# Patient Record
Sex: MALE | Employment: UNEMPLOYED | URBAN - METROPOLITAN AREA
[De-identification: names, ages, dates, MRNs, and addresses within clinical notes are randomized per-mention and may not be internally consistent; named-entity substitution may affect disease eponyms.]

---

## 2017-04-29 ENCOUNTER — HOSPITAL ENCOUNTER (EMERGENCY)
Age: 62
Discharge: LEFT BEFORE DISCHARGE | End: 2017-04-29
Attending: EMERGENCY MEDICINE
Payer: MEDICAID

## 2017-04-29 ENCOUNTER — APPOINTMENT (OUTPATIENT)
Dept: GENERAL RADIOLOGY | Age: 62
End: 2017-04-29
Attending: PHYSICIAN ASSISTANT
Payer: MEDICAID

## 2017-04-29 VITALS
SYSTOLIC BLOOD PRESSURE: 137 MMHG | TEMPERATURE: 98.3 F | WEIGHT: 150 LBS | OXYGEN SATURATION: 98 % | HEART RATE: 91 BPM | RESPIRATION RATE: 18 BRPM | BODY MASS INDEX: 20.32 KG/M2 | DIASTOLIC BLOOD PRESSURE: 81 MMHG | HEIGHT: 72 IN

## 2017-04-29 DIAGNOSIS — S82.891S: ICD-10-CM

## 2017-04-29 DIAGNOSIS — Z53.29 LEFT AGAINST MEDICAL ADVICE: Primary | ICD-10-CM

## 2017-04-29 DIAGNOSIS — E87.1 HYPONATREMIA: ICD-10-CM

## 2017-04-29 DIAGNOSIS — R07.89 MUSCULOSKELETAL CHEST PAIN: ICD-10-CM

## 2017-04-29 LAB
ALBUMIN SERPL BCP-MCNC: 3.7 G/DL (ref 3.4–5)
ALBUMIN/GLOB SERPL: 0.9 {RATIO} (ref 0.8–1.7)
ALP SERPL-CCNC: 102 U/L (ref 45–117)
ALT SERPL-CCNC: 34 U/L (ref 16–61)
ANION GAP BLD CALC-SCNC: 13 MMOL/L (ref 3–18)
ANION GAP BLD CALC-SCNC: 9 MMOL/L (ref 3–18)
AST SERPL W P-5'-P-CCNC: 39 U/L (ref 15–37)
BASOPHILS # BLD AUTO: 0 K/UL (ref 0–0.06)
BASOPHILS # BLD: 1 % (ref 0–2)
BILIRUB DIRECT SERPL-MCNC: 0.1 MG/DL (ref 0–0.2)
BILIRUB SERPL-MCNC: 0.4 MG/DL (ref 0.2–1)
BUN SERPL-MCNC: 5 MG/DL (ref 7–18)
BUN SERPL-MCNC: 5 MG/DL (ref 7–18)
BUN/CREAT SERPL: 7 (ref 12–20)
BUN/CREAT SERPL: 7 (ref 12–20)
CALCIUM SERPL-MCNC: 8.6 MG/DL (ref 8.5–10.1)
CALCIUM SERPL-MCNC: 8.8 MG/DL (ref 8.5–10.1)
CHLORIDE SERPL-SCNC: 90 MMOL/L (ref 100–108)
CHLORIDE SERPL-SCNC: 91 MMOL/L (ref 100–108)
CK MB CFR SERPL CALC: 1.7 % (ref 0–4)
CK MB CFR SERPL CALC: 1.7 % (ref 0–4)
CK MB SERPL-MCNC: 2.4 NG/ML (ref 5–25)
CK MB SERPL-MCNC: 3 NG/ML (ref 5–25)
CK SERPL-CCNC: 142 U/L (ref 39–308)
CK SERPL-CCNC: 172 U/L (ref 39–308)
CO2 SERPL-SCNC: 20 MMOL/L (ref 21–32)
CO2 SERPL-SCNC: 24 MMOL/L (ref 21–32)
CREAT SERPL-MCNC: 0.67 MG/DL (ref 0.6–1.3)
CREAT SERPL-MCNC: 0.71 MG/DL (ref 0.6–1.3)
DIFFERENTIAL METHOD BLD: ABNORMAL
EOSINOPHIL # BLD: 0 K/UL (ref 0–0.4)
EOSINOPHIL NFR BLD: 1 % (ref 0–5)
ERYTHROCYTE [DISTWIDTH] IN BLOOD BY AUTOMATED COUNT: 12.5 % (ref 11.6–14.5)
GLOBULIN SER CALC-MCNC: 4.2 G/DL (ref 2–4)
GLUCOSE SERPL-MCNC: 134 MG/DL (ref 74–99)
GLUCOSE SERPL-MCNC: 91 MG/DL (ref 74–99)
HCT VFR BLD AUTO: 39.2 % (ref 36–48)
HGB BLD-MCNC: 14.4 G/DL (ref 13–16)
LYMPHOCYTES # BLD AUTO: 44 % (ref 21–52)
LYMPHOCYTES # BLD: 1.6 K/UL (ref 0.9–3.6)
MAGNESIUM SERPL-MCNC: 1.7 MG/DL (ref 1.6–2.6)
MCH RBC QN AUTO: 33.6 PG (ref 24–34)
MCHC RBC AUTO-ENTMCNC: 36.7 G/DL (ref 31–37)
MCV RBC AUTO: 91.4 FL (ref 74–97)
MONOCYTES # BLD: 0.4 K/UL (ref 0.05–1.2)
MONOCYTES NFR BLD AUTO: 11 % (ref 3–10)
NEUTS SEG # BLD: 1.6 K/UL (ref 1.8–8)
NEUTS SEG NFR BLD AUTO: 43 % (ref 40–73)
PLATELET # BLD AUTO: 221 K/UL (ref 135–420)
PMV BLD AUTO: 7.6 FL (ref 9.2–11.8)
POTASSIUM SERPL-SCNC: 3.7 MMOL/L (ref 3.5–5.5)
POTASSIUM SERPL-SCNC: 3.9 MMOL/L (ref 3.5–5.5)
PROT SERPL-MCNC: 7.9 G/DL (ref 6.4–8.2)
RBC # BLD AUTO: 4.29 M/UL (ref 4.7–5.5)
SODIUM SERPL-SCNC: 123 MMOL/L (ref 136–145)
SODIUM SERPL-SCNC: 124 MMOL/L (ref 136–145)
TROPONIN I SERPL-MCNC: <0.02 NG/ML (ref 0–0.04)
TROPONIN I SERPL-MCNC: <0.02 NG/ML (ref 0–0.04)
WBC # BLD AUTO: 3.7 K/UL (ref 4.6–13.2)

## 2017-04-29 PROCEDURE — 99285 EMERGENCY DEPT VISIT HI MDM: CPT

## 2017-04-29 PROCEDURE — 82550 ASSAY OF CK (CPK): CPT | Performed by: PHYSICIAN ASSISTANT

## 2017-04-29 PROCEDURE — 74011250636 HC RX REV CODE- 250/636: Performed by: PHYSICIAN ASSISTANT

## 2017-04-29 PROCEDURE — 83735 ASSAY OF MAGNESIUM: CPT | Performed by: PHYSICIAN ASSISTANT

## 2017-04-29 PROCEDURE — 80048 BASIC METABOLIC PNL TOTAL CA: CPT | Performed by: PHYSICIAN ASSISTANT

## 2017-04-29 PROCEDURE — 73564 X-RAY EXAM KNEE 4 OR MORE: CPT

## 2017-04-29 PROCEDURE — 74011250637 HC RX REV CODE- 250/637: Performed by: PHYSICIAN ASSISTANT

## 2017-04-29 PROCEDURE — 85025 COMPLETE CBC W/AUTO DIFF WBC: CPT | Performed by: PHYSICIAN ASSISTANT

## 2017-04-29 PROCEDURE — 80076 HEPATIC FUNCTION PANEL: CPT | Performed by: PHYSICIAN ASSISTANT

## 2017-04-29 PROCEDURE — 96372 THER/PROPH/DIAG INJ SC/IM: CPT

## 2017-04-29 PROCEDURE — 71010 XR CHEST PORT: CPT

## 2017-04-29 PROCEDURE — 96360 HYDRATION IV INFUSION INIT: CPT

## 2017-04-29 PROCEDURE — 93005 ELECTROCARDIOGRAM TRACING: CPT

## 2017-04-29 RX ORDER — LISINOPRIL 20 MG/1
20 TABLET ORAL
Status: COMPLETED | OUTPATIENT
Start: 2017-04-29 | End: 2017-04-29

## 2017-04-29 RX ORDER — KETOROLAC TROMETHAMINE 30 MG/ML
60 INJECTION, SOLUTION INTRAMUSCULAR; INTRAVENOUS
Status: COMPLETED | OUTPATIENT
Start: 2017-04-29 | End: 2017-04-29

## 2017-04-29 RX ORDER — LORAZEPAM 1 MG/1
1 TABLET ORAL
Status: COMPLETED | OUTPATIENT
Start: 2017-04-29 | End: 2017-04-29

## 2017-04-29 RX ORDER — CLONIDINE HYDROCHLORIDE 0.1 MG/1
0.1 TABLET ORAL
Status: COMPLETED | OUTPATIENT
Start: 2017-04-29 | End: 2017-04-29

## 2017-04-29 RX ORDER — LISINOPRIL 20 MG/1
20 TABLET ORAL DAILY
Qty: 30 TAB | Refills: 0 | Status: SHIPPED | OUTPATIENT
Start: 2017-04-29 | End: 2018-06-22

## 2017-04-29 RX ADMIN — SODIUM CHLORIDE 1000 ML: 900 INJECTION, SOLUTION INTRAVENOUS at 13:23

## 2017-04-29 RX ADMIN — LISINOPRIL 20 MG: 20 TABLET ORAL at 14:45

## 2017-04-29 RX ADMIN — KETOROLAC TROMETHAMINE 60 MG: 30 INJECTION, SOLUTION INTRAMUSCULAR at 11:48

## 2017-04-29 RX ADMIN — CLONIDINE HYDROCHLORIDE 0.1 MG: 0.1 TABLET ORAL at 14:45

## 2017-04-29 RX ADMIN — LORAZEPAM 1 MG: 1 TABLET ORAL at 16:33
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
## 2017-04-29 NOTE — DISCHARGE INSTRUCTIONS
Musculoskeletal Chest Pain: Care Instructions  Your Care Instructions  Chest pain is not always a sign that something is wrong with your heart or that you have another serious problem. The doctor thinks your chest pain is caused by strained muscles or ligaments, inflamed chest cartilage, or another problem in your chest, rather than by your heart. You may need more tests to find the cause of your chest pain. Follow-up care is a key part of your treatment and safety. Be sure to make and go to all appointments, and call your doctor if you are having problems. Its also a good idea to know your test results and keep a list of the medicines you take. How can you care for yourself at home? · Take pain medicines exactly as directed. ¨ If the doctor gave you a prescription medicine for pain, take it as prescribed. ¨ If you are not taking a prescription pain medicine, ask your doctor if you can take an over-the-counter medicine. · Rest and protect the sore area. · Stop, change, or take a break from any activity that may be causing your pain or soreness. · Put ice or a cold pack on the sore area for 10 to 20 minutes at a time. Try to do this every 1 to 2 hours for the next 3 days (when you are awake) or until the swelling goes down. Put a thin cloth between the ice and your skin. · After 2 or 3 days, apply a heating pad set on low or a warm cloth to the area that hurts. Some doctors suggest that you go back and forth between hot and cold. · Do not wrap or tape your ribs for support. This may cause you to take smaller breaths, which could increase your risk of lung problems. · Mentholated creams such as Bengay or Icy Hot may soothe sore muscles. Follow the instructions on the package. · Follow your doctor's instructions for exercising. · Gentle stretching and massage may help you get better faster. Stretch slowly to the point just before pain begins, and hold the stretch for at least 15 to 30 seconds.  Do this 3 or 4 times a day. Stretch just after you have applied heat. · As your pain gets better, slowly return to your normal activities. Any increased pain may be a sign that you need to rest a while longer. When should you call for help? Call 911 anytime you think you may need emergency care. For example, call if:  · You have chest pain or pressure. This may occur with:  ¨ Sweating. ¨ Shortness of breath. ¨ Nausea or vomiting. ¨ Pain that spreads from the chest to the neck, jaw, or one or both shoulders or arms. ¨ Dizziness or lightheadedness. ¨ A fast or uneven pulse. After calling 911, chew 1 adult-strength aspirin. Wait for an ambulance. Do not try to drive yourself. · You have sudden chest pain and shortness of breath, or you cough up blood. Call your doctor now or seek immediate medical care if:  · You have any trouble breathing. · Your chest pain gets worse. · Your chest pain occurs consistently with exercise and is relieved by rest.  Watch closely for changes in your health, and be sure to contact your doctor if:  · Your chest pain does not get better after 1 week. Where can you learn more? Go to http://olivia-gary.info/. Enter V293 in the search box to learn more about \"Musculoskeletal Chest Pain: Care Instructions. \"  Current as of: May 27, 2016  Content Version: 11.2  © 3314-9840 [employer]. Care instructions adapted under license by [employer] (which disclaims liability or warranty for this information). If you have questions about a medical condition or this instruction, always ask your healthcare professional. [place] [address] any warranty or liability for your use of this information. Hyponatremia: Care Instructions  Your Care Instructions  Hyponatremia (say \"wd-[id number]-[id number]-beverly-uh\") means that you don't have enough sodium in your blood. It can cause nausea, vomiting, and headaches. Or you may not feel hungry.  In serious cases, it can cause seizures, a coma, or even death. Hyponatremia is not a disease. It is a problem caused by something else, such as medicines or exercising for a long time in hot weather. You can get hyponatremia if you lose a lot of fluids and then you drink a lot of water or other liquids that don't have much sodium. You can also get it if you have kidney, liver, heart, or other health problems. Treatment is focused on getting your sodium levels back to normal.  Follow-up care is a key part of your treatment and safety. Be sure to make and go to all appointments, and call your doctor if you are having problems. It's also a good idea to know your test results and keep a list of the medicines you take. How can you care for yourself at home? · If your doctor recommends it, drink fluids that have sodium. Sports drinks are a good choice. Or you can eat salty foods. · If your doctor recommends it, limit the amount of water you drink. And limit fluids that are mostly water. These include tea, coffee, and juice. · Take your medicines exactly as prescribed. Call your doctor if you have any problems with your medicine. · Get your sodium levels tested when your doctor tells you to. When should you call for help? Call 911 anytime you think you may need emergency care. For example, call if:  · You have a seizure. · You passed out (lost consciousness). Call your doctor now or seek immediate medical care if:  · You are confused or it is hard to focus. · You have little or no appetite. · You feel sick to your stomach or you vomit. · You have a headache. · You have mood changes. · You feel more tired than usual.  Watch closely for changes in your health, and be sure to contact your doctor if:  · You do not get better as expected. Where can you learn more? Go to http://olivia-gary.info/. Enter W293 in the search box to learn more about \"Hyponatremia: Care Instructions. \"  Current as of: October 14, 2016  Content Version: 11.2  © 5885-3222 [employer]. Care instructions adapted under license by [employer] (which disclaims liability or warranty for this information). If you have questions about a medical condition or this instruction, always ask your healthcare professional. [address] any warranty or liability for your use of this information.

## 2017-04-29 NOTE — ED NOTES
Patient leaving against medical advice at this time. I have tried talking to patient but patient states Tamea Apley been here all day I need to go home\". Patient verbalizes understanding that his sodium is low and we are trying to increase his levels prior to leaving. Patient states \"I will eat a lot of sodium at home\". Patient asking for referral to a clinic so he can get pain medication. I have reviewed discharge instructions with the patient. The patient verbalized understanding. VSS.  Patient armband removed and shredded

## 2017-04-29 NOTE — ED NOTES
Purposeful rounding completed:    Side rails up x 1:  YES  Bed in low position and wheels locked: YES  Call bell within reach: YES  Comfort addressed: YES    Toileting needs addressed: YES  Plan of care reviewed/updated with patient and or family members: YES  IV site assessed: YES  Pain assessed and addressed: YES, 6

## 2017-04-29 NOTE — ED NOTES
I have reviewed discharge instructions with the patient. The patient verbalized understanding. Patient armband removed and shredded. VSS.  Patient verbalized understanding of how to properly take prescribed medication

## 2017-04-29 NOTE — ED TRIAGE NOTES
Pt c/o right knee pain for one month and wanting \"some pain medication\". Reports he was prescribed pain medication one month ago but only got half the pills because it was too expensive. Wants pain medicine from us and to f/u with \"a private doctor\". Education given on pain medication and er visits.

## 2017-04-29 NOTE — ED NOTES
Purposeful rounding completed:    Side rails up x 1:  YES  Bed in low position and wheels locked: YES  Call bell within reach: YES  Comfort addressed: YES    Toileting needs addressed: YES  Plan of care reviewed/updated with patient and or family members: YES  IV site assessed: YES  Pain assessed and addressed: YES, 5    Patient pacing in room saying hes tired of sitting and wants to leave.  Patient medicated with ativan at this time

## 2017-04-29 NOTE — ED NOTES
Patient provided with crackers and ginger ale at this time per verbal okay of provider Mary Dickerson.

## 2017-04-30 LAB
ATRIAL RATE: 88 BPM
CALCULATED P AXIS, ECG09: 76 DEGREES
CALCULATED R AXIS, ECG10: 71 DEGREES
CALCULATED T AXIS, ECG11: 75 DEGREES
DIAGNOSIS, 93000: NORMAL
P-R INTERVAL, ECG05: 262 MS
Q-T INTERVAL, ECG07: 370 MS
QRS DURATION, ECG06: 84 MS
QTC CALCULATION (BEZET), ECG08: 447 MS
VENTRICULAR RATE, ECG03: 88 BPM

## 2018-06-22 ENCOUNTER — APPOINTMENT (OUTPATIENT)
Dept: GENERAL RADIOLOGY | Age: 63
End: 2018-06-22
Attending: EMERGENCY MEDICINE
Payer: SELF-PAY

## 2018-06-22 ENCOUNTER — HOSPITAL ENCOUNTER (EMERGENCY)
Age: 63
Discharge: HOME | End: 2018-06-22
Attending: EMERGENCY MEDICINE
Payer: SELF-PAY

## 2018-06-22 VITALS
DIASTOLIC BLOOD PRESSURE: 89 MMHG | HEART RATE: 77 BPM | OXYGEN SATURATION: 100 % | TEMPERATURE: 97.6 F | SYSTOLIC BLOOD PRESSURE: 142 MMHG | RESPIRATION RATE: 19 BRPM

## 2018-06-22 DIAGNOSIS — C34.91 NON-SMALL CELL CANCER OF RIGHT LUNG (HCC): ICD-10-CM

## 2018-06-22 DIAGNOSIS — R07.9 ACUTE CHEST PAIN: Primary | ICD-10-CM

## 2018-06-22 LAB
ANION GAP SERPL CALC-SCNC: 8 MMOL/L (ref 3–18)
APPEARANCE UR: CLEAR
BASOPHILS # BLD: 0.1 K/UL (ref 0–0.06)
BASOPHILS NFR BLD: 2 % (ref 0–2)
BILIRUB UR QL: NEGATIVE
BUN SERPL-MCNC: 4 MG/DL (ref 7–18)
BUN/CREAT SERPL: 5 (ref 12–20)
CALCIUM SERPL-MCNC: 8.9 MG/DL (ref 8.5–10.1)
CHLORIDE SERPL-SCNC: 99 MMOL/L (ref 100–108)
CO2 SERPL-SCNC: 25 MMOL/L (ref 21–32)
COLOR UR: YELLOW
CREAT SERPL-MCNC: 0.73 MG/DL (ref 0.6–1.3)
DIFFERENTIAL METHOD BLD: ABNORMAL
EOSINOPHIL # BLD: 0 K/UL (ref 0–0.4)
EOSINOPHIL NFR BLD: 1 % (ref 0–5)
ERYTHROCYTE [DISTWIDTH] IN BLOOD BY AUTOMATED COUNT: 12.8 % (ref 11.6–14.5)
GLUCOSE SERPL-MCNC: 86 MG/DL (ref 74–99)
GLUCOSE UR STRIP.AUTO-MCNC: NEGATIVE MG/DL
HCT VFR BLD AUTO: 41.8 % (ref 36–48)
HGB BLD-MCNC: 15.2 G/DL (ref 13–16)
HGB UR QL STRIP: NEGATIVE
KETONES UR QL STRIP.AUTO: NEGATIVE MG/DL
LEUKOCYTE ESTERASE UR QL STRIP.AUTO: NEGATIVE
LYMPHOCYTES # BLD: 1.8 K/UL (ref 0.9–3.6)
LYMPHOCYTES NFR BLD: 55 % (ref 21–52)
MCH RBC QN AUTO: 34.3 PG (ref 24–34)
MCHC RBC AUTO-ENTMCNC: 36.4 G/DL (ref 31–37)
MCV RBC AUTO: 94.4 FL (ref 74–97)
MONOCYTES # BLD: 0.5 K/UL (ref 0.05–1.2)
MONOCYTES NFR BLD: 14 % (ref 3–10)
NEUTS SEG # BLD: 0.9 K/UL (ref 1.8–8)
NEUTS SEG NFR BLD: 28 % (ref 40–73)
NITRITE UR QL STRIP.AUTO: NEGATIVE
PH UR STRIP: 6 [PH] (ref 5–8)
PLATELET # BLD AUTO: 143 K/UL (ref 135–420)
PMV BLD AUTO: 8.1 FL (ref 9.2–11.8)
POTASSIUM SERPL-SCNC: 4 MMOL/L (ref 3.5–5.5)
PROT UR STRIP-MCNC: NEGATIVE MG/DL
RBC # BLD AUTO: 4.43 M/UL (ref 4.7–5.5)
SODIUM SERPL-SCNC: 132 MMOL/L (ref 136–145)
SP GR UR REFRACTOMETRY: 1.01 (ref 1–1.03)
TROPONIN I SERPL-MCNC: <0.02 NG/ML (ref 0–0.04)
UROBILINOGEN UR QL STRIP.AUTO: 0.2 EU/DL (ref 0.2–1)
WBC # BLD AUTO: 3.3 K/UL (ref 4.6–13.2)

## 2018-06-22 PROCEDURE — 71045 X-RAY EXAM CHEST 1 VIEW: CPT

## 2018-06-22 PROCEDURE — 93005 ELECTROCARDIOGRAM TRACING: CPT

## 2018-06-22 PROCEDURE — 99285 EMERGENCY DEPT VISIT HI MDM: CPT

## 2018-06-22 PROCEDURE — 85025 COMPLETE CBC W/AUTO DIFF WBC: CPT | Performed by: EMERGENCY MEDICINE

## 2018-06-22 PROCEDURE — 80048 BASIC METABOLIC PNL TOTAL CA: CPT | Performed by: EMERGENCY MEDICINE

## 2018-06-22 PROCEDURE — 74011250637 HC RX REV CODE- 250/637: Performed by: EMERGENCY MEDICINE

## 2018-06-22 PROCEDURE — 81003 URINALYSIS AUTO W/O SCOPE: CPT | Performed by: EMERGENCY MEDICINE

## 2018-06-22 PROCEDURE — 84484 ASSAY OF TROPONIN QUANT: CPT | Performed by: EMERGENCY MEDICINE

## 2018-06-22 RX ORDER — GUAIFENESIN 100 MG/5ML
324 LIQUID (ML) ORAL
Status: COMPLETED | OUTPATIENT
Start: 2018-06-22 | End: 2018-06-22

## 2018-06-22 RX ORDER — LISINOPRIL 20 MG/1
20 TABLET ORAL
Status: COMPLETED | OUTPATIENT
Start: 2018-06-22 | End: 2018-06-22

## 2018-06-22 RX ORDER — LISINOPRIL 20 MG/1
20 TABLET ORAL DAILY
Qty: 30 TAB | Refills: 0 | Status: SHIPPED | OUTPATIENT
Start: 2018-06-22 | End: 2019-01-01

## 2018-06-22 RX ORDER — NITROGLYCERIN 0.4 MG/1
0.4 TABLET SUBLINGUAL
Status: DISCONTINUED | OUTPATIENT
Start: 2018-06-22 | End: 2018-06-22 | Stop reason: HOSPADM

## 2018-06-22 RX ADMIN — NITROGLYCERIN 0.4 MG: 0.4 TABLET SUBLINGUAL at 19:10

## 2018-06-22 RX ADMIN — LISINOPRIL 20 MG: 20 TABLET ORAL at 19:10

## 2018-06-22 RX ADMIN — ASPIRIN 81 MG 324 MG: 81 TABLET ORAL at 19:10

## 2018-06-22 NOTE — ED PROVIDER NOTES
EMERGENCY DEPARTMENT HISTORY AND PHYSICAL EXAM    6:25 PM      Date: 6/22/2018  Patient Name: Timoteo Gagnon    History of Presenting Illness     Chief Complaint   Patient presents with    Chest Pain         History Provided By: Patient    Chief Complaint: cp  Duration:  4 hours  Timing:  Constant  Location: right sided  Quality: nonradiating, atraumatic   Severity: Moderate  Modifying Factors: has not had BP and seizure medication for a while   Associated Symptoms: denies any other associated signs or symptoms      Additional History (Context): Timoteo Gagnon, a 59 y.o. Male, current daily smoker, alcohol drinker, with h/o depression, seizures and HTN, presents to the ED c/o constant, moderate severity, right sided cp since 4 hours ago that worsens with palpation. Pt admits to walking at the time of onset of his cp but denies sob. He has a cough for a while but no cold sx or abd pain. Pt is supposed to take HTN medication but has not been able to get his medication due to problems with coverage for the past month. He has applied for Medicaid but won't have coverage until 7/1/2018. Pt does not have a PCP. No other complaints/concerns are reported at this time. PCP: None    Current Facility-Administered Medications   Medication Dose Route Frequency Provider Last Rate Last Dose    nitroglycerin (NITROSTAT) tablet 0.4 mg  0.4 mg SubLINGual Q5MIN PRN Luda Gauthier MD   0.4 mg at 06/22/18 1910     Current Outpatient Prescriptions   Medication Sig Dispense Refill    lisinopril (PRINIVIL, ZESTRIL) 20 mg tablet Take 1 Tab by mouth daily. 30 Tab 0    aspirin (ASPIRIN) 325 mg tablet Take 325 mg by mouth daily.  divalproex DR (DEPAKOTE) 250 mg tablet Take  by mouth three (3) times daily. Past History     Past Medical History:  Past Medical History:   Diagnosis Date    Depression     Hypertension     Seizure ([facility] [address]     Seizures ([facility] [address]        Past Surgical History:  History reviewed.  No pertinent surgical history. Family History:  History reviewed. No pertinent family history. Social History:  Social History   Substance Use Topics    Smoking status: Current Every Day Smoker    Smokeless tobacco: Never Used    Alcohol use Yes       Allergies:  No Known Allergies      Review of Systems       Review of Systems   Constitutional: Negative for chills and fever. HENT: Negative for ear pain and sore throat. Eyes: Negative for pain and visual disturbance. Respiratory: Positive for cough. Negative for shortness of breath. Cardiovascular: Positive for chest pain (right sided). Negative for palpitations. Gastrointestinal: Negative for abdominal pain, diarrhea, nausea and vomiting. Genitourinary: Negative for flank pain. Musculoskeletal: Negative for back pain and neck pain. Neurological: Negative for syncope and headaches. Psychiatric/Behavioral: Negative for agitation. The patient is not nervous/anxious. All other systems reviewed and are negative. Physical Exam     Visit Vitals    BP (!) 169/93    Pulse 89    Temp 97.6 °F (36.4 °C)    Resp 14    SpO2 100%       Physical Exam    Physical exam:  General:  Well-developed, well-nourished, thin male nontoxic nondiaphoretic. Head:  Normocephalic atraumatic. Eyes:  Pupils midrange extraocular movements intact. No pallor or conjunctival injection. Nose:  No rhinorrhea, inspection grossly normal.    Ears:  Grossly normal to inspection, no discharge. Mouth:  Mucous membranes moist, no appreciable intraoral lesion. Neck/Back:  Trachea midline, no asymmetry. Chest:  Grossly normal inspection, symmetric chest rise. Pulmonary:  Clear to auscultation bilaterally no wheezes rhonchi or rales. Cardiovascular:  S1-S2 no murmurs rubs or gallops. Abdomen: Soft, nontender, nondistended no guarding rebound or peritoneal signs.     Extremities:  Grossly normal to inspection, peripheral pulses intact  no distal edema no pain on palpation no asymmetry  Neurologic:  Alert and oriented no appreciable focal neurologic deficit     Skin:  Warm and dry   Psychiatric:  Grossly normal mood and affect   Nursing note reviewed, vital signs reviewed. Diagnostic Study Results     Labs -  Recent Results (from the past 12 hour(s))   EKG, 12 LEAD, INITIAL    Collection Time: 06/22/18  6:22 PM   Result Value Ref Range    Ventricular Rate 88 BPM    Atrial Rate 88 BPM    P-R Interval 248 ms    QRS Duration 94 ms    Q-T Interval 372 ms    QTC Calculation (Bezet) 450 ms    Calculated P Axis 83 degrees    Calculated R Axis 71 degrees    Calculated T Axis 78 degrees    Diagnosis       Sinus rhythm with 1st degree AV block  Possible Left atrial enlargement  Borderline ECG  When compared with ECG of 29-APR-2017 10:57,  No significant change was found     URINALYSIS W/ RFLX MICROSCOPIC    Collection Time: 06/22/18  6:29 PM   Result Value Ref Range    Color YELLOW      Appearance CLEAR      Specific gravity 1.007 1.005 - 1.030      pH (UA) 6.0 5.0 - 8.0      Protein NEGATIVE  NEG mg/dL    Glucose NEGATIVE  NEG mg/dL    Ketone NEGATIVE  NEG mg/dL    Bilirubin NEGATIVE  NEG      Blood NEGATIVE  NEG      Urobilinogen 0.2 0.2 - 1.0 EU/dL    Nitrites NEGATIVE  NEG      Leukocyte Esterase NEGATIVE  NEG     CBC WITH AUTOMATED DIFF    Collection Time: 06/22/18  6:34 PM   Result Value Ref Range    WBC 3.3 (L) 4.6 - 13.2 K/uL    RBC 4.43 (L) 4.70 - 5.50 M/uL    HGB 15.2 13.0 - 16.0 g/dL    HCT 41.8 36.0 - 48.0 %    MCV 94.4 74.0 - 97.0 FL    MCH 34.3 (H) 24.0 - 34.0 PG    MCHC 36.4 31.0 - 37.0 g/dL    RDW 12.8 11.6 - 14.5 %    PLATELET 881 020 - 722 K/uL    MPV 8.1 (L) 9.2 - 11.8 FL    NEUTROPHILS 28 (L) 40 - 73 %    LYMPHOCYTES 55 (H) 21 - 52 %    MONOCYTES 14 (H) 3 - 10 %    EOSINOPHILS 1 0 - 5 %    BASOPHILS 2 0 - 2 %    ABS. NEUTROPHILS 0.9 (L) 1.8 - 8.0 K/UL    ABS. LYMPHOCYTES 1.8 0.9 - 3.6 K/UL    ABS. MONOCYTES 0.5 0.05 - 1.2 K/UL    ABS. EOSINOPHILS 0.0 0.0 - 0.4 K/UL    ABS. BASOPHILS 0.1 (H) 0.0 - 0.06 K/UL    DF AUTOMATED     METABOLIC PANEL, BASIC    Collection Time: 06/22/18  6:34 PM   Result Value Ref Range    Sodium 132 (L) 136 - 145 mmol/L    Potassium 4.0 3.5 - 5.5 mmol/L    Chloride 99 (L) 100 - 108 mmol/L    CO2 25 21 - 32 mmol/L    Anion gap 8 3.0 - 18 mmol/L    Glucose 86 74 - 99 mg/dL    BUN 4 (L) 7.0 - 18 MG/DL    Creatinine 0.73 0.6 - 1.3 MG/DL    BUN/Creatinine ratio 5 (L) 12 - 20      GFR est AA >60 >60 ml/min/1.73m2    GFR est non-AA >60 >60 ml/min/1.73m2    Calcium 8.9 8.5 - 10.1 MG/DL   TROPONIN I    Collection Time: 06/22/18  6:34 PM   Result Value Ref Range    Troponin-I, Qt. <0.02 0.0 - 0.045 NG/ML       Radiologic Studies -   XR CHEST PORT    (Results Pending)     XR CHEST PORT  --------------------------------------------  Impression by Dr. Nash Kent at 7:24 PM   No pneumothorax, hazy opasicity in right upper lobe corresponding to former nodule. Medical Decision Making   I am the first provider for this patient. I reviewed the vital signs, available nursing notes, past medical history, past surgical history, family history and social history. Vital Signs-Reviewed the patient's vital signs. Pulse Oximetry Analysis -  98% on room air (Interpretation) Non-hypoxic     Cardiac Monitor:  Rate: 95  Rhythm:  sinus rhythm with first degree AV block     EKG: Interpreted by the EP. Time Interpreted: 18:22    Rate: 88 bpm   Rhythm: sinus rhythm with first degree AV block     Records Reviewed: Nursing Notes and Old Medical Records (Time of Review: 6:25 PM)    ED Course: Progress Notes, Reevaluation, and Consults:      ED course:  Patient presents with chest pain, atypical description, recent onset of more than 4 hours, we'll not need more than 1 troponin, he does have a markedly elevated high blood pressure not compliant with medications or past 1 month due to financial difficulty.   We'll give aspirin and nitroglycerin glycerin dose of lisinopril here which is his normal medication and check labs    7:26 PM Chart review shows pt  was seen by pulmonology for lung cancer on January of 2018. Labs unremarkable troponin negative    Reevaluated blood pressure is now 140/82 his symptoms have resolved, he was offered inpatient admission for further evaluation of his chest pain and management as high blood pressure, he declined. He is clinically sober, and no indication of force him against his will to be admitted. On review his EMR he does have a history of right-sided lung cancer, status post what sounds like radiation therapy, he has multiple no shows listed, he was informed as need to follow-up with primary care and oncology. He is aware of the dangers of failing to follow up for evaluation of cancer. He was given a cardiologist to follow up with as an outpatient, prescription for lisinopril and strict return precautions    Patient's history, physical exam and laboratory evaluations were reviewed. Had discussion with the patient about the possible etiologies of chest pain. Heart score: 2    Patient was felt to be low risk for major adverse cardiac event, was  informed about the risks and benefits and alternatives of inpatient versus outpatient workup. At this time patient is stable for outpatient management including follow-up with primary care physician/cardiology for reevaluation within 72 hours. Patient is aware that no evaluation in the emergency department can ensure 0% risk, patient will return to the emergency department with any concerns. Portions of this chart were created with Dragon medical speech to text program.   Unrecognized errors may be present. Diagnosis     Clinical Impression:   1. Acute chest pain    2.  Non-small cell cancer of right lung [facility])        Disposition: Discharged    Follow-up Information     Follow up With Details Comments Contact Info    Nikolay Noel MD Schedule an appointment as soon as possible for a visit in 3 days  [address] [address]  [phone]      [facility] EMERGENCY DEPT  As needed, If symptoms worsen [address]  [phone]           Patient's Medications   Start Taking    No medications on file   Continue Taking    ASPIRIN (ASPIRIN) 325 MG TABLET    Take 325 mg by mouth daily. DIVALPROEX DR (DEPAKOTE) 250 MG TABLET    Take  by mouth three (3) times daily. These Medications have changed    Modified Medication Previous Medication    LISINOPRIL (PRINIVIL, ZESTRIL) 20 MG TABLET lisinopril (PRINIVIL, ZESTRIL) 20 mg tablet       Take 1 Tab by mouth daily. Take 1 Tab by mouth daily. Stop Taking    LISINOPRIL (PRINIVIL, ZESTRIL) 20 MG TABLET    Take  by mouth daily. _______________________________    Attestations:  Scribe Attestation     Doris Roach acting as a scribe for and in the presence of Rukhsana Arias MD      June 22, 2018 at 6:25 PM       Provider Attestation:      I personally performed the services described in the documentation, reviewed the documentation, as recorded by the scribe in my presence, and it accurately and completely records my words and actions.  June 22, 2018 at 6:25 PM - Rukhsana Arias MD  _______________________________

## 2018-06-23 LAB
ATRIAL RATE: 88 BPM
CALCULATED P AXIS, ECG09: 83 DEGREES
CALCULATED R AXIS, ECG10: 71 DEGREES
CALCULATED T AXIS, ECG11: 78 DEGREES
DIAGNOSIS, 93000: NORMAL
P-R INTERVAL, ECG05: 248 MS
Q-T INTERVAL, ECG07: 372 MS
QRS DURATION, ECG06: 94 MS
QTC CALCULATION (BEZET), ECG08: 450 MS
VENTRICULAR RATE, ECG03: 88 BPM

## 2018-06-23 NOTE — DISCHARGE INSTRUCTIONS
Chest Pain: Care Instructions  Your Care Instructions    There are many things that can cause chest pain. Some are not serious and will get better on their own in a few days. But some kinds of chest pain need more testing and treatment. Your doctor may have recommended a follow-up visit in the next 8 to 12 hours. If you are not getting better, you may need more tests or treatment. Even though your doctor has released you, you still need to watch for any problems. The doctor carefully checked you, but sometimes problems can develop later. If you have new symptoms or if your symptoms do not get better, get medical care right away. If you have worse or different chest pain or pressure that lasts more than 5 minutes or you passed out (lost consciousness), call 911 or seek other emergency help right away. A medical visit is only one step in your treatment. Even if you feel better, you still need to do what your doctor recommends, such as going to all suggested follow-up appointments and taking medicines exactly as directed. This will help you recover and help prevent future problems. How can you care for yourself at home? · Rest until you feel better. · Take your medicine exactly as prescribed. Call your doctor if you think you are having a problem with your medicine. · Do not drive after taking a prescription pain medicine. When should you call for help? Call 911 if:  ? · You passed out (lost consciousness). ? · You have severe difficulty breathing. ? · You have symptoms of a heart attack. These may include:  ¨ Chest pain or pressure, or a strange feeling in your chest.  ¨ Sweating. ¨ Shortness of breath. ¨ Nausea or vomiting. ¨ Pain, pressure, or a strange feeling in your back, neck, jaw, or upper belly or in one or both shoulders or arms. ¨ Lightheadedness or sudden weakness. ¨ A fast or irregular heartbeat.   After you call 911, the  may tell you to chew 1 adult-strength or 2 to 4 low-dose aspirin. Wait for an ambulance. Do not try to drive yourself. ?Call your doctor today if:  ? · You have any trouble breathing. ? · Your chest pain gets worse. ? · You are dizzy or lightheaded, or you feel like you may faint. ? · You are not getting better as expected. ? · You are having new or different chest pain. Where can you learn more? Go to http://olivia-gary.info/. Enter A120 in the search box to learn more about \"Chest Pain: Care Instructions. \"  Current as of: March 20, 2017  Content Version: 11.4  © 4568-3910 [employer]. Care instructions adapted under license by [employer] (which disclaims liability or warranty for this information). If you have questions about a medical condition or this instruction, always ask your healthcare professional. [address] any warranty or liability for your use of this information. Lung Cancer: Care Instructions  Your Care Instructions    Lung cancer occurs when abnormal cells grow out of control in the lung. Lung cancer can start anywhere in the lungs and spread to other parts of the body. Treatment for lung cancer depends on what type of lung cancer you have and how advanced it is. Treatment may include surgery to remove the cancer. It could also include medicines (chemotherapy) or radiation to destroy cancer cells. Being treated for cancer can weaken your body, and you may feel very tired. Home treatment and certain medicines can help you feel better. Finding out that you have cancer is scary. You may feel many emotions and may need some help coping. Seek out family, friends, and counselors for support. You also can do things at home to make yourself feel better while you go through treatment. Call the [facility] (1-599.216.8869) or visit its website at [address]. org for more information. Follow-up care is a key part of your treatment and safety.  Be sure to make and go to all appointments, and call your doctor if you are having problems. It's also a good idea to know your test results and keep a list of the medicines you take. How can you care for yourself at home? · Take your medicines exactly as prescribed. Call your doctor if you think you are having a problem with your medicine. You will get more details on the specific medicines your doctor prescribes. · Follow your doctor's instructions to relieve pain. Use pain medicine when you first feel pain, before it becomes severe. Taking pain medicines regularly is often the best way to keep pain under control. · Eat healthy food. If you do not feel like eating, try to eat food that has protein and extra calories to keep up your strength and prevent weight loss. Drink liquid meal replacements for extra calories and protein. Try to eat your main meal early. Eating smaller portions more often may help as well. · Get some physical activity every day, but do not get too tired. Keep doing the hobbies you enjoy as your energy allows. · Do not smoke. Smoking can make your cancer symptoms worse. If you need help quitting, talk to your doctor about stop-smoking programs and medicines. These can increase your chances of quitting for good. · If you use oxygen, do not smoke, light a cigarette, or use a flame while your oxygen is on. Smoking while using oxygen can lead to fire and even explosion. · If you have nausea, try to eat several small meals a day. When you feel better, eat clear soups and mild foods until all symptoms are gone for 12 to 48 hours. Other good choices include dry toast, crackers, cooked cereal, and gelatin dessert, such as Jell-O.  · If you are vomiting or have diarrhea:  ¨ Drink plenty of fluids (enough so that your urine is light yellow or clear like water) to prevent dehydration. Choose water and other caffeine-free clear liquids.  If you have kidney, heart, or liver disease and have to limit fluids, talk with your doctor before you increase the amount of fluids you drink. ¨ When you are able to eat, try clear soups, mild foods, and liquids until all symptoms are gone for 12 to 48 hours. Other good choices include dry toast, crackers, cooked cereal, and gelatin dessert, such as Jell-O.  · Take steps to control your stress and workload. Learn relaxation techniques. ¨ Share your feelings. Stress and tension affect our emotions. By expressing your feelings to others, you may be able to understand and cope with them. ¨ Consider joining a support group. Talking about a problem with your spouse, a good friend, or other people with similar problems is a good way to reduce tension and stress. ¨ Express yourself with art. Try writing, crafts, dance, or art to relieve stress. Some dance, writing, or art groups may be available just for people who have cancer. ¨ Be kind to your body and mind. Getting enough sleep, eating a healthy diet, and taking time to do things you enjoy can contribute to an overall feeling of balance in your life and help reduce stress. ¨ Get help if you need it. Discuss your concerns with your doctor or counselor. · If you have not already done so, prepare a list of advance directives. Advance directives are instructions to your doctor and family members about what kind of care you want if you become unable to speak or express yourself. When should you call for help? Call 911 anytime you think you may need emergency care. For example, call if:  ? · You passed out (lost consciousness). ? · You have severe trouble breathing. ? · You cough up a lot of blood. ?Call your doctor now or seek immediate medical care if:  ? · You have a fever. ? · You are short of breath. ? · You have new or worse pain. ? · You have a new or worse cough. ? · You think you have an infection. ? Watch closely for changes in your health, and be sure to contact your doctor if:  ? · You do not get better as expected. Where can you learn more? Go to http://olivia-gary.info/. Enter Y405 in the search box to learn more about \"Lung Cancer: Care Instructions. \"  Current as of: May 12, 2017  Content Version: 11.4  © 6468-5043 [employer]. Care instructions adapted under license by [employer] (which disclaims liability or warranty for this information). If you have questions about a medical condition or this instruction, always ask your healthcare professional. [address] any warranty or liability for your use of this information.

## 2019-01-01 ENCOUNTER — APPOINTMENT (OUTPATIENT)
Dept: NON INVASIVE DIAGNOSTICS | Age: 64
DRG: 300 | End: 2019-01-01
Attending: PHYSICIAN ASSISTANT
Payer: MEDICARE

## 2019-01-01 ENCOUNTER — APPOINTMENT (OUTPATIENT)
Dept: GENERAL RADIOLOGY | Age: 64
DRG: 853 | End: 2019-01-01
Attending: NURSE PRACTITIONER
Payer: MEDICARE

## 2019-01-01 ENCOUNTER — APPOINTMENT (OUTPATIENT)
Dept: CT IMAGING | Age: 64
DRG: 300 | End: 2019-01-01
Attending: SURGERY
Payer: MEDICARE

## 2019-01-01 ENCOUNTER — APPOINTMENT (OUTPATIENT)
Dept: MRI IMAGING | Age: 64
DRG: 300 | End: 2019-01-01
Attending: HOSPITALIST
Payer: MEDICARE

## 2019-01-01 ENCOUNTER — APPOINTMENT (OUTPATIENT)
Dept: GENERAL RADIOLOGY | Age: 64
DRG: 853 | End: 2019-01-01
Attending: SURGERY
Payer: MEDICARE

## 2019-01-01 ENCOUNTER — ANESTHESIA EVENT (OUTPATIENT)
Dept: SURGERY | Age: 64
DRG: 853 | End: 2019-01-01
Payer: MEDICARE

## 2019-01-01 ENCOUNTER — APPOINTMENT (OUTPATIENT)
Dept: GENERAL RADIOLOGY | Age: 64
DRG: 300 | End: 2019-01-01
Attending: PHYSICIAN ASSISTANT
Payer: MEDICARE

## 2019-01-01 ENCOUNTER — APPOINTMENT (OUTPATIENT)
Dept: GENERAL RADIOLOGY | Age: 64
DRG: 853 | End: 2019-01-01
Attending: INTERNAL MEDICINE
Payer: MEDICARE

## 2019-01-01 ENCOUNTER — HOME HEALTH ADMISSION (OUTPATIENT)
Dept: HOME HEALTH SERVICES | Facility: HOME HEALTH | Age: 64
End: 2019-01-01
Payer: MEDICARE

## 2019-01-01 ENCOUNTER — HOSPICE ADMISSION (OUTPATIENT)
Dept: HOSPICE | Facility: HOSPICE | Age: 64
End: 2019-01-01

## 2019-01-01 ENCOUNTER — ANESTHESIA (OUTPATIENT)
Dept: SURGERY | Age: 64
DRG: 853 | End: 2019-01-01
Payer: MEDICARE

## 2019-01-01 ENCOUNTER — HOSPITAL ENCOUNTER (EMERGENCY)
Age: 64
Discharge: HOME | End: 2019-01-13
Attending: EMERGENCY MEDICINE
Payer: MEDICARE

## 2019-01-01 ENCOUNTER — HOSPITAL ENCOUNTER (OUTPATIENT)
Dept: LAB | Age: 64
Discharge: HOME | DRG: 853 | End: 2019-07-11
Payer: MEDICARE

## 2019-01-01 ENCOUNTER — HOSPITAL ENCOUNTER (INPATIENT)
Age: 64
LOS: 8 days | Discharge: HOME | DRG: 300 | End: 2019-07-07
Attending: EMERGENCY MEDICINE | Admitting: HOSPITALIST
Payer: MEDICARE

## 2019-01-01 ENCOUNTER — APPOINTMENT (OUTPATIENT)
Dept: NUCLEAR MEDICINE | Age: 64
DRG: 853 | End: 2019-01-01
Attending: SPECIALIST
Payer: MEDICARE

## 2019-01-01 ENCOUNTER — APPOINTMENT (OUTPATIENT)
Dept: NON INVASIVE DIAGNOSTICS | Age: 64
DRG: 300 | End: 2019-01-01
Attending: HOSPITALIST
Payer: MEDICARE

## 2019-01-01 ENCOUNTER — APPOINTMENT (OUTPATIENT)
Dept: NUCLEAR MEDICINE | Age: 64
DRG: 300 | End: 2019-01-01
Attending: PHYSICIAN ASSISTANT
Payer: MEDICARE

## 2019-01-01 ENCOUNTER — HOSPITAL ENCOUNTER (INPATIENT)
Dept: NUCLEAR MEDICINE | Age: 64
Discharge: HOME | DRG: 853 | End: 2019-07-12
Attending: SPECIALIST
Payer: MEDICARE

## 2019-01-01 ENCOUNTER — APPOINTMENT (OUTPATIENT)
Dept: ULTRASOUND IMAGING | Age: 64
DRG: 853 | End: 2019-01-01
Attending: INTERNAL MEDICINE
Payer: MEDICARE

## 2019-01-01 ENCOUNTER — APPOINTMENT (OUTPATIENT)
Dept: GENERAL RADIOLOGY | Age: 64
DRG: 853 | End: 2019-01-01
Attending: HOSPITALIST
Payer: MEDICARE

## 2019-01-01 ENCOUNTER — APPOINTMENT (OUTPATIENT)
Dept: VASCULAR SURGERY | Age: 64
DRG: 300 | End: 2019-01-01
Attending: EMERGENCY MEDICINE
Payer: MEDICARE

## 2019-01-01 ENCOUNTER — APPOINTMENT (OUTPATIENT)
Dept: GENERAL RADIOLOGY | Age: 64
DRG: 853 | End: 2019-01-01
Attending: PHYSICIAN ASSISTANT
Payer: MEDICARE

## 2019-01-01 ENCOUNTER — HOSPITAL ENCOUNTER (INPATIENT)
Age: 64
LOS: 16 days | DRG: 853 | End: 2019-07-26
Attending: EMERGENCY MEDICINE | Admitting: HOSPITALIST
Payer: MEDICARE

## 2019-01-01 ENCOUNTER — APPOINTMENT (OUTPATIENT)
Dept: CT IMAGING | Age: 64
DRG: 853 | End: 2019-01-01
Attending: SPECIALIST
Payer: MEDICARE

## 2019-01-01 ENCOUNTER — HOME CARE VISIT (OUTPATIENT)
Dept: HOME HEALTH SERVICES | Facility: HOME HEALTH | Age: 64
End: 2019-01-01
Payer: MEDICARE

## 2019-01-01 ENCOUNTER — APPOINTMENT (OUTPATIENT)
Dept: VASCULAR SURGERY | Age: 64
DRG: 853 | End: 2019-01-01
Attending: PHYSICIAN ASSISTANT
Payer: MEDICARE

## 2019-01-01 ENCOUNTER — HOME CARE VISIT (OUTPATIENT)
Dept: HOSPICE | Facility: HOSPICE | Age: 64
End: 2019-01-01

## 2019-01-01 ENCOUNTER — HOME CARE VISIT (OUTPATIENT)
Dept: SCHEDULING | Facility: HOME HEALTH | Age: 64
End: 2019-01-01
Payer: MEDICARE

## 2019-01-01 ENCOUNTER — APPOINTMENT (OUTPATIENT)
Dept: GENERAL RADIOLOGY | Age: 64
DRG: 853 | End: 2019-01-01
Attending: EMERGENCY MEDICINE
Payer: MEDICARE

## 2019-01-01 ENCOUNTER — HOME CARE VISIT (OUTPATIENT)
Dept: HOME HEALTH SERVICES | Facility: HOME HEALTH | Age: 64
End: 2019-01-01

## 2019-01-01 ENCOUNTER — APPOINTMENT (OUTPATIENT)
Dept: MRI IMAGING | Age: 64
DRG: 300 | End: 2019-01-01
Attending: PHYSICIAN ASSISTANT
Payer: MEDICARE

## 2019-01-01 VITALS
HEIGHT: 72 IN | SYSTOLIC BLOOD PRESSURE: 139 MMHG | HEART RATE: 107 BPM | WEIGHT: 141.09 LBS | TEMPERATURE: 102.2 F | OXYGEN SATURATION: 93 % | DIASTOLIC BLOOD PRESSURE: 84 MMHG | BODY MASS INDEX: 19.11 KG/M2 | RESPIRATION RATE: 22 BRPM

## 2019-01-01 VITALS
HEART RATE: 92 BPM | RESPIRATION RATE: 18 BRPM | TEMPERATURE: 98 F | OXYGEN SATURATION: 98 % | DIASTOLIC BLOOD PRESSURE: 76 MMHG | SYSTOLIC BLOOD PRESSURE: 118 MMHG

## 2019-01-01 VITALS
WEIGHT: 130 LBS | HEIGHT: 72 IN | BODY MASS INDEX: 17.61 KG/M2 | RESPIRATION RATE: 16 BRPM | TEMPERATURE: 98.2 F | HEART RATE: 58 BPM | DIASTOLIC BLOOD PRESSURE: 93 MMHG | SYSTOLIC BLOOD PRESSURE: 143 MMHG | OXYGEN SATURATION: 98 %

## 2019-01-01 VITALS
OXYGEN SATURATION: 100 % | HEART RATE: 87 BPM | TEMPERATURE: 98.6 F | RESPIRATION RATE: 18 BRPM | DIASTOLIC BLOOD PRESSURE: 75 MMHG | SYSTOLIC BLOOD PRESSURE: 120 MMHG

## 2019-01-01 DIAGNOSIS — I96 GANGRENE OF FOOT (HCC): Primary | ICD-10-CM

## 2019-01-01 DIAGNOSIS — I73.9 PAD (PERIPHERAL ARTERY DISEASE) (HCC): ICD-10-CM

## 2019-01-01 DIAGNOSIS — I96 GANGRENE OF FOOT (HCC): ICD-10-CM

## 2019-01-01 DIAGNOSIS — L03.116 CELLULITIS OF LEFT FOOT: ICD-10-CM

## 2019-01-01 DIAGNOSIS — E87.1 HYPONATREMIA: ICD-10-CM

## 2019-01-01 DIAGNOSIS — L03.116 CELLULITIS OF LEFT LOWER EXTREMITY: ICD-10-CM

## 2019-01-01 DIAGNOSIS — D72.829 LEUKOCYTOSIS, UNSPECIFIED TYPE: ICD-10-CM

## 2019-01-01 DIAGNOSIS — R79.89 ELEVATED LACTIC ACID LEVEL: ICD-10-CM

## 2019-01-01 DIAGNOSIS — D64.9 ANEMIA, UNSPECIFIED TYPE: ICD-10-CM

## 2019-01-01 DIAGNOSIS — R04.0 EPISTAXIS: Primary | ICD-10-CM

## 2019-01-01 DIAGNOSIS — I96 GANGRENE OF TOE OF LEFT FOOT (HCC): Primary | ICD-10-CM

## 2019-01-01 LAB
ABO + RH BLD: NORMAL
ABO + RH BLD: NORMAL
ALBUMIN SERPL ELPH-MCNC: 2.2 G/DL (ref 2.9–4.4)
ALBUMIN SERPL-MCNC: 1.1 G/DL (ref 3.4–5)
ALBUMIN SERPL-MCNC: 1.1 G/DL (ref 3.4–5)
ALBUMIN SERPL-MCNC: 1.2 G/DL (ref 3.4–5)
ALBUMIN SERPL-MCNC: 1.4 G/DL (ref 3.4–5)
ALBUMIN SERPL-MCNC: 1.5 G/DL (ref 3.4–5)
ALBUMIN SERPL-MCNC: 1.8 G/DL (ref 3.4–5)
ALBUMIN SERPL-MCNC: 1.9 G/DL (ref 3.4–5)
ALBUMIN SERPL-MCNC: 2 G/DL (ref 3.4–5)
ALBUMIN SERPL-MCNC: 2.6 G/DL (ref 3.4–5)
ALBUMIN SERPL-MCNC: 2.7 G/DL (ref 3.4–5)
ALBUMIN SERPL-MCNC: 2.8 G/DL (ref 3.4–5)
ALBUMIN SERPL-MCNC: 2.9 G/DL (ref 3.4–5)
ALBUMIN SERPL-MCNC: 3.2 G/DL (ref 3.4–5)
ALBUMIN/GLOB SERPL: 0.2 {RATIO} (ref 0.8–1.7)
ALBUMIN/GLOB SERPL: 0.3 {RATIO} (ref 0.8–1.7)
ALBUMIN/GLOB SERPL: 0.4 {RATIO} (ref 0.8–1.7)
ALBUMIN/GLOB SERPL: 0.4 {RATIO} (ref 0.8–1.7)
ALBUMIN/GLOB SERPL: 0.5 {RATIO} (ref 0.8–1.7)
ALBUMIN/GLOB SERPL: 0.6 {RATIO} (ref 0.8–1.7)
ALBUMIN/GLOB SERPL: 0.7 {RATIO} (ref 0.8–1.7)
ALBUMIN/GLOB SERPL: 0.8 {RATIO} (ref 0.7–1.7)
ALP SERPL-CCNC: 100 U/L (ref 45–117)
ALP SERPL-CCNC: 131 U/L (ref 45–117)
ALP SERPL-CCNC: 164 U/L (ref 45–117)
ALP SERPL-CCNC: 165 U/L (ref 45–117)
ALP SERPL-CCNC: 166 U/L (ref 45–117)
ALP SERPL-CCNC: 53 U/L (ref 45–117)
ALP SERPL-CCNC: 64 U/L (ref 45–117)
ALP SERPL-CCNC: 76 U/L (ref 45–117)
ALP SERPL-CCNC: 87 U/L (ref 45–117)
ALPHA1 GLOB SERPL ELPH-MCNC: 0.4 G/DL (ref 0–0.4)
ALPHA2 GLOB SERPL ELPH-MCNC: 0.8 G/DL (ref 0.4–1)
ALT SERPL-CCNC: 18 U/L (ref 16–61)
ALT SERPL-CCNC: 26 U/L (ref 16–61)
ALT SERPL-CCNC: 70 U/L (ref 16–61)
ALT SERPL-CCNC: 75 U/L (ref 16–61)
ALT SERPL-CCNC: 76 U/L (ref 16–61)
ALT SERPL-CCNC: 81 U/L (ref 16–61)
ALT SERPL-CCNC: 83 U/L (ref 16–61)
ALT SERPL-CCNC: 96 U/L (ref 16–61)
ALT SERPL-CCNC: 97 U/L (ref 16–61)
ANION GAP SERPL CALC-SCNC: 10 MMOL/L (ref 3–18)
ANION GAP SERPL CALC-SCNC: 11 MMOL/L (ref 3–18)
ANION GAP SERPL CALC-SCNC: 12 MMOL/L (ref 3–18)
ANION GAP SERPL CALC-SCNC: 13 MMOL/L (ref 3–18)
ANION GAP SERPL CALC-SCNC: 13 MMOL/L (ref 3–18)
ANION GAP SERPL CALC-SCNC: 14 MMOL/L (ref 3–18)
ANION GAP SERPL CALC-SCNC: 15 MMOL/L (ref 3–18)
ANION GAP SERPL CALC-SCNC: 16 MMOL/L (ref 3–18)
ANION GAP SERPL CALC-SCNC: 6 MMOL/L (ref 3–18)
ANION GAP SERPL CALC-SCNC: 6 MMOL/L (ref 3–18)
ANION GAP SERPL CALC-SCNC: 7 MMOL/L (ref 3–18)
ANION GAP SERPL CALC-SCNC: 7 MMOL/L (ref 3–18)
ANION GAP SERPL CALC-SCNC: 8 MMOL/L (ref 3–18)
ANION GAP SERPL CALC-SCNC: 9 MMOL/L (ref 3–18)
APPEARANCE UR: ABNORMAL
APPEARANCE UR: CLEAR
ARTERIAL PATENCY WRIST A: ABNORMAL
ARTERIAL PATENCY WRIST A: YES
AST SERPL-CCNC: 260 U/L (ref 10–38)
AST SERPL-CCNC: 284 U/L (ref 10–38)
AST SERPL-CCNC: 292 U/L (ref 10–38)
AST SERPL-CCNC: 332 U/L (ref 10–38)
AST SERPL-CCNC: 34 U/L (ref 15–37)
AST SERPL-CCNC: 398 U/L (ref 10–38)
AST SERPL-CCNC: 412 U/L (ref 10–38)
AST SERPL-CCNC: 439 U/L (ref 10–38)
AST SERPL-CCNC: 49 U/L (ref 15–37)
ATRIAL RATE: 102 BPM
ATRIAL RATE: 94 BPM
B-GLOBULIN SERPL ELPH-MCNC: 1 G/DL (ref 0.7–1.3)
BACTERIA SPEC CULT: ABNORMAL
BACTERIA SPEC CULT: NORMAL
BACTERIA SPEC CULT: NORMAL
BACTERIA URNS QL MICRO: NEGATIVE /HPF
BASE DEFICIT BLD-SCNC: 1 MMOL/L
BASE DEFICIT BLD-SCNC: 1 MMOL/L
BASE DEFICIT BLD-SCNC: 2 MMOL/L
BASE DEFICIT BLD-SCNC: 2 MMOL/L
BASE DEFICIT BLD-SCNC: 5 MMOL/L
BASE EXCESS BLD CALC-SCNC: 0 MMOL/L
BASE EXCESS BLD CALC-SCNC: 0 MMOL/L
BASE EXCESS BLD CALC-SCNC: 1 MMOL/L
BASOPHILS # BLD: 0 K/UL (ref 0–0.1)
BASOPHILS NFR BLD: 0 % (ref 0–2)
BASOPHILS NFR BLD: 0 % (ref 0–3)
BASOPHILS NFR BLD: 0 % (ref 0–3)
BDY SITE: ABNORMAL
BILIRUB SERPL-MCNC: 0.3 MG/DL (ref 0.2–1)
BILIRUB SERPL-MCNC: 0.4 MG/DL (ref 0.2–1)
BILIRUB SERPL-MCNC: 0.5 MG/DL (ref 0.2–1)
BILIRUB SERPL-MCNC: 0.7 MG/DL (ref 0.2–1)
BILIRUB UR QL: NEGATIVE
BILIRUB UR QL: NEGATIVE
BLD PROD TYP BPU: NORMAL
BLOOD GROUP ANTIBODIES SERPL: NORMAL
BLOOD GROUP ANTIBODIES SERPL: NORMAL
BODY TEMPERATURE: 101.9
BODY TEMPERATURE: 98.6
BODY TEMPERATURE: 99.8
BPU ID: NORMAL
BUN SERPL-MCNC: 12 MG/DL (ref 7–18)
BUN SERPL-MCNC: 16 MG/DL (ref 7–18)
BUN SERPL-MCNC: 18 MG/DL (ref 7–18)
BUN SERPL-MCNC: 27 MG/DL (ref 7–18)
BUN SERPL-MCNC: 29 MG/DL (ref 7–18)
BUN SERPL-MCNC: 3 MG/DL (ref 7–18)
BUN SERPL-MCNC: 32 MG/DL (ref 7–18)
BUN SERPL-MCNC: 37 MG/DL (ref 7–18)
BUN SERPL-MCNC: 4 MG/DL (ref 7–18)
BUN SERPL-MCNC: 40 MG/DL (ref 7–18)
BUN SERPL-MCNC: 43 MG/DL (ref 7–18)
BUN SERPL-MCNC: 45 MG/DL (ref 7–18)
BUN SERPL-MCNC: 46 MG/DL (ref 7–18)
BUN SERPL-MCNC: 5 MG/DL (ref 7–18)
BUN SERPL-MCNC: 5 MG/DL (ref 7–18)
BUN SERPL-MCNC: 52 MG/DL (ref 7–18)
BUN SERPL-MCNC: 6 MG/DL (ref 7–18)
BUN SERPL-MCNC: 65 MG/DL (ref 7–18)
BUN SERPL-MCNC: 7 MG/DL (ref 7–18)
BUN SERPL-MCNC: 7 MG/DL (ref 7–18)
BUN SERPL-MCNC: 75 MG/DL (ref 7–18)
BUN SERPL-MCNC: 76 MG/DL (ref 7–18)
BUN SERPL-MCNC: 9 MG/DL (ref 7–18)
BUN/CREAT SERPL: 10 (ref 12–20)
BUN/CREAT SERPL: 10 (ref 12–20)
BUN/CREAT SERPL: 11 (ref 12–20)
BUN/CREAT SERPL: 12 (ref 12–20)
BUN/CREAT SERPL: 4 (ref 12–20)
BUN/CREAT SERPL: 5 (ref 12–20)
BUN/CREAT SERPL: 6 (ref 12–20)
BUN/CREAT SERPL: 7 (ref 12–20)
BUN/CREAT SERPL: 8 (ref 12–20)
BUN/CREAT SERPL: 9 (ref 12–20)
CA-I SERPL-SCNC: 0.96 MMOL/L (ref 1.12–1.32)
CA-I SERPL-SCNC: 0.96 MMOL/L (ref 1.12–1.32)
CA-I SERPL-SCNC: 1.04 MMOL/L (ref 1.12–1.32)
CA-I SERPL-SCNC: 1.06 MMOL/L (ref 1.12–1.32)
CA-I SERPL-SCNC: 1.07 MMOL/L (ref 1.12–1.32)
CA-I SERPL-SCNC: 1.09 MMOL/L (ref 1.12–1.32)
CA-I SERPL-SCNC: 1.1 MMOL/L (ref 1.12–1.32)
CA-I SERPL-SCNC: 1.11 MMOL/L (ref 1.12–1.32)
CA-I SERPL-SCNC: 1.12 MMOL/L (ref 1.12–1.32)
CA-I SERPL-SCNC: 1.15 MMOL/L (ref 1.12–1.32)
CA-I SERPL-SCNC: 1.16 MMOL/L (ref 1.12–1.32)
CA-I SERPL-SCNC: 1.25 MMOL/L (ref 1.12–1.32)
CALCIUM SERPL-MCNC: 7 MG/DL (ref 8.5–10.1)
CALCIUM SERPL-MCNC: 7.1 MG/DL (ref 8.5–10.1)
CALCIUM SERPL-MCNC: 7.2 MG/DL (ref 8.5–10.1)
CALCIUM SERPL-MCNC: 7.3 MG/DL (ref 8.5–10.1)
CALCIUM SERPL-MCNC: 7.4 MG/DL (ref 8.5–10.1)
CALCIUM SERPL-MCNC: 7.5 MG/DL (ref 8.5–10.1)
CALCIUM SERPL-MCNC: 7.6 MG/DL (ref 8.5–10.1)
CALCIUM SERPL-MCNC: 7.7 MG/DL (ref 8.5–10.1)
CALCIUM SERPL-MCNC: 7.9 MG/DL (ref 8.5–10.1)
CALCIUM SERPL-MCNC: 7.9 MG/DL (ref 8.5–10.1)
CALCIUM SERPL-MCNC: 8 MG/DL (ref 8.5–10.1)
CALCIUM SERPL-MCNC: 8.1 MG/DL (ref 8.5–10.1)
CALCIUM SERPL-MCNC: 8.2 MG/DL (ref 8.5–10.1)
CALCIUM SERPL-MCNC: 8.2 MG/DL (ref 8.5–10.1)
CALCIUM SERPL-MCNC: 8.3 MG/DL (ref 8.5–10.1)
CALCIUM SERPL-MCNC: 8.4 MG/DL (ref 8.5–10.1)
CALCIUM SERPL-MCNC: 8.6 MG/DL (ref 8.5–10.1)
CALCIUM SERPL-MCNC: 8.7 MG/DL (ref 8.5–10.1)
CALCULATED P AXIS, ECG09: 46 DEGREES
CALCULATED P AXIS, ECG09: 65 DEGREES
CALCULATED R AXIS, ECG10: -16 DEGREES
CALCULATED R AXIS, ECG10: -41 DEGREES
CALCULATED T AXIS, ECG11: 46 DEGREES
CALCULATED T AXIS, ECG11: 73 DEGREES
CALLED TO:,BCALL1: NORMAL
CALLED TO:,BCALL1: NORMAL
CALLED TO:,BCALL2: NORMAL
CHLORIDE SERPL-SCNC: 100 MMOL/L (ref 100–108)
CHLORIDE SERPL-SCNC: 100 MMOL/L (ref 100–111)
CHLORIDE SERPL-SCNC: 102 MMOL/L (ref 100–111)
CHLORIDE SERPL-SCNC: 102 MMOL/L (ref 100–111)
CHLORIDE SERPL-SCNC: 103 MMOL/L (ref 100–111)
CHLORIDE SERPL-SCNC: 103 MMOL/L (ref 100–111)
CHLORIDE SERPL-SCNC: 87 MMOL/L (ref 100–108)
CHLORIDE SERPL-SCNC: 87 MMOL/L (ref 100–108)
CHLORIDE SERPL-SCNC: 90 MMOL/L (ref 100–108)
CHLORIDE SERPL-SCNC: 91 MMOL/L (ref 100–108)
CHLORIDE SERPL-SCNC: 92 MMOL/L (ref 100–108)
CHLORIDE SERPL-SCNC: 93 MMOL/L (ref 100–108)
CHLORIDE SERPL-SCNC: 94 MMOL/L (ref 100–108)
CHLORIDE SERPL-SCNC: 95 MMOL/L (ref 100–108)
CHLORIDE SERPL-SCNC: 96 MMOL/L (ref 100–108)
CHLORIDE SERPL-SCNC: 97 MMOL/L (ref 100–108)
CHLORIDE SERPL-SCNC: 98 MMOL/L (ref 100–108)
CHLORIDE SERPL-SCNC: 98 MMOL/L (ref 100–108)
CHLORIDE SERPL-SCNC: 99 MMOL/L (ref 100–111)
CK MB CFR SERPL CALC: 2.2 % (ref 0–4)
CK MB CFR SERPL CALC: 2.5 % (ref 0–4)
CK MB SERPL-MCNC: 129.7 NG/ML (ref 5–25)
CK MB SERPL-MCNC: 45 NG/ML (ref 5–25)
CK SERPL-CCNC: 1780 U/L (ref 39–308)
CK SERPL-CCNC: 5909 U/L (ref 39–308)
CO2 SERPL-SCNC: 13 MMOL/L (ref 21–32)
CO2 SERPL-SCNC: 14 MMOL/L (ref 21–32)
CO2 SERPL-SCNC: 14 MMOL/L (ref 21–32)
CO2 SERPL-SCNC: 15 MMOL/L (ref 21–32)
CO2 SERPL-SCNC: 16 MMOL/L (ref 21–32)
CO2 SERPL-SCNC: 18 MMOL/L (ref 21–32)
CO2 SERPL-SCNC: 20 MMOL/L (ref 21–32)
CO2 SERPL-SCNC: 20 MMOL/L (ref 21–32)
CO2 SERPL-SCNC: 21 MMOL/L (ref 21–32)
CO2 SERPL-SCNC: 21 MMOL/L (ref 21–32)
CO2 SERPL-SCNC: 22 MMOL/L (ref 21–32)
CO2 SERPL-SCNC: 23 MMOL/L (ref 21–32)
CO2 SERPL-SCNC: 24 MMOL/L (ref 21–32)
CO2 SERPL-SCNC: 25 MMOL/L (ref 21–32)
CO2 SERPL-SCNC: 26 MMOL/L (ref 21–32)
CO2 SERPL-SCNC: 27 MMOL/L (ref 21–32)
CO2 SERPL-SCNC: 27 MMOL/L (ref 21–32)
COLOR UR: ABNORMAL
COLOR UR: YELLOW
CORTIS AM PEAK SERPL-MCNC: 9.2 UG/DL (ref 4.3–22.45)
CREAT SERPL-MCNC: 0.59 MG/DL (ref 0.6–1.3)
CREAT SERPL-MCNC: 0.61 MG/DL (ref 0.6–1.3)
CREAT SERPL-MCNC: 0.62 MG/DL (ref 0.6–1.3)
CREAT SERPL-MCNC: 0.63 MG/DL (ref 0.6–1.3)
CREAT SERPL-MCNC: 0.64 MG/DL (ref 0.6–1.3)
CREAT SERPL-MCNC: 0.64 MG/DL (ref 0.6–1.3)
CREAT SERPL-MCNC: 0.65 MG/DL (ref 0.6–1.3)
CREAT SERPL-MCNC: 0.66 MG/DL (ref 0.6–1.3)
CREAT SERPL-MCNC: 0.67 MG/DL (ref 0.6–1.3)
CREAT SERPL-MCNC: 0.67 MG/DL (ref 0.6–1.3)
CREAT SERPL-MCNC: 0.68 MG/DL (ref 0.6–1.3)
CREAT SERPL-MCNC: 0.69 MG/DL (ref 0.6–1.3)
CREAT SERPL-MCNC: 0.71 MG/DL (ref 0.6–1.3)
CREAT SERPL-MCNC: 0.74 MG/DL (ref 0.6–1.3)
CREAT SERPL-MCNC: 1.41 MG/DL (ref 0.6–1.3)
CREAT SERPL-MCNC: 2.35 MG/DL (ref 0.6–1.3)
CREAT SERPL-MCNC: 2.99 MG/DL (ref 0.6–1.3)
CREAT SERPL-MCNC: 3.04 MG/DL (ref 0.6–1.3)
CREAT SERPL-MCNC: 3.26 MG/DL (ref 0.6–1.3)
CREAT SERPL-MCNC: 3.45 MG/DL (ref 0.6–1.3)
CREAT SERPL-MCNC: 3.85 MG/DL (ref 0.6–1.3)
CREAT SERPL-MCNC: 4.17 MG/DL (ref 0.6–1.3)
CREAT SERPL-MCNC: 4.46 MG/DL (ref 0.6–1.3)
CREAT SERPL-MCNC: 5.49 MG/DL (ref 0.6–1.3)
CREAT SERPL-MCNC: 5.77 MG/DL (ref 0.6–1.3)
CREAT SERPL-MCNC: 6.7 MG/DL (ref 0.6–1.3)
CREAT SERPL-MCNC: 7.01 MG/DL (ref 0.6–1.3)
CREAT SERPL-MCNC: 7.74 MG/DL (ref 0.6–1.3)
CREAT SERPL-MCNC: 8.68 MG/DL (ref 0.6–1.3)
CREAT SERPL-MCNC: 8.99 MG/DL (ref 0.6–1.3)
CREAT SERPL-MCNC: 9.26 MG/DL (ref 0.6–1.3)
CREAT SERPL-MCNC: 9.38 MG/DL (ref 0.6–1.3)
CREAT UR-MCNC: 64.5 MG/DL (ref 30–125)
CREAT UR-MCNC: 67.2 MG/DL (ref 30–125)
CROSSMATCH RESULT,%XM: NORMAL
DATE LAST DOSE: NORMAL
DIAGNOSIS, 93000: NORMAL
DIAGNOSIS, 93000: NORMAL
DIFFERENTIAL METHOD BLD: ABNORMAL
EOSINOPHIL # BLD: 0 K/UL (ref 0–0.4)
EOSINOPHIL # BLD: 0.1 K/UL (ref 0–0.4)
EOSINOPHIL # BLD: 0.1 K/UL (ref 0–0.4)
EOSINOPHIL NFR BLD: 0 % (ref 0–5)
EOSINOPHIL NFR BLD: 1 % (ref 0–5)
EOSINOPHIL NFR BLD: 1 % (ref 0–5)
EPITH CASTS URNS QL MICRO: NORMAL /LPF (ref 0–5)
ERYTHROCYTE [DISTWIDTH] IN BLOOD BY AUTOMATED COUNT: 13.3 % (ref 11.6–14.5)
ERYTHROCYTE [DISTWIDTH] IN BLOOD BY AUTOMATED COUNT: 13.6 % (ref 11.6–14.5)
ERYTHROCYTE [DISTWIDTH] IN BLOOD BY AUTOMATED COUNT: 15.1 % (ref 11.6–14.5)
ERYTHROCYTE [DISTWIDTH] IN BLOOD BY AUTOMATED COUNT: 15.1 % (ref 11.6–14.5)
ERYTHROCYTE [DISTWIDTH] IN BLOOD BY AUTOMATED COUNT: 15.2 % (ref 11.6–14.5)
ERYTHROCYTE [DISTWIDTH] IN BLOOD BY AUTOMATED COUNT: 15.5 % (ref 11.6–14.5)
ERYTHROCYTE [DISTWIDTH] IN BLOOD BY AUTOMATED COUNT: 15.8 % (ref 11.6–14.5)
ERYTHROCYTE [DISTWIDTH] IN BLOOD BY AUTOMATED COUNT: 16 % (ref 11.6–14.5)
ERYTHROCYTE [DISTWIDTH] IN BLOOD BY AUTOMATED COUNT: 16.1 % (ref 11.6–14.5)
ERYTHROCYTE [DISTWIDTH] IN BLOOD BY AUTOMATED COUNT: 16.3 % (ref 11.6–14.5)
ERYTHROCYTE [DISTWIDTH] IN BLOOD BY AUTOMATED COUNT: 16.4 % (ref 11.6–14.5)
ERYTHROCYTE [DISTWIDTH] IN BLOOD BY AUTOMATED COUNT: 16.5 % (ref 11.6–14.5)
ERYTHROCYTE [DISTWIDTH] IN BLOOD BY AUTOMATED COUNT: 16.6 % (ref 11.6–14.5)
ERYTHROCYTE [DISTWIDTH] IN BLOOD BY AUTOMATED COUNT: 16.7 % (ref 11.6–14.5)
ERYTHROCYTE [SEDIMENTATION RATE] IN BLOOD: 82 MM/HR (ref 0–20)
ERYTHROCYTE [SEDIMENTATION RATE] IN BLOOD: 83 MM/HR (ref 0–20)
GAMMA GLOB SERPL ELPH-MCNC: 0.8 G/DL (ref 0.4–1.8)
GAS FLOW.O2 O2 DELIVERY SYS: ABNORMAL L/MIN
GAS FLOW.O2 SETTING OXYMISER: 12 BPM
GAS FLOW.O2 SETTING OXYMISER: 14 BPM
GLOBULIN SER CALC-MCNC: 3.4 G/DL (ref 2–4)
GLOBULIN SER CALC-MCNC: 3.4 G/DL (ref 2–4)
GLOBULIN SER CALC-MCNC: 3.6 G/DL (ref 2–4)
GLOBULIN SER CALC-MCNC: 3.9 G/DL (ref 2–4)
GLOBULIN SER CALC-MCNC: 3.9 G/DL (ref 2–4)
GLOBULIN SER CALC-MCNC: 4.2 G/DL (ref 2–4)
GLOBULIN SER CALC-MCNC: 4.3 G/DL (ref 2–4)
GLOBULIN SER CALC-MCNC: 4.7 G/DL (ref 2–4)
GLOBULIN SER CALC-MCNC: 4.8 G/DL (ref 2–4)
GLOBULIN SER-MCNC: 3 G/DL (ref 2.2–3.9)
GLUCOSE BLD STRIP.AUTO-MCNC: 101 MG/DL (ref 70–110)
GLUCOSE BLD STRIP.AUTO-MCNC: 102 MG/DL (ref 70–110)
GLUCOSE BLD STRIP.AUTO-MCNC: 105 MG/DL (ref 70–110)
GLUCOSE BLD STRIP.AUTO-MCNC: 107 MG/DL (ref 70–110)
GLUCOSE BLD STRIP.AUTO-MCNC: 110 MG/DL (ref 70–110)
GLUCOSE BLD STRIP.AUTO-MCNC: 111 MG/DL (ref 70–110)
GLUCOSE BLD STRIP.AUTO-MCNC: 111 MG/DL (ref 70–110)
GLUCOSE BLD STRIP.AUTO-MCNC: 113 MG/DL (ref 70–110)
GLUCOSE BLD STRIP.AUTO-MCNC: 117 MG/DL (ref 70–110)
GLUCOSE BLD STRIP.AUTO-MCNC: 119 MG/DL (ref 70–110)
GLUCOSE BLD STRIP.AUTO-MCNC: 121 MG/DL (ref 70–110)
GLUCOSE BLD STRIP.AUTO-MCNC: 124 MG/DL (ref 70–110)
GLUCOSE BLD STRIP.AUTO-MCNC: 128 MG/DL (ref 70–110)
GLUCOSE BLD STRIP.AUTO-MCNC: 130 MG/DL (ref 70–110)
GLUCOSE BLD STRIP.AUTO-MCNC: 133 MG/DL (ref 70–110)
GLUCOSE BLD STRIP.AUTO-MCNC: 133 MG/DL (ref 70–110)
GLUCOSE BLD STRIP.AUTO-MCNC: 139 MG/DL (ref 70–110)
GLUCOSE BLD STRIP.AUTO-MCNC: 140 MG/DL (ref 70–110)
GLUCOSE BLD STRIP.AUTO-MCNC: 66 MG/DL (ref 70–110)
GLUCOSE BLD STRIP.AUTO-MCNC: 67 MG/DL (ref 70–110)
GLUCOSE BLD STRIP.AUTO-MCNC: 70 MG/DL (ref 70–110)
GLUCOSE BLD STRIP.AUTO-MCNC: 72 MG/DL (ref 70–110)
GLUCOSE BLD STRIP.AUTO-MCNC: 77 MG/DL (ref 70–110)
GLUCOSE BLD STRIP.AUTO-MCNC: 84 MG/DL (ref 70–110)
GLUCOSE BLD STRIP.AUTO-MCNC: 84 MG/DL (ref 70–110)
GLUCOSE BLD STRIP.AUTO-MCNC: 85 MG/DL (ref 70–110)
GLUCOSE BLD STRIP.AUTO-MCNC: 86 MG/DL (ref 70–110)
GLUCOSE BLD STRIP.AUTO-MCNC: 89 MG/DL (ref 70–110)
GLUCOSE BLD STRIP.AUTO-MCNC: 91 MG/DL (ref 70–110)
GLUCOSE BLD STRIP.AUTO-MCNC: 91 MG/DL (ref 70–110)
GLUCOSE BLD STRIP.AUTO-MCNC: 93 MG/DL (ref 70–110)
GLUCOSE BLD STRIP.AUTO-MCNC: 96 MG/DL (ref 70–110)
GLUCOSE BLD STRIP.AUTO-MCNC: 96 MG/DL (ref 70–110)
GLUCOSE BLD STRIP.AUTO-MCNC: 97 MG/DL (ref 70–110)
GLUCOSE BLD STRIP.AUTO-MCNC: 98 MG/DL (ref 70–110)
GLUCOSE BLD STRIP.AUTO-MCNC: 99 MG/DL (ref 70–110)
GLUCOSE SERPL-MCNC: 100 MG/DL (ref 74–99)
GLUCOSE SERPL-MCNC: 100 MG/DL (ref 74–99)
GLUCOSE SERPL-MCNC: 101 MG/DL (ref 74–99)
GLUCOSE SERPL-MCNC: 101 MG/DL (ref 74–99)
GLUCOSE SERPL-MCNC: 107 MG/DL (ref 74–99)
GLUCOSE SERPL-MCNC: 110 MG/DL (ref 74–99)
GLUCOSE SERPL-MCNC: 115 MG/DL (ref 74–99)
GLUCOSE SERPL-MCNC: 117 MG/DL (ref 74–99)
GLUCOSE SERPL-MCNC: 125 MG/DL (ref 74–99)
GLUCOSE SERPL-MCNC: 67 MG/DL (ref 74–99)
GLUCOSE SERPL-MCNC: 75 MG/DL (ref 74–99)
GLUCOSE SERPL-MCNC: 77 MG/DL (ref 74–99)
GLUCOSE SERPL-MCNC: 78 MG/DL (ref 74–99)
GLUCOSE SERPL-MCNC: 79 MG/DL (ref 74–99)
GLUCOSE SERPL-MCNC: 79 MG/DL (ref 74–99)
GLUCOSE SERPL-MCNC: 81 MG/DL (ref 74–99)
GLUCOSE SERPL-MCNC: 83 MG/DL (ref 74–99)
GLUCOSE SERPL-MCNC: 84 MG/DL (ref 74–99)
GLUCOSE SERPL-MCNC: 86 MG/DL (ref 74–99)
GLUCOSE SERPL-MCNC: 87 MG/DL (ref 74–99)
GLUCOSE SERPL-MCNC: 87 MG/DL (ref 74–99)
GLUCOSE SERPL-MCNC: 88 MG/DL (ref 74–99)
GLUCOSE SERPL-MCNC: 89 MG/DL (ref 74–99)
GLUCOSE SERPL-MCNC: 90 MG/DL (ref 74–99)
GLUCOSE SERPL-MCNC: 92 MG/DL (ref 74–99)
GLUCOSE SERPL-MCNC: 94 MG/DL (ref 74–99)
GLUCOSE SERPL-MCNC: 95 MG/DL (ref 74–99)
GLUCOSE SERPL-MCNC: 97 MG/DL (ref 74–99)
GLUCOSE SERPL-MCNC: 98 MG/DL (ref 74–99)
GLUCOSE SERPL-MCNC: 99 MG/DL (ref 74–99)
GLUCOSE UR STRIP.AUTO-MCNC: NEGATIVE MG/DL
GLUCOSE UR STRIP.AUTO-MCNC: NEGATIVE MG/DL
GRAM STN SPEC: ABNORMAL
HBV SURFACE AG SER QL: <0.1 INDEX
HBV SURFACE AG SER QL: NEGATIVE
HCO3 BLD-SCNC: 19.7 MMOL/L (ref 22–26)
HCO3 BLD-SCNC: 21.5 MMOL/L (ref 22–26)
HCO3 BLD-SCNC: 22.4 MMOL/L (ref 22–26)
HCO3 BLD-SCNC: 22.5 MMOL/L (ref 22–26)
HCO3 BLD-SCNC: 23.2 MMOL/L (ref 22–26)
HCO3 BLD-SCNC: 23.6 MMOL/L (ref 22–26)
HCO3 BLD-SCNC: 23.9 MMOL/L (ref 22–26)
HCO3 BLD-SCNC: 24.3 MMOL/L (ref 22–26)
HCT VFR BLD AUTO: 19.4 % (ref 36–48)
HCT VFR BLD AUTO: 19.4 % (ref 36–48)
HCT VFR BLD AUTO: 20.1 % (ref 36–48)
HCT VFR BLD AUTO: 20.5 % (ref 36–48)
HCT VFR BLD AUTO: 20.6 % (ref 36–48)
HCT VFR BLD AUTO: 21.3 % (ref 36–48)
HCT VFR BLD AUTO: 21.6 % (ref 36–48)
HCT VFR BLD AUTO: 21.6 % (ref 36–48)
HCT VFR BLD AUTO: 21.9 % (ref 36–48)
HCT VFR BLD AUTO: 22.3 % (ref 36–48)
HCT VFR BLD AUTO: 22.5 % (ref 36–48)
HCT VFR BLD AUTO: 22.8 % (ref 36–48)
HCT VFR BLD AUTO: 22.9 % (ref 36–48)
HCT VFR BLD AUTO: 23.1 % (ref 36–48)
HCT VFR BLD AUTO: 23.3 % (ref 36–48)
HCT VFR BLD AUTO: 23.9 % (ref 36–48)
HCT VFR BLD AUTO: 24.3 % (ref 36–48)
HCT VFR BLD AUTO: 24.5 % (ref 36–48)
HCT VFR BLD AUTO: 25.9 % (ref 36–48)
HCT VFR BLD AUTO: 28.1 % (ref 36–48)
HCV AB SER IA-ACNC: 0.12 INDEX
HCV AB SERPL QL IA: NEGATIVE
HCV COMMENT,HCGAC: NORMAL
HGB BLD-MCNC: 6.5 G/DL (ref 13–16)
HGB BLD-MCNC: 6.6 G/DL (ref 13–16)
HGB BLD-MCNC: 6.7 G/DL (ref 13–16)
HGB BLD-MCNC: 6.9 G/DL (ref 13–16)
HGB BLD-MCNC: 7 G/DL (ref 13–16)
HGB BLD-MCNC: 7 G/DL (ref 13–16)
HGB BLD-MCNC: 7.1 G/DL (ref 13–16)
HGB BLD-MCNC: 7.1 G/DL (ref 13–16)
HGB BLD-MCNC: 7.2 G/DL (ref 13–16)
HGB BLD-MCNC: 7.4 G/DL (ref 13–16)
HGB BLD-MCNC: 7.5 G/DL (ref 13–16)
HGB BLD-MCNC: 7.7 G/DL (ref 13–16)
HGB BLD-MCNC: 7.8 G/DL (ref 13–16)
HGB BLD-MCNC: 7.9 G/DL (ref 13–16)
HGB BLD-MCNC: 7.9 G/DL (ref 13–16)
HGB BLD-MCNC: 8.1 G/DL (ref 13–16)
HGB BLD-MCNC: 8.7 G/DL (ref 13–16)
HGB BLD-MCNC: 9.7 G/DL (ref 13–16)
HGB UR QL STRIP: NEGATIVE
HGB UR QL STRIP: NEGATIVE
HIV 1+2 AB+HIV1 P24 AG SERPL QL IA: NONREACTIVE
HIV1 P24 AG SERPL QL IA: NONREACTIVE
HIV1+2 AB SERPL QL IA: NONREACTIVE
HIV12 RESULT COMMENT, HHIVC: NORMAL
IGA SERPL-MCNC: 625 MG/DL (ref 61–437)
IGG SERPL-MCNC: 1150 MG/DL (ref 700–1600)
IGM SERPL-MCNC: 40 MG/DL (ref 20–172)
INR PPP: 1.1 (ref 0.8–1.2)
INR PPP: 1.1 (ref 0.8–1.2)
INR PPP: 1.2 (ref 0.8–1.2)
INSPIRATION.DURATION SETTING TIME VENT: 0.9 SEC
INTERPRETATION SERPL IEP-IMP: ABNORMAL
KAPPA LC FREE SER-MCNC: 44.4 MG/L (ref 3.3–19.4)
KAPPA LC FREE/LAMBDA FREE SER: 1.37 {RATIO} (ref 0.26–1.65)
KETONES UR QL STRIP.AUTO: NEGATIVE MG/DL
KETONES UR QL STRIP.AUTO: NEGATIVE MG/DL
LACTATE BLD-SCNC: 1.21 MMOL/L (ref 0.4–2)
LACTATE BLD-SCNC: 2.26 MMOL/L (ref 0.4–2)
LACTATE BLD-SCNC: 3.95 MMOL/L (ref 0.4–2)
LAMBDA LC FREE SERPL-MCNC: 32.5 MG/L (ref 5.7–26.3)
LEFT ARM BP: 172 MMHG
LEFT KIDNEY LENGTH: 11.98 CM
LEFT KIDNEY WIDTH: 5.64 CM
LEUKOCYTE ESTERASE UR QL STRIP.AUTO: NEGATIVE
LEUKOCYTE ESTERASE UR QL STRIP.AUTO: NEGATIVE
LYMPHOCYTES # BLD: 1 K/UL (ref 0.9–3.6)
LYMPHOCYTES # BLD: 1.2 K/UL (ref 0.9–3.6)
LYMPHOCYTES # BLD: 1.2 K/UL (ref 0.9–3.6)
LYMPHOCYTES # BLD: 1.3 K/UL (ref 0.8–3.5)
LYMPHOCYTES # BLD: 1.3 K/UL (ref 0.9–3.6)
LYMPHOCYTES # BLD: 1.3 K/UL (ref 0.9–3.6)
LYMPHOCYTES # BLD: 1.4 K/UL (ref 0.9–3.6)
LYMPHOCYTES # BLD: 1.5 K/UL (ref 0.8–3.5)
LYMPHOCYTES # BLD: 1.6 K/UL (ref 0.9–3.6)
LYMPHOCYTES # BLD: 1.7 K/UL (ref 0.9–3.6)
LYMPHOCYTES NFR BLD: 10 % (ref 20–51)
LYMPHOCYTES NFR BLD: 10 % (ref 21–52)
LYMPHOCYTES NFR BLD: 11 % (ref 21–52)
LYMPHOCYTES NFR BLD: 19 % (ref 21–52)
LYMPHOCYTES NFR BLD: 6 % (ref 21–52)
LYMPHOCYTES NFR BLD: 7 % (ref 20–51)
LYMPHOCYTES NFR BLD: 7 % (ref 21–52)
LYMPHOCYTES NFR BLD: 8 % (ref 21–52)
LYMPHOCYTES NFR BLD: 8 % (ref 21–52)
LYMPHOCYTES NFR BLD: 9 % (ref 21–52)
M PROTEIN SERPL ELPH-MCNC: ABNORMAL G/DL
MAGNESIUM SERPL-MCNC: 1.9 MG/DL (ref 1.6–2.6)
MAGNESIUM SERPL-MCNC: 2 MG/DL (ref 1.6–2.6)
MAGNESIUM SERPL-MCNC: 2.1 MG/DL (ref 1.6–2.6)
MAGNESIUM SERPL-MCNC: 2.2 MG/DL (ref 1.6–2.6)
MAGNESIUM SERPL-MCNC: 2.4 MG/DL (ref 1.6–2.6)
MCH RBC QN AUTO: 28.2 PG (ref 24–34)
MCH RBC QN AUTO: 28.3 PG (ref 24–34)
MCH RBC QN AUTO: 28.4 PG (ref 24–34)
MCH RBC QN AUTO: 28.6 PG (ref 24–34)
MCH RBC QN AUTO: 28.6 PG (ref 24–34)
MCH RBC QN AUTO: 28.7 PG (ref 24–34)
MCH RBC QN AUTO: 28.8 PG (ref 24–34)
MCH RBC QN AUTO: 28.9 PG (ref 24–34)
MCH RBC QN AUTO: 28.9 PG (ref 24–34)
MCH RBC QN AUTO: 29.1 PG (ref 24–34)
MCH RBC QN AUTO: 29.1 PG (ref 24–34)
MCH RBC QN AUTO: 29.2 PG (ref 24–34)
MCH RBC QN AUTO: 29.2 PG (ref 24–34)
MCH RBC QN AUTO: 29.4 PG (ref 24–34)
MCH RBC QN AUTO: 29.9 PG (ref 24–34)
MCH RBC QN AUTO: 30.1 PG (ref 24–34)
MCHC RBC AUTO-ENTMCNC: 32 G/DL (ref 31–37)
MCHC RBC AUTO-ENTMCNC: 32.2 G/DL (ref 31–37)
MCHC RBC AUTO-ENTMCNC: 32.2 G/DL (ref 31–37)
MCHC RBC AUTO-ENTMCNC: 32.3 G/DL (ref 31–37)
MCHC RBC AUTO-ENTMCNC: 32.3 G/DL (ref 31–37)
MCHC RBC AUTO-ENTMCNC: 32.4 G/DL (ref 31–37)
MCHC RBC AUTO-ENTMCNC: 32.5 G/DL (ref 31–37)
MCHC RBC AUTO-ENTMCNC: 33.3 G/DL (ref 31–37)
MCHC RBC AUTO-ENTMCNC: 33.3 G/DL (ref 31–37)
MCHC RBC AUTO-ENTMCNC: 33.5 G/DL (ref 31–37)
MCHC RBC AUTO-ENTMCNC: 33.6 G/DL (ref 31–37)
MCHC RBC AUTO-ENTMCNC: 34.1 G/DL (ref 31–37)
MCHC RBC AUTO-ENTMCNC: 34.2 G/DL (ref 31–37)
MCHC RBC AUTO-ENTMCNC: 34.2 G/DL (ref 31–37)
MCHC RBC AUTO-ENTMCNC: 34.3 G/DL (ref 31–37)
MCHC RBC AUTO-ENTMCNC: 34.5 G/DL (ref 31–37)
MCHC RBC AUTO-ENTMCNC: 34.5 G/DL (ref 31–37)
MCHC RBC AUTO-ENTMCNC: 34.7 G/DL (ref 31–37)
MCV RBC AUTO: 83.7 FL (ref 74–97)
MCV RBC AUTO: 84 FL (ref 74–97)
MCV RBC AUTO: 84.1 FL (ref 74–97)
MCV RBC AUTO: 84.3 FL (ref 74–97)
MCV RBC AUTO: 85.4 FL (ref 74–97)
MCV RBC AUTO: 85.5 FL (ref 74–97)
MCV RBC AUTO: 85.9 FL (ref 74–97)
MCV RBC AUTO: 86.6 FL (ref 74–97)
MCV RBC AUTO: 87.3 FL (ref 74–97)
MCV RBC AUTO: 87.4 FL (ref 74–97)
MCV RBC AUTO: 87.5 FL (ref 74–97)
MCV RBC AUTO: 87.8 FL (ref 74–97)
MCV RBC AUTO: 87.9 FL (ref 74–97)
MCV RBC AUTO: 88 FL (ref 74–97)
MCV RBC AUTO: 88.3 FL (ref 74–97)
MCV RBC AUTO: 88.4 FL (ref 74–97)
MCV RBC AUTO: 88.7 FL (ref 74–97)
MCV RBC AUTO: 89 FL (ref 74–97)
MONOCYTES # BLD: 0.4 K/UL (ref 0–1)
MONOCYTES # BLD: 0.7 K/UL (ref 0.05–1.2)
MONOCYTES # BLD: 1 K/UL (ref 0.05–1.2)
MONOCYTES # BLD: 1.1 K/UL (ref 0.05–1.2)
MONOCYTES # BLD: 1.2 K/UL (ref 0.05–1.2)
MONOCYTES # BLD: 1.3 K/UL (ref 0.05–1.2)
MONOCYTES # BLD: 1.4 K/UL (ref 0.05–1.2)
MONOCYTES # BLD: 1.5 K/UL (ref 0–1)
MONOCYTES NFR BLD: 11 % (ref 3–10)
MONOCYTES NFR BLD: 3 % (ref 2–9)
MONOCYTES NFR BLD: 6 % (ref 3–10)
MONOCYTES NFR BLD: 7 % (ref 2–9)
MONOCYTES NFR BLD: 7 % (ref 3–10)
MONOCYTES NFR BLD: 8 % (ref 3–10)
MONOCYTES NFR BLD: 9 % (ref 3–10)
NEUTS BAND NFR BLD MANUAL: 2 % (ref 0–5)
NEUTS BAND NFR BLD MANUAL: 2 % (ref 0–5)
NEUTS SEG # BLD: 11 K/UL (ref 1.8–8)
NEUTS SEG # BLD: 11.3 K/UL (ref 1.8–8)
NEUTS SEG # BLD: 12.7 K/UL (ref 1.8–8)
NEUTS SEG # BLD: 12.7 K/UL (ref 1.8–8)
NEUTS SEG # BLD: 14.2 K/UL (ref 1.8–8)
NEUTS SEG # BLD: 17.2 K/UL (ref 1.8–8)
NEUTS SEG # BLD: 17.4 K/UL (ref 1.8–8)
NEUTS SEG # BLD: 18.1 K/UL (ref 1.8–8)
NEUTS SEG # BLD: 5.1 K/UL (ref 1.8–8)
NEUTS SEG # BLD: 9.8 K/UL (ref 1.8–8)
NEUTS SEG NFR BLD: 71 % (ref 40–73)
NEUTS SEG NFR BLD: 81 % (ref 40–73)
NEUTS SEG NFR BLD: 82 % (ref 40–73)
NEUTS SEG NFR BLD: 82 % (ref 40–73)
NEUTS SEG NFR BLD: 83 % (ref 40–73)
NEUTS SEG NFR BLD: 84 % (ref 42–75)
NEUTS SEG NFR BLD: 85 % (ref 40–73)
NEUTS SEG NFR BLD: 85 % (ref 42–75)
NEUTS SEG NFR BLD: 87 % (ref 40–73)
NEUTS SEG NFR BLD: 87 % (ref 40–73)
NITRITE UR QL STRIP.AUTO: NEGATIVE
NITRITE UR QL STRIP.AUTO: NEGATIVE
O2/TOTAL GAS SETTING VFR VENT: 0.3 %
O2/TOTAL GAS SETTING VFR VENT: 30 %
O2/TOTAL GAS SETTING VFR VENT: 40 %
OSMOLALITY SERPL: 255 MOSM/KG H2O (ref 280–301)
OSMOLALITY SERPL: 264 MOSM/KG H2O (ref 280–301)
OSMOLALITY SERPL: 666 MOSM/KG H2O (ref 280–301)
OSMOLALITY UR: 259 MOSM/KG H2O (ref 50–1400)
OSMOLALITY UR: 263 MOSM/KG H2O (ref 50–1400)
P-R INTERVAL, ECG05: 166 MS
P-R INTERVAL, ECG05: 218 MS
PCO2 BLD: 30.7 MMHG (ref 35–45)
PCO2 BLD: 31.1 MMHG (ref 35–45)
PCO2 BLD: 32.7 MMHG (ref 35–45)
PCO2 BLD: 33.9 MMHG (ref 35–45)
PCO2 BLD: 34.3 MMHG (ref 35–45)
PCO2 BLD: 34.3 MMHG (ref 35–45)
PCO2 BLD: 34.6 MMHG (ref 35–45)
PCO2 BLD: 35.2 MMHG (ref 35–45)
PEEP RESPIRATORY: 5 CMH2O
PH BLD: 7.41 [PH] (ref 7.35–7.45)
PH BLD: 7.42 [PH] (ref 7.35–7.45)
PH BLD: 7.43 [PH] (ref 7.35–7.45)
PH BLD: 7.44 [PH] (ref 7.35–7.45)
PH BLD: 7.44 [PH] (ref 7.35–7.45)
PH BLD: 7.45 [PH] (ref 7.35–7.45)
PH BLD: 7.46 [PH] (ref 7.35–7.45)
PH BLD: 7.47 [PH] (ref 7.35–7.45)
PH UR STRIP: 5.5 [PH] (ref 5–8)
PH UR STRIP: 5.5 [PH] (ref 5–8)
PHOSPHATE SERPL-MCNC: 10.3 MG/DL (ref 2.5–4.9)
PHOSPHATE SERPL-MCNC: 10.6 MG/DL (ref 2.5–4.9)
PHOSPHATE SERPL-MCNC: 3 MG/DL (ref 2.5–4.9)
PHOSPHATE SERPL-MCNC: 3 MG/DL (ref 2.5–4.9)
PHOSPHATE SERPL-MCNC: 3.1 MG/DL (ref 2.5–4.9)
PHOSPHATE SERPL-MCNC: 3.3 MG/DL (ref 2.5–4.9)
PHOSPHATE SERPL-MCNC: 3.5 MG/DL (ref 2.5–4.9)
PHOSPHATE SERPL-MCNC: 3.6 MG/DL (ref 2.5–4.9)
PHOSPHATE SERPL-MCNC: 3.7 MG/DL (ref 2.5–4.9)
PHOSPHATE SERPL-MCNC: 4 MG/DL (ref 2.5–4.9)
PHOSPHATE SERPL-MCNC: 7.5 MG/DL (ref 2.5–4.9)
PHOSPHATE SERPL-MCNC: 8.5 MG/DL (ref 2.5–4.9)
PHOSPHATE SERPL-MCNC: 9.3 MG/DL (ref 2.5–4.9)
PHOSPHATE SERPL-MCNC: 9.8 MG/DL (ref 2.5–4.9)
PIP ISTAT,IPIP: 12
PIP ISTAT,IPIP: 12
PIP ISTAT,IPIP: 16
PIP ISTAT,IPIP: 19
PIP ISTAT,IPIP: 23
PIP ISTAT,IPIP: 26
PLATELET # BLD AUTO: 191 K/UL (ref 135–420)
PLATELET # BLD AUTO: 196 K/UL (ref 135–420)
PLATELET # BLD AUTO: 208 K/UL (ref 135–420)
PLATELET # BLD AUTO: 223 K/UL (ref 135–420)
PLATELET # BLD AUTO: 241 K/UL (ref 135–420)
PLATELET # BLD AUTO: 253 K/UL (ref 135–420)
PLATELET # BLD AUTO: 276 K/UL (ref 135–420)
PLATELET # BLD AUTO: 289 K/UL (ref 135–420)
PLATELET # BLD AUTO: 305 K/UL (ref 135–420)
PLATELET # BLD AUTO: 327 K/UL (ref 135–420)
PLATELET # BLD AUTO: 358 K/UL (ref 135–420)
PLATELET # BLD AUTO: 369 K/UL (ref 135–420)
PLATELET # BLD AUTO: 375 K/UL (ref 135–420)
PLATELET # BLD AUTO: 449 K/UL (ref 135–420)
PLATELET # BLD AUTO: 453 K/UL (ref 135–420)
PLATELET # BLD AUTO: 459 K/UL (ref 135–420)
PLATELET # BLD AUTO: 462 K/UL (ref 135–420)
PLATELET # BLD AUTO: 465 K/UL (ref 135–420)
PLATELET COMMENTS,PCOM: ABNORMAL
PLATELET COMMENTS,PCOM: ABNORMAL
PMV BLD AUTO: 7.3 FL (ref 9.2–11.8)
PMV BLD AUTO: 7.4 FL (ref 9.2–11.8)
PMV BLD AUTO: 7.5 FL (ref 9.2–11.8)
PMV BLD AUTO: 7.7 FL (ref 9.2–11.8)
PMV BLD AUTO: 7.8 FL (ref 9.2–11.8)
PMV BLD AUTO: 7.9 FL (ref 9.2–11.8)
PMV BLD AUTO: 8.3 FL (ref 9.2–11.8)
PMV BLD AUTO: 8.5 FL (ref 9.2–11.8)
PMV BLD AUTO: 8.5 FL (ref 9.2–11.8)
PMV BLD AUTO: 8.9 FL (ref 9.2–11.8)
PO2 BLD: 129 MMHG (ref 80–100)
PO2 BLD: 59 MMHG (ref 80–100)
PO2 BLD: 65 MMHG (ref 80–100)
PO2 BLD: 66 MMHG (ref 80–100)
PO2 BLD: 77 MMHG (ref 80–100)
PO2 BLD: 79 MMHG (ref 80–100)
PO2 BLD: 80 MMHG (ref 80–100)
PO2 BLD: 84 MMHG (ref 80–100)
POTASSIUM SERPL-SCNC: 3.3 MMOL/L (ref 3.5–5.5)
POTASSIUM SERPL-SCNC: 3.6 MMOL/L (ref 3.5–5.5)
POTASSIUM SERPL-SCNC: 3.8 MMOL/L (ref 3.5–5.5)
POTASSIUM SERPL-SCNC: 3.9 MMOL/L (ref 3.5–5.5)
POTASSIUM SERPL-SCNC: 4 MMOL/L (ref 3.5–5.5)
POTASSIUM SERPL-SCNC: 4.1 MMOL/L (ref 3.5–5.5)
POTASSIUM SERPL-SCNC: 4.2 MMOL/L (ref 3.5–5.5)
POTASSIUM SERPL-SCNC: 4.3 MMOL/L (ref 3.5–5.5)
POTASSIUM SERPL-SCNC: 4.4 MMOL/L (ref 3.5–5.5)
POTASSIUM SERPL-SCNC: 4.5 MMOL/L (ref 3.5–5.5)
POTASSIUM SERPL-SCNC: 4.6 MMOL/L (ref 3.5–5.5)
POTASSIUM SERPL-SCNC: 4.9 MMOL/L (ref 3.5–5.5)
POTASSIUM SERPL-SCNC: 5 MMOL/L (ref 3.5–5.5)
POTASSIUM SERPL-SCNC: 5.4 MMOL/L (ref 3.5–5.5)
POTASSIUM SERPL-SCNC: 5.4 MMOL/L (ref 3.5–5.5)
POTASSIUM SERPL-SCNC: 5.7 MMOL/L (ref 3.5–5.5)
PROT SERPL-MCNC: 4.8 G/DL (ref 6.4–8.2)
PROT SERPL-MCNC: 5.1 G/DL (ref 6.4–8.2)
PROT SERPL-MCNC: 5.2 G/DL (ref 6–8.5)
PROT SERPL-MCNC: 5.3 G/DL (ref 6.4–8.2)
PROT SERPL-MCNC: 5.3 G/DL (ref 6.4–8.2)
PROT SERPL-MCNC: 5.8 G/DL (ref 6.4–8.2)
PROT SERPL-MCNC: 7.4 G/DL (ref 6.4–8.2)
PROT SERPL-MCNC: 7.5 G/DL (ref 6.4–8.2)
PROT UR STRIP-MCNC: 30 MG/DL
PROT UR STRIP-MCNC: NEGATIVE MG/DL
PROTHROMBIN TIME: 13.9 SEC (ref 11.5–15.2)
PROTHROMBIN TIME: 14.2 SEC (ref 11.5–15.2)
PROTHROMBIN TIME: 14.8 SEC (ref 11.5–15.2)
PROTHROMBIN TIME: 14.9 SEC (ref 11.5–15.2)
PROTHROMBIN TIME: 15.1 SEC (ref 11.5–15.2)
PROTHROMBIN TIME: 15.1 SEC (ref 11.5–15.2)
Q-T INTERVAL, ECG07: 334 MS
Q-T INTERVAL, ECG07: 390 MS
QRS DURATION, ECG06: 66 MS
QRS DURATION, ECG06: 68 MS
QTC CALCULATION (BEZET), ECG08: 435 MS
QTC CALCULATION (BEZET), ECG08: 487 MS
RBC # BLD AUTO: 2.27 M/UL (ref 4.7–5.5)
RBC # BLD AUTO: 2.3 M/UL (ref 4.7–5.5)
RBC # BLD AUTO: 2.34 M/UL (ref 4.7–5.5)
RBC # BLD AUTO: 2.39 M/UL (ref 4.7–5.5)
RBC # BLD AUTO: 2.44 M/UL (ref 4.7–5.5)
RBC # BLD AUTO: 2.46 M/UL (ref 4.7–5.5)
RBC # BLD AUTO: 2.47 M/UL (ref 4.7–5.5)
RBC # BLD AUTO: 2.54 M/UL (ref 4.7–5.5)
RBC # BLD AUTO: 2.57 M/UL (ref 4.7–5.5)
RBC # BLD AUTO: 2.58 M/UL (ref 4.7–5.5)
RBC # BLD AUTO: 2.59 M/UL (ref 4.7–5.5)
RBC # BLD AUTO: 2.64 M/UL (ref 4.7–5.5)
RBC # BLD AUTO: 2.67 M/UL (ref 4.7–5.5)
RBC # BLD AUTO: 2.69 M/UL (ref 4.7–5.5)
RBC # BLD AUTO: 2.72 M/UL (ref 4.7–5.5)
RBC # BLD AUTO: 2.74 M/UL (ref 4.7–5.5)
RBC # BLD AUTO: 2.91 M/UL (ref 4.7–5.5)
RBC # BLD AUTO: 3.22 M/UL (ref 4.7–5.5)
RBC #/AREA URNS HPF: NORMAL /HPF (ref 0–5)
RBC MORPH BLD: ABNORMAL
REPORTED DOSE,DOSE: NORMAL UNITS
REPORTED DOSE/TIME,TMG: 100
REPORTED DOSE/TIME,TMG: 2000
REPORTED DOSE/TIME,TMG: 400
RIGHT ARM BP: 170 MMHG
SAO2 % BLD: 91 % (ref 92–97)
SAO2 % BLD: 93 % (ref 92–97)
SAO2 % BLD: 94 % (ref 92–97)
SAO2 % BLD: 95 % (ref 92–97)
SAO2 % BLD: 96 % (ref 92–97)
SAO2 % BLD: 96 % (ref 92–97)
SAO2 % BLD: 97 % (ref 92–97)
SAO2 % BLD: 99 % (ref 92–97)
SERVICE CMNT-IMP: ABNORMAL
SERVICE CMNT-IMP: NORMAL
SERVICE CMNT-IMP: NORMAL
SODIUM SERPL-SCNC: 121 MMOL/L (ref 136–145)
SODIUM SERPL-SCNC: 123 MMOL/L (ref 136–145)
SODIUM SERPL-SCNC: 124 MMOL/L (ref 136–145)
SODIUM SERPL-SCNC: 125 MMOL/L (ref 136–145)
SODIUM SERPL-SCNC: 126 MMOL/L (ref 136–145)
SODIUM SERPL-SCNC: 127 MMOL/L (ref 136–145)
SODIUM SERPL-SCNC: 128 MMOL/L (ref 136–145)
SODIUM SERPL-SCNC: 129 MMOL/L (ref 136–145)
SODIUM SERPL-SCNC: 130 MMOL/L (ref 136–145)
SODIUM SERPL-SCNC: 132 MMOL/L (ref 136–145)
SODIUM SERPL-SCNC: 134 MMOL/L (ref 136–145)
SODIUM SERPL-SCNC: 134 MMOL/L (ref 136–145)
SODIUM SERPL-SCNC: 135 MMOL/L (ref 136–145)
SODIUM SERPL-SCNC: 135 MMOL/L (ref 136–145)
SODIUM SERPL-SCNC: 136 MMOL/L (ref 136–145)
SODIUM SERPL-SCNC: 137 MMOL/L (ref 136–145)
SODIUM SERPL-SCNC: 137 MMOL/L (ref 136–145)
SODIUM SERPL-SCNC: 138 MMOL/L (ref 136–145)
SODIUM UR-SCNC: 34 MMOL/L (ref 20–110)
SODIUM UR-SCNC: 46 MMOL/L (ref 20–110)
SODIUM UR-SCNC: 47 MMOL/L (ref 20–110)
SP GR UR REFRACTOMETRY: 1.01 (ref 1–1.03)
SP GR UR REFRACTOMETRY: 1.01 (ref 1–1.03)
SPECIMEN EXP DATE BLD: NORMAL
SPECIMEN EXP DATE BLD: NORMAL
SPECIMEN TYPE: ABNORMAL
STATUS OF UNIT,%ST: NORMAL
STRESS BASELINE HR: 97 BPM
STRESS ESTIMATED WORKLOAD: 1 METS
STRESS EXERCISE DUR MIN: NORMAL
STRESS PEAK DIAS BP: 86 MMHG
STRESS PEAK SYS BP: 174 MMHG
STRESS PERCENT HR ACHIEVED: 74 %
STRESS POST PEAK HR: 116 BPM
STRESS RATE PRESSURE PRODUCT: NORMAL BPM*MMHG
STRESS ST DEPRESSION: 0 MM
STRESS ST ELEVATION: 0 MM
STRESS TARGET HR: 157 BPM
TOTAL RESP. RATE, ITRR: 14
TOTAL RESP. RATE, ITRR: 15
TOTAL RESP. RATE, ITRR: 16
TOTAL RESP. RATE, ITRR: 16
TOTAL RESP. RATE, ITRR: 18
TOTAL RESP. RATE, ITRR: 24
TOTAL RESP. RATE, ITRR: 27
TOTAL RESP. RATE, ITRR: 28
TROPONIN I SERPL-MCNC: 0.03 NG/ML (ref 0–0.04)
TSH SERPL DL<=0.05 MIU/L-ACNC: 0.82 UIU/ML (ref 0.36–3.74)
UNIT DIVISION, %UDIV: 0
UROBILINOGEN UR QL STRIP.AUTO: 0.2 EU/DL (ref 0.2–1)
UROBILINOGEN UR QL STRIP.AUTO: 1 EU/DL (ref 0.2–1)
VANCOMYCIN SERPL-MCNC: 16.9 UG/ML (ref 5–40)
VANCOMYCIN SERPL-MCNC: 18 UG/ML (ref 5–40)
VANCOMYCIN SERPL-MCNC: 22 UG/ML (ref 5–40)
VANCOMYCIN SERPL-MCNC: 23.9 UG/ML (ref 5–40)
VANCOMYCIN SERPL-MCNC: 24.7 UG/ML (ref 5–40)
VANCOMYCIN SERPL-MCNC: 26 UG/ML (ref 5–40)
VANCOMYCIN SERPL-MCNC: 26.2 UG/ML (ref 5–40)
VANCOMYCIN SERPL-MCNC: 26.7 UG/ML (ref 5–40)
VANCOMYCIN TROUGH SERPL-MCNC: 11 UG/ML (ref 10–20)
VANCOMYCIN TROUGH SERPL-MCNC: 15.8 UG/ML (ref 10–20)
VANCOMYCIN TROUGH SERPL-MCNC: 17.9 UG/ML (ref 10–20)
VANCOMYCIN TROUGH SERPL-MCNC: 18 UG/ML (ref 10–20)
VANCOMYCIN TROUGH SERPL-MCNC: 18.2 UG/ML (ref 10–20)
VANCOMYCIN TROUGH SERPL-MCNC: 23.8 UG/ML (ref 10–20)
VENTILATION MODE VENT: ABNORMAL
VENTRICULAR RATE, ECG03: 102 BPM
VENTRICULAR RATE, ECG03: 94 BPM
VOLUME CONTROL PLUS IVLCP: YES
VT SETTING VENT: 450 ML
WBC # BLD AUTO: 10.4 K/UL (ref 4.6–13.2)
WBC # BLD AUTO: 11.6 K/UL (ref 4.6–13.2)
WBC # BLD AUTO: 12.2 K/UL (ref 4.6–13.2)
WBC # BLD AUTO: 12.5 K/UL (ref 4.6–13.2)
WBC # BLD AUTO: 12.6 K/UL (ref 4.6–13.2)
WBC # BLD AUTO: 12.7 K/UL (ref 4.6–13.2)
WBC # BLD AUTO: 13.3 K/UL (ref 4.6–13.2)
WBC # BLD AUTO: 13.3 K/UL (ref 4.6–13.2)
WBC # BLD AUTO: 13.7 K/UL (ref 4.6–13.2)
WBC # BLD AUTO: 14.2 K/UL (ref 4.6–13.2)
WBC # BLD AUTO: 15.5 K/UL (ref 4.6–13.2)
WBC # BLD AUTO: 15.5 K/UL (ref 4.6–13.2)
WBC # BLD AUTO: 16.8 K/UL (ref 4.6–13.2)
WBC # BLD AUTO: 19.8 K/UL (ref 4.6–13.2)
WBC # BLD AUTO: 20 K/UL (ref 4.6–13.2)
WBC # BLD AUTO: 21.5 K/UL (ref 4.6–13.2)
WBC # BLD AUTO: 5.7 K/UL (ref 4.6–13.2)
WBC # BLD AUTO: 7.2 K/UL (ref 4.6–13.2)
WBC URNS QL MICRO: NORMAL /HPF (ref 0–4)

## 2019-01-01 PROCEDURE — 74011250636 HC RX REV CODE- 250/636: Performed by: INTERNAL MEDICINE

## 2019-01-01 PROCEDURE — 65610000006 HC RM INTENSIVE CARE

## 2019-01-01 PROCEDURE — 74011250636 HC RX REV CODE- 250/636: Performed by: HOSPITALIST

## 2019-01-01 PROCEDURE — 74011000258 HC RX REV CODE- 258: Performed by: HOSPITALIST

## 2019-01-01 PROCEDURE — 75710 ARTERY X-RAYS ARM/LEG: CPT

## 2019-01-01 PROCEDURE — 36600 WITHDRAWAL OF ARTERIAL BLOOD: CPT

## 2019-01-01 PROCEDURE — 85025 COMPLETE CBC W/AUTO DIFF WBC: CPT

## 2019-01-01 PROCEDURE — 82962 GLUCOSE BLOOD TEST: CPT

## 2019-01-01 PROCEDURE — 74011250636 HC RX REV CODE- 250/636: Performed by: SURGERY

## 2019-01-01 PROCEDURE — 84100 ASSAY OF PHOSPHORUS: CPT

## 2019-01-01 PROCEDURE — 77030002916 HC SUT ETHLN J&J -A: Performed by: SURGERY

## 2019-01-01 PROCEDURE — 77010033678 HC OXYGEN DAILY

## 2019-01-01 PROCEDURE — 74011000258 HC RX REV CODE- 258: Performed by: PHYSICIAN ASSISTANT

## 2019-01-01 PROCEDURE — 77030019938 HC TBNG IV PCA ICUM -A

## 2019-01-01 PROCEDURE — 74011250637 HC RX REV CODE- 250/637: Performed by: PHYSICIAN ASSISTANT

## 2019-01-01 PROCEDURE — G0299 HHS/HOSPICE OF RN EA 15 MIN: HCPCS

## 2019-01-01 PROCEDURE — 84132 ASSAY OF SERUM POTASSIUM: CPT

## 2019-01-01 PROCEDURE — 74011000250 HC RX REV CODE- 250: Performed by: NURSE PRACTITIONER

## 2019-01-01 PROCEDURE — 80202 ASSAY OF VANCOMYCIN: CPT

## 2019-01-01 PROCEDURE — 06H033Z INSERTION OF INFUSION DEVICE INTO INFERIOR VENA CAVA, PERCUTANEOUS APPROACH: ICD-10-PCS | Performed by: SURGERY

## 2019-01-01 PROCEDURE — 36430 TRANSFUSION BLD/BLD COMPNT: CPT

## 2019-01-01 PROCEDURE — 80053 COMPREHEN METABOLIC PANEL: CPT

## 2019-01-01 PROCEDURE — 74011250636 HC RX REV CODE- 250/636: Performed by: PHYSICIAN ASSISTANT

## 2019-01-01 PROCEDURE — 74011250637 HC RX REV CODE- 250/637: Performed by: INTERNAL MEDICINE

## 2019-01-01 PROCEDURE — 94640 AIRWAY INHALATION TREATMENT: CPT

## 2019-01-01 PROCEDURE — 74011636320 HC RX REV CODE- 636/320: Performed by: HOSPITALIST

## 2019-01-01 PROCEDURE — 87186 SC STD MICRODIL/AGAR DIL: CPT

## 2019-01-01 PROCEDURE — 74011000250 HC RX REV CODE- 250: Performed by: INTERNAL MEDICINE

## 2019-01-01 PROCEDURE — 3331090002 HH PPS REVENUE DEBIT

## 2019-01-01 PROCEDURE — 82803 BLOOD GASES ANY COMBINATION: CPT

## 2019-01-01 PROCEDURE — 85610 PROTHROMBIN TIME: CPT

## 2019-01-01 PROCEDURE — 65270000029 HC RM PRIVATE

## 2019-01-01 PROCEDURE — 84295 ASSAY OF SERUM SODIUM: CPT

## 2019-01-01 PROCEDURE — 88307 TISSUE EXAM BY PATHOLOGIST: CPT

## 2019-01-01 PROCEDURE — 96365 THER/PROPH/DIAG IV INF INIT: CPT

## 2019-01-01 PROCEDURE — 3331090001 HH PPS REVENUE CREDIT

## 2019-01-01 PROCEDURE — 97530 THERAPEUTIC ACTIVITIES: CPT

## 2019-01-01 PROCEDURE — 74011250636 HC RX REV CODE- 250/636: Performed by: ANESTHESIOLOGY

## 2019-01-01 PROCEDURE — 83735 ASSAY OF MAGNESIUM: CPT

## 2019-01-01 PROCEDURE — 71045 X-RAY EXAM CHEST 1 VIEW: CPT

## 2019-01-01 PROCEDURE — 36415 COLL VENOUS BLD VENIPUNCTURE: CPT

## 2019-01-01 PROCEDURE — 85027 COMPLETE CBC AUTOMATED: CPT

## 2019-01-01 PROCEDURE — P9016 RBC LEUKOCYTES REDUCED: HCPCS

## 2019-01-01 PROCEDURE — 77030018846 HC SOL IRR STRL H20 ICUM -A

## 2019-01-01 PROCEDURE — 74011250637 HC RX REV CODE- 250/637: Performed by: HOSPITALIST

## 2019-01-01 PROCEDURE — 99284 EMERGENCY DEPT VISIT MOD MDM: CPT

## 2019-01-01 PROCEDURE — 83930 ASSAY OF BLOOD OSMOLALITY: CPT

## 2019-01-01 PROCEDURE — 5A1D70Z PERFORMANCE OF URINARY FILTRATION, INTERMITTENT, LESS THAN 6 HOURS PER DAY: ICD-10-PCS | Performed by: INTERNAL MEDICINE

## 2019-01-01 PROCEDURE — 74011250636 HC RX REV CODE- 250/636: Performed by: NURSE PRACTITIONER

## 2019-01-01 PROCEDURE — 82330 ASSAY OF CALCIUM: CPT

## 2019-01-01 PROCEDURE — 74011250637 HC RX REV CODE- 250/637: Performed by: SURGERY

## 2019-01-01 PROCEDURE — 86900 BLOOD TYPING SEROLOGIC ABO: CPT

## 2019-01-01 PROCEDURE — 80069 RENAL FUNCTION PANEL: CPT

## 2019-01-01 PROCEDURE — 400013 HH SOC

## 2019-01-01 PROCEDURE — 93017 CV STRESS TEST TRACING ONLY: CPT

## 2019-01-01 PROCEDURE — 94762 N-INVAS EAR/PLS OXIMTRY CONT: CPT

## 2019-01-01 PROCEDURE — 31500 INSERT EMERGENCY AIRWAY: CPT

## 2019-01-01 PROCEDURE — 77030018723 HC ELCTRD BLD COVD -A: Performed by: SURGERY

## 2019-01-01 PROCEDURE — 80048 BASIC METABOLIC PNL TOTAL CA: CPT

## 2019-01-01 PROCEDURE — C1752 CATH,HEMODIALYSIS,SHORT-TERM: HCPCS | Performed by: SURGERY

## 2019-01-01 PROCEDURE — 93922 UPR/L XTREMITY ART 2 LEVELS: CPT

## 2019-01-01 PROCEDURE — 93005 ELECTROCARDIOGRAM TRACING: CPT

## 2019-01-01 PROCEDURE — 87070 CULTURE OTHR SPECIMN AEROBIC: CPT

## 2019-01-01 PROCEDURE — 74011250636 HC RX REV CODE- 250/636: Performed by: FAMILY MEDICINE

## 2019-01-01 PROCEDURE — 51798 US URINE CAPACITY MEASURE: CPT

## 2019-01-01 PROCEDURE — 77030037878 HC DRSG MEPILEX >48IN BORD MOLN -B

## 2019-01-01 PROCEDURE — 94003 VENT MGMT INPAT SUBQ DAY: CPT

## 2019-01-01 PROCEDURE — 74011250636 HC RX REV CODE- 250/636

## 2019-01-01 PROCEDURE — 0Y6D0Z3 DETACHMENT AT LEFT UPPER LEG, LOW, OPEN APPROACH: ICD-10-PCS | Performed by: SURGERY

## 2019-01-01 PROCEDURE — 74011000250 HC RX REV CODE- 250: Performed by: PHYSICIAN ASSISTANT

## 2019-01-01 PROCEDURE — 77030003028 HC SUT VCRL J&J -A: Performed by: SURGERY

## 2019-01-01 PROCEDURE — 3331090003 HH PPS REVENUE ADJ

## 2019-01-01 PROCEDURE — 84300 ASSAY OF URINE SODIUM: CPT

## 2019-01-01 PROCEDURE — 36592 COLLECT BLOOD FROM PICC: CPT

## 2019-01-01 PROCEDURE — 87340 HEPATITIS B SURFACE AG IA: CPT

## 2019-01-01 PROCEDURE — 74018 RADEX ABDOMEN 1 VIEW: CPT

## 2019-01-01 PROCEDURE — 73630 X-RAY EXAM OF FOOT: CPT

## 2019-01-01 PROCEDURE — 82570 ASSAY OF URINE CREATININE: CPT

## 2019-01-01 PROCEDURE — 83605 ASSAY OF LACTIC ACID: CPT

## 2019-01-01 PROCEDURE — C9113 INJ PANTOPRAZOLE SODIUM, VIA: HCPCS | Performed by: PHYSICIAN ASSISTANT

## 2019-01-01 PROCEDURE — 74011250636 HC RX REV CODE- 250/636: Performed by: EMERGENCY MEDICINE

## 2019-01-01 PROCEDURE — 74011000258 HC RX REV CODE- 258: Performed by: NURSE PRACTITIONER

## 2019-01-01 PROCEDURE — A9500 TC99M SESTAMIBI: HCPCS

## 2019-01-01 PROCEDURE — 77030008462 HC STPLR SKN PROX J&J -A: Performed by: SURGERY

## 2019-01-01 PROCEDURE — 82784 ASSAY IGA/IGD/IGG/IGM EACH: CPT

## 2019-01-01 PROCEDURE — 74011000258 HC RX REV CODE- 258: Performed by: INTERNAL MEDICINE

## 2019-01-01 PROCEDURE — 76210000017 HC OR PH I REC 1.5 TO 2 HR: Performed by: SURGERY

## 2019-01-01 PROCEDURE — 77030018719 HC DRSG PTCH ANTIMIC J&J -A: Performed by: SURGERY

## 2019-01-01 PROCEDURE — 77030037877 HC DRSG MEPILEX >48IN BORD MOLN -A

## 2019-01-01 PROCEDURE — 90935 HEMODIALYSIS ONE EVALUATION: CPT

## 2019-01-01 PROCEDURE — P9047 ALBUMIN (HUMAN), 25%, 50ML: HCPCS | Performed by: NURSE PRACTITIONER

## 2019-01-01 PROCEDURE — 87389 HIV-1 AG W/HIV-1&-2 AB AG IA: CPT

## 2019-01-01 PROCEDURE — 93306 TTE W/DOPPLER COMPLETE: CPT

## 2019-01-01 PROCEDURE — 76060000034 HC ANESTHESIA 1.5 TO 2 HR: Performed by: SURGERY

## 2019-01-01 PROCEDURE — 77030029184 HC NEB SOLO AERONEB AEPM -A

## 2019-01-01 PROCEDURE — 96375 TX/PRO/DX INJ NEW DRUG ADDON: CPT

## 2019-01-01 PROCEDURE — 77030002996 HC SUT SLK J&J -A: Performed by: SURGERY

## 2019-01-01 PROCEDURE — 99285 EMERGENCY DEPT VISIT HI MDM: CPT

## 2019-01-01 PROCEDURE — 96366 THER/PROPH/DIAG IV INF ADDON: CPT

## 2019-01-01 PROCEDURE — 85652 RBC SED RATE AUTOMATED: CPT

## 2019-01-01 PROCEDURE — 93975 VASCULAR STUDY: CPT

## 2019-01-01 PROCEDURE — 82533 TOTAL CORTISOL: CPT

## 2019-01-01 PROCEDURE — 74011000272 HC RX REV CODE- 272: Performed by: SURGERY

## 2019-01-01 PROCEDURE — 87077 CULTURE AEROBIC IDENTIFY: CPT

## 2019-01-01 PROCEDURE — 96374 THER/PROPH/DIAG INJ IV PUSH: CPT

## 2019-01-01 PROCEDURE — 77030027688 HC DRSG MEPILEX 16-48IN NO BORD MOLN -A

## 2019-01-01 PROCEDURE — 99283 EMERGENCY DEPT VISIT LOW MDM: CPT

## 2019-01-01 PROCEDURE — 87040 BLOOD CULTURE FOR BACTERIA: CPT

## 2019-01-01 PROCEDURE — 86803 HEPATITIS C AB TEST: CPT

## 2019-01-01 PROCEDURE — 97166 OT EVAL MOD COMPLEX 45 MIN: CPT

## 2019-01-01 PROCEDURE — 77030006835 HC BLD SAW SAG STRY -B: Performed by: SURGERY

## 2019-01-01 PROCEDURE — 87106 FUNGI IDENTIFICATION YEAST: CPT

## 2019-01-01 PROCEDURE — 71250 CT THORAX DX C-: CPT

## 2019-01-01 PROCEDURE — 83935 ASSAY OF URINE OSMOLALITY: CPT

## 2019-01-01 PROCEDURE — 86923 COMPATIBILITY TEST ELECTRIC: CPT

## 2019-01-01 PROCEDURE — 76775 US EXAM ABDO BACK WALL LIM: CPT

## 2019-01-01 PROCEDURE — 30233N1 TRANSFUSION OF NONAUTOLOGOUS RED BLOOD CELLS INTO PERIPHERAL VEIN, PERCUTANEOUS APPROACH: ICD-10-PCS | Performed by: HOSPITALIST

## 2019-01-01 PROCEDURE — 77030013079 HC BLNKT BAIR HGGR 3M -A: Performed by: ANESTHESIOLOGY

## 2019-01-01 PROCEDURE — 36591 DRAW BLOOD OFF VENOUS DEVICE: CPT

## 2019-01-01 PROCEDURE — 88311 DECALCIFY TISSUE: CPT

## 2019-01-01 PROCEDURE — C1892 INTRO/SHEATH,FIXED,PEEL-AWAY: HCPCS | Performed by: SURGERY

## 2019-01-01 PROCEDURE — 97162 PT EVAL MOD COMPLEX 30 MIN: CPT

## 2019-01-01 PROCEDURE — 77030012510 HC MSK AIRWY LMA TELE -B: Performed by: ANESTHESIOLOGY

## 2019-01-01 PROCEDURE — 77030021352 HC CBL LD SYS DISP COVD -B

## 2019-01-01 PROCEDURE — 83883 ASSAY NEPHELOMETRY NOT SPEC: CPT

## 2019-01-01 PROCEDURE — 76010000129 HC CV SURG 1.5 TO 2 HR: Performed by: SURGERY

## 2019-01-01 PROCEDURE — 74011250636 HC RX REV CODE- 250/636: Performed by: NURSE ANESTHETIST, CERTIFIED REGISTERED

## 2019-01-01 PROCEDURE — 74011000250 HC RX REV CODE- 250

## 2019-01-01 PROCEDURE — 81001 URINALYSIS AUTO W/SCOPE: CPT

## 2019-01-01 PROCEDURE — 77030011256 HC DRSG MEPILEX <16IN NO BORD MOLN -A

## 2019-01-01 PROCEDURE — 82550 ASSAY OF CK (CPK): CPT

## 2019-01-01 PROCEDURE — 0BH17EZ INSERTION OF ENDOTRACHEAL AIRWAY INTO TRACHEA, VIA NATURAL OR ARTIFICIAL OPENING: ICD-10-PCS | Performed by: INTERNAL MEDICINE

## 2019-01-01 PROCEDURE — 74011000258 HC RX REV CODE- 258: Performed by: EMERGENCY MEDICINE

## 2019-01-01 PROCEDURE — 5A1955Z RESPIRATORY VENTILATION, GREATER THAN 96 CONSECUTIVE HOURS: ICD-10-PCS | Performed by: INTERNAL MEDICINE

## 2019-01-01 PROCEDURE — 77030008463 HC STPLR SKN PROX J&J -B: Performed by: SURGERY

## 2019-01-01 PROCEDURE — 82553 CREATINE MB FRACTION: CPT

## 2019-01-01 PROCEDURE — 81003 URINALYSIS AUTO W/O SCOPE: CPT

## 2019-01-01 PROCEDURE — 96367 TX/PROPH/DG ADDL SEQ IV INF: CPT

## 2019-01-01 PROCEDURE — 65660000001 HC RM ICU INTERMED STEPDOWN

## 2019-01-01 PROCEDURE — 84443 ASSAY THYROID STIM HORMONE: CPT

## 2019-01-01 PROCEDURE — 94002 VENT MGMT INPAT INIT DAY: CPT

## 2019-01-01 PROCEDURE — 74011250637 HC RX REV CODE- 250/637: Performed by: EMERGENCY MEDICINE

## 2019-01-01 PROCEDURE — 75635 CT ANGIO ABDOMINAL ARTERIES: CPT

## 2019-01-01 PROCEDURE — 85018 HEMOGLOBIN: CPT

## 2019-01-01 RX ORDER — SODIUM CHLORIDE 0.9 % (FLUSH) 0.9 %
5-40 SYRINGE (ML) INJECTION AS NEEDED
Status: DISCONTINUED | OUTPATIENT
Start: 2019-01-01 | End: 2019-01-01

## 2019-01-01 RX ORDER — DIPHENHYDRAMINE HYDROCHLORIDE 50 MG/ML
12.5 INJECTION, SOLUTION INTRAMUSCULAR; INTRAVENOUS
Status: DISCONTINUED | OUTPATIENT
Start: 2019-01-01 | End: 2019-01-01 | Stop reason: HOSPADM

## 2019-01-01 RX ORDER — CHLORHEXIDINE GLUCONATE 1.2 MG/ML
10 RINSE ORAL EVERY 12 HOURS
Status: DISCONTINUED | OUTPATIENT
Start: 2019-01-01 | End: 2019-01-01

## 2019-01-01 RX ORDER — SODIUM CHLORIDE 9 MG/ML
250 INJECTION, SOLUTION INTRAVENOUS AS NEEDED
Status: DISCONTINUED | OUTPATIENT
Start: 2019-01-01 | End: 2019-01-01

## 2019-01-01 RX ORDER — EPINEPHRINE 1 MG/ML
INJECTION, SOLUTION, CONCENTRATE INTRAVENOUS
Status: DISCONTINUED
Start: 2019-01-01 | End: 2019-01-01

## 2019-01-01 RX ORDER — CARVEDILOL 6.25 MG/1
6.25 TABLET ORAL 2 TIMES DAILY WITH MEALS
Status: DISCONTINUED | OUTPATIENT
Start: 2019-01-01 | End: 2019-01-01

## 2019-01-01 RX ORDER — HEPARIN SODIUM 1000 [USP'U]/ML
INJECTION, SOLUTION INTRAVENOUS; SUBCUTANEOUS AS NEEDED
Status: DISCONTINUED | OUTPATIENT
Start: 2019-01-01 | End: 2019-01-01 | Stop reason: HOSPADM

## 2019-01-01 RX ORDER — FENTANYL CITRATE 50 UG/ML
25 INJECTION, SOLUTION INTRAMUSCULAR; INTRAVENOUS
Status: DISCONTINUED | OUTPATIENT
Start: 2019-01-01 | End: 2019-01-01 | Stop reason: HOSPADM

## 2019-01-01 RX ORDER — SODIUM CHLORIDE 9 MG/ML
50 INJECTION, SOLUTION INTRAVENOUS
Status: DISCONTINUED | OUTPATIENT
Start: 2019-01-01 | End: 2019-01-01

## 2019-01-01 RX ORDER — MORPHINE SULFATE 2 MG/ML
2 INJECTION, SOLUTION INTRAMUSCULAR; INTRAVENOUS
Status: COMPLETED | OUTPATIENT
Start: 2019-01-01 | End: 2019-01-01

## 2019-01-01 RX ORDER — CLONIDINE HYDROCHLORIDE 0.1 MG/1
0.1 TABLET ORAL 2 TIMES DAILY
Status: DISCONTINUED | OUTPATIENT
Start: 2019-01-01 | End: 2019-01-01 | Stop reason: HOSPADM

## 2019-01-01 RX ORDER — OXYCODONE AND ACETAMINOPHEN 5; 325 MG/1; MG/1
1-2 TABLET ORAL
Status: DISCONTINUED | OUTPATIENT
Start: 2019-01-01 | End: 2019-01-01

## 2019-01-01 RX ORDER — SODIUM CHLORIDE 0.9 % (FLUSH) 0.9 %
5-40 SYRINGE (ML) INJECTION AS NEEDED
Status: DISCONTINUED | OUTPATIENT
Start: 2019-01-01 | End: 2019-01-01 | Stop reason: HOSPADM

## 2019-01-01 RX ORDER — THERA TABS 400 MCG
1 TAB ORAL DAILY
Status: DISCONTINUED | OUTPATIENT
Start: 2019-01-01 | End: 2019-01-01

## 2019-01-01 RX ORDER — SODIUM CHLORIDE 0.9 % (FLUSH) 0.9 %
5-40 SYRINGE (ML) INJECTION EVERY 8 HOURS
Status: DISCONTINUED | OUTPATIENT
Start: 2019-01-01 | End: 2019-01-01

## 2019-01-01 RX ORDER — ATORVASTATIN CALCIUM 40 MG/1
40 TABLET, FILM COATED ORAL
Status: DISCONTINUED | OUTPATIENT
Start: 2019-01-01 | End: 2019-01-01 | Stop reason: HOSPADM

## 2019-01-01 RX ORDER — FENTANYL CITRATE-0.9 % NACL/PF 900MCG/30
PATIENT CONTROLLED ANALGESIA VIAL INJECTION
Status: DISCONTINUED | OUTPATIENT
Start: 2019-01-01 | End: 2019-01-01

## 2019-01-01 RX ORDER — FLUCONAZOLE 2 MG/ML
200 INJECTION, SOLUTION INTRAVENOUS EVERY 24 HOURS
Status: DISCONTINUED | OUTPATIENT
Start: 2019-01-01 | End: 2019-01-01

## 2019-01-01 RX ORDER — MORPHINE SULFATE 2 MG/ML
2 INJECTION, SOLUTION INTRAMUSCULAR; INTRAVENOUS
Status: DISCONTINUED | OUTPATIENT
Start: 2019-01-01 | End: 2019-01-01

## 2019-01-01 RX ORDER — ASPIRIN 600 MG/1
300 SUPPOSITORY RECTAL DAILY
Status: DISCONTINUED | OUTPATIENT
Start: 2019-01-01 | End: 2019-01-01

## 2019-01-01 RX ORDER — LIDOCAINE HYDROCHLORIDE 20 MG/ML
INJECTION, SOLUTION EPIDURAL; INFILTRATION; INTRACAUDAL; PERINEURAL AS NEEDED
Status: DISCONTINUED | OUTPATIENT
Start: 2019-01-01 | End: 2019-01-01 | Stop reason: HOSPADM

## 2019-01-01 RX ORDER — ALBUMIN HUMAN 250 G/1000ML
25 SOLUTION INTRAVENOUS ONCE
Status: COMPLETED | OUTPATIENT
Start: 2019-01-01 | End: 2019-01-01

## 2019-01-01 RX ORDER — LORAZEPAM 2 MG/ML
1 INJECTION INTRAMUSCULAR ONCE
Status: COMPLETED | OUTPATIENT
Start: 2019-01-01 | End: 2019-01-01

## 2019-01-01 RX ORDER — FENTANYL CITRATE 50 UG/ML
INJECTION, SOLUTION INTRAMUSCULAR; INTRAVENOUS
Status: DISCONTINUED
Start: 2019-01-01 | End: 2019-01-01

## 2019-01-01 RX ORDER — SODIUM CHLORIDE 9 MG/ML
INJECTION, SOLUTION INTRAVENOUS
Status: DISCONTINUED | OUTPATIENT
Start: 2019-01-01 | End: 2019-01-01 | Stop reason: HOSPADM

## 2019-01-01 RX ORDER — SODIUM CHLORIDE 0.9 % (FLUSH) 0.9 %
5-40 SYRINGE (ML) INJECTION EVERY 8 HOURS
Status: DISCONTINUED | OUTPATIENT
Start: 2019-01-01 | End: 2019-01-01 | Stop reason: HOSPADM

## 2019-01-01 RX ORDER — MORPHINE SULFATE 2 MG/ML
2-3 INJECTION, SOLUTION INTRAMUSCULAR; INTRAVENOUS
Status: DISCONTINUED | OUTPATIENT
Start: 2019-01-01 | End: 2019-01-01

## 2019-01-01 RX ORDER — OXYCODONE AND ACETAMINOPHEN 5; 325 MG/1; MG/1
1 TABLET ORAL
Status: COMPLETED | OUTPATIENT
Start: 2019-01-01 | End: 2019-01-01

## 2019-01-01 RX ORDER — NALOXONE HYDROCHLORIDE 0.4 MG/ML
0.1 INJECTION, SOLUTION INTRAMUSCULAR; INTRAVENOUS; SUBCUTANEOUS AS NEEDED
Status: DISCONTINUED | OUTPATIENT
Start: 2019-01-01 | End: 2019-01-01

## 2019-01-01 RX ORDER — FENTANYL CITRATE 50 UG/ML
25-50 INJECTION, SOLUTION INTRAMUSCULAR; INTRAVENOUS
Status: DISCONTINUED | OUTPATIENT
Start: 2019-01-01 | End: 2019-01-01 | Stop reason: HOSPADM

## 2019-01-01 RX ORDER — FUROSEMIDE 20 MG/1
20 TABLET ORAL
Qty: 12 TAB | Refills: 0 | Status: SHIPPED | OUTPATIENT
Start: 2019-01-01

## 2019-01-01 RX ORDER — ATORVASTATIN CALCIUM 40 MG/1
40 TABLET, FILM COATED ORAL
Qty: 30 TAB | Refills: 0 | Status: SHIPPED | OUTPATIENT
Start: 2019-01-01

## 2019-01-01 RX ORDER — PROPOFOL 10 MG/ML
INJECTION, EMULSION INTRAVENOUS
Status: DISCONTINUED
Start: 2019-01-01 | End: 2019-01-01

## 2019-01-01 RX ORDER — OXYCODONE AND ACETAMINOPHEN 5; 325 MG/1; MG/1
1-2 TABLET ORAL
Qty: 9 TAB | Refills: 0 | Status: SHIPPED | OUTPATIENT
Start: 2019-01-01 | End: 2019-01-01

## 2019-01-01 RX ORDER — POTASSIUM CHLORIDE 7.45 MG/ML
10 INJECTION INTRAVENOUS
Status: COMPLETED | OUTPATIENT
Start: 2019-01-01 | End: 2019-01-01

## 2019-01-01 RX ORDER — CIPROFLOXACIN 750 MG/1
750 TABLET, FILM COATED ORAL EVERY 12 HOURS
Qty: 18 TAB | Refills: 0 | Status: SHIPPED | OUTPATIENT
Start: 2019-01-01

## 2019-01-01 RX ORDER — QUETIAPINE FUMARATE 25 MG/1
50 TABLET, FILM COATED ORAL 2 TIMES DAILY
Status: DISCONTINUED | OUTPATIENT
Start: 2019-01-01 | End: 2019-01-01

## 2019-01-01 RX ORDER — FENTANYL CITRATE 50 UG/ML
12.5 INJECTION, SOLUTION INTRAMUSCULAR; INTRAVENOUS ONCE
Status: DISCONTINUED | OUTPATIENT
Start: 2019-01-01 | End: 2019-01-01

## 2019-01-01 RX ORDER — MIDAZOLAM HYDROCHLORIDE 1 MG/ML
1 INJECTION, SOLUTION INTRAMUSCULAR; INTRAVENOUS ONCE
Status: COMPLETED | OUTPATIENT
Start: 2019-01-01 | End: 2019-01-01

## 2019-01-01 RX ORDER — FENTANYL CITRATE 50 UG/ML
INJECTION, SOLUTION INTRAMUSCULAR; INTRAVENOUS AS NEEDED
Status: DISCONTINUED | OUTPATIENT
Start: 2019-01-01 | End: 2019-01-01 | Stop reason: HOSPADM

## 2019-01-01 RX ORDER — IPRATROPIUM BROMIDE AND ALBUTEROL SULFATE 2.5; .5 MG/3ML; MG/3ML
3 SOLUTION RESPIRATORY (INHALATION)
Status: DISCONTINUED | OUTPATIENT
Start: 2019-01-01 | End: 2019-01-01

## 2019-01-01 RX ORDER — MORPHINE SULFATE 2 MG/ML
10 INJECTION, SOLUTION INTRAMUSCULAR; INTRAVENOUS
Status: DISCONTINUED | OUTPATIENT
Start: 2019-01-01 | End: 2019-01-01 | Stop reason: HOSPADM

## 2019-01-01 RX ORDER — MORPHINE SULFATE 5 MG/ML
INJECTION, SOLUTION INTRAVENOUS CONTINUOUS
Status: DISCONTINUED | OUTPATIENT
Start: 2019-01-01 | End: 2019-01-01

## 2019-01-01 RX ORDER — AMOXICILLIN 500 MG/1
500 CAPSULE ORAL EVERY 8 HOURS
Qty: 27 CAP | Refills: 0 | Status: SHIPPED | OUTPATIENT
Start: 2019-01-01

## 2019-01-01 RX ORDER — NOREPINEPHRINE BIT/0.9 % NACL 8 MG/250ML
2-16 INFUSION BOTTLE (ML) INTRAVENOUS
Status: DISCONTINUED | OUTPATIENT
Start: 2019-01-01 | End: 2019-01-01

## 2019-01-01 RX ORDER — EPINEPHRINE 1 MG/ML
1 INJECTION, SOLUTION, CONCENTRATE INTRAVENOUS ONCE
Status: DISCONTINUED | OUTPATIENT
Start: 2019-01-01 | End: 2019-01-01

## 2019-01-01 RX ORDER — AMOXICILLIN 250 MG/1
500 CAPSULE ORAL EVERY 8 HOURS
Status: DISCONTINUED | OUTPATIENT
Start: 2019-01-01 | End: 2019-01-01

## 2019-01-01 RX ORDER — DEXTROSE MONOHYDRATE AND SODIUM CHLORIDE 5; .9 G/100ML; G/100ML
100 INJECTION, SOLUTION INTRAVENOUS CONTINUOUS
Status: DISCONTINUED | OUTPATIENT
Start: 2019-01-01 | End: 2019-01-01

## 2019-01-01 RX ORDER — MORPHINE SULFATE 4 MG/ML
4 INJECTION INTRAVENOUS
Status: COMPLETED | OUTPATIENT
Start: 2019-01-01 | End: 2019-01-01

## 2019-01-01 RX ORDER — CLOPIDOGREL BISULFATE 75 MG/1
75 TABLET ORAL DAILY
Status: DISCONTINUED | OUTPATIENT
Start: 2019-01-01 | End: 2019-01-01

## 2019-01-01 RX ORDER — VANCOMYCIN/0.9 % SOD CHLORIDE 1.5G/250ML
1500 PLASTIC BAG, INJECTION (ML) INTRAVENOUS ONCE
Status: COMPLETED | OUTPATIENT
Start: 2019-01-01 | End: 2019-01-01

## 2019-01-01 RX ORDER — QUETIAPINE FUMARATE 100 MG/1
200 TABLET, FILM COATED ORAL 2 TIMES DAILY
Status: DISCONTINUED | OUTPATIENT
Start: 2019-01-01 | End: 2019-01-01

## 2019-01-01 RX ORDER — OXYMETAZOLINE HCL 0.05 %
2 SPRAY, NON-AEROSOL (ML) NASAL 2 TIMES DAILY
Qty: 15 ML | Refills: 0 | Status: SHIPPED | OUTPATIENT
Start: 2019-01-01 | End: 2019-01-01

## 2019-01-01 RX ORDER — PROCHLORPERAZINE EDISYLATE 5 MG/ML
5 INJECTION INTRAMUSCULAR; INTRAVENOUS ONCE
Status: ACTIVE | OUTPATIENT
Start: 2019-01-01 | End: 2019-01-01

## 2019-01-01 RX ORDER — CIPROFLOXACIN 750 MG/1
750 TABLET, FILM COATED ORAL EVERY 12 HOURS
Qty: 20 TAB | Refills: 0 | Status: SHIPPED | OUTPATIENT
Start: 2019-01-01 | End: 2019-01-01

## 2019-01-01 RX ORDER — PROPOFOL 10 MG/ML
0-50 VIAL (ML) INTRAVENOUS
Status: DISCONTINUED | OUTPATIENT
Start: 2019-01-01 | End: 2019-01-01

## 2019-01-01 RX ORDER — SENNOSIDES 8.6 MG/1
1 TABLET ORAL 2 TIMES DAILY
Status: DISCONTINUED | OUTPATIENT
Start: 2019-01-01 | End: 2019-01-01

## 2019-01-01 RX ORDER — MORPHINE SULFATE 4 MG/ML
4 INJECTION INTRAVENOUS
Status: DISCONTINUED | OUTPATIENT
Start: 2019-01-01 | End: 2019-01-01

## 2019-01-01 RX ORDER — ASPIRIN 325 MG
325 TABLET ORAL DAILY
Status: DISCONTINUED | OUTPATIENT
Start: 2019-01-01 | End: 2019-01-01

## 2019-01-01 RX ORDER — HEPARIN SODIUM 5000 [USP'U]/ML
5000 INJECTION, SOLUTION INTRAVENOUS; SUBCUTANEOUS EVERY 8 HOURS
Status: DISCONTINUED | OUTPATIENT
Start: 2019-01-01 | End: 2019-01-01 | Stop reason: HOSPADM

## 2019-01-01 RX ORDER — AMLODIPINE BESYLATE 10 MG/1
10 TABLET ORAL DAILY
Status: DISCONTINUED | OUTPATIENT
Start: 2019-01-01 | End: 2019-01-01

## 2019-01-01 RX ORDER — MORPHINE SULFATE 5 MG/ML
0-10 INJECTION, SOLUTION INTRAVENOUS
Status: DISCONTINUED | OUTPATIENT
Start: 2019-01-01 | End: 2019-01-01 | Stop reason: HOSPADM

## 2019-01-01 RX ORDER — SODIUM CHLORIDE 0.9 % (FLUSH) 0.9 %
5-10 SYRINGE (ML) INJECTION AS NEEDED
Status: DISCONTINUED | OUTPATIENT
Start: 2019-01-01 | End: 2019-01-01

## 2019-01-01 RX ORDER — POTASSIUM CHLORIDE 750 MG/1
20 TABLET, EXTENDED RELEASE ORAL 3 TIMES DAILY
Status: DISPENSED | OUTPATIENT
Start: 2019-01-01 | End: 2019-01-01

## 2019-01-01 RX ORDER — SODIUM CHLORIDE 1 G/1
1 TABLET ORAL 3 TIMES DAILY
Status: DISCONTINUED | OUTPATIENT
Start: 2019-01-01 | End: 2019-01-01

## 2019-01-01 RX ORDER — FUROSEMIDE 20 MG/1
20 TABLET ORAL ONCE
Status: DISCONTINUED | OUTPATIENT
Start: 2019-01-01 | End: 2019-01-01

## 2019-01-01 RX ORDER — HYDROCODONE BITARTRATE AND ACETAMINOPHEN 5; 325 MG/1; MG/1
1 TABLET ORAL
Status: DISCONTINUED | OUTPATIENT
Start: 2019-01-01 | End: 2019-01-01

## 2019-01-01 RX ORDER — PROPOFOL 10 MG/ML
INJECTION, EMULSION INTRAVENOUS AS NEEDED
Status: DISCONTINUED | OUTPATIENT
Start: 2019-01-01 | End: 2019-01-01 | Stop reason: HOSPADM

## 2019-01-01 RX ORDER — SODIUM CHLORIDE 9 MG/ML
75 INJECTION, SOLUTION INTRAVENOUS CONTINUOUS
Status: DISCONTINUED | OUTPATIENT
Start: 2019-01-01 | End: 2019-01-01

## 2019-01-01 RX ORDER — HEPARIN SODIUM 5000 [USP'U]/ML
5000 INJECTION, SOLUTION INTRAVENOUS; SUBCUTANEOUS EVERY 8 HOURS
Status: DISCONTINUED | OUTPATIENT
Start: 2019-01-01 | End: 2019-01-01

## 2019-01-01 RX ORDER — CLOPIDOGREL BISULFATE 75 MG/1
75 TABLET ORAL DAILY
COMMUNITY

## 2019-01-01 RX ORDER — HEPARIN SODIUM 1000 [USP'U]/ML
1000 INJECTION, SOLUTION INTRAVENOUS; SUBCUTANEOUS
Status: DISCONTINUED | OUTPATIENT
Start: 2019-01-01 | End: 2019-01-01

## 2019-01-01 RX ORDER — VASOPRESSIN 20 U/ML
INJECTION PARENTERAL AS NEEDED
Status: DISCONTINUED | OUTPATIENT
Start: 2019-01-01 | End: 2019-01-01 | Stop reason: HOSPADM

## 2019-01-01 RX ORDER — OXYCODONE AND ACETAMINOPHEN 5; 325 MG/1; MG/1
1-2 TABLET ORAL
Status: DISCONTINUED | OUTPATIENT
Start: 2019-01-01 | End: 2019-01-01 | Stop reason: HOSPADM

## 2019-01-01 RX ORDER — FENTANYL CITRATE 50 UG/ML
25 INJECTION, SOLUTION INTRAMUSCULAR; INTRAVENOUS
Status: COMPLETED | OUTPATIENT
Start: 2019-01-01 | End: 2019-01-01

## 2019-01-01 RX ORDER — SODIUM CHLORIDE 9 MG/ML
25 INJECTION, SOLUTION INTRAVENOUS CONTINUOUS
Status: DISPENSED | OUTPATIENT
Start: 2019-01-01 | End: 2019-01-01

## 2019-01-01 RX ORDER — ONDANSETRON 2 MG/ML
4 INJECTION INTRAMUSCULAR; INTRAVENOUS AS NEEDED
Status: DISCONTINUED | OUTPATIENT
Start: 2019-01-01 | End: 2019-01-01

## 2019-01-01 RX ORDER — CARVEDILOL 3.12 MG/1
3.12 TABLET ORAL 2 TIMES DAILY WITH MEALS
Status: DISCONTINUED | OUTPATIENT
Start: 2019-01-01 | End: 2019-01-01 | Stop reason: HOSPADM

## 2019-01-01 RX ORDER — OXYMETAZOLINE HCL 0.05 %
2 SPRAY, NON-AEROSOL (ML) NASAL 2 TIMES DAILY
Status: DISCONTINUED | OUTPATIENT
Start: 2019-01-01 | End: 2019-01-01 | Stop reason: HOSPADM

## 2019-01-01 RX ORDER — ACETAMINOPHEN 650 MG/1
650 SUPPOSITORY RECTAL
Status: DISCONTINUED | OUTPATIENT
Start: 2019-01-01 | End: 2019-01-01 | Stop reason: HOSPADM

## 2019-01-01 RX ORDER — ACETAMINOPHEN 325 MG/1
650 TABLET ORAL
Status: DISCONTINUED | OUTPATIENT
Start: 2019-01-01 | End: 2019-01-01

## 2019-01-01 RX ORDER — SODIUM CHLORIDE, SODIUM LACTATE, POTASSIUM CHLORIDE, CALCIUM CHLORIDE 600; 310; 30; 20 MG/100ML; MG/100ML; MG/100ML; MG/100ML
75 INJECTION, SOLUTION INTRAVENOUS CONTINUOUS
Status: DISCONTINUED | OUTPATIENT
Start: 2019-01-01 | End: 2019-01-01

## 2019-01-01 RX ORDER — INSULIN LISPRO 100 [IU]/ML
INJECTION, SOLUTION INTRAVENOUS; SUBCUTANEOUS ONCE
Status: DISCONTINUED | OUTPATIENT
Start: 2019-01-01 | End: 2019-01-01 | Stop reason: HOSPADM

## 2019-01-01 RX ORDER — GUAIFENESIN 100 MG/5ML
81 LIQUID (ML) ORAL DAILY
Status: DISCONTINUED | OUTPATIENT
Start: 2019-01-01 | End: 2019-01-01 | Stop reason: HOSPADM

## 2019-01-01 RX ORDER — QUETIAPINE FUMARATE 25 MG/1
100 TABLET, FILM COATED ORAL 2 TIMES DAILY
Status: DISCONTINUED | OUTPATIENT
Start: 2019-01-01 | End: 2019-01-01

## 2019-01-01 RX ORDER — CARVEDILOL 3.12 MG/1
3.12 TABLET ORAL 2 TIMES DAILY WITH MEALS
Status: DISCONTINUED | OUTPATIENT
Start: 2019-01-01 | End: 2019-01-01

## 2019-01-01 RX ORDER — AMOXICILLIN 250 MG/1
500 CAPSULE ORAL EVERY 8 HOURS
Status: DISCONTINUED | OUTPATIENT
Start: 2019-01-01 | End: 2019-01-01 | Stop reason: HOSPADM

## 2019-01-01 RX ORDER — MORPHINE SULFATE 2 MG/ML
2 INJECTION, SOLUTION INTRAMUSCULAR; INTRAVENOUS
Status: DISCONTINUED | OUTPATIENT
Start: 2019-01-01 | End: 2019-01-01 | Stop reason: HOSPADM

## 2019-01-01 RX ORDER — ETOMIDATE 2 MG/ML
20 INJECTION INTRAVENOUS ONCE
Status: COMPLETED | OUTPATIENT
Start: 2019-01-01 | End: 2019-01-01

## 2019-01-01 RX ORDER — HALOPERIDOL 5 MG/ML
2 INJECTION INTRAMUSCULAR ONCE
Status: COMPLETED | OUTPATIENT
Start: 2019-01-01 | End: 2019-01-01

## 2019-01-01 RX ORDER — ONDANSETRON 2 MG/ML
4 INJECTION INTRAMUSCULAR; INTRAVENOUS
Status: COMPLETED | OUTPATIENT
Start: 2019-01-01 | End: 2019-01-01

## 2019-01-01 RX ORDER — AMOXICILLIN 500 MG/1
500 CAPSULE ORAL EVERY 8 HOURS
Qty: 30 CAP | Refills: 0 | Status: SHIPPED | OUTPATIENT
Start: 2019-01-01 | End: 2019-01-01

## 2019-01-01 RX ORDER — ONDANSETRON 2 MG/ML
4 INJECTION INTRAMUSCULAR; INTRAVENOUS
Status: DISCONTINUED | OUTPATIENT
Start: 2019-01-01 | End: 2019-01-01 | Stop reason: HOSPADM

## 2019-01-01 RX ORDER — CIPROFLOXACIN 500 MG/1
750 TABLET ORAL EVERY 12 HOURS
Status: DISCONTINUED | OUTPATIENT
Start: 2019-01-01 | End: 2019-01-01 | Stop reason: HOSPADM

## 2019-01-01 RX ORDER — ONDANSETRON 2 MG/ML
INJECTION INTRAMUSCULAR; INTRAVENOUS AS NEEDED
Status: DISCONTINUED | OUTPATIENT
Start: 2019-01-01 | End: 2019-01-01 | Stop reason: HOSPADM

## 2019-01-01 RX ORDER — FUROSEMIDE 20 MG/1
20 TABLET ORAL
Status: DISCONTINUED | OUTPATIENT
Start: 2019-01-01 | End: 2019-01-01 | Stop reason: HOSPADM

## 2019-01-01 RX ORDER — IBUPROFEN 200 MG
1 TABLET ORAL DAILY
Status: DISCONTINUED | OUTPATIENT
Start: 2019-01-01 | End: 2019-01-01 | Stop reason: HOSPADM

## 2019-01-01 RX ORDER — DEXTROSE MONOHYDRATE AND SODIUM CHLORIDE 5; .45 G/100ML; G/100ML
75 INJECTION, SOLUTION INTRAVENOUS CONTINUOUS
Status: DISCONTINUED | OUTPATIENT
Start: 2019-01-01 | End: 2019-01-01

## 2019-01-01 RX ORDER — LORAZEPAM 2 MG/ML
1 CONCENTRATE ORAL
Status: DISCONTINUED | OUTPATIENT
Start: 2019-01-01 | End: 2019-01-01 | Stop reason: HOSPADM

## 2019-01-01 RX ORDER — POTASSIUM CHLORIDE 7.45 MG/ML
10 INJECTION INTRAVENOUS ONCE
Status: COMPLETED | OUTPATIENT
Start: 2019-01-01 | End: 2019-01-01

## 2019-01-01 RX ORDER — CARVEDILOL 3.12 MG/1
3.12 TABLET ORAL 2 TIMES DAILY WITH MEALS
Qty: 60 TAB | Refills: 0 | Status: SHIPPED | OUTPATIENT
Start: 2019-01-01

## 2019-01-01 RX ORDER — ATORVASTATIN CALCIUM 40 MG/1
40 TABLET, FILM COATED ORAL
Status: DISCONTINUED | OUTPATIENT
Start: 2019-01-01 | End: 2019-01-01

## 2019-01-01 RX ORDER — MORPHINE SULFATE 4 MG/ML
6 INJECTION INTRAVENOUS
Status: DISCONTINUED | OUTPATIENT
Start: 2019-01-01 | End: 2019-01-01

## 2019-01-01 RX ADMIN — FENTANYL CITRATE 50 MCG: 50 INJECTION, SOLUTION INTRAMUSCULAR; INTRAVENOUS at 13:52

## 2019-01-01 RX ADMIN — CLONIDINE HYDROCHLORIDE 0.1 MG: 0.1 TABLET ORAL at 06:35

## 2019-01-01 RX ADMIN — CEFTRIAXONE 1 G: 1 INJECTION, POWDER, FOR SOLUTION INTRAMUSCULAR; INTRAVENOUS at 14:27

## 2019-01-01 RX ADMIN — Medication 10 ML: at 13:54

## 2019-01-01 RX ADMIN — HEPARIN SODIUM 5000 UNITS: 5000 INJECTION INTRAVENOUS; SUBCUTANEOUS at 06:06

## 2019-01-01 RX ADMIN — CLOPIDOGREL BISULFATE 75 MG: 75 TABLET ORAL at 08:28

## 2019-01-01 RX ADMIN — POTASSIUM CHLORIDE 20 MEQ: 10 TABLET, EXTENDED RELEASE ORAL at 22:29

## 2019-01-01 RX ADMIN — CHLORHEXIDINE GLUCONATE 10 ML: 1.2 RINSE ORAL at 08:31

## 2019-01-01 RX ADMIN — MORPHINE SULFATE: 10 INJECTION, SOLUTION INTRAMUSCULAR; INTRAVENOUS at 19:57

## 2019-01-01 RX ADMIN — IPRATROPIUM BROMIDE AND ALBUTEROL SULFATE 3 ML: .5; 3 SOLUTION RESPIRATORY (INHALATION) at 20:22

## 2019-01-01 RX ADMIN — OXYCODONE HYDROCHLORIDE AND ACETAMINOPHEN 1 TABLET: 5; 325 TABLET ORAL at 14:06

## 2019-01-01 RX ADMIN — CARVEDILOL 3.12 MG: 3.12 TABLET, FILM COATED ORAL at 18:26

## 2019-01-01 RX ADMIN — OXYCODONE HYDROCHLORIDE AND ACETAMINOPHEN 1 TABLET: 5; 325 TABLET ORAL at 22:45

## 2019-01-01 RX ADMIN — Medication 100 MCG/HR: at 05:00

## 2019-01-01 RX ADMIN — THERA TABS 1 TABLET: TAB at 08:56

## 2019-01-01 RX ADMIN — OXYCODONE HYDROCHLORIDE AND ACETAMINOPHEN 2 TABLET: 5; 325 TABLET ORAL at 01:09

## 2019-01-01 RX ADMIN — OXYCODONE HYDROCHLORIDE AND ACETAMINOPHEN 2 TABLET: 5; 325 TABLET ORAL at 21:59

## 2019-01-01 RX ADMIN — SODIUM BICARBONATE: 84 INJECTION, SOLUTION INTRAVENOUS at 23:14

## 2019-01-01 RX ADMIN — IPRATROPIUM BROMIDE AND ALBUTEROL SULFATE 3 ML: .5; 3 SOLUTION RESPIRATORY (INHALATION) at 02:40

## 2019-01-01 RX ADMIN — POTASSIUM CHLORIDE 20 MEQ: 10 TABLET, EXTENDED RELEASE ORAL at 08:44

## 2019-01-01 RX ADMIN — CLONIDINE HYDROCHLORIDE 0.1 MG: 0.1 TABLET ORAL at 08:24

## 2019-01-01 RX ADMIN — CHLORHEXIDINE GLUCONATE 10 ML: 1.2 RINSE ORAL at 08:41

## 2019-01-01 RX ADMIN — SODIUM CHLORIDE 75 ML/HR: 900 INJECTION, SOLUTION INTRAVENOUS at 19:28

## 2019-01-01 RX ADMIN — ONDANSETRON 4 MG: 2 INJECTION INTRAMUSCULAR; INTRAVENOUS at 11:13

## 2019-01-01 RX ADMIN — CLOPIDOGREL BISULFATE 75 MG: 75 TABLET ORAL at 08:02

## 2019-01-01 RX ADMIN — SODIUM CHLORIDE 0.4 MCG/KG/HR: 900 INJECTION, SOLUTION INTRAVENOUS at 13:02

## 2019-01-01 RX ADMIN — SODIUM CHLORIDE 0.4 MCG/KG/HR: 900 INJECTION, SOLUTION INTRAVENOUS at 11:43

## 2019-01-01 RX ADMIN — CARVEDILOL 6.25 MG: 6.25 TABLET, FILM COATED ORAL at 07:21

## 2019-01-01 RX ADMIN — OXYCODONE HYDROCHLORIDE AND ACETAMINOPHEN 2 TABLET: 5; 325 TABLET ORAL at 18:01

## 2019-01-01 RX ADMIN — ASPIRIN 325 MG ORAL TABLET 325 MG: 325 PILL ORAL at 13:09

## 2019-01-01 RX ADMIN — ASPIRIN 81 MG 81 MG: 81 TABLET ORAL at 08:44

## 2019-01-01 RX ADMIN — SODIUM BICARBONATE: 84 INJECTION, SOLUTION INTRAVENOUS at 20:52

## 2019-01-01 RX ADMIN — HEPARIN SODIUM 5000 UNITS: 5000 INJECTION INTRAVENOUS; SUBCUTANEOUS at 20:11

## 2019-01-01 RX ADMIN — PROPOFOL 50 MCG/KG/MIN: 10 INJECTION, EMULSION INTRAVENOUS at 03:30

## 2019-01-01 RX ADMIN — Medication 8.6 MG: at 17:39

## 2019-01-01 RX ADMIN — ATORVASTATIN CALCIUM 40 MG: 40 TABLET, FILM COATED ORAL at 22:44

## 2019-01-01 RX ADMIN — Medication 10 ML: at 06:04

## 2019-01-01 RX ADMIN — SODIUM CHLORIDE 1000 MG: 900 INJECTION, SOLUTION INTRAVENOUS at 04:05

## 2019-01-01 RX ADMIN — FENTANYL CITRATE 50 MCG: 50 INJECTION, SOLUTION INTRAMUSCULAR; INTRAVENOUS at 11:33

## 2019-01-01 RX ADMIN — SODIUM CHLORIDE 1000 MG: 900 INJECTION, SOLUTION INTRAVENOUS at 13:57

## 2019-01-01 RX ADMIN — HEPARIN SODIUM 5000 UNITS: 5000 INJECTION INTRAVENOUS; SUBCUTANEOUS at 21:46

## 2019-01-01 RX ADMIN — HEPARIN SODIUM 5000 UNITS: 5000 INJECTION INTRAVENOUS; SUBCUTANEOUS at 22:24

## 2019-01-01 RX ADMIN — THERA TABS 1 TABLET: TAB at 09:24

## 2019-01-01 RX ADMIN — LORAZEPAM 5 MG/HR: 2 INJECTION, SOLUTION INTRAMUSCULAR; INTRAVENOUS at 07:50

## 2019-01-01 RX ADMIN — Medication 8.6 MG: at 17:50

## 2019-01-01 RX ADMIN — THERA TABS 1 TABLET: TAB at 08:27

## 2019-01-01 RX ADMIN — PROPOFOL 45 MCG/KG/MIN: 10 INJECTION, EMULSION INTRAVENOUS at 14:27

## 2019-01-01 RX ADMIN — CEFEPIME HYDROCHLORIDE 0.5 G: 1 INJECTION, POWDER, FOR SOLUTION INTRAMUSCULAR; INTRAVENOUS at 21:30

## 2019-01-01 RX ADMIN — HYDROCODONE BITARTRATE AND ACETAMINOPHEN 1 TABLET: 5; 325 TABLET ORAL at 04:55

## 2019-01-01 RX ADMIN — PROPOFOL 40 MCG/KG/MIN: 10 INJECTION, EMULSION INTRAVENOUS at 03:56

## 2019-01-01 RX ADMIN — SODIUM CHLORIDE 75 ML/HR: 900 INJECTION, SOLUTION INTRAVENOUS at 10:03

## 2019-01-01 RX ADMIN — PROPOFOL 50 MCG/KG/MIN: 10 INJECTION, EMULSION INTRAVENOUS at 02:01

## 2019-01-01 RX ADMIN — AMOXICILLIN 500 MG: 250 CAPSULE ORAL at 11:35

## 2019-01-01 RX ADMIN — CHLORHEXIDINE GLUCONATE 10 ML: 1.2 RINSE ORAL at 09:22

## 2019-01-01 RX ADMIN — SODIUM CHLORIDE 1000 MG: 900 INJECTION, SOLUTION INTRAVENOUS at 03:24

## 2019-01-01 RX ADMIN — HEPARIN SODIUM 5000 UNITS: 5000 INJECTION INTRAVENOUS; SUBCUTANEOUS at 21:59

## 2019-01-01 RX ADMIN — CALCIUM GLUCONATE 4 G: 94 INJECTION, SOLUTION INTRAVENOUS at 20:25

## 2019-01-01 RX ADMIN — ATORVASTATIN CALCIUM 40 MG: 40 TABLET, FILM COATED ORAL at 21:16

## 2019-01-01 RX ADMIN — IPRATROPIUM BROMIDE AND ALBUTEROL SULFATE 3 ML: .5; 3 SOLUTION RESPIRATORY (INHALATION) at 13:47

## 2019-01-01 RX ADMIN — OXYCODONE HYDROCHLORIDE AND ACETAMINOPHEN 2 TABLET: 5; 325 TABLET ORAL at 16:45

## 2019-01-01 RX ADMIN — IPRATROPIUM BROMIDE AND ALBUTEROL SULFATE 3 ML: .5; 3 SOLUTION RESPIRATORY (INHALATION) at 08:23

## 2019-01-01 RX ADMIN — OXYCODONE HYDROCHLORIDE AND ACETAMINOPHEN 2 TABLET: 5; 325 TABLET ORAL at 00:27

## 2019-01-01 RX ADMIN — DEXTROSE MONOHYDRATE AND SODIUM CHLORIDE 75 ML/HR: 5; .9 INJECTION, SOLUTION INTRAVENOUS at 17:14

## 2019-01-01 RX ADMIN — PROPOFOL 50 MCG/KG/MIN: 10 INJECTION, EMULSION INTRAVENOUS at 12:04

## 2019-01-01 RX ADMIN — POTASSIUM CHLORIDE 20 MEQ: 10 TABLET, EXTENDED RELEASE ORAL at 17:02

## 2019-01-01 RX ADMIN — HEPARIN SODIUM 5000 UNITS: 5000 INJECTION INTRAVENOUS; SUBCUTANEOUS at 13:09

## 2019-01-01 RX ADMIN — CEFTRIAXONE 1 G: 1 INJECTION, POWDER, FOR SOLUTION INTRAMUSCULAR; INTRAVENOUS at 14:29

## 2019-01-01 RX ADMIN — PANTOPRAZOLE SODIUM 40 MG: 40 INJECTION, POWDER, FOR SOLUTION INTRAVENOUS at 08:41

## 2019-01-01 RX ADMIN — HYDROCODONE BITARTRATE AND ACETAMINOPHEN 1 TABLET: 5; 325 TABLET ORAL at 23:09

## 2019-01-01 RX ADMIN — THERA TABS 1 TABLET: TAB at 12:13

## 2019-01-01 RX ADMIN — IPRATROPIUM BROMIDE AND ALBUTEROL SULFATE 3 ML: .5; 3 SOLUTION RESPIRATORY (INHALATION) at 15:06

## 2019-01-01 RX ADMIN — HEPARIN SODIUM 5000 UNITS: 5000 INJECTION INTRAVENOUS; SUBCUTANEOUS at 21:16

## 2019-01-01 RX ADMIN — ACETAMINOPHEN 650 MG: 325 TABLET, FILM COATED ORAL at 09:55

## 2019-01-01 RX ADMIN — CEFEPIME HYDROCHLORIDE 1 G: 1 INJECTION, POWDER, FOR SOLUTION INTRAMUSCULAR; INTRAVENOUS at 21:30

## 2019-01-01 RX ADMIN — SODIUM CHLORIDE 0.5 MCG/KG/HR: 900 INJECTION, SOLUTION INTRAVENOUS at 07:55

## 2019-01-01 RX ADMIN — ASPIRIN 325 MG ORAL TABLET 325 MG: 325 PILL ORAL at 08:56

## 2019-01-01 RX ADMIN — IPRATROPIUM BROMIDE AND ALBUTEROL SULFATE 3 ML: .5; 3 SOLUTION RESPIRATORY (INHALATION) at 08:13

## 2019-01-01 RX ADMIN — SODIUM CHLORIDE 1000 MG: 900 INJECTION, SOLUTION INTRAVENOUS at 12:01

## 2019-01-01 RX ADMIN — HEPARIN SODIUM 5000 UNITS: 5000 INJECTION INTRAVENOUS; SUBCUTANEOUS at 21:27

## 2019-01-01 RX ADMIN — SODIUM CHLORIDE 1000 MG: 900 INJECTION, SOLUTION INTRAVENOUS at 17:00

## 2019-01-01 RX ADMIN — MORPHINE SULFATE 4 MG: 4 INJECTION INTRAVENOUS at 14:16

## 2019-01-01 RX ADMIN — CLOPIDOGREL BISULFATE 75 MG: 75 TABLET ORAL at 08:27

## 2019-01-01 RX ADMIN — IPRATROPIUM BROMIDE AND ALBUTEROL SULFATE 3 ML: .5; 3 SOLUTION RESPIRATORY (INHALATION) at 01:50

## 2019-01-01 RX ADMIN — CLOPIDOGREL BISULFATE 75 MG: 75 TABLET ORAL at 08:42

## 2019-01-01 RX ADMIN — HEPARIN SODIUM 5000 UNITS: 5000 INJECTION INTRAVENOUS; SUBCUTANEOUS at 04:17

## 2019-01-01 RX ADMIN — HEPARIN SODIUM 5000 UNITS: 5000 INJECTION INTRAVENOUS; SUBCUTANEOUS at 06:08

## 2019-01-01 RX ADMIN — FENTANYL CITRATE 25 MCG: 50 INJECTION, SOLUTION INTRAMUSCULAR; INTRAVENOUS at 14:20

## 2019-01-01 RX ADMIN — CEFEPIME HYDROCHLORIDE 1 G: 1 INJECTION, POWDER, FOR SOLUTION INTRAMUSCULAR; INTRAVENOUS at 01:38

## 2019-01-01 RX ADMIN — SODIUM CHLORIDE 1000 MG: 900 INJECTION, SOLUTION INTRAVENOUS at 20:11

## 2019-01-01 RX ADMIN — MORPHINE SULFATE 2 MG: 2 INJECTION, SOLUTION INTRAMUSCULAR; INTRAVENOUS at 11:25

## 2019-01-01 RX ADMIN — IPRATROPIUM BROMIDE AND ALBUTEROL SULFATE 3 ML: .5; 3 SOLUTION RESPIRATORY (INHALATION) at 13:32

## 2019-01-01 RX ADMIN — ASPIRIN 81 MG 81 MG: 81 TABLET ORAL at 09:11

## 2019-01-01 RX ADMIN — PROPOFOL 50 MCG/KG/MIN: 10 INJECTION, EMULSION INTRAVENOUS at 21:33

## 2019-01-01 RX ADMIN — LORAZEPAM 5 MG/HR: 2 INJECTION, SOLUTION INTRAMUSCULAR; INTRAVENOUS at 02:42

## 2019-01-01 RX ADMIN — CLOPIDOGREL BISULFATE 75 MG: 75 TABLET ORAL at 09:22

## 2019-01-01 RX ADMIN — Medication 8.6 MG: at 08:42

## 2019-01-01 RX ADMIN — PROPOFOL 50 MCG/KG/MIN: 10 INJECTION, EMULSION INTRAVENOUS at 21:30

## 2019-01-01 RX ADMIN — LORAZEPAM 2 MG/HR: 2 INJECTION, SOLUTION INTRAMUSCULAR; INTRAVENOUS at 03:50

## 2019-01-01 RX ADMIN — HEPARIN SODIUM 5000 UNITS: 5000 INJECTION INTRAVENOUS; SUBCUTANEOUS at 05:57

## 2019-01-01 RX ADMIN — Medication 1000 MG: at 22:29

## 2019-01-01 RX ADMIN — MORPHINE SULFATE 4 MG: 4 INJECTION INTRAVENOUS at 22:53

## 2019-01-01 RX ADMIN — HEPARIN SODIUM 5000 UNITS: 5000 INJECTION INTRAVENOUS; SUBCUTANEOUS at 13:52

## 2019-01-01 RX ADMIN — OXYCODONE HYDROCHLORIDE AND ACETAMINOPHEN 2 TABLET: 5; 325 TABLET ORAL at 02:53

## 2019-01-01 RX ADMIN — QUETIAPINE FUMARATE 50 MG: 25 TABLET ORAL at 12:28

## 2019-01-01 RX ADMIN — MORPHINE SULFATE 2 MG: 2 INJECTION, SOLUTION INTRAMUSCULAR; INTRAVENOUS at 20:46

## 2019-01-01 RX ADMIN — AMOXICILLIN 500 MG: 250 CAPSULE ORAL at 13:55

## 2019-01-01 RX ADMIN — SODIUM CHLORIDE 1000 MG: 900 INJECTION, SOLUTION INTRAVENOUS at 04:00

## 2019-01-01 RX ADMIN — PANTOPRAZOLE SODIUM 40 MG: 40 INJECTION, POWDER, FOR SOLUTION INTRAVENOUS at 08:26

## 2019-01-01 RX ADMIN — Medication 10 ML: at 22:24

## 2019-01-01 RX ADMIN — THERA TABS 1 TABLET: TAB at 08:42

## 2019-01-01 RX ADMIN — MORPHINE SULFATE 4 MG: 4 INJECTION INTRAVENOUS at 14:47

## 2019-01-01 RX ADMIN — Medication 10 ML: at 21:29

## 2019-01-01 RX ADMIN — LORAZEPAM 5 MG/HR: 2 INJECTION, SOLUTION INTRAMUSCULAR; INTRAVENOUS at 19:40

## 2019-01-01 RX ADMIN — PROPOFOL 50 MCG/KG/MIN: 10 INJECTION, EMULSION INTRAVENOUS at 04:30

## 2019-01-01 RX ADMIN — CARVEDILOL 6.25 MG: 6.25 TABLET, FILM COATED ORAL at 08:56

## 2019-01-01 RX ADMIN — Medication 10 ML: at 21:48

## 2019-01-01 RX ADMIN — CEFEPIME HYDROCHLORIDE 0.5 G: 1 INJECTION, POWDER, FOR SOLUTION INTRAMUSCULAR; INTRAVENOUS at 21:23

## 2019-01-01 RX ADMIN — IPRATROPIUM BROMIDE AND ALBUTEROL SULFATE 3 ML: .5; 3 SOLUTION RESPIRATORY (INHALATION) at 02:03

## 2019-01-01 RX ADMIN — Medication 10 ML: at 14:11

## 2019-01-01 RX ADMIN — ACETAMINOPHEN 650 MG: 325 TABLET, FILM COATED ORAL at 16:15

## 2019-01-01 RX ADMIN — OXYCODONE HYDROCHLORIDE AND ACETAMINOPHEN 2 TABLET: 5; 325 TABLET ORAL at 09:03

## 2019-01-01 RX ADMIN — ASPIRIN 81 MG 81 MG: 81 TABLET ORAL at 08:24

## 2019-01-01 RX ADMIN — MORPHINE SULFATE 4 MG: 4 INJECTION INTRAVENOUS at 09:55

## 2019-01-01 RX ADMIN — QUETIAPINE FUMARATE 50 MG: 25 TABLET ORAL at 20:44

## 2019-01-01 RX ADMIN — HEPARIN SODIUM 5000 UNITS: 5000 INJECTION INTRAVENOUS; SUBCUTANEOUS at 11:24

## 2019-01-01 RX ADMIN — OXYCODONE HYDROCHLORIDE AND ACETAMINOPHEN 2 TABLET: 5; 325 TABLET ORAL at 22:16

## 2019-01-01 RX ADMIN — FENTANYL CITRATE 25 MCG: 50 INJECTION, SOLUTION INTRAMUSCULAR; INTRAVENOUS at 14:33

## 2019-01-01 RX ADMIN — ASPIRIN 325 MG ORAL TABLET 325 MG: 325 PILL ORAL at 09:22

## 2019-01-01 RX ADMIN — POTASSIUM CHLORIDE 10 MEQ: 7.46 INJECTION, SOLUTION INTRAVENOUS at 03:42

## 2019-01-01 RX ADMIN — PROPOFOL 10 MCG/KG/MIN: 10 INJECTION, EMULSION INTRAVENOUS at 04:00

## 2019-01-01 RX ADMIN — OXYCODONE HYDROCHLORIDE AND ACETAMINOPHEN 2 TABLET: 5; 325 TABLET ORAL at 18:26

## 2019-01-01 RX ADMIN — HEPARIN SODIUM 5000 UNITS: 5000 INJECTION INTRAVENOUS; SUBCUTANEOUS at 21:52

## 2019-01-01 RX ADMIN — IOPAMIDOL 148 ML: 755 INJECTION, SOLUTION INTRAVENOUS at 15:56

## 2019-01-01 RX ADMIN — HEPARIN SODIUM 5000 UNITS: 5000 INJECTION INTRAVENOUS; SUBCUTANEOUS at 20:27

## 2019-01-01 RX ADMIN — MORPHINE SULFATE 4 MG: 4 INJECTION INTRAVENOUS at 02:25

## 2019-01-01 RX ADMIN — REGADENOSON 0.4 MG: 0.08 INJECTION, SOLUTION INTRAVENOUS at 09:00

## 2019-01-01 RX ADMIN — HEPARIN SODIUM 5000 UNITS: 5000 INJECTION INTRAVENOUS; SUBCUTANEOUS at 20:23

## 2019-01-01 RX ADMIN — OXYCODONE HYDROCHLORIDE AND ACETAMINOPHEN 2 TABLET: 5; 325 TABLET ORAL at 20:26

## 2019-01-01 RX ADMIN — SODIUM CHLORIDE 0.5 MCG/KG/HR: 900 INJECTION, SOLUTION INTRAVENOUS at 13:18

## 2019-01-01 RX ADMIN — POTASSIUM CHLORIDE 20 MEQ: 10 TABLET, EXTENDED RELEASE ORAL at 21:32

## 2019-01-01 RX ADMIN — SODIUM CHLORIDE 75 ML/HR: 900 INJECTION, SOLUTION INTRAVENOUS at 19:57

## 2019-01-01 RX ADMIN — CLONIDINE HYDROCHLORIDE 0.1 MG: 0.1 TABLET ORAL at 18:35

## 2019-01-01 RX ADMIN — OXYCODONE HYDROCHLORIDE AND ACETAMINOPHEN 2 TABLET: 5; 325 TABLET ORAL at 20:48

## 2019-01-01 RX ADMIN — LORAZEPAM 5 MG/HR: 2 INJECTION, SOLUTION INTRAMUSCULAR; INTRAVENOUS at 13:56

## 2019-01-01 RX ADMIN — CARVEDILOL 6.25 MG: 6.25 TABLET, FILM COATED ORAL at 09:52

## 2019-01-01 RX ADMIN — Medication 10 ML: at 05:03

## 2019-01-01 RX ADMIN — SODIUM CHLORIDE 75 ML/HR: 900 INJECTION, SOLUTION INTRAVENOUS at 10:49

## 2019-01-01 RX ADMIN — OXYCODONE HYDROCHLORIDE AND ACETAMINOPHEN 2 TABLET: 5; 325 TABLET ORAL at 21:57

## 2019-01-01 RX ADMIN — AMLODIPINE BESYLATE 10 MG: 10 TABLET ORAL at 11:33

## 2019-01-01 RX ADMIN — PANTOPRAZOLE SODIUM 40 MG: 40 INJECTION, POWDER, FOR SOLUTION INTRAVENOUS at 08:13

## 2019-01-01 RX ADMIN — HYDROCODONE BITARTRATE AND ACETAMINOPHEN 1 TABLET: 5; 325 TABLET ORAL at 21:16

## 2019-01-01 RX ADMIN — PANTOPRAZOLE SODIUM 40 MG: 40 INJECTION, POWDER, FOR SOLUTION INTRAVENOUS at 08:31

## 2019-01-01 RX ADMIN — DEXTROSE MONOHYDRATE 50 ML/HR: 10 INJECTION, SOLUTION INTRAVENOUS at 17:00

## 2019-01-01 RX ADMIN — Medication 8.6 MG: at 08:31

## 2019-01-01 RX ADMIN — MORPHINE SULFATE 2 MG: 2 INJECTION, SOLUTION INTRAMUSCULAR; INTRAVENOUS at 08:50

## 2019-01-01 RX ADMIN — CLONIDINE HYDROCHLORIDE 0.1 MG: 0.1 TABLET ORAL at 05:44

## 2019-01-01 RX ADMIN — CARVEDILOL 6.25 MG: 6.25 TABLET, FILM COATED ORAL at 18:08

## 2019-01-01 RX ADMIN — CEFTRIAXONE 1 G: 1 INJECTION, POWDER, FOR SOLUTION INTRAMUSCULAR; INTRAVENOUS at 15:30

## 2019-01-01 RX ADMIN — FLUCONAZOLE, SODIUM CHLORIDE 200 MG: 2 INJECTION INTRAVENOUS at 21:46

## 2019-01-01 RX ADMIN — Medication 10 ML: at 13:59

## 2019-01-01 RX ADMIN — MORPHINE SULFATE 4 MG/HR: 10 INJECTION, SOLUTION INTRAMUSCULAR; INTRAVENOUS at 17:33

## 2019-01-01 RX ADMIN — SODIUM CHLORIDE 1000 MG: 900 INJECTION, SOLUTION INTRAVENOUS at 10:33

## 2019-01-01 RX ADMIN — QUETIAPINE FUMARATE 50 MG: 25 TABLET ORAL at 21:37

## 2019-01-01 RX ADMIN — HEPARIN SODIUM 5000 UNITS: 5000 INJECTION INTRAVENOUS; SUBCUTANEOUS at 22:09

## 2019-01-01 RX ADMIN — CEFEPIME HYDROCHLORIDE 1 G: 1 INJECTION, POWDER, FOR SOLUTION INTRAMUSCULAR; INTRAVENOUS at 00:37

## 2019-01-01 RX ADMIN — OXYCODONE HYDROCHLORIDE AND ACETAMINOPHEN 2 TABLET: 5; 325 TABLET ORAL at 12:01

## 2019-01-01 RX ADMIN — IPRATROPIUM BROMIDE AND ALBUTEROL SULFATE 3 ML: .5; 3 SOLUTION RESPIRATORY (INHALATION) at 20:03

## 2019-01-01 RX ADMIN — CEFEPIME HYDROCHLORIDE 1 G: 1 INJECTION, POWDER, FOR SOLUTION INTRAMUSCULAR; INTRAVENOUS at 13:06

## 2019-01-01 RX ADMIN — CARVEDILOL 6.25 MG: 6.25 TABLET, FILM COATED ORAL at 08:12

## 2019-01-01 RX ADMIN — SODIUM CHLORIDE 750 MG: 900 INJECTION, SOLUTION INTRAVENOUS at 13:00

## 2019-01-01 RX ADMIN — HEPARIN SODIUM 5000 UNITS: 5000 INJECTION INTRAVENOUS; SUBCUTANEOUS at 11:33

## 2019-01-01 RX ADMIN — HEPARIN SODIUM 5000 UNITS: 5000 INJECTION INTRAVENOUS; SUBCUTANEOUS at 06:17

## 2019-01-01 RX ADMIN — OXYCODONE HYDROCHLORIDE AND ACETAMINOPHEN 2 TABLET: 5; 325 TABLET ORAL at 07:35

## 2019-01-01 RX ADMIN — PROPOFOL 35 MCG/KG/MIN: 10 INJECTION, EMULSION INTRAVENOUS at 15:14

## 2019-01-01 RX ADMIN — ACETAMINOPHEN 650 MG: 325 TABLET, FILM COATED ORAL at 00:28

## 2019-01-01 RX ADMIN — ATORVASTATIN CALCIUM 40 MG: 40 TABLET, FILM COATED ORAL at 22:16

## 2019-01-01 RX ADMIN — IPRATROPIUM BROMIDE AND ALBUTEROL SULFATE 3 ML: .5; 3 SOLUTION RESPIRATORY (INHALATION) at 01:04

## 2019-01-01 RX ADMIN — LORAZEPAM 5 MG/HR: 2 INJECTION, SOLUTION INTRAMUSCULAR; INTRAVENOUS at 20:28

## 2019-01-01 RX ADMIN — CLONIDINE HYDROCHLORIDE 0.1 MG: 0.1 TABLET ORAL at 17:47

## 2019-01-01 RX ADMIN — MORPHINE SULFATE 2 MG: 2 INJECTION, SOLUTION INTRAMUSCULAR; INTRAVENOUS at 04:32

## 2019-01-01 RX ADMIN — OXYCODONE HYDROCHLORIDE AND ACETAMINOPHEN 2 TABLET: 5; 325 TABLET ORAL at 04:40

## 2019-01-01 RX ADMIN — SODIUM CHLORIDE 0.5 MCG/KG/HR: 900 INJECTION, SOLUTION INTRAVENOUS at 22:02

## 2019-01-01 RX ADMIN — HALOPERIDOL LACTATE 1 MG: 5 INJECTION INTRAMUSCULAR at 10:32

## 2019-01-01 RX ADMIN — MORPHINE SULFATE 3 MG: 2 INJECTION, SOLUTION INTRAMUSCULAR; INTRAVENOUS at 02:39

## 2019-01-01 RX ADMIN — SODIUM CHLORIDE 75 ML/HR: 900 INJECTION, SOLUTION INTRAVENOUS at 01:04

## 2019-01-01 RX ADMIN — Medication 10 ML: at 22:11

## 2019-01-01 RX ADMIN — MORPHINE SULFATE 2 MG: 2 INJECTION, SOLUTION INTRAMUSCULAR; INTRAVENOUS at 04:49

## 2019-01-01 RX ADMIN — IPRATROPIUM BROMIDE AND ALBUTEROL SULFATE 3 ML: .5; 3 SOLUTION RESPIRATORY (INHALATION) at 07:36

## 2019-01-01 RX ADMIN — CLONIDINE HYDROCHLORIDE 0.1 MG: 0.1 TABLET ORAL at 18:03

## 2019-01-01 RX ADMIN — LORAZEPAM 1 MG: 2 INJECTION, SOLUTION INTRAMUSCULAR; INTRAVENOUS at 16:31

## 2019-01-01 RX ADMIN — Medication 10 ML: at 14:31

## 2019-01-01 RX ADMIN — Medication 10 ML: at 14:18

## 2019-01-01 RX ADMIN — VANCOMYCIN HYDROCHLORIDE 1500 MG: 10 INJECTION, POWDER, LYOPHILIZED, FOR SOLUTION INTRAVENOUS at 15:52

## 2019-01-01 RX ADMIN — SODIUM CHLORIDE 1000 MG: 900 INJECTION, SOLUTION INTRAVENOUS at 22:27

## 2019-01-01 RX ADMIN — HEPARIN SODIUM 5000 UNITS: 5000 INJECTION INTRAVENOUS; SUBCUTANEOUS at 06:03

## 2019-01-01 RX ADMIN — CARVEDILOL 6.25 MG: 6.25 TABLET, FILM COATED ORAL at 08:22

## 2019-01-01 RX ADMIN — CEFEPIME HYDROCHLORIDE 1 G: 1 INJECTION, POWDER, FOR SOLUTION INTRAMUSCULAR; INTRAVENOUS at 14:40

## 2019-01-01 RX ADMIN — ACETAMINOPHEN 650 MG: 325 TABLET, FILM COATED ORAL at 04:23

## 2019-01-01 RX ADMIN — CHLORHEXIDINE GLUCONATE 10 ML: 1.2 RINSE ORAL at 21:29

## 2019-01-01 RX ADMIN — SODIUM CHLORIDE 750 MG: 900 INJECTION, SOLUTION INTRAVENOUS at 21:36

## 2019-01-01 RX ADMIN — Medication 100 MCG/HR: at 14:11

## 2019-01-01 RX ADMIN — CHLORHEXIDINE GLUCONATE 10 ML: 1.2 RINSE ORAL at 21:32

## 2019-01-01 RX ADMIN — Medication 10 ML: at 06:05

## 2019-01-01 RX ADMIN — OXYCODONE HYDROCHLORIDE AND ACETAMINOPHEN 2 TABLET: 5; 325 TABLET ORAL at 09:57

## 2019-01-01 RX ADMIN — SODIUM CHLORIDE 1000 MG: 900 INJECTION, SOLUTION INTRAVENOUS at 08:15

## 2019-01-01 RX ADMIN — IPRATROPIUM BROMIDE AND ALBUTEROL SULFATE 3 ML: .5; 3 SOLUTION RESPIRATORY (INHALATION) at 19:37

## 2019-01-01 RX ADMIN — OXYCODONE HYDROCHLORIDE AND ACETAMINOPHEN 2 TABLET: 5; 325 TABLET ORAL at 09:36

## 2019-01-01 RX ADMIN — HEPARIN SODIUM 5000 UNITS: 5000 INJECTION INTRAVENOUS; SUBCUTANEOUS at 15:30

## 2019-01-01 RX ADMIN — HEPARIN SODIUM 5000 UNITS: 5000 INJECTION INTRAVENOUS; SUBCUTANEOUS at 20:48

## 2019-01-01 RX ADMIN — CARVEDILOL 3.12 MG: 3.12 TABLET, FILM COATED ORAL at 18:03

## 2019-01-01 RX ADMIN — ATORVASTATIN CALCIUM 40 MG: 40 TABLET, FILM COATED ORAL at 21:19

## 2019-01-01 RX ADMIN — Medication 75 MCG/HR: at 16:38

## 2019-01-01 RX ADMIN — LORAZEPAM 2 MG/HR: 2 INJECTION, SOLUTION INTRAMUSCULAR; INTRAVENOUS at 14:30

## 2019-01-01 RX ADMIN — LORAZEPAM 5 MG/HR: 2 INJECTION, SOLUTION INTRAMUSCULAR; INTRAVENOUS at 01:50

## 2019-01-01 RX ADMIN — SODIUM CHLORIDE 75 ML/HR: 900 INJECTION, SOLUTION INTRAVENOUS at 03:52

## 2019-01-01 RX ADMIN — Medication 75 MCG/HR: at 04:30

## 2019-01-01 RX ADMIN — IPRATROPIUM BROMIDE AND ALBUTEROL SULFATE 3 ML: .5; 3 SOLUTION RESPIRATORY (INHALATION) at 13:28

## 2019-01-01 RX ADMIN — SODIUM CHLORIDE 770 ML: 900 INJECTION, SOLUTION INTRAVENOUS at 15:52

## 2019-01-01 RX ADMIN — HEPARIN SODIUM 5000 UNITS: 5000 INJECTION INTRAVENOUS; SUBCUTANEOUS at 20:51

## 2019-01-01 RX ADMIN — Medication 10 ML: at 22:00

## 2019-01-01 RX ADMIN — ATORVASTATIN CALCIUM 40 MG: 40 TABLET, FILM COATED ORAL at 21:54

## 2019-01-01 RX ADMIN — SODIUM CHLORIDE 1000 MG: 900 INJECTION, SOLUTION INTRAVENOUS at 21:00

## 2019-01-01 RX ADMIN — CEFTRIAXONE 1 G: 1 INJECTION, POWDER, FOR SOLUTION INTRAMUSCULAR; INTRAVENOUS at 14:01

## 2019-01-01 RX ADMIN — QUETIAPINE FUMARATE 50 MG: 25 TABLET ORAL at 08:27

## 2019-01-01 RX ADMIN — ONDANSETRON 1 G: 2 INJECTION INTRAMUSCULAR; INTRAVENOUS at 08:20

## 2019-01-01 RX ADMIN — CALCIUM GLUCONATE 2 G: 94 INJECTION, SOLUTION INTRAVENOUS at 10:54

## 2019-01-01 RX ADMIN — CEFEPIME HYDROCHLORIDE 0.5 G: 1 INJECTION, POWDER, FOR SOLUTION INTRAMUSCULAR; INTRAVENOUS at 21:36

## 2019-01-01 RX ADMIN — SODIUM CHLORIDE 1000 MG: 900 INJECTION, SOLUTION INTRAVENOUS at 20:30

## 2019-01-01 RX ADMIN — CALCIUM GLUCONATE 2 G: 94 INJECTION, SOLUTION INTRAVENOUS at 18:30

## 2019-01-01 RX ADMIN — FENTANYL CITRATE 25 MCG: 50 INJECTION, SOLUTION INTRAMUSCULAR; INTRAVENOUS at 14:30

## 2019-01-01 RX ADMIN — OXYCODONE HYDROCHLORIDE AND ACETAMINOPHEN 2 TABLET: 5; 325 TABLET ORAL at 08:24

## 2019-01-01 RX ADMIN — CHLORHEXIDINE GLUCONATE 10 ML: 1.2 RINSE ORAL at 09:53

## 2019-01-01 RX ADMIN — IPRATROPIUM BROMIDE AND ALBUTEROL SULFATE 3 ML: .5; 3 SOLUTION RESPIRATORY (INHALATION) at 07:13

## 2019-01-01 RX ADMIN — HEPARIN SODIUM 5000 UNITS: 5000 INJECTION INTRAVENOUS; SUBCUTANEOUS at 03:34

## 2019-01-01 RX ADMIN — DEXTROSE MONOHYDRATE AND SODIUM CHLORIDE 100 ML/HR: 5; .9 INJECTION, SOLUTION INTRAVENOUS at 12:18

## 2019-01-01 RX ADMIN — Medication 10 ML: at 05:40

## 2019-01-01 RX ADMIN — CEFEPIME HYDROCHLORIDE 0.5 G: 1 INJECTION, POWDER, FOR SOLUTION INTRAMUSCULAR; INTRAVENOUS at 21:44

## 2019-01-01 RX ADMIN — OXYCODONE HYDROCHLORIDE AND ACETAMINOPHEN 2 TABLET: 5; 325 TABLET ORAL at 22:29

## 2019-01-01 RX ADMIN — CLONIDINE HYDROCHLORIDE 0.1 MG: 0.1 TABLET ORAL at 17:02

## 2019-01-01 RX ADMIN — OXYCODONE HYDROCHLORIDE AND ACETAMINOPHEN 2 TABLET: 5; 325 TABLET ORAL at 04:06

## 2019-01-01 RX ADMIN — SODIUM CHLORIDE 1000 MG: 900 INJECTION, SOLUTION INTRAVENOUS at 08:46

## 2019-01-01 RX ADMIN — PROPOFOL 50 MCG/KG/MIN: 10 INJECTION, EMULSION INTRAVENOUS at 15:35

## 2019-01-01 RX ADMIN — VASOPRESSIN 1 UNITS: 20 INJECTION PARENTERAL at 09:12

## 2019-01-01 RX ADMIN — IPRATROPIUM BROMIDE AND ALBUTEROL SULFATE 3 ML: .5; 3 SOLUTION RESPIRATORY (INHALATION) at 19:47

## 2019-01-01 RX ADMIN — OXYCODONE HYDROCHLORIDE AND ACETAMINOPHEN 1 TABLET: 5; 325 TABLET ORAL at 09:25

## 2019-01-01 RX ADMIN — LORAZEPAM 1 MG: 2 INJECTION, SOLUTION INTRAMUSCULAR; INTRAVENOUS at 00:11

## 2019-01-01 RX ADMIN — HEPARIN SODIUM 5000 UNITS: 5000 INJECTION INTRAVENOUS; SUBCUTANEOUS at 16:29

## 2019-01-01 RX ADMIN — CLOPIDOGREL BISULFATE 75 MG: 75 TABLET ORAL at 08:56

## 2019-01-01 RX ADMIN — HEPARIN SODIUM 5000 UNITS: 5000 INJECTION INTRAVENOUS; SUBCUTANEOUS at 06:04

## 2019-01-01 RX ADMIN — CEFTRIAXONE 1 G: 1 INJECTION, POWDER, FOR SOLUTION INTRAMUSCULAR; INTRAVENOUS at 14:41

## 2019-01-01 RX ADMIN — Medication 75 MCG/HR: at 07:56

## 2019-01-01 RX ADMIN — CHLORHEXIDINE GLUCONATE 10 ML: 1.2 RINSE ORAL at 08:12

## 2019-01-01 RX ADMIN — CARVEDILOL 3.12 MG: 3.12 TABLET, FILM COATED ORAL at 09:11

## 2019-01-01 RX ADMIN — QUETIAPINE FUMARATE 50 MG: 25 TABLET ORAL at 21:32

## 2019-01-01 RX ADMIN — Medication 75 MCG/HR: at 15:22

## 2019-01-01 RX ADMIN — SODIUM CHLORIDE 1000 MG: 900 INJECTION, SOLUTION INTRAVENOUS at 11:25

## 2019-01-01 RX ADMIN — PROPOFOL 50 MCG/KG/MIN: 10 INJECTION, EMULSION INTRAVENOUS at 01:45

## 2019-01-01 RX ADMIN — CARVEDILOL 6.25 MG: 6.25 TABLET, FILM COATED ORAL at 18:14

## 2019-01-01 RX ADMIN — HYDROCODONE BITARTRATE AND ACETAMINOPHEN 1 TABLET: 5; 325 TABLET ORAL at 06:07

## 2019-01-01 RX ADMIN — Medication 10 ML: at 06:24

## 2019-01-01 RX ADMIN — ACETAMINOPHEN 650 MG: 325 TABLET, FILM COATED ORAL at 14:09

## 2019-01-01 RX ADMIN — PANTOPRAZOLE SODIUM 40 MG: 40 INJECTION, POWDER, FOR SOLUTION INTRAVENOUS at 09:21

## 2019-01-01 RX ADMIN — Medication 50 MCG/HR: at 03:49

## 2019-01-01 RX ADMIN — CLONIDINE HYDROCHLORIDE 0.1 MG: 0.1 TABLET ORAL at 20:36

## 2019-01-01 RX ADMIN — HEPARIN SODIUM 5000 UNITS: 5000 INJECTION INTRAVENOUS; SUBCUTANEOUS at 22:00

## 2019-01-01 RX ADMIN — Medication 8.6 MG: at 17:17

## 2019-01-01 RX ADMIN — PROPOFOL 40 MCG/KG/MIN: 10 INJECTION, EMULSION INTRAVENOUS at 22:10

## 2019-01-01 RX ADMIN — HEPARIN SODIUM 5000 UNITS: 5000 INJECTION INTRAVENOUS; SUBCUTANEOUS at 14:25

## 2019-01-01 RX ADMIN — OXYMETAZOLINE HYDROCHLORIDE 2 SPRAY: 5 SPRAY NASAL at 06:48

## 2019-01-01 RX ADMIN — ASPIRIN 81 MG 81 MG: 81 TABLET ORAL at 09:03

## 2019-01-01 RX ADMIN — OXYCODONE HYDROCHLORIDE AND ACETAMINOPHEN 2 TABLET: 5; 325 TABLET ORAL at 15:01

## 2019-01-01 RX ADMIN — CARVEDILOL 3.12 MG: 3.12 TABLET, FILM COATED ORAL at 09:03

## 2019-01-01 RX ADMIN — OXYCODONE HYDROCHLORIDE AND ACETAMINOPHEN 2 TABLET: 5; 325 TABLET ORAL at 02:29

## 2019-01-01 RX ADMIN — CARVEDILOL 6.25 MG: 6.25 TABLET, FILM COATED ORAL at 09:23

## 2019-01-01 RX ADMIN — OXYCODONE HYDROCHLORIDE AND ACETAMINOPHEN 2 TABLET: 5; 325 TABLET ORAL at 14:56

## 2019-01-01 RX ADMIN — Medication 75 MCG/HR: at 02:47

## 2019-01-01 RX ADMIN — CARVEDILOL 6.25 MG: 6.25 TABLET, FILM COATED ORAL at 08:27

## 2019-01-01 RX ADMIN — SODIUM CHLORIDE 1000 MG: 900 INJECTION, SOLUTION INTRAVENOUS at 04:04

## 2019-01-01 RX ADMIN — SODIUM CHLORIDE 1000 MG: 900 INJECTION, SOLUTION INTRAVENOUS at 04:14

## 2019-01-01 RX ADMIN — Medication 10 ML: at 14:00

## 2019-01-01 RX ADMIN — VASOPRESSIN 1 UNITS: 20 INJECTION PARENTERAL at 09:40

## 2019-01-01 RX ADMIN — SODIUM CHLORIDE 25 ML/HR: 900 INJECTION, SOLUTION INTRAVENOUS at 14:28

## 2019-01-01 RX ADMIN — LORAZEPAM 5 MG/HR: 2 INJECTION, SOLUTION INTRAMUSCULAR; INTRAVENOUS at 13:53

## 2019-01-01 RX ADMIN — DIPHENHYDRAMINE HYDROCHLORIDE 12.5 MG: 50 INJECTION, SOLUTION INTRAMUSCULAR; INTRAVENOUS at 10:20

## 2019-01-01 RX ADMIN — LORAZEPAM 1 MG: 2 INJECTION, SOLUTION INTRAMUSCULAR; INTRAVENOUS at 13:42

## 2019-01-01 RX ADMIN — CARVEDILOL 6.25 MG: 6.25 TABLET, FILM COATED ORAL at 09:22

## 2019-01-01 RX ADMIN — IPRATROPIUM BROMIDE AND ALBUTEROL SULFATE 3 ML: .5; 3 SOLUTION RESPIRATORY (INHALATION) at 07:56

## 2019-01-01 RX ADMIN — OXYCODONE HYDROCHLORIDE AND ACETAMINOPHEN 2 TABLET: 5; 325 TABLET ORAL at 15:38

## 2019-01-01 RX ADMIN — OXYCODONE HYDROCHLORIDE AND ACETAMINOPHEN 2 TABLET: 5; 325 TABLET ORAL at 13:06

## 2019-01-01 RX ADMIN — PROPOFOL 50 MCG/KG/MIN: 10 INJECTION, EMULSION INTRAVENOUS at 22:00

## 2019-01-01 RX ADMIN — MORPHINE SULFATE 4 MG: 4 INJECTION INTRAVENOUS at 19:05

## 2019-01-01 RX ADMIN — SODIUM CHLORIDE 1000 ML: 900 INJECTION, SOLUTION INTRAVENOUS at 15:51

## 2019-01-01 RX ADMIN — MORPHINE SULFATE 2 MG: 2 INJECTION, SOLUTION INTRAMUSCULAR; INTRAVENOUS at 16:12

## 2019-01-01 RX ADMIN — PROPOFOL 40 MCG/KG/MIN: 10 INJECTION, EMULSION INTRAVENOUS at 15:11

## 2019-01-01 RX ADMIN — CHLORHEXIDINE GLUCONATE 10 ML: 1.2 RINSE ORAL at 20:00

## 2019-01-01 RX ADMIN — Medication 100 MCG/HR: at 23:08

## 2019-01-01 RX ADMIN — HEPARIN SODIUM 5000 UNITS: 5000 INJECTION INTRAVENOUS; SUBCUTANEOUS at 05:00

## 2019-01-01 RX ADMIN — Medication 8.6 MG: at 08:56

## 2019-01-01 RX ADMIN — PROPOFOL 50 MCG/KG/MIN: 10 INJECTION, EMULSION INTRAVENOUS at 20:30

## 2019-01-01 RX ADMIN — CARVEDILOL 3.12 MG: 3.12 TABLET, FILM COATED ORAL at 17:48

## 2019-01-01 RX ADMIN — ASPIRIN 81 MG 81 MG: 81 TABLET ORAL at 13:51

## 2019-01-01 RX ADMIN — POTASSIUM CHLORIDE 10 MEQ: 7.46 INJECTION, SOLUTION INTRAVENOUS at 05:45

## 2019-01-01 RX ADMIN — Medication 10 ML: at 15:15

## 2019-01-01 RX ADMIN — ATORVASTATIN CALCIUM 40 MG: 40 TABLET, FILM COATED ORAL at 21:02

## 2019-01-01 RX ADMIN — ALBUMIN (HUMAN) 25 G: 0.25 INJECTION, SOLUTION INTRAVENOUS at 16:27

## 2019-01-01 RX ADMIN — CEFEPIME HYDROCHLORIDE 1 G: 1 INJECTION, POWDER, FOR SOLUTION INTRAMUSCULAR; INTRAVENOUS at 01:19

## 2019-01-01 RX ADMIN — Medication 10 ML: at 21:56

## 2019-01-01 RX ADMIN — MORPHINE SULFATE 2 MG: 2 INJECTION, SOLUTION INTRAMUSCULAR; INTRAVENOUS at 13:26

## 2019-01-01 RX ADMIN — DEXTROSE MONOHYDRATE 75 ML/HR: 10 INJECTION, SOLUTION INTRAVENOUS at 08:57

## 2019-01-01 RX ADMIN — OXYCODONE HYDROCHLORIDE AND ACETAMINOPHEN 2 TABLET: 5; 325 TABLET ORAL at 07:23

## 2019-01-01 RX ADMIN — HEPARIN SODIUM 5000 UNITS: 5000 INJECTION INTRAVENOUS; SUBCUTANEOUS at 05:03

## 2019-01-01 RX ADMIN — CARVEDILOL 6.25 MG: 6.25 TABLET, FILM COATED ORAL at 08:31

## 2019-01-01 RX ADMIN — MIDAZOLAM 1 MG: 1 INJECTION INTRAMUSCULAR; INTRAVENOUS at 10:59

## 2019-01-01 RX ADMIN — CARVEDILOL 6.25 MG: 6.25 TABLET, FILM COATED ORAL at 17:15

## 2019-01-01 RX ADMIN — SODIUM CHLORIDE 25 ML/HR: 900 INJECTION, SOLUTION INTRAVENOUS at 02:00

## 2019-01-01 RX ADMIN — ATORVASTATIN CALCIUM 40 MG: 40 TABLET, FILM COATED ORAL at 22:37

## 2019-01-01 RX ADMIN — OXYCODONE HYDROCHLORIDE AND ACETAMINOPHEN 1 TABLET: 5; 325 TABLET ORAL at 16:09

## 2019-01-01 RX ADMIN — CIPROFLOXACIN HYDROCHLORIDE 750 MG: 500 TABLET, FILM COATED ORAL at 09:11

## 2019-01-01 RX ADMIN — HEPARIN SODIUM 5000 UNITS: 5000 INJECTION INTRAVENOUS; SUBCUTANEOUS at 12:46

## 2019-01-01 RX ADMIN — Medication 10 ML: at 22:05

## 2019-01-01 RX ADMIN — CEFEPIME HYDROCHLORIDE 1 G: 1 INJECTION, POWDER, FOR SOLUTION INTRAMUSCULAR; INTRAVENOUS at 12:14

## 2019-01-01 RX ADMIN — SODIUM CHLORIDE: 450 INJECTION, SOLUTION INTRAVENOUS at 14:00

## 2019-01-01 RX ADMIN — PANTOPRAZOLE SODIUM 40 MG: 40 INJECTION, POWDER, FOR SOLUTION INTRAVENOUS at 08:56

## 2019-01-01 RX ADMIN — IPRATROPIUM BROMIDE AND ALBUTEROL SULFATE 3 ML: .5; 3 SOLUTION RESPIRATORY (INHALATION) at 15:05

## 2019-01-01 RX ADMIN — Medication 10 ML: at 06:10

## 2019-01-01 RX ADMIN — VASOPRESSIN 1 UNITS: 20 INJECTION PARENTERAL at 09:15

## 2019-01-01 RX ADMIN — QUETIAPINE FUMARATE 50 MG: 25 TABLET ORAL at 21:28

## 2019-01-01 RX ADMIN — HEPARIN SODIUM 5000 UNITS: 5000 INJECTION INTRAVENOUS; SUBCUTANEOUS at 03:25

## 2019-01-01 RX ADMIN — CEFTRIAXONE 1 G: 1 INJECTION, POWDER, FOR SOLUTION INTRAMUSCULAR; INTRAVENOUS at 15:29

## 2019-01-01 RX ADMIN — IPRATROPIUM BROMIDE AND ALBUTEROL SULFATE 3 ML: .5; 3 SOLUTION RESPIRATORY (INHALATION) at 13:19

## 2019-01-01 RX ADMIN — ETOMIDATE 20 MG: 20 INJECTION, SOLUTION INTRAVENOUS at 15:26

## 2019-01-01 RX ADMIN — FENTANYL CITRATE 25 MCG: 50 INJECTION, SOLUTION INTRAMUSCULAR; INTRAVENOUS at 14:10

## 2019-01-01 RX ADMIN — CEFEPIME HYDROCHLORIDE 0.5 G: 1 INJECTION, POWDER, FOR SOLUTION INTRAMUSCULAR; INTRAVENOUS at 21:40

## 2019-01-01 RX ADMIN — SODIUM CHLORIDE: 450 INJECTION, SOLUTION INTRAVENOUS at 19:02

## 2019-01-01 RX ADMIN — HEPARIN SODIUM 5000 UNITS: 5000 INJECTION INTRAVENOUS; SUBCUTANEOUS at 20:37

## 2019-01-01 RX ADMIN — THERA TABS 1 TABLET: TAB at 08:22

## 2019-01-01 RX ADMIN — HEPARIN SODIUM 5000 UNITS: 5000 INJECTION INTRAVENOUS; SUBCUTANEOUS at 13:14

## 2019-01-01 RX ADMIN — ASPIRIN 81 MG 81 MG: 81 TABLET ORAL at 08:39

## 2019-01-01 RX ADMIN — Medication 8.6 MG: at 17:21

## 2019-01-01 RX ADMIN — CALCIUM GLUCONATE 2 G: 94 INJECTION, SOLUTION INTRAVENOUS at 08:55

## 2019-01-01 RX ADMIN — CARVEDILOL 6.25 MG: 6.25 TABLET, FILM COATED ORAL at 17:50

## 2019-01-01 RX ADMIN — Medication 8.6 MG: at 08:39

## 2019-01-01 RX ADMIN — MORPHINE SULFATE 4 MG: 4 INJECTION INTRAVENOUS at 06:53

## 2019-01-01 RX ADMIN — SODIUM CHLORIDE 1000 MG: 900 INJECTION, SOLUTION INTRAVENOUS at 04:32

## 2019-01-01 RX ADMIN — MORPHINE SULFATE 2 MG: 2 INJECTION, SOLUTION INTRAMUSCULAR; INTRAVENOUS at 05:50

## 2019-01-01 RX ADMIN — OXYCODONE HYDROCHLORIDE AND ACETAMINOPHEN 2 TABLET: 5; 325 TABLET ORAL at 04:29

## 2019-01-01 RX ADMIN — SODIUM CHLORIDE 1000 MG: 900 INJECTION, SOLUTION INTRAVENOUS at 22:13

## 2019-01-01 RX ADMIN — PROPOFOL 70 MG: 10 INJECTION, EMULSION INTRAVENOUS at 08:20

## 2019-01-01 RX ADMIN — CEFEPIME HYDROCHLORIDE 0.5 G: 1 INJECTION, POWDER, FOR SOLUTION INTRAMUSCULAR; INTRAVENOUS at 21:15

## 2019-01-01 RX ADMIN — ATORVASTATIN CALCIUM 40 MG: 40 TABLET, FILM COATED ORAL at 22:29

## 2019-01-01 RX ADMIN — MORPHINE SULFATE 3 MG: 2 INJECTION, SOLUTION INTRAMUSCULAR; INTRAVENOUS at 06:31

## 2019-01-01 RX ADMIN — OXYCODONE HYDROCHLORIDE AND ACETAMINOPHEN 2 TABLET: 5; 325 TABLET ORAL at 13:10

## 2019-01-01 RX ADMIN — CHLORHEXIDINE GLUCONATE 10 ML: 1.2 RINSE ORAL at 21:00

## 2019-01-01 RX ADMIN — MORPHINE SULFATE 2 MG: 2 INJECTION, SOLUTION INTRAMUSCULAR; INTRAVENOUS at 04:52

## 2019-01-01 RX ADMIN — PROPOFOL 50 MCG/KG/MIN: 10 INJECTION, EMULSION INTRAVENOUS at 08:46

## 2019-01-01 RX ADMIN — Medication 100 MCG/HR: at 11:16

## 2019-01-01 RX ADMIN — SODIUM CHLORIDE: 9 INJECTION, SOLUTION INTRAVENOUS at 08:05

## 2019-01-01 RX ADMIN — CHLORHEXIDINE GLUCONATE 10 ML: 1.2 RINSE ORAL at 21:36

## 2019-01-01 RX ADMIN — ATORVASTATIN CALCIUM 40 MG: 40 TABLET, FILM COATED ORAL at 21:32

## 2019-01-01 RX ADMIN — CALCIUM GLUCONATE 4 G: 94 INJECTION, SOLUTION INTRAVENOUS at 08:22

## 2019-01-01 RX ADMIN — HEPARIN SODIUM 5000 UNITS: 5000 INJECTION INTRAVENOUS; SUBCUTANEOUS at 22:43

## 2019-01-01 RX ADMIN — VANCOMYCIN HYDROCHLORIDE 1500 MG: 10 INJECTION, POWDER, LYOPHILIZED, FOR SOLUTION INTRAVENOUS at 18:12

## 2019-01-01 RX ADMIN — MORPHINE SULFATE 3 MG: 2 INJECTION, SOLUTION INTRAMUSCULAR; INTRAVENOUS at 14:42

## 2019-01-01 RX ADMIN — ASPIRIN 325 MG ORAL TABLET 325 MG: 325 PILL ORAL at 08:12

## 2019-01-01 RX ADMIN — ATORVASTATIN CALCIUM 40 MG: 40 TABLET, FILM COATED ORAL at 21:30

## 2019-01-01 RX ADMIN — MORPHINE SULFATE 2 MG: 2 INJECTION, SOLUTION INTRAMUSCULAR; INTRAVENOUS at 21:53

## 2019-01-01 RX ADMIN — THERA TABS 1 TABLET: TAB at 09:52

## 2019-01-01 RX ADMIN — CLONIDINE HYDROCHLORIDE 0.1 MG: 0.1 TABLET ORAL at 09:03

## 2019-01-01 RX ADMIN — FENTANYL CITRATE 25 MCG: 50 INJECTION, SOLUTION INTRAMUSCULAR; INTRAVENOUS at 11:10

## 2019-01-01 RX ADMIN — ONDANSETRON 4 MG: 2 INJECTION INTRAMUSCULAR; INTRAVENOUS at 14:47

## 2019-01-01 RX ADMIN — FENTANYL CITRATE 50 MCG: 50 INJECTION, SOLUTION INTRAMUSCULAR; INTRAVENOUS at 15:49

## 2019-01-01 RX ADMIN — CEFEPIME HYDROCHLORIDE 1 G: 1 INJECTION, POWDER, FOR SOLUTION INTRAMUSCULAR; INTRAVENOUS at 13:27

## 2019-01-01 RX ADMIN — IPRATROPIUM BROMIDE AND ALBUTEROL SULFATE 3 ML: .5; 3 SOLUTION RESPIRATORY (INHALATION) at 01:01

## 2019-01-01 RX ADMIN — HEPARIN SODIUM 5000 UNITS: 5000 INJECTION INTRAVENOUS; SUBCUTANEOUS at 14:11

## 2019-01-01 RX ADMIN — FLUCONAZOLE, SODIUM CHLORIDE 200 MG: 2 INJECTION INTRAVENOUS at 21:30

## 2019-01-01 RX ADMIN — CIPROFLOXACIN HYDROCHLORIDE 750 MG: 500 TABLET, FILM COATED ORAL at 20:48

## 2019-01-01 RX ADMIN — MORPHINE SULFATE 2 MG: 2 INJECTION, SOLUTION INTRAMUSCULAR; INTRAVENOUS at 01:05

## 2019-01-01 RX ADMIN — MORPHINE SULFATE 2 MG: 2 INJECTION, SOLUTION INTRAMUSCULAR; INTRAVENOUS at 03:56

## 2019-01-01 RX ADMIN — OXYCODONE HYDROCHLORIDE AND ACETAMINOPHEN 2 TABLET: 5; 325 TABLET ORAL at 21:02

## 2019-01-01 RX ADMIN — HEPARIN SODIUM 5000 UNITS: 5000 INJECTION INTRAVENOUS; SUBCUTANEOUS at 14:13

## 2019-01-01 RX ADMIN — Medication 50 MCG/HR: at 21:00

## 2019-01-01 RX ADMIN — HEPARIN SODIUM 5000 UNITS: 5000 INJECTION INTRAVENOUS; SUBCUTANEOUS at 06:00

## 2019-01-01 RX ADMIN — THERA TABS 1 TABLET: TAB at 08:31

## 2019-01-01 RX ADMIN — PROPOFOL 50 MCG/KG/MIN: 10 INJECTION, EMULSION INTRAVENOUS at 17:28

## 2019-01-01 RX ADMIN — MORPHINE SULFATE 3 MG: 2 INJECTION, SOLUTION INTRAMUSCULAR; INTRAVENOUS at 10:49

## 2019-01-01 RX ADMIN — ACETAMINOPHEN 650 MG: 650 SUPPOSITORY RECTAL at 21:44

## 2019-01-01 RX ADMIN — Medication 10 ML: at 21:57

## 2019-01-01 RX ADMIN — PROPOFOL 50 MCG/KG/MIN: 10 INJECTION, EMULSION INTRAVENOUS at 06:37

## 2019-01-01 RX ADMIN — SODIUM CHLORIDE 1000 MG: 900 INJECTION, SOLUTION INTRAVENOUS at 03:58

## 2019-01-01 RX ADMIN — FENTANYL CITRATE 25 MCG: 50 INJECTION, SOLUTION INTRAMUSCULAR; INTRAVENOUS at 09:02

## 2019-01-01 RX ADMIN — THERA TABS 1 TABLET: TAB at 08:29

## 2019-01-01 RX ADMIN — Medication 10 ML: at 05:00

## 2019-01-01 RX ADMIN — IPRATROPIUM BROMIDE AND ALBUTEROL SULFATE 3 ML: .5; 3 SOLUTION RESPIRATORY (INHALATION) at 09:18

## 2019-01-01 RX ADMIN — CLOPIDOGREL BISULFATE 75 MG: 75 TABLET ORAL at 13:09

## 2019-01-01 RX ADMIN — HEPARIN SODIUM 5000 UNITS: 5000 INJECTION INTRAVENOUS; SUBCUTANEOUS at 22:08

## 2019-01-01 RX ADMIN — MORPHINE SULFATE 3 MG: 2 INJECTION, SOLUTION INTRAMUSCULAR; INTRAVENOUS at 12:42

## 2019-01-01 RX ADMIN — HEPARIN SODIUM 5000 UNITS: 5000 INJECTION INTRAVENOUS; SUBCUTANEOUS at 21:55

## 2019-01-01 RX ADMIN — SODIUM CHLORIDE 750 MG: 900 INJECTION, SOLUTION INTRAVENOUS at 08:55

## 2019-01-01 RX ADMIN — DEXTROSE MONOHYDRATE 75 ML/HR: 10 INJECTION, SOLUTION INTRAVENOUS at 05:45

## 2019-01-01 RX ADMIN — CARVEDILOL 3.12 MG: 3.12 TABLET, FILM COATED ORAL at 08:02

## 2019-01-01 RX ADMIN — CHLORHEXIDINE GLUCONATE 10 ML: 1.2 RINSE ORAL at 19:27

## 2019-01-01 RX ADMIN — HEPARIN SODIUM 5000 UNITS: 5000 INJECTION INTRAVENOUS; SUBCUTANEOUS at 11:35

## 2019-01-01 RX ADMIN — HEPARIN SODIUM 5000 UNITS: 5000 INJECTION INTRAVENOUS; SUBCUTANEOUS at 14:23

## 2019-01-01 RX ADMIN — FLUCONAZOLE, SODIUM CHLORIDE 200 MG: 2 INJECTION INTRAVENOUS at 20:44

## 2019-01-01 RX ADMIN — CARVEDILOL 6.25 MG: 6.25 TABLET, FILM COATED ORAL at 17:31

## 2019-01-01 RX ADMIN — IPRATROPIUM BROMIDE AND ALBUTEROL SULFATE 3 ML: .5; 3 SOLUTION RESPIRATORY (INHALATION) at 01:37

## 2019-01-01 RX ADMIN — Medication 1000 MG: at 08:44

## 2019-01-01 RX ADMIN — CLONIDINE HYDROCHLORIDE 0.1 MG: 0.1 TABLET ORAL at 09:11

## 2019-01-01 RX ADMIN — Medication 8.6 MG: at 09:52

## 2019-01-01 RX ADMIN — HYDROCODONE BITARTRATE AND ACETAMINOPHEN 1 TABLET: 5; 325 TABLET ORAL at 19:30

## 2019-01-01 RX ADMIN — OXYCODONE HYDROCHLORIDE AND ACETAMINOPHEN 2 TABLET: 5; 325 TABLET ORAL at 15:23

## 2019-01-01 RX ADMIN — IPRATROPIUM BROMIDE AND ALBUTEROL SULFATE 3 ML: .5; 3 SOLUTION RESPIRATORY (INHALATION) at 07:31

## 2019-01-01 RX ADMIN — QUETIAPINE FUMARATE 50 MG: 25 TABLET ORAL at 08:42

## 2019-01-01 RX ADMIN — CEFTRIAXONE 1 G: 1 INJECTION, POWDER, FOR SOLUTION INTRAMUSCULAR; INTRAVENOUS at 14:50

## 2019-01-01 RX ADMIN — IPRATROPIUM BROMIDE AND ALBUTEROL SULFATE 3 ML: .5; 3 SOLUTION RESPIRATORY (INHALATION) at 15:58

## 2019-01-01 RX ADMIN — HEPARIN SODIUM 5000 UNITS: 5000 INJECTION INTRAVENOUS; SUBCUTANEOUS at 05:39

## 2019-01-01 RX ADMIN — Medication 10 ML: at 14:12

## 2019-01-01 RX ADMIN — HEPARIN SODIUM 5000 UNITS: 5000 INJECTION INTRAVENOUS; SUBCUTANEOUS at 20:12

## 2019-01-01 RX ADMIN — CHLORHEXIDINE GLUCONATE 10 ML: 1.2 RINSE ORAL at 21:38

## 2019-01-01 RX ADMIN — DEXTROSE MONOHYDRATE 75 ML/HR: 10 INJECTION, SOLUTION INTRAVENOUS at 19:00

## 2019-01-01 RX ADMIN — THERA TABS 1 TABLET: TAB at 08:12

## 2019-01-01 RX ADMIN — AMLODIPINE BESYLATE 10 MG: 10 TABLET ORAL at 13:51

## 2019-01-01 RX ADMIN — IPRATROPIUM BROMIDE AND ALBUTEROL SULFATE 3 ML: .5; 3 SOLUTION RESPIRATORY (INHALATION) at 19:27

## 2019-01-01 RX ADMIN — HEPARIN SODIUM 5000 UNITS: 5000 INJECTION INTRAVENOUS; SUBCUTANEOUS at 14:01

## 2019-01-01 RX ADMIN — THERA TABS 1 TABLET: TAB at 09:22

## 2019-01-01 RX ADMIN — IPRATROPIUM BROMIDE AND ALBUTEROL SULFATE 3 ML: .5; 3 SOLUTION RESPIRATORY (INHALATION) at 20:12

## 2019-01-01 RX ADMIN — Medication 10 ML: at 05:06

## 2019-01-01 RX ADMIN — HEPARIN SODIUM 5000 UNITS: 5000 INJECTION INTRAVENOUS; SUBCUTANEOUS at 13:53

## 2019-01-01 RX ADMIN — IPRATROPIUM BROMIDE AND ALBUTEROL SULFATE 3 ML: .5; 3 SOLUTION RESPIRATORY (INHALATION) at 01:00

## 2019-01-01 RX ADMIN — CEFEPIME HYDROCHLORIDE 1 G: 1 INJECTION, POWDER, FOR SOLUTION INTRAMUSCULAR; INTRAVENOUS at 14:55

## 2019-01-01 RX ADMIN — Medication 100 MCG/HR: at 20:00

## 2019-01-01 RX ADMIN — Medication 10 ML: at 06:00

## 2019-01-01 RX ADMIN — ACETAMINOPHEN 650 MG: 325 TABLET, FILM COATED ORAL at 20:17

## 2019-01-01 RX ADMIN — HEPARIN SODIUM 5000 UNITS: 5000 INJECTION INTRAVENOUS; SUBCUTANEOUS at 15:14

## 2019-01-01 RX ADMIN — QUETIAPINE FUMARATE 50 MG: 25 TABLET ORAL at 09:22

## 2019-01-01 RX ADMIN — CHLORHEXIDINE GLUCONATE 10 ML: 1.2 RINSE ORAL at 08:26

## 2019-01-01 RX ADMIN — CHLORHEXIDINE GLUCONATE 10 ML: 1.2 RINSE ORAL at 08:57

## 2019-01-01 RX ADMIN — AMLODIPINE BESYLATE 10 MG: 10 TABLET ORAL at 08:44

## 2019-01-01 RX ADMIN — OXYCODONE HYDROCHLORIDE AND ACETAMINOPHEN 1 TABLET: 5; 325 TABLET ORAL at 10:06

## 2019-01-01 RX ADMIN — FUROSEMIDE 20 MG: 20 TABLET ORAL at 08:24

## 2019-01-01 RX ADMIN — CEFEPIME HYDROCHLORIDE 1 G: 1 INJECTION, POWDER, FOR SOLUTION INTRAMUSCULAR; INTRAVENOUS at 00:30

## 2019-01-01 RX ADMIN — SODIUM CHLORIDE 500 ML: 900 INJECTION, SOLUTION INTRAVENOUS at 08:10

## 2019-01-01 RX ADMIN — CLONIDINE HYDROCHLORIDE 0.1 MG: 0.1 TABLET ORAL at 13:51

## 2019-01-01 RX ADMIN — POTASSIUM CHLORIDE 20 MEQ: 10 TABLET, EXTENDED RELEASE ORAL at 15:59

## 2019-01-01 RX ADMIN — HEPARIN SODIUM 5000 UNITS: 5000 INJECTION INTRAVENOUS; SUBCUTANEOUS at 04:27

## 2019-01-01 RX ADMIN — SODIUM CHLORIDE 1000 MG: 900 INJECTION, SOLUTION INTRAVENOUS at 14:53

## 2019-01-01 RX ADMIN — LORAZEPAM 1 MG/HR: 2 INJECTION, SOLUTION INTRAMUSCULAR; INTRAVENOUS at 12:38

## 2019-01-01 RX ADMIN — DEXTROSE MONOHYDRATE AND SODIUM CHLORIDE 100 ML/HR: 5; .9 INJECTION, SOLUTION INTRAVENOUS at 07:43

## 2019-01-01 RX ADMIN — HEPARIN SODIUM 5000 UNITS: 5000 INJECTION INTRAVENOUS; SUBCUTANEOUS at 12:01

## 2019-01-01 RX ADMIN — CARVEDILOL 3.12 MG: 3.12 TABLET, FILM COATED ORAL at 08:24

## 2019-01-01 RX ADMIN — OXYCODONE HYDROCHLORIDE AND ACETAMINOPHEN 2 TABLET: 5; 325 TABLET ORAL at 16:27

## 2019-01-01 RX ADMIN — LIDOCAINE HYDROCHLORIDE 100 MG: 20 INJECTION, SOLUTION EPIDURAL; INFILTRATION; INTRACAUDAL; PERINEURAL at 08:20

## 2019-01-01 RX ADMIN — CLOPIDOGREL BISULFATE 75 MG: 75 TABLET ORAL at 09:23

## 2019-01-01 RX ADMIN — MORPHINE SULFATE 2 MG: 2 INJECTION, SOLUTION INTRAMUSCULAR; INTRAVENOUS at 13:42

## 2019-01-01 RX ADMIN — HEPARIN SODIUM 5000 UNITS: 5000 INJECTION INTRAVENOUS; SUBCUTANEOUS at 06:30

## 2019-01-01 RX ADMIN — ASPIRIN 325 MG ORAL TABLET 325 MG: 325 PILL ORAL at 08:27

## 2019-01-01 RX ADMIN — CEFEPIME HYDROCHLORIDE 1 G: 1 INJECTION, POWDER, FOR SOLUTION INTRAMUSCULAR; INTRAVENOUS at 02:31

## 2019-01-01 RX ADMIN — Medication 8.6 MG: at 09:33

## 2019-01-01 RX ADMIN — HEPARIN SODIUM 5000 UNITS: 5000 INJECTION INTRAVENOUS; SUBCUTANEOUS at 04:53

## 2019-01-01 RX ADMIN — CARVEDILOL 6.25 MG: 6.25 TABLET, FILM COATED ORAL at 17:39

## 2019-01-01 RX ADMIN — MORPHINE SULFATE 2 MG: 2 INJECTION, SOLUTION INTRAMUSCULAR; INTRAVENOUS at 19:59

## 2019-01-01 RX ADMIN — HYDROCODONE BITARTRATE AND ACETAMINOPHEN 1 TABLET: 5; 325 TABLET ORAL at 20:01

## 2019-01-01 RX ADMIN — CARVEDILOL 6.25 MG: 6.25 TABLET, FILM COATED ORAL at 17:45

## 2019-01-01 RX ADMIN — THERA TABS 1 TABLET: TAB at 08:28

## 2019-01-01 RX ADMIN — MORPHINE SULFATE 2 MG: 2 INJECTION, SOLUTION INTRAMUSCULAR; INTRAVENOUS at 15:38

## 2019-01-01 RX ADMIN — PANTOPRAZOLE SODIUM 40 MG: 40 INJECTION, POWDER, FOR SOLUTION INTRAVENOUS at 12:05

## 2019-01-01 RX ADMIN — HEPARIN SODIUM 5000 UNITS: 5000 INJECTION INTRAVENOUS; SUBCUTANEOUS at 21:19

## 2019-01-01 RX ADMIN — QUETIAPINE FUMARATE 50 MG: 25 TABLET ORAL at 08:12

## 2019-01-01 RX ADMIN — SODIUM CHLORIDE 1000 MG: 900 INJECTION, SOLUTION INTRAVENOUS at 22:08

## 2019-01-01 RX ADMIN — OXYCODONE HYDROCHLORIDE AND ACETAMINOPHEN 2 TABLET: 5; 325 TABLET ORAL at 00:41

## 2019-01-01 RX ADMIN — ACETAMINOPHEN 650 MG: 325 TABLET, FILM COATED ORAL at 22:43

## 2019-01-01 RX ADMIN — CARVEDILOL 6.25 MG: 6.25 TABLET, FILM COATED ORAL at 17:29

## 2019-01-01 RX ADMIN — Medication 10 ML: at 14:41

## 2019-01-01 RX ADMIN — HEPARIN SODIUM 5000 UNITS: 5000 INJECTION INTRAVENOUS; SUBCUTANEOUS at 05:05

## 2019-01-01 RX ADMIN — CARVEDILOL 6.25 MG: 6.25 TABLET, FILM COATED ORAL at 07:29

## 2019-01-01 RX ADMIN — ATORVASTATIN CALCIUM 40 MG: 40 TABLET, FILM COATED ORAL at 21:58

## 2019-01-01 RX ADMIN — CLOPIDOGREL BISULFATE 75 MG: 75 TABLET ORAL at 08:12

## 2019-01-01 RX ADMIN — DEXTROSE MONOHYDRATE AND SODIUM CHLORIDE 100 ML/HR: 5; .9 INJECTION, SOLUTION INTRAVENOUS at 00:30

## 2019-01-01 RX ADMIN — CLONIDINE HYDROCHLORIDE 0.1 MG: 0.1 TABLET ORAL at 08:44

## 2019-01-01 RX ADMIN — MORPHINE SULFATE 2 MG: 2 INJECTION, SOLUTION INTRAMUSCULAR; INTRAVENOUS at 08:22

## 2019-01-01 RX ADMIN — PROPOFOL 20 MG: 10 INJECTION, EMULSION INTRAVENOUS at 09:27

## 2019-01-01 RX ADMIN — Medication 10 ML: at 14:32

## 2019-01-01 RX ADMIN — MORPHINE SULFATE 2 MG: 2 INJECTION, SOLUTION INTRAMUSCULAR; INTRAVENOUS at 09:07

## 2019-01-01 RX ADMIN — OXYCODONE HYDROCHLORIDE AND ACETAMINOPHEN 2 TABLET: 5; 325 TABLET ORAL at 12:15

## 2019-01-01 RX ADMIN — FENTANYL CITRATE 25 MCG: 50 INJECTION, SOLUTION INTRAMUSCULAR; INTRAVENOUS at 09:24

## 2019-01-01 RX ADMIN — Medication 10 ML: at 05:58

## 2019-01-01 RX ADMIN — CALCIUM GLUCONATE 2 G: 94 INJECTION, SOLUTION INTRAVENOUS at 07:01

## 2019-01-01 RX ADMIN — IPRATROPIUM BROMIDE AND ALBUTEROL SULFATE 3 ML: .5; 3 SOLUTION RESPIRATORY (INHALATION) at 15:42

## 2019-01-01 RX ADMIN — Medication 75 MCG/HR: at 14:27

## 2019-01-01 RX ADMIN — CEFEPIME HYDROCHLORIDE 1 G: 1 INJECTION, POWDER, FOR SOLUTION INTRAMUSCULAR; INTRAVENOUS at 02:53

## 2019-01-01 RX ADMIN — ASPIRIN 325 MG ORAL TABLET 325 MG: 325 PILL ORAL at 08:42

## 2019-01-01 RX ADMIN — LORAZEPAM 4 MG/HR: 2 INJECTION, SOLUTION INTRAMUSCULAR; INTRAVENOUS at 08:20

## 2019-01-01 RX ADMIN — MORPHINE SULFATE 4 MG: 4 INJECTION INTRAVENOUS at 23:16

## 2019-01-01 RX ADMIN — Medication 100 MCG/HR: at 02:05

## 2019-01-01 RX ADMIN — MORPHINE SULFATE 2 MG: 2 INJECTION, SOLUTION INTRAMUSCULAR; INTRAVENOUS at 17:15

## 2019-01-01 RX ADMIN — Medication 1000 MG: at 17:01

## 2019-01-01 RX ADMIN — HEPARIN SODIUM 5000 UNITS: 5000 INJECTION INTRAVENOUS; SUBCUTANEOUS at 04:01

## 2019-01-01 RX ADMIN — HEPARIN SODIUM 5000 UNITS: 5000 INJECTION INTRAVENOUS; SUBCUTANEOUS at 12:15

## 2019-01-13 NOTE — ED TRIAGE NOTES
Patient states that his nose has been bleeding non stop for 3 hours. No active bleeding noted at this time

## 2019-01-13 NOTE — ED NOTES
Bedside and Verbal shift change report given to Meda Spatz (oncoming nurse) by Grady Byrne (offgoing nurse). Report included the following information SBAR, Kardex, ED Summary and MAR.

## 2019-01-13 NOTE — ED PROVIDER NOTES
EMERGENCY DEPARTMENT HISTORY AND PHYSICAL EXAM 
 
6:35 AM 
 
 
Date: 1/13/19 Patient Name: Jacob Maldonado History of Presenting Illness Chief Complaint Patient presents with  Epistaxis History Provided By: Patient Chief Complaint: Epistaxis Duration: 3 Hours Timing:  Constant Location: Right nare Quality: N/A Severity: Moderate Modifying Factors: applying pressure and tilting head back with no relief Associated Symptoms: denies any other associated signs or symptoms Additional History (Context): Jacob Maldonado is a 59 y.o. male with hypertension, seizure and depression who presents with constant, moderate epistaxis from right nare onset 3 hours ago. He denies taking any blood thinners. He state he has been applying pressure and tilting his head back with no relief. He states he has been spitting up clot from the blood running down the back of his throat. He reports he has not had this happen before. He denies any pain, new SOB, and other symptoms or complaints. PCP: None Current Facility-Administered Medications Medication Dose Route Frequency Provider Last Rate Last Dose  oxymetazoline (AFRIN) 0.05 % nasal spray 2 Spray  2 Spray Right Nostril BID Lavon Al MD   2 Mcdaniel at 01/13/19 8463 Current Outpatient Medications Medication Sig Dispense Refill  oxymetazoline (AFRIN) 0.05 % nasal spray 2 Sprays by Right Nostril route two (2) times a day for 3 days. 15 mL 0  
 lisinopril (PRINIVIL, ZESTRIL) 20 mg tablet Take 1 Tab by mouth daily. 30 Tab 0  
 aspirin (ASPIRIN) 325 mg tablet Take 325 mg by mouth daily.  divalproex DR (DEPAKOTE) 250 mg tablet Take  by mouth three (3) times daily. Past History Past Medical History: 
Past Medical History:  
Diagnosis Date  Cancer ([facility] lung  Depression  Hypertension  Seizure ([facility] [facility]  Seizures ([facility] [facility] Past Surgical History: 
History reviewed. No pertinent surgical history. Family History: 
History reviewed. No pertinent family history. Social History: 
Social History Tobacco Use  Smoking status: Current Every Day Smoker  Smokeless tobacco: Never Used Substance Use Topics  Alcohol use: Yes  Drug use: No  
 
 
Allergies: Allergies Allergen Reactions  Pcn [Penicillins] Unknown (comments) Review of Systems Review of Systems Constitutional: Negative for chills. HENT: Positive for nosebleeds. Negative for congestion and sore throat. Respiratory: Negative for cough. Cardiovascular: Negative for chest pain. Gastrointestinal: Negative for abdominal pain, diarrhea, nausea and vomiting. Genitourinary: Negative for dysuria. Musculoskeletal: Negative for back pain. Skin: Negative for rash. Neurological: Negative for dizziness and headaches. All other systems reviewed and are negative. Physical Exam  
 
Visit Vitals /80 Pulse 80 Temp 98.5 °F (36.9 °C) Resp 18 SpO2 98% General Exam: Patient is a well developed and well nourished in no distress. Patient does not appear acutely ill or toxic. ENT: No active bleeding. Irritated nasal mucosa in right nare, particularly along septum. Pulmonary Exam: No respiratory distress. The respiratory rate is normal.  No stridor. The breath sounds are equal bilaterally. There are no wheezes, rales, or rhonchi noted. Cardiac Exam: The cardiac rate and rhythm are normal.  No significant murmurs, rubs, or gallops. The peripheral pulses are normal. 
Abdominal Exam: Abdomen is soft and non-distended. There is no local tenderness. There is no rebound or guarding noted. Skin and Soft Tissue: The skin is warm and dry, without significant abnormality. Good color. Psychiatric: Normal adult with appropriate demeanor and interpersonal interaction. Is oriented to person, place, and time. Diagnostic Study Results Labs - 
 No results found for this or any previous visit (from the past 12 hour(s)). Radiologic Studies - No orders to display Medical Decision Making I am the first provider for this patient. I reviewed the vital signs, available nursing notes, past medical history, past surgical history, family history and social history. Vital Signs-Reviewed the patient's vital signs. Pulse Oximetry Analysis -  98% on room air, normal  
 
Cardiac Monitor: 
Rate: 107 BPM, tachycardic Records Reviewed: Nursing Notes and Old Medical Records (Time of Review: 6:35 AM) ED Course: Progress Notes, Reevaluation, and Consults: 
7:15 AM: Patient Re-evaluated. Bleeding has stopped. Discussed plan to observe pt and if bleeding returns, will insert RhinoRocket. 7:54 AM: Patient Re-evaluated. Bleeding has not resumed. Pt does not want packing and is aware bleeding can reoccur. He would like to continue using Afrin and nasal lubrication. Provider Notes (Medical Decision Making): Pt with nosebleed, bleeding stopped while in ED with no reoccurrence. Pt discharged with treatment instructions and need to return to ED if bleeding reoccurs and will not stop. Diagnosis Clinical Impression: 1. Epistaxis Disposition: Discharge Follow-up Information Follow up With Specialties Details Why Contact [facility] EMERGENCY DEPT Emergency Medicine  If symptoms worsen [address] 
[phone] Medication List  
  
START taking these medications   
oxymetazoline 0.05 % nasal spray Commonly known as:  AFRIN 
2 Sprays by Right Nostril route two (2) times a day for 3 days. ASK your doctor about these medications   
aspirin 325 mg tablet Commonly known as:  ASPIRIN 
  
DEPAKOTE 250 mg tablet Generic drug:  divalproex DR 
  
lisinopril 20 mg tablet Commonly known as:  Tildon Willi Take 1 Tab by mouth daily. Where to Get Your Medications Information about where to get these medications is not yet available Ask your nurse or doctor about these medications · oxymetazoline 0.05 % nasal spray 
  
 
_______________________________ Scribe Attestation Dayana Burns acting as a scribe for and in the presence of Cassy Burns MD     
January 13, 2019 at 8:33 AM 
    
Provider Attestation:     
I personally performed the services described in the documentation, reviewed the documentation, as recorded by the scribe in my presence, and it accurately and completely records my words and actions. January 13, 2019 at 8:33 AM - Cassy Burns MD 
 
 
_______________________________

## 2019-01-13 NOTE — DISCHARGE INSTRUCTIONS
Patient Education        Nosebleeds: Care Instructions  Your Care Instructions    Nosebleeds are common, especially if you have colds or allergies. Many things can cause a nosebleed. Some nosebleeds stop on their own with pressure. Others need packing. Some get cauterized (sealed). If you have gauze or other packing materials in your nose, you will need to follow up with your doctor to have the packing removed. You may need more treatment if you get nosebleeds a lot. The doctor has checked you carefully, but problems can develop later. If you notice any problems or new symptoms, get medical treatment right away. Follow-up care is a key part of your treatment and safety. Be sure to make and go to all appointments, and call your doctor if you are having problems. It's also a good idea to know your test results and keep a list of the medicines you take. How can you care for yourself at home? · If you get another nosebleed:  ? Sit up and tilt your head slightly forward. This keeps blood from going down your throat. ? Use your thumb and index finger to pinch your nose shut for 10 minutes. Use a clock. Do not check to see if the bleeding has stopped before the 10 minutes are up. If the bleeding has not stopped, pinch your nose shut for another 10 minutes. ? When the bleeding has stopped, try not to pick, rub, or blow your nose for 12 hours. Avoiding these things helps keep your nose from bleeding again. · If your doctor prescribed antibiotics, take them as directed. Do not stop taking them just because you feel better. You need to take the full course of antibiotics. To prevent nosebleeds  · Do not blow your nose too hard. · Try not to lift or strain after a nosebleed. · Raise your head on a pillow while you sleep. · Put a thin layer of a saline- or water-based nasal gel, such as NasoGel, inside your nose. Put it on the septum, which divides your nostrils.  This will prevent dryness that can cause nosebleeds. · Use a vaporizer or humidifier to add moisture to your bedroom. Follow the directions for cleaning the machine. · Do not use aspirin, ibuprofen (Advil, Motrin), or naproxen (Aleve) for 36 to 48 hours after a nosebleed unless your doctor tells you to. You can use acetaminophen (Tylenol) for pain relief. · Talk to your doctor about stopping any other medicines you are taking. Some medicines may make you more likely to get a nosebleed. · Do not use cold medicines or nasal sprays without first talking to your doctor. They can make your nose dry. When should you call for help? Call 911 anytime you think you may need emergency care. For example, call if:    · You passed out (lost consciousness).    Call your doctor now or seek immediate medical care if:    · You get another nosebleed and your nose is still bleeding after you have applied pressure 3 times for 10 minutes each time (30 minutes total).     · There is a lot of blood running down the back of your throat even after you pinch your nose and tilt your head forward.     · You have a fever.     · You have sinus pain.    Watch closely for changes in your health, and be sure to contact your doctor if:    · You get nosebleeds often, even if they stop.     · You do not get better as expected. Where can you learn more? Go to http://olivia-gary.info/. Enter S156 in the search box to learn more about \"Nosebleeds: Care Instructions. \"  Current as of: November 20, 2017  Content Version: 11.8  © 5867-9397 [employer]. Care instructions adapted under license by [employer] (which disclaims liability or warranty for this information). If you have questions about a medical condition or this instruction, always ask your healthcare professional. [address] any warranty or liability for your use of this information.

## 2019-06-29 PROBLEM — E87.1 CHRONIC HYPONATREMIA: Status: ACTIVE | Noted: 2019-01-01

## 2019-06-29 PROBLEM — L03.116 CELLULITIS OF LEFT LOWER EXTREMITY: Status: ACTIVE | Noted: 2019-01-01

## 2019-06-29 PROBLEM — I96 GANGRENE OF FOOT (HCC): Status: ACTIVE | Noted: 2019-01-01

## 2019-06-29 PROBLEM — E87.1 HYPONATREMIA: Status: ACTIVE | Noted: 2019-01-01

## 2019-06-29 PROBLEM — E46 MALNOURISHED (HCC): Status: ACTIVE | Noted: 2019-01-01

## 2019-06-29 PROBLEM — I73.9 PERIPHERAL VASCULAR DISEASE (HCC): Status: ACTIVE | Noted: 2019-01-01

## 2019-06-29 NOTE — ED NOTES
Mri brought patient back to be medicated because patient was too restless. Medicine given per dr goins order and MRI tech called.

## 2019-06-29 NOTE — ED NOTES
I performed a brief history of the patient here in triage and I have determined that pt will need further treatment and evaluation from the main side ER physician. I have placed initial orders based on the history to help in expediting patients care. Patient has a necrotic left third toe. Visit Vitals BP (!) 146/96 (BP 1 Location: Right arm, BP Patient Position: At rest) Pulse (!) 110 Temp 98.7 °F (37.1 °C) Resp 16 Ht 6' (1.829 m) Wt 59 kg (130 lb) SpO2 100% BMI 17.63 kg/m² DIANDRA Parkinson

## 2019-06-29 NOTE — PROGRESS NOTES
Pharmacy Dosing Services: Vancomycin Indication: SSTI Day of therapy: 0 Other Antimicrobials (Include dose, start day & day of therapy): 
None Loading dose (date given): 1500 mg 
Current Maintenance dose: None at this time Goal Vancomycin Level: 15-20 
(Trough 15-20 for most infections, 20 for meningitis/osteomyelitis, pre-HD level ~25) Vancomycin Level (if drawn): none at this time Significant Cultures: pending Renal function stable? (unstable defined as SCr increase of 0.5 mg/dL or > 50% increase from baseline, whichever is greater) (Y/N): Y  
 
CAPD, Hemodialysis or Renal Replacement Therapy (Y/N): N Recent Labs  
  19 
1225 CREA 0.69  
BUN 6* WBC 7.2 Temp (24hrs), Av.5 °F (36.9 °C), Min:97.6 °F (36.4 °C), Max:99.4 °F (37.4 °C) Creatinine Clearance (Creatinine Clearance (ml/min)): ~90 ml/min Regimen assessment: New start Maintenance dose: 1000 mg IV every 12 hours Next scheduled level: Early trough on  at 0330 Pharmacy will follow daily and adjust medications as appropriate for renal function and/or serum levels.  
 
Thank you, 
Angeline Gorman, PHARMD

## 2019-06-29 NOTE — H&P
Internal Medicine History and Physical 
   
 
 
Subjective HPI: Paola Avila is a 62 y.o. male with a PMHx listed below who presented to the ED with pain in his left foot. Upon further questioning, the pain actually started about a month ago but only worsened over the past 2-3 days. He states that's when the 3rd toe turned black and his toenails started falling off. He admits to fever and chills as well. He states pain in both legs with ambulation. He only takes plavix and can't tell me why. He hasn't smoked in quite some time. He drinks occasionally. In the ED, he was given broad spectrum IV antibiotics. PMHx: 
Past Medical History:  
Diagnosis Date  
    
 lung  Depression  Hypertension  Seizure ([address]  Seizures ([facility] [address] PSurgHx: 
History reviewed. No pertinent surgical history. SocialHx: 
Social History Socioeconomic History  Marital status:  Spouse name: Not on file  Number of children: Not on file  Years of education: Not on file  Highest education level: Not on file Occupational History  Not on file Social Needs  Financial resource strain: Not on file  Food insecurity:  
  Worry: Not on file Inability: Not on file  Transportation needs:  
  Medical: Not on file Non-medical: Not on file Tobacco Use  Smoking status: Current Every Day Smoker  Smokeless tobacco: Never Used Substance and Sexual Activity  Alcohol use: Yes  Drug use: No  
 Sexual activity: Not on file Lifestyle  Physical activity:  
  Days per week: Not on file Minutes per session: Not on file  Stress: Not on file Relationships  Social connections:  
  Talks on phone: Not on file Gets together: Not on file Attends Jainism service: Not on file Active member of club or organization: Not on file Attends meetings of clubs or organizations: Not on file Relationship status: Not on file  Intimate partner violence:  
  Fear of current or ex partner: Not on file Emotionally abused: Not on file Physically abused: Not on file Forced sexual activity: Not on file Other Topics Concern  Not on file Social History Narrative  Not on file Allergies: Allergies Allergen Reactions  Pcn [Penicillins] Unknown (comments) FamilyHx: 
History reviewed. No pertinent family history. Prior to Admission Medications Prescriptions Last Dose Informant Patient Reported? Taking? clopidogrel (PLAVIX) 75 mg tab   Yes Yes Sig: Take 75 mg by mouth daily. Facility-Administered Medications: None Review of Systems: 
CONST: Fever, weight loss, fatigue or chills HEENT: Recent changes in vision, vertigo, epistaxis, dysphagia and hoarseness CV: Chest pain, palpitations, edema and varicosities RESP: Cough, shortness of breath, wheezing, hemoptysis, snoring and reactive airway disease GI: Nausea, vomiting, abdominal pain, change in bowel habits, hematochezia, melena, and GERD  
: Hematuria, dysuria, frequency, urgency, nocturia and stress urinary incontinence MS: Weakness, B/L lower extremity pain worse on left foot and arthritis ENDO: Polyuria, polydipsia, polyphagia, poor wound healing, heat intolerance, cold intolerance LYMPH/HEME: Anemia, bruising and history of blood transfusions INTEG: Dermatitis, abnormal moles NEURO: Dizziness, headache, fainting, seizures and stroke PSYCH: Anxiety and depression Objective Visit Vitals /75 Pulse 96 Temp 98.1 °F (36.7 °C) Resp 18 Ht 6' (1.829 m) Wt 59 kg (130 lb) SpO2 100% BMI 17.63 kg/m² Physical Exam: 
General Appearance: NAD, conversant, cachetic HENT: normocephalic/atraumatic, moist mucus membranes Lungs: CTA with normal respiratory effort Cardiovascular: RRR, no m/r/g, capillary refill < 2 sec, B/L DP/PT pulses +3/4 Abdomen: soft, non-tender, normal bowel sounds Extremities: no cyanosis, + dry gangrene left distal 3rd toe w/ missing toenails on first three toes on left, surrounding erythema dorsum foot proximally to around ankle, + doppler in R popliteal, absent doppler B/L LE DP/PT and LLE popliteal 
Neuro: moves all extremities, no focal deficits Psych: appropriate affect, alert and oriented to person, place and time Laboratory Studies: 
BMP:  
Lab Results Component Value Date/Time  (L) 06/29/2019 12:25 PM  
 K 4.4 06/29/2019 12:25 PM  
 CL 87 (L) 06/29/2019 12:25 PM  
 CO2 22 06/29/2019 12:25 PM  
 AGAP 12 06/29/2019 12:25 PM  
 GLU 86 06/29/2019 12:25 PM  
 BUN 6 (L) 06/29/2019 12:25 PM  
 CREA 0.69 06/29/2019 12:25 PM  
 GFRAA >60 06/29/2019 12:25 PM  
 GFRNA >60 06/29/2019 12:25 PM  
 
CBC:  
Lab Results Component Value Date/Time WBC 7.2 06/29/2019 12:25 PM  
 HGB 9.7 (L) 06/29/2019 12:25 PM  
 HCT 28.1 (L) 06/29/2019 12:25 PM  
  06/29/2019 12:25 PM  
 
 
Imaging Reviewed: 
Xr Foot Lt Min 3 V Result Date: 6/29/2019 EXAM: Foot Series Complete Indication: Left foot pain. Technique:  Frontal, oblique, and lateral views of the left foot. Comparison: None _______________ FINDINGS: Osteopenia with old healed fourth and fifth metatarsal fractures. No acute fracture or destructive osseous abnormality. Small degenerative plantar cannula spur. The soft tissues appear within normal limits. No radiopaque foreign body is seen. _______________ IMPRESSION: 1. No acute fracture, destructive osseous changes or plain film findings to suggest osteomyelitis. Assessment/Plan Active Hospital Problems Diagnosis Date Noted  [facility] 06/29/2019  Cellulitis of left lower extremity 06/29/2019  Malnourished ([facility] 06/29/2019  Peripheral vascular disease ([facility] 06/29/2019  Hyponatremia 06/29/2019  
 
- Cont broad spectrum IV antibiotics - Cult wound if able - MRI for osteo, but unable to tolerate - If needs bone scan, will defer to podiatry - KELLI w/ DBI 
- Podiatry consulted - Vasc surg consulted in ED 
- Pain control PRN 
- Check urine Na, urine/serum osm, TSH, cortisol - BMP q6h - Low flow IVFs for slow correction of Na+ 
- Cont acceptable home medications for chronic conditions  
- DVT protocol I have personally reviewed all pertinent labs, films and EKGs that have officially resulted. I reviewed available electronic documentation outlining the initial presentation as well as the emergency room physician's encounter. Myah Adorno, DO Internal Medicine, Hospitalist 
Pager: [phone] [facility]

## 2019-06-29 NOTE — PROGRESS NOTES
Problem: Pain Goal: *Control of Pain Outcome: Progressing Towards Goal 
  
Problem: Patient Education: Go to Patient Education Activity Goal: Patient/Family Education Outcome: Progressing Towards Goal 
  
Problem: Falls - Risk of 
Goal: *Absence of Falls Description Document Loreli Hunger Fall Risk and appropriate interventions in the flowsheet. Outcome: Progressing Towards Goal 
  
Problem: Patient Education: Go to Patient Education Activity Goal: Patient/Family Education Outcome: Progressing Towards Goal 
  
Problem: Pressure Injury - Risk of 
Goal: *Prevention of pressure injury Description Document Mikie Scale and appropriate interventions in the flowsheet. Outcome: Progressing Towards Goal 
  
Problem: Patient Education: Go to Patient Education Activity Goal: Patient/Family Education Outcome: Progressing Towards Goal

## 2019-06-29 NOTE — PROGRESS NOTES
TRANSFER - IN REPORT: 
 
Verbal report received from Marcianne Meckel RN(name) on Janell Maldonado  being received from ER(unit) for routine progression of care Report consisted of patients Situation, Background, Assessment and  
Recommendations(SBAR). Information from the following report(s) SBAR, Kardex, ED Summary, [facility] and Recent Results was reviewed with the receiving nurse. Opportunity for questions and clarification was provided. Assessment completed upon patients arrival to unit and care assumed.

## 2019-06-29 NOTE — ED PROVIDER NOTES
EMERGENCY DEPARTMENT HISTORY AND PHYSICAL EXAM 
 
2:55 PM 
 
 
Date: 6/29/2019 Patient Name: Keith Yost History of Presenting Illness Chief Complaint Patient presents with  Leg Pain HISTORY: Keith Yost is a 62 y.o. male with hypertension, seizure disorder, tobacco abuse who presents with left leg pain that is been present for a few weeks with worsening over the last several days. He noticed that his left third digit turned black a few days ago. Currently nothing makes his pain better or worse. He has never been evaluated for arterial disease. Patient denies fever, chest pain, difficulty breathing, abdominal pain, right leg pain. PCP: None Current Outpatient Medications Medication Sig Dispense Refill  lisinopril (PRINIVIL, ZESTRIL) 20 mg tablet Take 1 Tab by mouth daily. 30 Tab 0  
 aspirin (ASPIRIN) 325 mg tablet Take 325 mg by mouth daily.  divalproex DR (DEPAKOTE) 250 mg tablet Take  by mouth three (3) times daily. Past History Past Medical History: 
Past Medical History:  
Diagnosis Date  Cancer ([facility] lung  Depression  Hypertension  [facility]  [facility]ures ([facility] Past Surgical History: 
History reviewed. No pertinent surgical history. Family History: 
History reviewed. No pertinent family history. Social History: 
Social History Tobacco Use  Smoking status: Current Every Day Smoker  Smokeless tobacco: Never Used Substance Use Topics  Alcohol use: Yes  Drug use: No  
 
 
Allergies: Allergies Allergen Reactions  Pcn [Penicillins] Unknown (comments) Review of Systems Review of Systems Constitutional: Negative for chills. HENT: Negative for congestion and sore throat. Respiratory: Negative for cough and shortness of breath. Cardiovascular: Negative for chest pain. Gastrointestinal: Negative for abdominal pain, diarrhea, nausea and vomiting. Genitourinary: Negative for dysuria. Musculoskeletal: Negative for back pain. L leg pain Skin: Positive for color change and wound. Neurological: Negative for dizziness and headaches. All other systems reviewed and are negative. Physical Exam  
 
Visit Vitals BP (!) 146/96 (BP 1 Location: Right arm, BP Patient Position: At rest) Pulse (!) 110 Temp 98.7 °F (37.1 °C) Resp 16 Ht 6' (1.829 m) Wt 59 kg (130 lb) SpO2 100% BMI 17.63 kg/m² Physical Exam 
General Exam: Patient is a well developed and well nourished in no distress. Patient does not appear acutely ill or toxic. Eye Exam: Lids and conjunctiva are normal 
ENT Exam: The general head and facial exam is normal.  The neck is supple without meningeal signs. No significant adenopathy. Pulmonary Exam: No respiratory distress. The respiratory rate is normal.  No stridor. The breath sounds are equal bilaterally. There are no wheezes, rales, or rhonchi noted. Cardiac Exam: The cardiac rate and rhythm are normal.  No significant murmurs, rubs, or gallops. The peripheral pulses of the upper extremities are normal. 
Abdominal Exam: Abdomen is soft and non-distended. No pulsatile masses. There is no local tenderness. There is no rebound or guarding noted. Skin and Soft Tissue: The skin is warm and dry Musculoskeletal Exam:No dopplerable PT or DP pulses bilaterally. Able to Doppler a popliteal pulses. Left foot with redness, swelling and warmth. Dry gangrene present to left third digit, purulent appearance to a great toe around nailbed Psychiatric: Normal adult with appropriate demeanor and interpersonal interaction. Is oriented to person, place, and time. Diagnostic Study Results Labs - Recent Results (from the past 12 hour(s)) CBC WITH AUTOMATED DIFF Collection Time: 06/29/19 12:25 PM  
Result Value Ref Range WBC 7.2 4.6 - 13.2 K/uL  
 RBC 3.22 (L) 4.70 - 5.50 M/uL HGB 9.7 (L) 13.0 - 16.0 g/dL HCT 28.1 (L) 36.0 - 48.0 % MCV 87.3 74.0 - 97.0 FL  
 MCH 30.1 24.0 - 34.0 PG  
 MCHC 34.5 31.0 - 37.0 g/dL  
 RDW 13.3 11.6 - 14.5 % PLATELET 187 435 - 943 K/uL MPV 7.3 (L) 9.2 - 11.8 FL  
 NEUTROPHILS 71 40 - 73 % LYMPHOCYTES 19 (L) 21 - 52 % MONOCYTES 9 3 - 10 % EOSINOPHILS 1 0 - 5 % BASOPHILS 0 0 - 2 %  
 ABS. NEUTROPHILS 5.1 1.8 - 8.0 K/UL  
 ABS. LYMPHOCYTES 1.4 0.9 - 3.6 K/UL  
 ABS. MONOCYTES 0.7 0.05 - 1.2 K/UL  
 ABS. EOSINOPHILS 0.1 0.0 - 0.4 K/UL  
 ABS. BASOPHILS 0.0 0.0 - 0.1 K/UL  
 DF AUTOMATED METABOLIC PANEL, COMPREHENSIVE Collection Time: 06/29/19 12:25 PM  
Result Value Ref Range Sodium 121 (L) 136 - 145 mmol/L Potassium 4.4 3.5 - 5.5 mmol/L Chloride 87 (L) 100 - 108 mmol/L  
 CO2 22 21 - 32 mmol/L Anion gap 12 3.0 - 18 mmol/L Glucose 86 74 - 99 mg/dL BUN 6 (L) 7.0 - 18 MG/DL Creatinine 0.69 0.6 - 1.3 MG/DL  
 BUN/Creatinine ratio 9 (L) 12 - 20 GFR est AA >60 >60 ml/min/1.73m2 GFR est non-AA >60 >60 ml/min/1.73m2 Calcium 8.3 (L) 8.5 - 10.1 MG/DL Bilirubin, total 0.5 0.2 - 1.0 MG/DL  
 ALT (SGPT) 18 16 - 61 U/L  
 AST (SGOT) 34 15 - 37 U/L Alk. phosphatase 87 45 - 117 U/L Protein, total 7.5 6.4 - 8.2 g/dL Albumin 3.2 (L) 3.4 - 5.0 g/dL Globulin 4.3 (H) 2.0 - 4.0 g/dL A-G Ratio 0.7 (L) 0.8 - 1.7 EKG, 12 LEAD, INITIAL Collection Time: 06/29/19  2:54 PM  
Result Value Ref Range Ventricular Rate 102 BPM  
 Atrial Rate 102 BPM  
 P-R Interval 218 ms QRS Duration 66 ms  
 Q-T Interval 334 ms QTC Calculation (Bezet) 435 ms Calculated P Axis 65 degrees Calculated R Axis -41 degrees Calculated T Axis 73 degrees Diagnosis Sinus tachycardia with 1st degree AV block Left axis deviation Septal infarct , age undetermined Abnormal ECG When compared with ECG of 22-JUN-2018 18:22, 
QRS axis shifted left Radiologic Studies -  
 XR FOOT LT MIN 3 V    (Results Pending) Medical Decision Making I am the first provider for this patient. I reviewed the vital signs, available nursing notes, past medical history, past surgical history, family history and social history. Vital Signs-Reviewed the patient's vital signs. Records Reviewed: Nursing Notes (Time of Review: 2:55 PM) ED Course: Progress Notes, Reevaluation, and Consults: 
 
 
Provider Notes (Medical Decision Making): Patient with left leg pain for several weeks. On exam has dry gangrene to a digit with area of cellulitis to the foot. No distal pulses palpable or dopplerable. Concern for peripheral arterial disease. I do not believe this is an acute arterial occlusion as his symptoms have been ongoing for a few weeks and he has dry gangrene present. Do not think he needs a heparin drip at this time. Have consulted podiatry, vascular surgery, and the hospitalist service. Patient will be admitted for ongoing management. Consult:  Discussed care with Dr. Ethan Taylor. Standard discussion; including history of patients chief complaint, available diagnostic results, and treatment course. He is currently in the ED and will evaluate the pt now. 2:45 PM, 6/29/2019 Consult:  Discussed care with Dr. Durwood Denver (vascular). Standard discussion; including history of patients chief complaint, available diagnostic results, and treatment course. Will see pt. aware that vascular study has been ordered. 2:49 PM, 6/29/2019 Consult:  Discussed care with Santos Reaves (podiatry). Standard discussion; including history of patients chief complaint, available diagnostic results, and treatment course. Will see pt. aware that MRI is pending and the patient is been started on antibiotics. 2:55 PM, 6/29/2019 Critical Care Time:  
Critical Care Time: The services I provided to this patient were to treat and/or prevent clinically significant deterioration that could result in the failure of one or more body systems and/or organ systems due to critical limb ischemia. Services included the following: 
-reviewing nursing notes and old charts 
-vital sign assessments 
-direct patient care 
-medication orders and management 
-interpreting and reviewing diagnostic studies/labs 
-re-evaluations 
-documentation time Aggregate critical care time was 43 minutes, which includes only time during which I was engaged in work directly related to the patient's care as described above, whether I was at bedside or elsewhere in the Emergency Department. It did not include time spent performing other reported procedures or the services of residents, students, nurses, or advance practice providers. Diagnosis Clinical Impression: 1. PAD (peripheral artery disease) ([facility] 2. Cellulitis of left foot 3. Gangrene of toe of left foot ([facility] 4. Hyponatremia Disposition: ADMIT Follow-up Information None Patient's Medications Start Taking No medications on file Continue Taking ASPIRIN (ASPIRIN) 325 MG TABLET    Take 325 mg by mouth daily. DIVALPROEX DR (DEPAKOTE) 250 MG TABLET    Take  by mouth three (3) times daily. LISINOPRIL (PRINIVIL, ZESTRIL) 20 MG TABLET    Take 1 Tab by mouth daily. These Medications have changed No medications on file Stop Taking No medications on file  
 
_______________________________ Please note that this dictation was completed with [employer] the computer voice recognition software. Quite often unanticipated grammatical, syntax, homophones, and other interpretive errors are inadvertently transcribed by the computer software. Please disregard these errors. Please excuse any errors that have escaped final proofreading.

## 2019-06-30 NOTE — PROGRESS NOTES
2000 Assumed care of patient. Sleeping in bed. Wet bed changed bedding and gown. Let patient know that urinal was within reach. 2245  Patient complaining of pain of 7. Gave Percocet as ordered. 0100  Patient pulled out IV. Reinserted IV in left forearm. 1367  Patient complaining of pain of 10. Paged Dr. Sp Tenorio to get medication due to patient being NPO. Ordered 2mg Morphine. Gave morphine as ordered. 7422  Patient resting in bed. Call bell in reach. No further concerns at this time.

## 2019-06-30 NOTE — CONSULTS
Podiatry Consult Subjective: 
 
  
 
Date of Consultation: June 30, 2019 Referring Physician: Dr Stephen Padron Mr Jase Stallings is a 62 y.o. male who is being seen for non palpable pulses with gangrene of the left foot. He has a history of hypertension, seizure disorder, tobacco abuse. He states that has had leg pain for some time but has been getting worst and few days ago his to started to turn black. Patient Active Problem List  
 Diagnosis Date Noted  Gangrene of foot ([facility] 06/29/2019  Cellulitis of left lower extremity 06/29/2019  [facility] 06/29/2019  Peripheral vascular disease ([facility] 06/29/2019  Hyponatremia 06/29/2019 Past Medical History:  
Diagnosis Date  Cancer ([facility] lung  Depression  Hypertension  Seizure ([facility]  Seizures ([facility] History reviewed. No pertinent surgical history. History reviewed. No pertinent family history. Social History Tobacco Use  Smoking status: Current Every Day Smoker  Smokeless tobacco: Never Used Substance Use Topics  Alcohol use: Yes Current Facility-Administered Medications Medication Dose Route Frequency Provider Last Rate Last Dose  morphine injection 2 mg  2 mg IntraVENous Q6H PRN Ashly Theodore MD   2 mg at 06/30/19 7970  heparin (porcine) injection 5,000 Units  5,000 Units SubCUTAneous Q8H Candy Quest H, DO   5,000 Units at 06/30/19 0401  
 dextrose 5% and 0.9% NaCl infusion  75 mL/hr IntraVENous CONTINUOUS Gianna Carbo, DO 75 mL/hr at 06/29/19 1714 75 mL/hr at 06/29/19 1714  VANCOMYCIN INFORMATION NOTE   Other Rx Dosing/Monitoring Gianna Carbo, DO      
 oxyCODONE-acetaminophen (PERCOCET) 5-325 mg per tablet 1-2 Tab  1-2 Tab Oral Q4H PRN Candy Quest H, DO   1 Tab at 06/29/19 2245  
 vancomycin (VANCOCIN) 1,000 mg in 0.9% sodium chloride (MBP/ADV) 250 mL adv  1,000 mg IntraVENous Q12H Candy Quest H,  mL/hr at 19 0405 1,000 mg at 19 0405  [START ON 2019] VANCOMYCIN INFORMATION NOTE   Other ONCE Eura Contes, DO Allergies Allergen Reactions  Pcn [Penicillins] Unknown (comments) Review of Systems:  
 
Objective:  
 
Patient Vitals for the past 8 hrs: 
 BP Temp Pulse Resp SpO2  
19 0423 147/89 98.6 °F (37 °C) (!) 104 16 100 % 19 2346 131/81 99.3 °F (37.4 °C) (!) 104 16 100 % Temp (24hrs), Av.6 °F (37 °C), Min:97.6 °F (36.4 °C), Max:99.4 °F (37.4 °C) Physical Exam:   
Mr Stephanie French 62 y. o. male who is pleasant, alert and oriented x3, in no apparent distress. . Patient is well-developed and nourished, with good attention to hygiene and body habitus. Mood and affect normal, appropriate to situation.  
  
Left foot: Gangrene tip of 3rd toe. NO odor 
  
Vascular Exam:  
Dorsalis Pedis  and Posterior Tibial non palpable with no edema of foot and ankle (no pulses audible with doppler) Skin temp warm to cool. Pedal hair is absent.   
  
Neurological Exam:  
Light touch protective sensation is diminshed to both feet. There is noted Loss of protective sensation. 
  
Musculoskeletal Exam:  
Muscle tone is normal for age and situation. There is equinus of the left limb. The muscle strength is 5/5 for the flexors, extensors, inverters, and everter's. 
  
Dermatological Exam:  
Skin is of abnormal texture and turgor with some atrophic skin changes noting decreased hair growth, nail changes (thickening), pigmentary changes, skin texture (thin,shiney), skin color (rubor, red) . R/L Bilateral. There is diffuse xerosis. There is noted subungual debris. 
  
Left foot 3rd toe gangrene. 
  
 
 
 
Wound Presentation:   
 
Lab Review:  
Recent Results (from the past 24 hour(s)) CBC WITH AUTOMATED DIFF Collection Time: 19 12:25 PM  
Result Value Ref Range WBC 7.2 4.6 - 13.2 K/uL  
 RBC 3.22 (L) 4.70 - 5.50 M/uL HGB 9.7 (L) 13.0 - 16.0 g/dL HCT 28.1 (L) 36.0 - 48.0 % MCV 87.3 74.0 - 97.0 FL  
 MCH 30.1 24.0 - 34.0 PG  
 MCHC 34.5 31.0 - 37.0 g/dL  
 RDW 13.3 11.6 - 14.5 % PLATELET 439 986 - 473 K/uL MPV 7.3 (L) 9.2 - 11.8 FL  
 NEUTROPHILS 71 40 - 73 % LYMPHOCYTES 19 (L) 21 - 52 % MONOCYTES 9 3 - 10 % EOSINOPHILS 1 0 - 5 % BASOPHILS 0 0 - 2 %  
 ABS. NEUTROPHILS 5.1 1.8 - 8.0 K/UL  
 ABS. LYMPHOCYTES 1.4 0.9 - 3.6 K/UL  
 ABS. MONOCYTES 0.7 0.05 - 1.2 K/UL  
 ABS. EOSINOPHILS 0.1 0.0 - 0.4 K/UL  
 ABS. BASOPHILS 0.0 0.0 - 0.1 K/UL  
 DF AUTOMATED METABOLIC PANEL, COMPREHENSIVE Collection Time: 06/29/19 12:25 PM  
Result Value Ref Range Sodium 121 (L) 136 - 145 mmol/L Potassium 4.4 3.5 - 5.5 mmol/L Chloride 87 (L) 100 - 108 mmol/L  
 CO2 22 21 - 32 mmol/L Anion gap 12 3.0 - 18 mmol/L Glucose 86 74 - 99 mg/dL BUN 6 (L) 7.0 - 18 MG/DL Creatinine 0.69 0.6 - 1.3 MG/DL  
 BUN/Creatinine ratio 9 (L) 12 - 20 GFR est AA >60 >60 ml/min/1.73m2 GFR est non-AA >60 >60 ml/min/1.73m2 Calcium 8.3 (L) 8.5 - 10.1 MG/DL Bilirubin, total 0.5 0.2 - 1.0 MG/DL  
 ALT (SGPT) 18 16 - 61 U/L  
 AST (SGOT) 34 15 - 37 U/L Alk. phosphatase 87 45 - 117 U/L Protein, total 7.5 6.4 - 8.2 g/dL Albumin 3.2 (L) 3.4 - 5.0 g/dL Globulin 4.3 (H) 2.0 - 4.0 g/dL A-G Ratio 0.7 (L) 0.8 - 1.7 EKG, 12 LEAD, INITIAL Collection Time: 06/29/19  2:54 PM  
Result Value Ref Range Ventricular Rate 102 BPM  
 Atrial Rate 102 BPM  
 P-R Interval 218 ms QRS Duration 66 ms  
 Q-T Interval 334 ms QTC Calculation (Bezet) 435 ms Calculated P Axis 65 degrees Calculated R Axis -41 degrees Calculated T Axis 73 degrees Diagnosis Sinus tachycardia with 1st degree AV block Left axis deviation Septal infarct , age undetermined Abnormal ECG When compared with ECG of 22-JUN-2018 18:22, 
QRS axis shifted left ANKLE BRACHIAL INDEX Collection Time: 06/29/19  3:54 PM  
Result Value Ref Range Left arm  mmHg Right arm  mmHg CULTURE, WOUND W GRAM STAIN Collection Time: 06/29/19  4:30 PM  
Result Value Ref Range Special Requests: NO SPECIAL REQUESTS    
 GRAM STAIN NO WBC'S SEEN    
 GRAM STAIN RARE GRAM POSITIVE COCCI IN PAIRS Culture result: PENDING   
TSH 3RD GENERATION Collection Time: 06/29/19  7:55 PM  
Result Value Ref Range TSH 0.82 0.36 - 3.74 uIU/mL METABOLIC PANEL, BASIC Collection Time: 06/29/19  7:55 PM  
Result Value Ref Range Sodium 123 (L) 136 - 145 mmol/L Potassium 4.0 3.5 - 5.5 mmol/L Chloride 90 (L) 100 - 108 mmol/L  
 CO2 23 21 - 32 mmol/L Anion gap 10 3.0 - 18 mmol/L Glucose 83 74 - 99 mg/dL BUN 6 (L) 7.0 - 18 MG/DL Creatinine 0.67 0.6 - 1.3 MG/DL  
 BUN/Creatinine ratio 9 (L) 12 - 20 GFR est AA >60 >60 ml/min/1.73m2 GFR est non-AA >60 >60 ml/min/1.73m2 Calcium 8.3 (L) 8.5 - 56.2 MG/DL  
METABOLIC PANEL, BASIC Collection Time: 06/30/19  3:20 AM  
Result Value Ref Range Sodium 123 (L) 136 - 145 mmol/L Potassium 4.2 3.5 - 5.5 mmol/L Chloride 93 (L) 100 - 108 mmol/L  
 CO2 24 21 - 32 mmol/L Anion gap 6 3.0 - 18 mmol/L Glucose 98 74 - 99 mg/dL BUN 6 (L) 7.0 - 18 MG/DL Creatinine 0.69 0.6 - 1.3 MG/DL  
 BUN/Creatinine ratio 9 (L) 12 - 20 GFR est AA >60 >60 ml/min/1.73m2 GFR est non-AA >60 >60 ml/min/1.73m2 Calcium 8.0 (L) 8.5 - 10.1 MG/DL Impression:  Gangrene of foot ([facility] 06/29/2019  Cellulitis of left lower extremity 06/29/2019  Malnourished ([facility] 06/29/2019  Peripheral vascular disease ([facility] 06/29/2019  Hyponatremia Recommendation:  
 
Discussed with the patient all the above clinical findings. Reviewed treatment options pending vascular input / treatment. Will order LWC: Betadine paint with gauze dressing. I would like to thank Dr. Hank Ricks and nursing staff for assistance in the team approach and care for the patient. Signed By: Dr Chandu Valdes Podiatric Surgeon [facility]) [phone] June 30, 2019

## 2019-06-30 NOTE — PROGRESS NOTES
Bedside shift change report given to 430 Francisco Velarde (oncoming nurse) by Ambrosio Burgess (offgoing nurse). Report included the following information SBAR, Kardex, Intake/Output and MAR.

## 2019-06-30 NOTE — PROGRESS NOTES
Nutrition initial assessment/Plan of care RECOMMENDATIONS:  
1. 2g Na Diet (Liberalized to increase PO intake) 2. Ensure TID (elizabeth) 3. Monitor labs, weight and PO intake 4. RD to follow GOALS:  
1. PO intake meets >75% of protein/calorie needs by 7/5 
2. Weight Maintenance/gradual gain (1-2 lb/week) by 7/7 ASSESSMENT:  
Wt status is classified as underweight per Body mass index is 17.63 kg/m². However Pt w/ Adequate PO intake per vitals (75%). Ensure TID for additional kcal/protien to promote weight gain and wound healing. Labs noted. Pt w/ hyponatremia and H/H (9.7/28.1). Nutrition recommendations listed. RD to follow. Nutrition Diagnoses:  
Unintentional weight loss related to decrease energy intake /cancer as evidenced by a 37 lb or 22.5% loss x 1 year. Underweight related to inadequate energy intake PTA as evidenced by a BMI of 17.6 and 73% IBW. Meets Criteria for Chronic Malnutrition  
[x] Severe Malnutrition, as evidenced by: 
 [x] Severe muscle wasting, loss of subcutaneous fat 
 [] Nutritional intake of <75% of recommended intake for >1 month 
 [x] Weight loss of  >5% in 1 month, >7.5% in 3 months, >10% in 6 months, >20% in 1 year 
 [] Severe edema Nutrition Risk:  [] High  [x] Moderate []  Low SUBJECTIVE/OBJECTIVE:  
 Pt admitted for gangrene of L foot. MST received for 2-13 lb recent unintentional weight loss.  lb x 2 years ago per documented weight records, but Pt stated 165 lb x 1 year ago and that weight loss increased 6 months or so ago when he was Dx with cancer. (37 lb or 22.5% x 1 year per current bed scale weight of 127.8 lb) Pt seen in room later in the day; appears thin with noted muscle wasting and SQ fat loss. Reports having a good appetite now but has been up and down during his radiation treatments. Denies having any food allergies or problems chewing/swallowing. Reports appetite has been improving again.   Stated he had been getting Ensure form his doctor before but not recently. Placed orders for Ensure TID for additional kcal/protein and liberalized diet. Provided a menu and encouraged to implement preference with dietary. Will continue to monitor. Information Obtained from:  
 [x] Chart Review [x] Patient 
 [] Family/Caregiver 
 [] Nurse/Physician 
 [] Interdisciplinary Meeting/Rounds Diet: Cardiac Diet Medications: [x] Reviewed IV: D5 NS @100 mL/hr (408 kcal/day) Allergies: [x] Reviewed Encounter Diagnoses ICD-10-CM ICD-9-CM 1. PAD (peripheral artery disease) ([facility]) I73.9 443.9 2. Cellulitis of left foot L03.116 682.7 3. Gangrene of toe of left foot ([facility]) I96 785.4 4. Hyponatremia E87.1 276.1 Past Medical History:  
Diagnosis Date  Cancer ([facility] lung  Depression  Hypertension  Seizure ([facility]  Seizures ([facility] Labs:   
Lab Results Component Value Date/Time Sodium 123 (L) 06/30/2019 03:20 AM  
 Potassium 4.2 06/30/2019 03:20 AM  
 Chloride 93 (L) 06/30/2019 03:20 AM  
 CO2 24 06/30/2019 03:20 AM  
 Anion gap 6 06/30/2019 03:20 AM  
 Glucose 98 06/30/2019 03:20 AM  
 BUN 6 (L) 06/30/2019 03:20 AM  
 Creatinine 0.69 06/30/2019 03:20 AM  
 Calcium 8.0 (L) 06/30/2019 03:20 AM  
 Magnesium 1.7 04/29/2017 11:03 AM  
 Phosphorus 3.6 04/18/2012 03:50 AM  
 Albumin 3.2 (L) 06/29/2019 12:25 PM  
 
Anthropometrics: BMI (calculated): 17.6 Last 3 Recorded Weights in this Encounter 06/29/19 1136 Weight: 59 kg (130 lb) Ht Readings from Last 1 Encounters:  
06/29/19 6' (1.829 m) Weight Metrics 6/29/2019 4/29/2017 3/11/2017 Weight 130 lb 150 lb 150 lb BMI 17.63 kg/m2 20.34 kg/m2 20.34 kg/m2 Per Care Everywhere: 
 
 
Patient Vitals for the past 100 hrs: 
 % Diet Eaten 06/30/19 1706 75 % 06/30/19 1310 75 %  
06/29/19 2022 75 % IBW: 178 lb %IBW: 73% UBW: 165 lb x 1 year ago per Pt [x] Weight Loss (37 lb or 22.5% x 1 year) [] Weight Gain [] Weight Stable Estimated Nutrition Needs: [x] MSJ  [] Other: 
Calories: 9104-3623 kcal Based on:   [x] Actual BW   
Protein:   71-89 g Based on:   [x] Actual BW Fluid:       7947-8693 ml Based on:   [x] Actual BW  
 
 [x] No Cultural, Temple or ethnic dietary need identified. [] Cultural, Temple and ethnic food preferences identified and addressed Wt Status:  [] Normal (18.6 - 24.9) [x] Underweight (<18.5) [] Overweight (25 - 29.9) [] Mild Obesity (30 - 34.9)  [] Moderate Obesity (35 - 39.9) [] Morbid Obesity (40+) Nutrition Problems Identified:  
[] Suboptimal PO intake  
[] Food Allergies [] Difficulty chewing/swallowing/poor dentition 
[] Constipation/Diarrhea  
[] Nausea/Vomiting  
[] None 
[x] Other: Wound healing Plan:  
[x] Therapeutic Diet [x]  Obtained/adjusted food preferences/tolerances and/or snacks options [x]  Supplements added  
[] Occupational therapy following for feeding techniques []  HS snack added  
[]  Modify diet texture  
[]  Modify diet for food allergies []  Educate patient  
[]  Assist with menu selection  
[x]  Monitor PO intake on meal rounds  
[x]  Continue inpatient monitoring and intervention  
[x]  Participated in discharge planning/Interdisciplinary rounds/Team meetings  
[]  Other:  
 
Education Needs: 
 [] Not appropriate for teaching at this time due to: 
 [x] Identified and addressed Nutrition Monitoring and Evaluation: 
[x] Continue ongoing monitoring and intervention 
[] Juan Jose Oliver

## 2019-06-30 NOTE — PROGRESS NOTES
0740 Received pt in bed, complaining that he is very hungry, asking for water at least. Will check with MD if doing surgery today. 56 Spoke with Dr. Hank Ricks re stat urine tests ordered yesterday but was not done, will collect urine today. 1050 In coordination with Pharmacy re Zosyn ordered by MD,pt has allergy with PCN. Checked with pt, pt had breakouts when he was 11 or 11 y/o from PCN shot. Per pt he's had PO PCN  at a later time and didn't have any reaction. 1140 Still awaiting for urine sample. 1300 IV 18g on Right FA for CTA, very good blood return. 1520 To CT. No urine specimen yet 1630 Urine specimen sent to lab. Occasionally complaining of throbbing pain, BLE elevated with pillow. 1850 Pt feels better with 2 tabs percocet, voids to urinal. BLE kept elevated with a pillow, can wiggle toes but per pt left foot feels numb. Dressing on third toe c/d/i.  Kept on bedrest.

## 2019-06-30 NOTE — PROGRESS NOTES
Internal Medicine Progress Note Patient's Name: Devonte Zhu Admit Date: 6/29/2019 Length of Stay: 1 Assessment/Plan Active Hospital Problems Diagnosis Date Noted  Gangrene of foot ([facility] 06/29/2019  Cellulitis of left lower extremity 06/29/2019  Malnourished ([facility] 06/29/2019  Peripheral vascular disease ([facility] 06/29/2019  Hyponatremia 06/29/2019  
 
- Cont IV vanco/zosyn - Wound cult w/ GNR, GPC 
- Pain control PRN 
- Appreciate podiatry consult - Awaiting vasc surg, suspect pt w/ need angiogram given extensive vascular compromise in legs 
- Urine Na+/urine osm pending - Serum osm pending 
- Na+ trending up slowly, will increase fluid rate - Trend BMP 
- Cont acceptable home medications for chronic conditions  
- DVT protocol I have personally reviewed all pertinent labs and films that have officially resulted over the last 24 hours. I have personally checked for all pending labs that are awaiting final results. Subjective Pt s/e @ bedside No major events overnight Pain tolerable on current regimen Hunglois Denies CP or SOB Objective Visit Vitals /81 (BP 1 Location: Right arm, BP Patient Position: At rest) Pulse 96 Temp 99.4 °F (37.4 °C) Resp 16 Ht 6' (1.829 m) Wt 59 kg (130 lb) SpO2 98% BMI 17.63 kg/m² Physical Exam: 
General Appearance: NAD, conversant Lungs: CTA with normal respiratory effort CV: RRR, no m/r/g Abdomen: soft, non-tender, normal bowel sounds Extremities: no cyanosis, + dry gangrene left distal 3rd toe w/ missing toenails on first three toes on left Neuro: No focal deficits, motor/sensory intact Lab/Data Reviewed: 
BMP:  
Lab Results Component Value Date/Time   (L) 06/30/2019 03:20 AM  
 K 4.2 06/30/2019 03:20 AM  
 CL 93 (L) 06/30/2019 03:20 AM  
 CO2 24 06/30/2019 03:20 AM  
 AGAP 6 06/30/2019 03:20 AM  
 GLU 98 06/30/2019 03:20 AM  
 BUN 6 (L) 06/30/2019 03:20 AM  
 CREA 0.69 06/30/2019 03:20 AM  
 GFRAA >60 06/30/2019 03:20 AM  
 GFRNA >60 06/30/2019 03:20 AM  
 
CBC:  
Lab Results Component Value Date/Time WBC 7.2 06/29/2019 12:25 PM  
 HGB 9.7 (L) 06/29/2019 12:25 PM  
 HCT 28.1 (L) 06/29/2019 12:25 PM  
  06/29/2019 12:25 PM  
 
 
Imaging Reviewed: 
Xr Foot Lt Min 3 V Result Date: 6/29/2019 EXAM: Foot Series Complete Indication: Left foot pain. Technique:  Frontal, oblique, and lateral views of the left foot. Comparison: None _______________ FINDINGS: Osteopenia with old healed fourth and fifth metatarsal fractures. No acute fracture or destructive osseous abnormality. Small degenerative plantar cannula spur. The soft tissues appear within normal limits. No radiopaque foreign body is seen. _______________ IMPRESSION: 1. No acute fracture, destructive osseous changes or plain film findings to suggest osteomyelitis. Medications Reviewed: 
Current Facility-Administered Medications Medication Dose Route Frequency  morphine injection 2 mg  2 mg IntraVENous Q6H PRN  
 heparin (porcine) injection 5,000 Units  5,000 Units SubCUTAneous Q8H  
 dextrose 5% and 0.9% NaCl infusion  100 mL/hr IntraVENous CONTINUOUS  
 VANCOMYCIN INFORMATION NOTE   Other Rx Dosing/Monitoring  oxyCODONE-acetaminophen (PERCOCET) 5-325 mg per tablet 1-2 Tab  1-2 Tab Oral Q4H PRN  
 vancomycin (VANCOCIN) 1,000 mg in 0.9% sodium chloride (MBP/ADV) 250 mL adv  1,000 mg IntraVENous Q12H  
 [START ON 7/1/2019] VANCOMYCIN INFORMATION NOTE   Other ONCE Sky Graham,  Internal Medicine, Hospitalist 
Pager: [phone] [facility]

## 2019-06-30 NOTE — CONSULTS
[facility] 
[address] [place], [address] Felisha Kirkpatrick, Dr. Jose Kaye, & Dr. Andrea He [phone] FAX# [phone] Consult Patient: Elsa Lemons MRN: [id number]  SSN: [id number] YOB: 1955  Age: 62 y.o. Sex: male Subjective:  
  
Elsa Lemons is a 62 y.o. male who is being seen for gangrene and ischemic rest pain of the left foot. He presented to the [facility] ED yesterday, with complaints of 1 month of left foot pain, and several days of gangrene (dry) to the left 3rd toe. The nail of the hallux has fallen off, with an underlying superficial ulceration. He also has a wound on the left heel. The patient does report b/l LE claudcation, exertional dyspnea, and vaguely describes some chronic right foot pain as well. He reports recent (unwanted) weight loss. Lower extremity noninvasive vascular testing was done in the ER, and is consistent with severe, multilevel disease, including a significant inflow component. He reports a strong smoking history, states that he is now trying to quit. Past Medical History:  
Diagnosis Date  Cancer ([facility] lung  Depression  Hypertension  Seizure ([facility]  Seizures ([facility] History reviewed. No pertinent surgical history. History reviewed. No pertinent family history. Social History Tobacco Use  Smoking status: Current Every Day Smoker  Smokeless tobacco: Never Used Substance Use Topics  Alcohol use: Yes Current Facility-Administered Medications Medication Dose Route Frequency Provider Last Rate Last Dose  morphine injection 2 mg  2 mg IntraVENous Q6H PRN Sharmila Rg MD   2 mg at 06/30/19 4251  cefepime (MAXIPIME) 1 g in 0.9% sodium chloride (MBP/ADV) 50 mL MBP  1 g IntraVENous Q12H Ana WANG DO      
 heparin (porcine) injection 5,000 Units  5,000 Units SubCUTAneous Q8H Deette Martinez H, DO   5,000 Units at 06/30/19 0401  
 dextrose 5% and 0.9% NaCl infusion  100 mL/hr IntraVENous CONTINUOUS Katelyn Kenneth,  mL/hr at 06/30/19 0925 100 mL/hr at 06/30/19 3848  VANCOMYCIN INFORMATION NOTE   Other Rx Dosing/Monitoring Katelyn Kenneth, DO      
 oxyCODONE-acetaminophen (PERCOCET) 5-325 mg per tablet 1-2 Tab  1-2 Tab Oral Q4H PRN Deette Martinez H, DO   1 Tab at 06/30/19 1407  
 vancomycin (VANCOCIN) 1,000 mg in 0.9% sodium chloride (MBP/ADV) 250 mL adv  1,000 mg IntraVENous Q12H Katelyn Kenneth,  mL/hr at 06/30/19 0405 1,000 mg at 06/30/19 0405  [START ON 7/1/2019] VANCOMYCIN INFORMATION NOTE   Other ONCE Katelyn Kenneth, DO Allergies Allergen Reactions  Pcn [Penicillins] Unknown (comments) Review of Systems: A comprehensive review of systems was negative except for that written in the History of Present Illness. Objective:  
 
Vitals:  
 06/29/19 2346 06/30/19 0423 06/30/19 8538 06/30/19 1111 BP: 131/81 147/89 126/81 145/83 Pulse: (!) 104 (!) 104 96 (!) 102 Resp: 16 16 16 18 Temp: 99.3 °F (37.4 °C) 98.6 °F (37 °C) 99.4 °F (37.4 °C) 97.4 °F (36.3 °C) SpO2: 100% 100% 98% 100% Weight:      
Height:      
  
 
Physical Exam: 
GENERAL: alert, cooperative, no distress, appears stated age, cachectic EYE: conjunctivae/corneas clear. PERRL, EOM's intact. Fundi benign THROAT & NECK: no erythema or exudates noted. , mucous membranes moist and neck supple and symmetrical.       No JVD. RESP: normal respiratory effort. HEART: Regular ABDOMEN: soft, nontender, nondistended, no masses, aorta nonpalpable, no scars. EXTREMITIES:  No LE edema. There are superficial ulcerations to the lateral left calf. The left hallux is missing a toenail and there is underlying ulceration. There is dry gangrene to the distal right 3rd toe. There is ulceration to the left heel. No wounds on the right foot. NEUROLOGIC: negative PSYCHIATRIC: non focal 
PULSES: bilateral carotid, axillary, brachial, radial pulses are 2+ and symmetric. The bilateral femoral, popliteal, posterior tibial, and dorsalis pedis pulses are absent. Handheld doppler demonstrates monophasic to biphasic signal in the bilateral femorals, and monophasic signals in the bilateral popliteals. Unable to attain signals over the bilateral posterior tibial and dorsalis pedis arteries. Assessment:  
 
Hospital Problems  Never Reviewed Codes Class Noted POA * (Principal) Gangrene of foot ([facility] ICD-10-CM: M62 
ICD-9-CM: 785.4  6/29/2019 Unknown Cellulitis of left lower extremity ICD-10-CM: L03.116 ICD-9-CM: 682.6  6/29/2019 Unknown Malnourished ([facility] ICD-10-CM: E46 
ICD-9-CM: 263.9  6/29/2019 Unknown Peripheral vascular disease ([facility] ICD-10-CM: I73.9 ICD-9-CM: 443.9  6/29/2019 Unknown Hyponatremia ICD-10-CM: E87.1 ICD-9-CM: 276.1  6/29/2019 Unknown PAD with gangrene and ischemic rest pain of the left foot, PAD with claudication of the right leg. Based on physical exam and noninvasive testing, he has significant inflow (supra-inguinal) occlusive disease. Plan: Will need revascularization for limb salvage. Given absence of femoral pulses, will start with a CT-angiogram of the abdomen/pelvis/bilateral lower extremity runoff. Smoking cessation. Please start aspirin 81 mg daily and high-dose statin. Risk factor modification. Signed By: Dav Romero MD   
 June 30, 2019

## 2019-06-30 NOTE — PROGRESS NOTES
PT screen received and chart reviewed. Per chart review of admitting diagnosis and relevant prior level of function formal PT/OT evaluations are NOT indicated. PT/OT evaluation is NOT indicated d/t active bedrest orders. Thank you, Terressa Sandhoff, PT, DPT Office Extension: [phone]

## 2019-06-30 NOTE — PROGRESS NOTES
conducted an initial consultation and Spiritual Assessment for Jame Schlatter, who is a 62 y.o.,male. Patients Primary Language is: [place]. According to the patients EMR Adventism Affiliation is: Church. The reason the Patient came to the hospital is:  
Patient Active Problem List  
 Diagnosis Date Noted  Gangrene of foot ([facility] 06/29/2019  Cellulitis of left lower extremity 06/29/2019  Malnourished ([facility] 06/29/2019  Peripheral vascular disease ([facility] 06/29/2019  Hyponatremia 06/29/2019 The  provided the following Interventions: 
Initiated a relationship of care and support with patient in room 2206 this morning. Listened as patient talked about his foot being the reason that he is here. Wants to get well and become free of the pain. Patient is very tired and seems older that his actual age. Provided information about Spiritual Care Services. Offered prayer and assurance of continued prayers on patients behalf. The following outcomes were achieved: 
Patient shared limited information about his medical narrative and spiritual journey/beliefs. Patient processed feeling about current hospitalization. Patient expressed gratitude for pastoral care visit. Assessment: 
Patient does not have any Mormon/cultural needs that will affect patients preferences in health care. There are no further spiritual or Mormon issues which require Spiritual Care Services interventions at this time. Plan: 
Chaplains will continue to follow and will provide pastoral care on an as needed/requested basis Marly Payne 3 Board Certified Kimmy Huddleston [facility]  
[phone]

## 2019-06-30 NOTE — PROGRESS NOTES
Problem: Pain Goal: *Control of Pain Outcome: Progressing Towards Goal 
  
Problem: Patient Education: Go to Patient Education Activity Goal: Patient/Family Education Outcome: Progressing Towards Goal 
  
Problem: Falls - Risk of 
Goal: *Absence of Falls Description Document Milta Raring Fall Risk and appropriate interventions in the flowsheet. Outcome: Progressing Towards Goal 
  
Problem: Patient Education: Go to Patient Education Activity Goal: Patient/Family Education Outcome: Progressing Towards Goal 
  
Problem: Pressure Injury - Risk of 
Goal: *Prevention of pressure injury Description Document Mikie Scale and appropriate interventions in the flowsheet. Outcome: Progressing Towards Goal 
  
Problem: Patient Education: Go to Patient Education Activity Goal: Patient/Family Education Outcome: Progressing Towards Goal

## 2019-06-30 NOTE — PROGRESS NOTES
Problem: Discharge Planning Goal: *Discharge to safe environment Outcome: Progressing Towards Goal 
 Home with hh .Reason for Admission:   Gangrene ft RRAT Score:      9 Plan for utilizing home health: yes Current Advanced Directive/Advance Care Plan: none Transition of Care Plan:   Spoke with pt, lives with son. Designates him for dcp, transport home. Independent with dls and amb. With a cane. May need hh services at dc, depending on course. Needs pcp. Plan home, cm to follow for hh needs. Patient has designated ____________son____________ to participate in his/her discharge plan and to receive any needed information. Name: Tiny Anderson Address: 
Phone number: [phone] Care Management Interventions PCP Verified by CM: Yes 
Palliative Care Criteria Met (RRAT>21 & CHF Dx)?: No 
Mode of Transport at Discharge: Other (see comment) Transition of Care Consult (CM Consult): Discharge Planning Discharge Durable Medical Equipment: No 
Physical Therapy Consult: Yes Occupational Therapy Consult: Yes Speech Therapy Consult: No 
Current Support Network: Relative's Home Confirm Follow Up Transport: Family Plan discussed with Pt/Family/Caregiver: Yes Discharge Location Discharge Placement: Home,poss hh

## 2019-07-01 NOTE — PROGRESS NOTES
1945  Received bedside report from [facility]. Patient resting comfortably in bed. Call bell in reach. Denies any pain at this time. 2215  Patient complaining of pain of 8. Gave percocet as ordered. 0045  Patient toe dressing came off. Redressed wound. 2099 Patient complaining of pain of 10. Gave Percocet as ordered. 3480  Patient complaining of pain of 10. Gave Morphine as ordered. 0283  Patient blood pressure high 173/120. Patient states he takes blood pressure medication at home. Paged Dr. Nohemy Easley. Patient complaining about heel pain,  Upon examining heel, patient had been picking at heel and exposed a large injury on left heel. Needs to be seen by wound care.

## 2019-07-01 NOTE — PROGRESS NOTES
[facility] 
[address], [address] Guera Friedman,  Dr. Romana Akers  
[phone] FAX# [phone] PROGRESS NOTE Patient: Janell Worley MRN: [id number]  SSN: [id number] YOB: 1955  Age: 62 y.o. Sex: male Date: 7/1/2019 Hospital: [facility] Post-Op Day: 0 Plan:  
Await reading on CTA Assessment:  
Left foot pain and gangrene Subjective: Moderate pain Pertinent items are noted in HPI. Objective:  
Admit weight: Weight: 59 kg (130 lb) Last recorded weight: Weight: 59 kg (130 lb) Visit Vitals /83 Pulse (!) 105 Temp 98.6 °F (37 °C) Resp 17 Ht 6' (1.829 m) Wt 59 kg (130 lb) SpO2 98% BMI 17.63 kg/m² Intake/Output Summary (Last 24 hours) at 7/1/2019 1026 Last data filed at 7/1/2019 7000 Gross per 24 hour Intake 3205.42 ml Output 1500 ml Net 1705.42 ml Physical exam was negative except for: Left foot with gangrene- wrapped in gauze. Non-palpable bilateral pedal pulses. Labs:  
 
Recent Labs  
  06/29/19 
1225 WBC 7.2 HGB 9.7* HCT 28.1*  
 RDW 13.3 Recent Labs  
  07/01/19 
0330 06/30/19 
2139 06/30/19 
1436  06/29/19 
1225 * 126* 125*   < > 121*  
K 3.9 4.1 3.9   < > 4.4 CL 96* 94* 93*   < > 87* CO2 24 23 22   < > 22  
GLU 99 101* 86   < > 86 BUN 3* 6* 5*   < > 6* CREA 0.69 0.71 0.69   < > 0.69 CA 8.3* 8.0* 7.6*   < > 8.3* ALB  --   --   --   --  3.2* SGOT  --   --   --   --  34 ALT  --   --   --   --  18  
 < > = values in this interval not displayed. No results for input(s): PH, PCO2, PO2, HCO3, FIO2 in the last 72 hours.  
 
 
 
Anurag Rai MD, FACS

## 2019-07-01 NOTE — PROGRESS NOTES
Pharmacy Dosing Services: Vancomycin Indication: SSTI Day of therapy: 2 Other Antimicrobials (Include dose, start day & day of therapy): 
Cefepime 1 gm IV every 12 hours Loading dose (date given): 1500 mg 
Current Maintenance dose: 1000 mg IV every 12 hours Goal Vancomycin Level: 15-20 
(Trough 15-20 for most infections, 20 for meningitis/osteomyelitis, pre-HD level ~25) Vancomycin Level (if drawn): 
 11 (10.41 hours after dose) Significant Cultures: pending Renal function stable? (unstable defined as SCr increase of 0.5 mg/dL or > 50% increase from baseline, whichever is greater) (Y/N): Y  
 
CAPD, Hemodialysis or Renal Replacement Therapy (Y/N): N Recent Labs  
  19 
0330 19 
2139 19 
1436  19 
1225 CREA 0.69 0.71 0.69   < > 0.69  
BUN 3* 6* 5*   < > 6* WBC  --   --   --   --  7.2  
 < > = values in this interval not displayed. Temp (24hrs), Av.5 °F (36.9 °C), Min:97.4 °F (36.3 °C), Max:99.4 °F (37.4 °C) Creatinine Clearance (Creatinine Clearance (ml/min)): ~90 ml/min Regimen assessment: Frequency increased to every 8 hours from 12 hours Maintenance dose: 1000 mg IV every 8 hours Next scheduled level: Early trough on  at 0330 Pharmacy will follow daily and adjust medications as appropriate for renal function and/or serum levels. Thank you, Wade Roberts [place]

## 2019-07-01 NOTE — WOUND CARE
Received IP wound care consult and reviewed EMR. Patient currently being followed by podiatry. Wound care will defer to podiatry.

## 2019-07-01 NOTE — PROGRESS NOTES
Problem: Pain Goal: *Control of Pain Outcome: Progressing Towards Goal 
  
Problem: Patient Education: Go to Patient Education Activity Goal: Patient/Family Education Outcome: Progressing Towards Goal 
  
Problem: Falls - Risk of 
Goal: *Absence of Falls Description Document Ksenia Lala Fall Risk and appropriate interventions in the flowsheet. Outcome: Progressing Towards Goal 
  
Problem: Patient Education: Go to Patient Education Activity Goal: Patient/Family Education Outcome: Progressing Towards Goal 
  
Problem: Pressure Injury - Risk of 
Goal: *Prevention of pressure injury Description Document Mikie Scale and appropriate interventions in the flowsheet. Outcome: Progressing Towards Goal 
  
Problem: Patient Education: Go to Patient Education Activity Goal: Patient/Family Education Outcome: Progressing Towards Goal 
  
Problem: Discharge Planning Goal: *Discharge to safe environment Outcome: Progressing Towards Goal 
  
Problem: Nutrition Deficit Goal: *Optimize nutritional status Outcome: Progressing Towards Goal

## 2019-07-01 NOTE — PROGRESS NOTES
Podiatry Surgery Progress Note Patient: Tejinder Tidwell MRN: [id number]  SSN: [id number] YOB: 1955  Age: 62 y.o. Sex: male Assessment:  
 
Patient Active Problem List  
Diagnosis Code  Gangrene of foot ([facility] I96  
 Cellulitis of left lower extremity L03. 80  
 Malnourished ([facility] E46  Peripheral vascular disease (HCC) I73.9  Hyponatremia E87.1 Plan:  
Appreciate vascular input. Continue LWC to the left foot with Betadine solution to the hallux, 3rd toe and heel left foot and  prevalon boot. Pending vascular intervention for limb salvage will re-evaluate for the possible need for surgical intervention 3rd toe left Total time spent with patient: 30 Minutes Care Plan discussed with: Patient Discussed:  Care Plan Disposition:  Stable Mr. Rodney Christie is a 62 y.o. male who was admitted for gangrene to the left foot. Patient states he is a current daily smoker but is trying to quit. He has no new complaints today. Subjective:  
Past Medical History Past Medical History:  
Diagnosis Date  Cancer ([facility] lung  Depression  Hypertension  Seizure ([facility]  Seizures ([facility] Social History Socioeconomic History  Marital status:  Spouse name: Not on file  Number of children: Not on file  Years of education: Not on file  Highest education level: Not on file Occupational History  Not on file Social Needs  Financial resource strain: Not on file  Food insecurity:  
  Worry: Not on file Inability: Not on file  Transportation needs:  
  Medical: Not on file Non-medical: Not on file Tobacco Use  Smoking status: Current Every Day Smoker  Smokeless tobacco: Never Used Substance and Sexual Activity  Alcohol use: Yes  Drug use: No  
 Sexual activity: Not on file Lifestyle  Physical activity:  
  Days per week: Not on file Minutes per session: Not on file  Stress: Not on file Relationships  Social connections:  
  Talks on phone: Not on file Gets together: Not on file Attends Christianity service: Not on file Active member of club or organization: Not on file Attends meetings of clubs or organizations: Not on file Relationship status: Not on file  Intimate partner violence:  
  Fear of current or ex partner: Not on file Emotionally abused: Not on file Physically abused: Not on file Forced sexual activity: Not on file Other Topics Concern  Not on file Social History Narrative  Not on file Current Medications Current Facility-Administered Medications Medication Dose Route Frequency Provider Last Rate Last Dose  vancomycin (VANCOCIN) 1,000 mg in 0.9% sodium chloride (MBP/ADV) 250 mL adv  1,000 mg IntraVENous Q8H Daphne Staley MD      
 [START ON 7/2/2019] VANCOMYCIN INFORMATION NOTE   Other ONCE Daphne Staley MD      
 cloNIDine HCl (CATAPRES) tablet 0.1 mg  0.1 mg Oral BID Pillo Bennett MD   0.1 mg at 07/01/19 6057  morphine injection 2 mg  2 mg IntraVENous Q6H PRN Pillo Bennett MD   2 mg at 07/01/19 5633  cefepime (MAXIPIME) 1 g in 0.9% sodium chloride (MBP/ADV) 50 mL MBP  1 g IntraVENous Q12H Clayborne China H,  mL/hr at 07/01/19 0037 1 g at 07/01/19 0037  
 nicotine (NICODERM CQ) 14 mg/24 hr patch 1 Patch  1 Patch TransDERmal DAILY [facility], DO   1 Patch at 06/30/19 1406  aspirin chewable tablet 81 mg  81 mg Oral DAILY [facility], DO      
 atorvastatin (LIPITOR) tablet 40 mg  40 mg Oral QHS Clayborne China H, DO   40 mg at 06/30/19 2216  heparin (porcine) injection 5,000 Units  5,000 Units SubCUTAneous Q8H [facility], DO   5,000 Units at 07/01/19 3782  dextrose 5% and 0.9% NaCl infusion  100 mL/hr IntraVENous CONTINUOUS [facility],  mL/hr at 07/01/19 0743 100 mL/hr at 07/01/19 1581  VANCOMYCIN INFORMATION NOTE   Other Rx Dosing/Monitoring Altamese Mini, DO      
 oxyCODONE-acetaminophen (PERCOCET) 5-325 mg per tablet 1-2 Tab  1-2 Tab Oral Q4H PRN Dennie Crimes H, DO   2 Tab at 07/01/19 8618 Patient Allergies Allergies Allergen Reactions  Pcn [Penicillins] Unknown (comments) Objective:  
General Exam 
alert, cooperative, no distress, appears stated age Vitals Visit Vitals BP (!) 137/95 (BP 1 Location: Right arm, BP Patient Position: At rest) Pulse (!) 101 Temp 99.4 °F (37.4 °C) Resp 16 Ht 6' (1.829 m) Wt 59 kg (130 lb) SpO2 100% BMI 17.63 kg/m² REVIEW OF SYSTEMS: 
General: denies chronic fatigue, weight loss, fever, anemia, bruising, depression, nervousness, panic attacks HEENT: denies ringing in ears, ear infections, dizzy spells, poor vision, glaucoma, sinus trouble, hoarseness, eye infections GI: denies diarrhea, gas, bloating, heartburn, regurgitation, difficulty swallowing, painful swallowing, nausea, vomiting, constipation, abdominal pain, decreased appetite, blood in stools, black stools, jaundice, dark urine Lungs: denies pneumonia, asthma, cough, SOB, hemoptysis Heart: denies chest pain, irregular heart beat, ankle swelling, HTN Skin: denies rashes, hives, allergic reaction Urinary: denies UTI, kidney stones, decreased urine force and flow, urination at night, blood in urine, painful urination Bones and Joints: denies arthritis, rheumatism, back pain, gout, osteoporosis Neurologic: denies stroke, seizures, headaches, numbness, tingling Physical Exam:   
Mr Adina Coffman 62 y. o. male who is pleasant, alert and oriented x3, in no apparent distress. . Patient is well-developed and nourished, with good attention to hygiene and body habitus. Mood and affect normal, appropriate to situation.  
  
Left foot: Gangrene tip of 3rd toe. NO odor 
  
Vascular Exam: Dorsalis Pedis  and Posterior Tibial non palpable with no edema of foot and ankle (no pulses audible with doppler) Skin temp warm to cool. Pedal hair is absent.   
  
Neurological Exam:  
Light touch protective sensation is diminshed to both feet. There is noted Loss of protective sensation. 
  
Musculoskeletal Exam:  
Muscle tone is normal for age and situation. There is equinus of the left limb. The muscle strength is 5/5 for the flexors, extensors, inverters, and everter's. 
  
Dermatological Exam:  
Skin is of abnormal texture and turgor with some atrophic skin changes noting decreased hair growth, nail changes (thickening), pigmentary changes, skin texture (thin,shiney), skin color (rubor, red) . R/L Bilateral. There is diffuse xerosis. There is noted subungual debris. 
  
Left foot 3rd toe gangrene and posterior plantar left heel. Wound Foot Other (Comment); Left (Active) Dressing Status Clean, dry, and intact 6/30/2019  8:43 PM  
Dressing Type 4 x 4;Gauze wrap (gloria) 6/30/2019  8:43 PM  
Drainage Amount None 6/30/2019  8:43 PM  
Wound Odor None 6/30/2019  8:43 PM  
Cleansing and Cleansing Agents  Betadine;Dermal wound cleanser 6/30/2019 12:00 PM  
Dressing Changed Changed/New 6/30/2019 12:00 PM  
Number of days: 1 Wound Heel Left (Active) Number of days: 0 Labs Recent Results (from the past 24 hour(s)) METABOLIC PANEL, BASIC Collection Time: 06/30/19  8:50 AM  
Result Value Ref Range Sodium 129 (L) 136 - 145 mmol/L Potassium 4.2 3.5 - 5.5 mmol/L Chloride 97 (L) 100 - 108 mmol/L  
 CO2 24 21 - 32 mmol/L Anion gap 8 3.0 - 18 mmol/L Glucose 89 74 - 99 mg/dL BUN 4 (L) 7.0 - 18 MG/DL Creatinine 0.64 0.6 - 1.3 MG/DL  
 BUN/Creatinine ratio 6 (L) 12 - 20 GFR est AA >60 >60 ml/min/1.73m2 GFR est non-AA >60 >60 ml/min/1.73m2 Calcium 8.4 (L) 8.5 - 04.7 MG/DL  
METABOLIC PANEL, BASIC Collection Time: 06/30/19  2:36 PM  
Result Value Ref Range Sodium 125 (L) 136 - 145 mmol/L Potassium 3.9 3.5 - 5.5 mmol/L Chloride 93 (L) 100 - 108 mmol/L  
 CO2 22 21 - 32 mmol/L Anion gap 10 3.0 - 18 mmol/L Glucose 86 74 - 99 mg/dL BUN 5 (L) 7.0 - 18 MG/DL Creatinine 0.69 0.6 - 1.3 MG/DL  
 BUN/Creatinine ratio 7 (L) 12 - 20 GFR est AA >60 >60 ml/min/1.73m2 GFR est non-AA >60 >60 ml/min/1.73m2 Calcium 7.6 (L) 8.5 - 10.1 MG/DL  
SODIUM, UR, RANDOM Collection Time: 06/30/19  4:20 PM  
Result Value Ref Range Sodium,urine random 47 20 - 043 MMOL/L  
METABOLIC PANEL, BASIC Collection Time: 06/30/19  9:39 PM  
Result Value Ref Range Sodium 126 (L) 136 - 145 mmol/L Potassium 4.1 3.5 - 5.5 mmol/L Chloride 94 (L) 100 - 108 mmol/L  
 CO2 23 21 - 32 mmol/L Anion gap 9 3.0 - 18 mmol/L Glucose 101 (H) 74 - 99 mg/dL BUN 6 (L) 7.0 - 18 MG/DL Creatinine 0.71 0.6 - 1.3 MG/DL  
 BUN/Creatinine ratio 8 (L) 12 - 20 GFR est AA >60 >60 ml/min/1.73m2 GFR est non-AA >60 >60 ml/min/1.73m2 Calcium 8.0 (L) 8.5 - 54.4 MG/DL  
METABOLIC PANEL, BASIC Collection Time: 07/01/19  3:30 AM  
Result Value Ref Range Sodium 129 (L) 136 - 145 mmol/L Potassium 3.9 3.5 - 5.5 mmol/L Chloride 96 (L) 100 - 108 mmol/L  
 CO2 24 21 - 32 mmol/L Anion gap 9 3.0 - 18 mmol/L Glucose 99 74 - 99 mg/dL BUN 3 (L) 7.0 - 18 MG/DL Creatinine 0.69 0.6 - 1.3 MG/DL  
 BUN/Creatinine ratio 4 (L) 12 - 20 GFR est AA >60 >60 ml/min/1.73m2 GFR est non-AA >60 >60 ml/min/1.73m2 Calcium 8.3 (L) 8.5 - 10.1 MG/DL Michelle Davide Collection Time: 07/01/19  3:30 AM  
Result Value Ref Range Vancomycin,trough 11.0 10.0 - 20.0 ug/mL · Critical multilevel disease including inflow disease bilaterally. Lower Extremity Arterial Findings KELLI The right common femoral artery and popliteal artery has monophasic waveforms. The left common femoral artery has monophasic waveforms.  No arterial doppler signals obtained in the right calf vessels. No arterial doppler signals obtained in the left popliteal and calf vessels. The digital signals are flat bilaterally. Arterial Pressure Measurements Right Left Brachial  mmHg 172 mmHg X-Ray:  
 
Study Result EXAM: Foot Series Complete 
  
Indication: Left foot pain. 
  
Technique:  Frontal, oblique, and lateral views of the left foot. 
  
Comparison: None 
_______________ FINDINGS: 
Osteopenia with old healed fourth and fifth metatarsal fractures. No acute 
fracture or destructive osseous abnormality. Small degenerative plantar cannula 
spur. The soft tissues appear within normal limits. No radiopaque foreign body 
is seen.  
  
_______________ IMPRESSION IMPRESSION: 
  
1. No acute fracture, destructive osseous changes or plain film findings to 
suggest osteomyelitis. 
  
   
Imaging XR FOOT LT MIN 3 V (Order: [id number]) - 6/29/2019 Procedures:   none Tere Nicole DPM 
July 1, 2019

## 2019-07-01 NOTE — PROGRESS NOTES
Internal Medicine Progress Note Patient's Name: Iliana Duffy Admit Date: 6/29/2019 Length of Stay: 2 Assessment/Plan Active Hospital Problems Diagnosis Date Noted  Gangrene of foot ([facility] 06/29/2019  Cellulitis of left lower extremity 06/29/2019  Malnourished ([facility] 06/29/2019  Peripheral vascular disease ([facility] 06/29/2019  Hyponatremia 06/29/2019  
 
- Cont IV vanco/zosyn - Wound cult w/ with multiple g+ bacteria 
- Pain control PRN 
- Appreciate podiatry consult - Awaiting CTA results and vascular surgery plan post results - Trend BMP 
- Cont acceptable home medications for chronic conditions  
- DVT protocol Subjective Patient without complaints, explained plan and he expressed understanding. Objective Visit Vitals /83 Pulse (!) 105 Temp 98.6 °F (37 °C) Resp 17 Ht 6' (1.829 m) Wt 59 kg (130 lb) SpO2 98% BMI 17.63 kg/m² Physical Exam: 
General Appearance: NAD, conversant Lungs: CTA with normal respiratory effort CV: RRR, no m/r/g Abdomen: soft, non-tender, normal bowel sounds Extremities: no cyanosis, + dry gangrene left distal 3rd toe w/ missing toenails on first three toes on left Neuro: No focal deficits, motor/sensory intact Lab/Data Reviewed: 
BMP:  
Lab Results Component Value Date/Time  (L) 07/01/2019 09:30 AM  
 K 3.8 07/01/2019 09:30 AM  
 CL 97 (L) 07/01/2019 09:30 AM  
 CO2 24 07/01/2019 09:30 AM  
 AGAP 8 07/01/2019 09:30 AM  
 GLU 89 07/01/2019 09:30 AM  
 BUN 3 (L) 07/01/2019 09:30 AM  
 CREA 0.65 07/01/2019 09:30 AM  
 GFRAA >60 07/01/2019 09:30 AM  
 GFRNA >60 07/01/2019 09:30 AM  
 
CBC:  
No results found for: WBC, HGB, HGBEXT, HCT, HCTEXT, PLT, PLTEXT, HGBEXT, HCTEXT, PLTEXT Imaging Reviewed: 
No results found. Medications Reviewed: 
Current Facility-Administered Medications Medication Dose Route Frequency  vancomycin (VANCOCIN) 1,000 mg in 0.9% sodium chloride (MBP/ADV) 250 mL adv  1,000 mg IntraVENous Q8H  
 [START ON 7/2/2019] VANCOMYCIN INFORMATION NOTE   Other ONCE  cloNIDine HCl (CATAPRES) tablet 0.1 mg  0.1 mg Oral BID  
 oxyCODONE-acetaminophen (PERCOCET) 5-325 mg per tablet 1-2 Tab  1-2 Tab Oral Q4H PRN  
 morphine injection 2 mg  2 mg IntraVENous Q6H PRN  
 cefepime (MAXIPIME) 1 g in 0.9% sodium chloride (MBP/ADV) 50 mL MBP  1 g IntraVENous Q12H  
 nicotine (NICODERM CQ) 14 mg/24 hr patch 1 Patch  1 Patch TransDERmal DAILY  aspirin chewable tablet 81 mg  81 mg Oral DAILY  atorvastatin (LIPITOR) tablet 40 mg  40 mg Oral QHS  heparin (porcine) injection 5,000 Units  5,000 Units SubCUTAneous Q8H  
 dextrose 5% and 0.9% NaCl infusion  100 mL/hr IntraVENous CONTINUOUS  
 VANCOMYCIN INFORMATION NOTE   Other Rx Dosing/Monitoring

## 2019-07-02 NOTE — PROGRESS NOTES
[facility] 
[address] [facility], [address] [facility] [address] Josh Walsh, Dr. Letitia Hernandez, & Dr. Luis Miguel Tony 
[phone] FAX# [phone] Progress Note Patient: Fabricio Fletcher MRN: [id number]  SSN: [id number] YOB: 1955  Age: 62 y.o. Sex: male Admit Date: 6/29/2019 LOS: 3 days Subjective: No acute changes. Still hyponatremic, being seen by nephrologist. 
CTA reviewed, demonstrates multilevel disease (inflow and outflow). Will need complex revascularization. Objective:  
 
Vitals:  
 07/01/19 2016 07/02/19 1616 07/02/19 8662 07/02/19 4182 BP: (!) 159/92 (!) 153/95 (!) 168/103 (!) 151/106 Pulse: (!) 103 (!) 101 (!) 103 97 Resp: 18 18 18 18 Temp: 99.2 °F (37.3 °C) 98.8 °F (37.1 °C) 97.6 °F (36.4 °C) 97.5 °F (36.4 °C) SpO2: 98% 100% 100% 100% Weight:      
Height:      
  
 
Intake and Output: 
Current Shift: No intake/output data recorded. Last three shifts: 06/30 1901 - 07/02 0700 In: 4163.8 [P.O.:690; I.V.:3473.8] Out: 3250 [SMTAQ:1909] Physical Exam:  
NAD, alert, thin L foot dressing intact Lab/Data Review: 
BMP:  
Lab Results Component Value Date/Time  (L) 07/02/2019 09:15 AM  
 K 3.3 (L) 07/02/2019 09:15 AM  
 CL 94 (L) 07/02/2019 09:15 AM  
 CO2 23 07/02/2019 09:15 AM  
 AGAP 11 07/02/2019 09:15 AM  
  (H) 07/02/2019 09:15 AM  
 BUN 4 (L) 07/02/2019 09:15 AM  
 CREA 0.69 07/02/2019 09:15 AM  
 GFRAA >60 07/02/2019 09:15 AM  
 GFRNA >60 07/02/2019 09:15 AM  
 
CBC: No results found for: WBC, HGB, HGBEXT, HCT, HCTEXT, PLT, PLTEXT, HGBEXT, HCTEXT, PLTEXT  
 
CTA demonstrated multilevel occlusive disease, including L external iliac occlusion, L femoral bifurcation dz, L SFA/popliteal/infrapopliteal disease. Also significant occlusive disease affecting R side. Assessment:  
 
Principal Problem: 
  Gangrene of foot ([address] (6/29/2019) Active Problems: Cellulitis of left lower extremity (6/29/2019) Malnourished ([address] (6/29/2019) Peripheral vascular disease ([address] (6/29/2019) Hyponatremia (6/29/2019) PAD with gangrene LLE. Plan: Will need complex multilevel revascularization, open vs. Hybrid open/endo. Various options could entail long operations under general anesthesia, with potential for significant blood loss. He reports short-distance exertional dyspnea. He is clinically malnourished. Unclear if he is a candidate for open revascularization. Will need input from pulmonary and cardiology. I suspect he will need a nuclear stress test and possibly other testing, will defer to medical teams. Will follow with you. Signed By: Fernando Akers MD   
 July 2, 2019

## 2019-07-02 NOTE — ROUTINE PROCESS
Bedside and Verbal shift change report given to Angela Gordon, RN, BSN (oncoming nurse) by Tammy Lai RN, BSN (offgoing nurse). Report included the following information SBAR, Kardex, Procedure Summary, Intake/Output, MAR and Recent Results.   
 
Bubba Munoz, RN, BSN

## 2019-07-02 NOTE — PROGRESS NOTES
/103, Heart rate 103. Dr Salome Eaton notified and ordered for morning dose of Catapres to be given now.

## 2019-07-02 NOTE — ANCILLARY DISCHARGE INSTRUCTIONS
Patient and/or next of kin has been given the [facility] Important Message From Medicare About Your Rights\" letter and all questions were answered.

## 2019-07-02 NOTE — PROGRESS NOTES
Consult Note Assessment: · Acute on chronic hypotonic asymptomatic euvolemic hyponatremia. Suspect SAIDH (due to malignancy vs idiopathic) exacerbated by excessive intake of free water and decreased intake of solutes and protein. · Hypokalemia. · HTN, suboptimally controlled. · Rt upper lung non small cell cancer. Current status is unclear but pet in April of this year was with possible mediastinal metastasis. · Lt le pad with lt foot gangrene, in need for revascularization. · Severe copd/emhysema. · Debilitation/malnutrition/tobacco/etoh abuse. Recommendations: · D/c ivf. · Replace K.  
· Add protein supplement. · Start 1 liter a day fluid restriction. Urine osmolality wasn't very high, hopefully moderate fluid restriction will be sufficient. · No need for frequent Na checks, at low risk for too rapid correction. · Resume amlodipine. · Thank you. Consult requested by: Endy Estevez MD 
 
ADMIT DATE: 6/29/2019  CONSULT DATE: July 2, 2019 Admission diagnosis: Gangrene of foot ([facility] Reason for Nephrology Consultation: hyponatremia. HPI: Arlene Tilley is a 62 y.o. male [address] with htn, emphysema, ongoing tobacco abuse and rt upper lung non small cell cancer treated with radiation and chemo in 2018 who was admitted with lt foot gangrene. Patient was started on abx, was seen by vascular and podiatry, needs lt le revascularization for severe pad. Patient is unaware of prior h/o of hyponatremia but on chart review mild to moderate hyponatremia dates back to 2010. Upon admission Na was 126, it is 128 today. Past Medical History:  
Diagnosis Date  Depression  Emphysema (subcutaneous) (surgical) resulting from a procedure  Hypertension  Hyponatremia  Non-small cell lung cancer ([facility] 2018  
 rt upper lung  Seizure ([facility]  Seizures ([facility] History reviewed. No pertinent surgical history. Social History Socioeconomic History  Marital status:  Spouse name: Not on file  Number of children: Not on file  Years of education: Not on file  Highest education level: Not on file Occupational History  Not on file Social Needs  Financial resource strain: Not on file  Food insecurity:  
  Worry: Not on file Inability: Not on file  Transportation needs:  
  Medical: Not on file Non-medical: Not on file Tobacco Use  Smoking status: Current Every Day Smoker  Smokeless tobacco: Never Used Substance and Sexual Activity  Alcohol use: Yes  Drug use: No  
 Sexual activity: Not on file Lifestyle  Physical activity:  
  Days per week: Not on file Minutes per session: Not on file  Stress: Not on file Relationships  Social connections:  
  Talks on phone: Not on file Gets together: Not on file Attends Gnosticism service: Not on file Active member of club or organization: Not on file Attends meetings of clubs or organizations: Not on file Relationship status: Not on file  Intimate partner violence:  
  Fear of current or ex partner: Not on file Emotionally abused: Not on file Physically abused: Not on file Forced sexual activity: Not on file Other Topics Concern  Not on file Social History Narrative  Not on file History reviewed. No pertinent family history. Allergies Allergen Reactions  Pcn [Penicillins] Unknown (comments) Home Medications:  
 
Medications Prior to Admission Medication Sig  clopidogrel (PLAVIX) 75 mg tab Take 75 mg by mouth daily. Current Inpatient Medications:  
 
Current Facility-Administered Medications Medication Dose Route Frequency  [START ON 7/3/2019] VANCOMYCIN INFORMATION NOTE   Other ONCE  
 vancomycin (VANCOCIN) 1,000 mg in 0.9% sodium chloride (MBP/ADV) 250 mL adv  1,000 mg IntraVENous Q8H  
  cloNIDine HCl (CATAPRES) tablet 0.1 mg  0.1 mg Oral BID  
 oxyCODONE-acetaminophen (PERCOCET) 5-325 mg per tablet 1-2 Tab  1-2 Tab Oral Q4H PRN  
 morphine injection 2 mg  2 mg IntraVENous Q6H PRN  
 cefepime (MAXIPIME) 1 g in 0.9% sodium chloride (MBP/ADV) 50 mL MBP  1 g IntraVENous Q12H  
 nicotine (NICODERM CQ) 14 mg/24 hr patch 1 Patch  1 Patch TransDERmal DAILY  aspirin chewable tablet 81 mg  81 mg Oral DAILY  atorvastatin (LIPITOR) tablet 40 mg  40 mg Oral QHS  heparin (porcine) injection 5,000 Units  5,000 Units SubCUTAneous Q8H  
 dextrose 5% and 0.9% NaCl infusion  100 mL/hr IntraVENous CONTINUOUS  
 VANCOMYCIN INFORMATION NOTE   Other Rx Dosing/Monitoring Review of Systems: No fever or chills. No sore throat. No cough or hemoptysis. C/o chronic shortness of breath on minimal exertion. No chest pain. No orthopnea or paroxysmal nocturnal dyspnea. Fair  appetite. Pushes fluids at home. No nausea, vomiting, abdominal pain, melena or hematochezia. No constipation or diarrhea. No dysuria, no gross hematuria of voiding difficulties. No ankle swelling, no joint paints. No muscle aches. No dizziness or lightheadedness. No headaches. Physical Assessment:  
 
Vitals:  
 07/01/19 2016 07/02/19 3161 07/02/19 0382 07/02/19 3012 BP: (!) 159/92 (!) 153/95 (!) 168/103 (!) 151/106 Pulse: (!) 103 (!) 101 (!) 103 97 Resp: 18 18 18 18 Temp: 99.2 °F (37.3 °C) 98.8 °F (37.1 °C) 97.6 °F (36.4 °C) 97.5 °F (36.4 °C) SpO2: 98% 100% 100% 100% Weight:      
Height:      
 
Last 3 Recorded Weights in this Encounter 06/29/19 1136 Weight: 59 kg (130 lb) Admission weight: Weight: 59 kg (130 lb) (06/29/19 1136) Intake/Output Summary (Last 24 hours) at 7/2/2019 1049 Last data filed at 7/2/2019 4866 Gross per 24 hour Intake 1438.33 ml Output 1750 ml Net -311.67 ml Patient is in no apparent distress but appears cachectic. HEENT: Head is normocephalic and atraumatic. Pupils are round, equal, reactive to light. Sclerae are anicteric. Oropharynx clear. Neck: no cervical lymphadenopathy or thyromegaly. Lungs: diffusely diminished air entry, bl exp rhonchi. Trachea at the midline. Cardiovascular system: S1, S2, regular rate and rhythm. No murmurs, gallops or rubs. No jvd. Carotid upstroke 2 + bilaterally. Abdomen: soft, non tender, non distended. Positive bowel sounds. No hepatosplenomegaly. No abdominal bruits. Extremities: moderate finger clubbing, no cyanosis or edema. Lt foot dressings intact. Rt foot cool, no skin breakdown. Integumentary: skin is grossly intact except lt foot. .  
Neurologic: Alert, oriented time three. Cooperative and appropriate. No gross motor or sensory deficits. Data Review: 
 
Labs: Results:  
   
Chemistry Recent Labs  
  07/02/19 
0915 07/02/19 
0325 07/01/19 
2245  06/29/19 
1225 * 94 95   < > 86 * 127* 126*   < > 121*  
K 3.3* 3.6 3.8   < > 4.4 CL 94* 94* 93*   < > 87* CO2 23 24 22   < > 22 BUN 4* 3* 3*   < > 6* CREA 0.69 0.64 0.62   < > 0.69 CA 8.0* 7.9* 7.9*   < > 8.3* AGAP 11 9 11   < > 12 BUCR 6* 5* 5*   < > 9* AP  --   --   --   --  87  
TP  --   --   --   --  7.5 ALB  --   --   --   --  3.2*  
GLOB  --   --   --   --  4.3* AGRAT  --   --   --   --  0.7*  
 < > = values in this interval not displayed. CBC w/Diff Recent Labs  
  06/29/19 
1225 WBC 7.2 RBC 3.22* HGB 9.7* HCT 28.1*  
 GRANS 71 LYMPH 19* EOS 1 Iron/Ferritin No results for input(s): IRON in the last 72 hours. No lab exists for component: TIBCCALC  
PTH/VIT D No results for input(s): PTH in the last 72 hours. No lab exists for component: AILYN Beach M.D [facility] Office [phone] Pager [phone] July 2, 2019

## 2019-07-02 NOTE — PROGRESS NOTES
Internal Medicine Progress Note Patient's Name: Paola Reason Admit Date: 6/29/2019 Length of Stay: 3 Assessment/Plan Active Hospital Problems Diagnosis Date Noted  Gangrene of foot ([facility] 06/29/2019  Cellulitis of left lower extremity 06/29/2019  Malnourished ([facility] 06/29/2019  Peripheral vascular disease ([facility] 06/29/2019  Hyponatremia 06/29/2019  
 
- Cont IV vanco/zosyn 
- plan for basic wound care management 
- future vascular surgery but needs cards clearance first 
 
Subjective No complaints but wants to go home Objective Visit Vitals BP (!) 166/104 Pulse (!) 57 Temp 98.9 °F (37.2 °C) Resp 16 Ht 6' (1.829 m) Wt 59 kg (130 lb) SpO2 100% BMI 17.63 kg/m² Physical Exam: 
General Appearance: NAD, conversant Lungs: CTA with normal respiratory effort CV: RRR, no m/r/g Abdomen: soft, non-tender, normal bowel sounds Extremities: no cyanosis, + dry gangrene left distal 3rd toe w/ missing toenails on first three toes on left Neuro: No focal deficits, motor/sensory intact Lab/Data Reviewed: 
BMP:  
Lab Results Component Value Date/Time  (L) 07/02/2019 09:15 AM  
 K 3.3 (L) 07/02/2019 09:15 AM  
 CL 94 (L) 07/02/2019 09:15 AM  
 CO2 23 07/02/2019 09:15 AM  
 AGAP 11 07/02/2019 09:15 AM  
  (H) 07/02/2019 09:15 AM  
 BUN 4 (L) 07/02/2019 09:15 AM  
 CREA 0.69 07/02/2019 09:15 AM  
 GFRAA >60 07/02/2019 09:15 AM  
 GFRNA >60 07/02/2019 09:15 AM  
 
CBC:  
No results found for: WBC, HGB, HGBEXT, HCT, HCTEXT, PLT, PLTEXT, HGBEXT, HCTEXT, PLTEXT Imaging Reviewed: 
No results found. Medications Reviewed: 
Current Facility-Administered Medications Medication Dose Route Frequency  [START ON 7/3/2019] VANCOMYCIN INFORMATION NOTE   Other ONCE  
 amLODIPine (NORVASC) tablet 10 mg  10 mg Oral DAILY  potassium chloride (KLOR-CON) tablet 20 mEq  20 mEq Oral TID  vancomycin (VANCOCIN) 1,000 mg in 0.9% sodium chloride (MBP/ADV) 250 mL adv  1,000 mg IntraVENous Q8H  
 cloNIDine HCl (CATAPRES) tablet 0.1 mg  0.1 mg Oral BID  
 oxyCODONE-acetaminophen (PERCOCET) 5-325 mg per tablet 1-2 Tab  1-2 Tab Oral Q4H PRN  
 morphine injection 2 mg  2 mg IntraVENous Q6H PRN  
 cefepime (MAXIPIME) 1 g in 0.9% sodium chloride (MBP/ADV) 50 mL MBP  1 g IntraVENous Q12H  
 nicotine (NICODERM CQ) 14 mg/24 hr patch 1 Patch  1 Patch TransDERmal DAILY  aspirin chewable tablet 81 mg  81 mg Oral DAILY  atorvastatin (LIPITOR) tablet 40 mg  40 mg Oral QHS  heparin (porcine) injection 5,000 Units  5,000 Units SubCUTAneous Q8H  
 VANCOMYCIN INFORMATION NOTE   Other Rx Dosing/Monitoring

## 2019-07-02 NOTE — CONSULTS
[facility] - Consult Note Date of  Admission: 6/29/2019 11:38 AM  
Primary Care Physician:  None Patient seen and examined independently. He has a very low functional capacity. Will order nuclear stress test for a.m. Agree with assessment and plan as noted below. Chey Lynne MD 
Patient Active Problem List  
Diagnosis Code  Gangrene of foot ([facility]) I96  
 Cellulitis of left lower extremity L03. 80  
 Malnourished ([facility]) E46  Peripheral vascular disease (HCC) I73.9  Hyponatremia E87.1  
 
- Left lower extremity PAD/gangrene - Preoperative risk assessment for vascular intervention - Hyponatremia - Decreased exercise tolerance - Tobacco use 
- HTN Plan:  
 
- Patient with severe PAD and cardiology consulted for preoperative risk assessment. Vascular surgery considering their options of whether to pursue open revascularization, therefore will proceed with nuclear stress test for further risk stratification.  
- NPO After MN 
- Counseled regarding tobacco cessation History of Present Illness: This is a 62 y.o. male admitted for Gangrene of foot ([facility]) Shashi Hoffman. Patient complains of:  Seen in consult for preop clearance, admitted for left third toe gangrene This is a 62year old male with a history of hypertension and tobacco use that cardiology is seeing in consult due to preop clearance. He describes having several weeks of left foot and leg pain and skin discoloration. He has been admitted and seen by podiatry and vascular surgery for severe PAD. He has been started on antibiotics. Regarding cardiac history, he denies history of CAD, CHF, cardiac stenting or having any recent stress tests. He denies chest pain. He can walk on a flat surface but would have to stop frequently due to leg pain and dyspnea. Does not take the stairs due to leg pain. Denies orthopnea or PND. He does smoke cigarettes but states he is ready to quit. Cardiac risk factors: smoking/ tobacco exposure, male gender, hypertension Review of Symptoms:  Except as stated above include: 
Constitutional:  negative Respiratory:  negative Cardiovascular: per HPI Gastrointestinal: negative Genitourinary:  negative Musculoskeletal:  Negative Neurological:  Negative Dermatological:  Negative Endocrinological: Negative Psychological:  Negative A comprehensive review of systems was negative except for that written in the HPI. Past Medical History:  
 
Past Medical History:  
Diagnosis Date  Depression  Emphysema (subcutaneous) (surgical) resulting from a procedure  Hypertension  Hyponatremia  Non-small cell lung cancer ([facility] 2018  
 rt upper lung  Seizure ([facility].)  Seizures ([facility] Social History:  
 
Social History Socioeconomic History  Marital status:  Spouse name: Not on file  Number of children: Not on file  Years of education: Not on file  Highest education level: Not on file Tobacco Use  Smoking status: Current Every Day Smoker  Smokeless tobacco: Never Used Substance and Sexual Activity  Alcohol use: Yes  Drug use: No  
 
 
 Family History:  
History reviewed. No pertinent family history. Medications: Allergies Allergen Reactions  Pcn [Penicillins] Unknown (comments) Current Facility-Administered Medications Medication Dose Route Frequency  [START ON 7/3/2019] VANCOMYCIN INFORMATION NOTE   Other ONCE  
 amLODIPine (NORVASC) tablet 10 mg  10 mg Oral DAILY  potassium chloride (KLOR-CON) tablet 20 mEq  20 mEq Oral TID  vancomycin (VANCOCIN) 1,000 mg in 0.9% sodium chloride (MBP/ADV) 250 mL adv  1,000 mg IntraVENous Q8H  
 cloNIDine HCl (CATAPRES) tablet 0.1 mg  0.1 mg Oral BID  
 oxyCODONE-acetaminophen (PERCOCET) 5-325 mg per tablet 1-2 Tab  1-2 Tab Oral Q4H PRN  
 morphine injection 2 mg  2 mg IntraVENous Q6H PRN  
  cefepime (MAXIPIME) 1 g in 0.9% sodium chloride (MBP/ADV) 50 mL MBP  1 g IntraVENous Q12H  
 nicotine (NICODERM CQ) 14 mg/24 hr patch 1 Patch  1 Patch TransDERmal DAILY  aspirin chewable tablet 81 mg  81 mg Oral DAILY  atorvastatin (LIPITOR) tablet 40 mg  40 mg Oral QHS  heparin (porcine) injection 5,000 Units  5,000 Units SubCUTAneous Q8H  
 VANCOMYCIN INFORMATION NOTE   Other Rx Dosing/Monitoring Physical Exam:  
 
Visit Vitals BP (!) 166/104 Pulse (!) 57 Temp 98.9 °F (37.2 °C) Resp 16 Ht 6' (1.829 m) Wt 130 lb (59 kg) SpO2 100% BMI 17.63 kg/m² BP Readings from Last 3 Encounters:  
07/02/19 (!) 166/104  
01/13/19 120/75  
06/22/18 142/89 Pulse Readings from Last 3 Encounters:  
07/02/19 (!) 57  
01/13/19 87  
06/22/18 77 Wt Readings from Last 3 Encounters:  
06/29/19 130 lb (59 kg) 04/29/17 150 lb (68 kg) 03/11/17 150 lb (68 kg) General:  alert, cooperative, no distress Neck:  nontender, no JVD Lungs:  clear to auscultation bilaterally Heart:  regular rate and rhythm, S1, S2 normal, no murmur, click, rub or gallop Abdomen:  abdomen is soft without significant tenderness, masses, organomegaly or guarding Extremities:  no edema Skin: Warm and dry. no hyperpigmentation, vitiligo, or suspicious lesions Neuro: alert, oriented x3, affect appropriate Psych: non focal 
 
 Data Review: No results for input(s): WBC, HGB, HCT, PLT, HGBEXT, HCTEXT, PLTEXT in the last 72 hours. Recent Labs  
  07/02/19 
0915 07/02/19 
0325 07/01/19 
2245 * 127* 126*  
K 3.3* 3.6 3.8 CL 94* 94* 93* CO2 23 24 22 * 94 95 BUN 4* 3* 3*  
CREA 0.69 0.64 0.62  
CA 8.0* 7.9* 7.9* Results for orders placed or performed during the hospital encounter of 06/29/19 EKG, 12 LEAD, INITIAL Result Value Ref Range Ventricular Rate 102 BPM  
 Atrial Rate 102 BPM  
 P-R Interval 218 ms QRS Duration 66 ms  
 Q-T Interval 334 ms QTC Calculation (Bezet) 435 ms Calculated P Axis 65 degrees Calculated R Axis -41 degrees Calculated T Axis 73 degrees Diagnosis Sinus tachycardia with 1st degree AV block Left axis deviation Septal infarct , age undetermined vs lead placement Abnormal ECG When compared with ECG of 22-JUN-2018 18:22, 
QRS axis shifted left Confirmed by Herbie Castillo ([id number]) on 6/30/2019 11:53:34 AM 
  
 
 
All Cardiac Markers in the last 24 hours:  No results found for: CPK, CK, CKMMB, CKMB, RCK3, CKMBT, CKNDX, CKND1, NAHID, TROPT, TROIQ, MICHELA, TROPT, TNIPOC, BNP, BNPP Last Lipid:   
Lab Results Component Value Date/Time Cholesterol, total 133 04/18/2012 03:50 AM  
 HDL Cholesterol 74 (H) 04/18/2012 03:50 AM  
 LDL, calculated 41.6 04/18/2012 03:50 AM  
 Triglyceride 87 04/18/2012 03:50 AM  
 CHOL/HDL Ratio 1.8 04/18/2012 03:50 AM  
 
 
Signed By: Denise Cochran. Tawana Desouza PA-C July 2, 2019

## 2019-07-02 NOTE — ROUTINE PROCESS
Bedside and Verbal shift change report given to COLEEN Millan, BSN (oncoming nurse) by Marley Bowers RN, BSN 
 (offgoing nurse). Report included the following information SBAR, Kardex, Intake/Output, MAR and Recent Results.   
 
Marley Bowers RN, BSN

## 2019-07-02 NOTE — CDMP QUERY
Pt admitted PVD, gangrene, left foot, hyponatremia with  and has malnutrition documented. Please further specify type of malnutrition. ? Protein calorie malnutrition (mild, moderate, severe) ? Other (please specify) ? Unable to determine The medical record reflects the following: 
  Risk Factors: 61 yo male admitted with gangrene, cellulitis, malnutrition Clinical Indicators: Per Dietician note  6/30 Meets Criteria for Chronic Malnutrition  
[x] Severe Malnutrition, as evidenced by: 
            [x] Severe muscle wasting, loss of subcutaneous fat 
[x] Weight loss of  >5% in 1 month 
  
 
=> Unintentional weight loss related to decrease energy intake /cancer as evidenced by a 37 lb or 22.5% loss x 1 year. 
  
 
=>Underweight related to inadequate energy intake PTA as evidenced by a BMI of 17.6 and 73% IBW.  
  
 
  Treatment: . 2g Na Diet (Liberalized to increase PO intake) 2. Ensure TID (elizabeth) 3. Monitor labs, weight and PO intake 4. RD to follow Thank you, 
Govind Fontanez RN CDI   [phone]

## 2019-07-02 NOTE — PROGRESS NOTES
Podiatry Surgery Progress Note Patient: Thiago Jessica MRN: [id number]  SSN: [id number] YOB: 1955  Age: 62 y.o. Sex: male Assessment:  
 
Patient Active Problem List  
Diagnosis Code  Gangrene of foot ([facility] I96  
 Cellulitis of left lower extremity L03. 80  
 Malnourished ([facility] E46  Peripheral vascular disease (HCC) I73.9  Hyponatremia E87.1 Plan:  
Continue [address] with betadine and IV antibx. Monitor progress Total time spent with patient: 30 Minutes Care Plan discussed with: Patient Discussed:  Care Plan Disposition:  Stable Mr. Margot Coyle is a 62 y.o. male who was seen on morning rounds. Patient was eating breakfast with no new complaints today. Subjective:  
Past Medical History Past Medical History:  
Diagnosis Date  Cancer ([facility] lung  Depression  Hypertension  Seizure ([facility]  Seizures ([facility] Social History Socioeconomic History  Marital status:  Spouse name: Not on file  Number of children: Not on file  Years of education: Not on file  Highest education level: Not on file Occupational History  Not on file Social Needs  Financial resource strain: Not on file  Food insecurity:  
  Worry: Not on file Inability: Not on file  Transportation needs:  
  Medical: Not on file Non-medical: Not on file Tobacco Use  Smoking status: Current Every Day Smoker  Smokeless tobacco: Never Used Substance and Sexual Activity  Alcohol use: Yes  Drug use: No  
 Sexual activity: Not on file Lifestyle  Physical activity:  
  Days per week: Not on file Minutes per session: Not on file  Stress: Not on file Relationships  Social connections:  
  Talks on phone: Not on file Gets together: Not on file Attends Hindu service: Not on file Active member of club or organization: Not on file Attends meetings of clubs or organizations: Not on file Relationship status: Not on file  Intimate partner violence:  
  Fear of current or ex partner: Not on file Emotionally abused: Not on file Physically abused: Not on file Forced sexual activity: Not on file Other Topics Concern  Not on file Social History Narrative  Not on file Current Medications Current Facility-Administered Medications Medication Dose Route Frequency Provider Last Rate Last Dose  [START ON 7/3/2019] VANCOMYCIN INFORMATION NOTE   Other ONCE Stew Mendoza MD      
 [facility]) 1,000 mg in 0.9% sodium chloride (MBP/ADV) 250 mL adv  1,000 mg IntraVENous Q8H Stew Mendoza  mL/hr at 07/02/19 0432 1,000 mg at 07/02/19 7885  cloNIDine HCl (CATAPRES) tablet 0.1 mg  0.1 mg Oral BID Wilman Vizcarra MD   0.1 mg at 07/02/19 8023  
 oxyCODONE-acetaminophen (PERCOCET) 5-325 mg per tablet 1-2 Tab  1-2 Tab Oral Q4H PRN Stew Mendoza MD   2 Tab at 07/02/19 9623  morphine injection 2 mg  2 mg IntraVENous Q6H PRN Stew Mendoza MD      
 cefepime (MAXIPIME) 1 g in 0.9% sodium chloride (MBP/ADV) 50 mL MBP  1 g IntraVENous Q12H Veronica Fischer H,  mL/hr at 07/02/19 0030 1 g at 07/02/19 0030  
 nicotine (NICODERM CQ) 14 mg/24 hr patch 1 Patch  1 Patch TransDERmal DAILY Veronica Fischer H, DO   1 Patch at 07/01/19 0847  
 aspirin chewable tablet 81 mg  81 mg Oral DAILY Spenser Daingerfield, DO   81 mg at 07/01/19 9154  
 atorvastatin (LIPITOR) tablet 40 mg  40 mg Oral QHS Veronica Fischer H, DO   40 mg at 07/01/19 2158  heparin (porcine) injection 5,000 Units  5,000 Units SubCUTAneous Q8H Veronica Fischer H, DO   5,000 Units at 07/02/19 0427  
 dextrose 5% and 0.9% NaCl infusion  100 mL/hr IntraVENous CONTINUOUS Veronica Fischer H,  mL/hr at 07/02/19 0030 100 mL/hr at 07/02/19 0030  VANCOMYCIN INFORMATION NOTE   Other Rx Dosing/Monitoring Kayley Dukes Claudia Shell DO Patient Allergies Allergies Allergen Reactions  Pcn [Penicillins] Unknown (comments) Objective:  
General Exam 
alert, cooperative, no distress, appears stated age Vitals Visit Vitals BP (!) 168/103 (BP 1 Location: Right arm, BP Patient Position: At rest) Pulse (!) 103 Temp 97.6 °F (36.4 °C) Resp 18 Ht 6' (1.829 m) Wt 59 kg (130 lb) SpO2 100% BMI 17.63 kg/m² REVIEW OF SYSTEMS: 
No changes Physical Exam:   
Mr Apolinar Gates 9 F. o. male who is pleasant, alert and oriented x3, in no apparent distress. . Patient is well-developed and nourished, with good attention to hygiene and body habitus. Mood and affect normal, appropriate to situation.  
  
Left foot: Gangrene tip of 3rd toe.  NO odor 
  
Vascular Exam:  
Dorsalis Pedis  and Posterior Tibial non palpable with no edema of foot and ankle (no pulses audible with doppler) Skin temp warm to cool. Pedal hair is absent.   
  
Neurological Exam:  
Light touch protective sensation is diminshed to both feet. There is noted Loss of protective sensation. 
  
Musculoskeletal Exam:  
Muscle tone is normal for age and situation. There is equinus of the left limb. The muscle strength is 5/5 for the flexors, extensors, inverters, and everter's. 
  
Dermatological Exam:  
Skin is of abnormal texture and turgor with some atrophic skin changes noting decreased hair growth, nail changes (thickening), pigmentary changes, skin texture (thin,shiney), skin color (rubor, red) . R/L Bilateral. There is diffuse xerosis. There is noted subungual debris. 
  
Left foot 3rd toe gangrene and posterior plantar left heel. 
  
 
 
 
Wound Foot Other (Comment); Left (Active) Dressing Status Clean, dry, and intact 6/30/2019  8:43 PM  
Dressing Type 4 x 4;Gauze wrap (gloria) 6/30/2019  8:43 PM  
Drainage Amount None 6/30/2019  8:43 PM  
Wound Odor None 6/30/2019  8:43 PM  
 Cleansing and Cleansing Agents  Betadine;Dermal wound cleanser 6/30/2019 12:00 PM  
Dressing Changed Changed/New 6/30/2019 12:00 PM  
Number of days: 2 Wound Heel Left (Active) Number of days: 1 Labs Recent Results (from the past 24 hour(s)) METABOLIC PANEL, BASIC Collection Time: 07/01/19  9:30 AM  
Result Value Ref Range Sodium 129 (L) 136 - 145 mmol/L Potassium 3.8 3.5 - 5.5 mmol/L Chloride 97 (L) 100 - 108 mmol/L  
 CO2 24 21 - 32 mmol/L Anion gap 8 3.0 - 18 mmol/L Glucose 89 74 - 99 mg/dL BUN 3 (L) 7.0 - 18 MG/DL Creatinine 0.65 0.6 - 1.3 MG/DL  
 BUN/Creatinine ratio 5 (L) 12 - 20 GFR est AA >60 >60 ml/min/1.73m2 GFR est non-AA >60 >60 ml/min/1.73m2 Calcium 8.1 (L) 8.5 - 29.6 MG/DL  
METABOLIC PANEL, BASIC Collection Time: 07/01/19 11:05 AM  
Result Value Ref Range Sodium 127 (L) 136 - 145 mmol/L Potassium 3.8 3.5 - 5.5 mmol/L Chloride 97 (L) 100 - 108 mmol/L  
 CO2 24 21 - 32 mmol/L Anion gap 6 3.0 - 18 mmol/L Glucose 81 74 - 99 mg/dL BUN 3 (L) 7.0 - 18 MG/DL Creatinine 0.59 (L) 0.6 - 1.3 MG/DL  
 BUN/Creatinine ratio 5 (L) 12 - 20 GFR est AA >60 >60 ml/min/1.73m2 GFR est non-AA >60 >60 ml/min/1.73m2 Calcium 8.0 (L) 8.5 - 99.3 MG/DL  
METABOLIC PANEL, BASIC Collection Time: 07/01/19  6:26 PM  
Result Value Ref Range Sodium 127 (L) 136 - 145 mmol/L Potassium 4.2 3.5 - 5.5 mmol/L Chloride 93 (L) 100 - 108 mmol/L  
 CO2 23 21 - 32 mmol/L Anion gap 11 3.0 - 18 mmol/L Glucose 84 74 - 99 mg/dL BUN 4 (L) 7.0 - 18 MG/DL Creatinine 0.68 0.6 - 1.3 MG/DL  
 BUN/Creatinine ratio 6 (L) 12 - 20 GFR est AA >60 >60 ml/min/1.73m2 GFR est non-AA >60 >60 ml/min/1.73m2 Calcium 8.6 8.5 - 08.3 MG/DL  
METABOLIC PANEL, BASIC Collection Time: 07/01/19 10:45 PM  
Result Value Ref Range Sodium 126 (L) 136 - 145 mmol/L Potassium 3.8 3.5 - 5.5 mmol/L  Chloride 93 (L) 100 - 108 mmol/L  
 CO2 22 21 - 32 mmol/L Anion gap 11 3.0 - 18 mmol/L Glucose 95 74 - 99 mg/dL BUN 3 (L) 7.0 - 18 MG/DL Creatinine 0.62 0.6 - 1.3 MG/DL  
 BUN/Creatinine ratio 5 (L) 12 - 20 GFR est AA >60 >60 ml/min/1.73m2 GFR est non-AA >60 >60 ml/min/1.73m2 Calcium 7.9 (L) 8.5 - 51.8 MG/DL  
METABOLIC PANEL, BASIC Collection Time: 07/02/19  3:25 AM  
Result Value Ref Range Sodium 127 (L) 136 - 145 mmol/L Potassium 3.6 3.5 - 5.5 mmol/L Chloride 94 (L) 100 - 108 mmol/L  
 CO2 24 21 - 32 mmol/L Anion gap 9 3.0 - 18 mmol/L Glucose 94 74 - 99 mg/dL BUN 3 (L) 7.0 - 18 MG/DL Creatinine 0.64 0.6 - 1.3 MG/DL  
 BUN/Creatinine ratio 5 (L) 12 - 20 GFR est AA >60 >60 ml/min/1.73m2 GFR est non-AA >60 >60 ml/min/1.73m2 Calcium 7.9 (L) 8.5 - 10.1 MG/DL Luis Armando January Collection Time: 07/02/19  3:25 AM  
Result Value Ref Range Vancomycin,trough 18.2 10.0 - 20.0 ug/mL Reported dose date: [id number] Reported dose time: 2000 Reported dose: 1,000 MG UNITS X-Ray: EXAM: Foot Series Complete 
  
Indication: Left foot pain. 
  
Technique:  Frontal, oblique, and lateral views of the left foot. 
  
Comparison: None 
_______________ FINDINGS: 
Osteopenia with old healed fourth and fifth metatarsal fractures. No acute 
fracture or destructive osseous abnormality. Small degenerative plantar cannula 
spur.  The soft tissues appear within normal limits.  No radiopaque foreign body 
is seen.  
  
_______________ IMPRESSION IMPRESSION: 
  
1.  No acute fracture, destructive osseous changes or plain film findings to 
suggest osteomyelitis. 
  
   
Imaging  
  
XR FOOT LT MIN 3 V (Order: [id number]) - 6/29/2019  
  
 
 
 
 
Procedures:   none Zoran Del Cid DPM 
July 2, 2019

## 2019-07-03 NOTE — PROGRESS NOTES
By to see if patient. Patient was out to get echocardiogram.  We will try to see the patient again later. Followed up on patient's labs. Sodium slightly trending down to 125. 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 Will start the patient on salt tablets. Recheck sodium in AM.. NOted that patient ECHO showed normal EF , mild LVH Please call with questions Vidal Runner, MD [facility] Cell [phone] Pager: [phone]

## 2019-07-03 NOTE — PROGRESS NOTES
Podiatry Surgery Progress Note Patient: Ellen Caraballo MRN: [id number]  SSN: [id number] YOB: 1955  Age: 62 y.o. Sex: male Assessment:  
 
Patient Active Problem List  
Diagnosis Code  [facility] of foot ([facility] I96  
 Cellulitis of left lower extremity L03. 80  
 Malnourished ([facility] E46  Peripheral vascular disease (HCC) I73.9  Hyponatremia E87.1 Plan:  
Continue current [facility] with betadne to 3rd toe and heel left foot. Continue prevalon boots and IV antibx. Total time spent with patient: 15 Minutes Discussed:  Care Plan Disposition:  Stable Mr. José Miguel Lucero is a 62 y.o. male who is of floor for NM stress test per Cardiology Subjective:  
Past Medical History Past Medical History:  
Diagnosis Date  Depression  Emphysema (subcutaneous) (surgical) resulting from a procedure  Hypertension  Hyponatremia  Non-small cell lung cancer ([facility] 2018  
 rt upper lung  Seizure ([facility]  Seizures ([facility] Social History Socioeconomic History  Marital status:  Spouse name: Not on file  Number of children: Not on file  Years of education: Not on file  Highest education level: Not on file Occupational History  Not on file Social Needs  Financial resource strain: Not on file  Food insecurity:  
  Worry: Not on file Inability: Not on file  Transportation needs:  
  Medical: Not on file Non-medical: Not on file Tobacco Use  Smoking status: Current Every Day Smoker  Smokeless tobacco: Never Used Substance and Sexual Activity  Alcohol use: Yes  Drug use: No  
 Sexual activity: Not on file Lifestyle  Physical activity:  
  Days per week: Not on file Minutes per session: Not on file  Stress: Not on file Relationships  Social connections:  
  Talks on phone: Not on file Gets together: Not on file Attends Yazidi service: Not on file Active member of club or organization: Not on file Attends meetings of clubs or organizations: Not on file Relationship status: Not on file  Intimate partner violence:  
  Fear of current or ex partner: Not on file Emotionally abused: Not on file Physically abused: Not on file Forced sexual activity: Not on file Other Topics Concern  Not on file Social History Narrative  Not on file Current Medications Current Facility-Administered Medications Medication Dose Route Frequency Provider Last Rate Last Dose  technetium sestamibi (CARDIOLITE) injection 58.35 millicurie  21.22 millicurie IntraVENous RAD ONCE Allison Wheeler MD      
 regadenoson [facility]) injection 0.4 mg  0.4 mg IntraVENous RAD ONCE Allison Wheeler MD      
 VANCOMYCIN INFORMATION NOTE   Other ONCE Allison Wheeler MD      
 amLODIPine (NORVASC) tablet 10 mg  10 mg Oral DAILY Ham Bobby MD   10 mg at 07/02/19 1133  potassium chloride (KLOR-CON) tablet 20 mEq  20 mEq Oral TID Ham Bobby MD   20 mEq at 07/02/19 2132  
 vancomycin (VANCOCIN) 1,000 mg in 0.9% sodium chloride (MBP/ADV) 250 mL adv  1,000 mg IntraVENous Q8H Allison Wheeler  mL/hr at 07/03/19 0404 1,000 mg at 07/03/19 0404  cloNIDine HCl (CATAPRES) tablet 0.1 mg  0.1 mg Oral BID Yesi Lopez MD   0.1 mg at 07/02/19 2036  oxyCODONE-acetaminophen (PERCOCET) 5-325 mg per tablet 1-2 Tab  1-2 Tab Oral Q4H PRN Allison Wheeler MD   2 Tab at 07/02/19 1306  morphine injection 2 mg  2 mg IntraVENous Q6H PRN Allison Wheeler MD      
 cefepime (MAXIPIME) 1 g in 0.9% sodium chloride (MBP/ADV) 50 mL MBP  1 g IntraVENous Q12H Hurshel Salt H,  mL/hr at 07/03/19 0119 1 g at 07/03/19 0119  
 nicotine (NICODERM CQ) 14 mg/24 hr patch 1 Patch  1 Patch TransDERmal DAILY Hurshel Salt H, DO   1 Patch at 07/02/19 8672  
 aspirin chewable tablet 81 mg  81 mg Oral DAILY Chauncey Corners, DO 81 mg at 07/02/19 0824  
 atorvastatin (LIPITOR) tablet 40 mg  40 mg Oral QHS Ana Riverside H, DO   40 mg at 07/02/19 2132  heparin (porcine) injection 5,000 Units  5,000 Units SubCUTAneous Q8H Ana Riverside H, DO   5,000 Units at 07/03/19 0131  VANCOMYCIN INFORMATION NOTE   Other Rx Dosing/Monitoring Eura Contes, DO Patient Allergies Allergies Allergen Reactions  Pcn [Penicillins] Unknown (comments) Objective:  
General Exam 
alert, cooperative, no distress, appears stated age Vitals Visit Vitals BP (!) 150/94 (BP 1 Location: Right arm, BP Patient Position: At rest) Pulse (!) 104 Temp 99.2 °F (37.3 °C) Resp 16 Ht 6' (1.829 m) Wt 59 kg (130 lb) SpO2 100% BMI 17.63 kg/m² REVIEW OF SYSTEMS: 
General: denies chronic fatigue, weight loss, fever, anemia, bruising, depression, nervousness, panic attacks HEENT: denies ringing in ears, ear infections, dizzy spells, poor vision, glaucoma, sinus trouble, hoarseness, eye infections GI: denies diarrhea, gas, bloating, heartburn, regurgitation, difficulty swallowing, painful swallowing, nausea, vomiting, constipation, abdominal pain, decreased appetite, blood in stools, black stools, jaundice, dark urine Lungs: denies pneumonia, asthma, cough, SOB, hemoptysis Heart: denies chest pain, irregular heart beat, ankle swelling, HTN Skin: denies rashes, hives, allergic reaction Urinary: denies UTI, kidney stones, decreased urine force and flow, urination at night, blood in urine, painful urination Bones and Joints: denies arthritis, rheumatism, back pain, gout, osteoporosis Neurologic: denies stroke, seizures, headaches, numbness, tingling Physical Exam:   
Mr Stephanie French 9 Z. o. male who is pleasant, alert and oriented x3, in no apparent distress. . Patient is well-developed and nourished, with good attention to hygiene and body habitus.  Mood and affect normal, appropriate to situation.  
  
 Left foot: Gangrene tip of 3rd toe.  NO odor 
  
Vascular Exam:  
Dorsalis Pedis  and Posterior Tibial non palpable with no edema of foot and ankle (no pulses audible with doppler) Skin temp warm to cool. Pedal hair is absent.   
  
Neurological Exam:  
Light touch protective sensation is diminshed to both feet. There is noted Loss of protective sensation. 
  
Musculoskeletal Exam:  
Muscle tone is normal for age and situation. There is equinus of the left limb. The muscle strength is 5/5 for the flexors, extensors, inverters, and everter's. 
  
Dermatological Exam:  
Skin is of abnormal texture and turgor with some atrophic skin changes noting decreased hair growth, nail changes (thickening), pigmentary changes, skin texture (thin,shiney), skin color (rubor, red) . R/L Bilateral. There is diffuse xerosis. There is noted subungual debris. 
  
Left foot 3rd toe gangrene and posterior plantar left heel. 
  
 
 
 
Wound Foot Other (Comment); Left (Active) Dressing Status Other (Comment) 7/2/2019  7:20 PM  
Dressing Type Open to air 7/2/2019  7:15 AM  
Drainage Amount None 7/2/2019  7:15 AM  
Wound Odor None 7/2/2019  7:15 AM  
Cleansing and Cleansing Agents  Dermal wound cleanser;Betadine 7/2/2019  7:15 AM  
Dressing Changed Changed/New 7/2/2019  7:15 AM  
Dressing Type Applied 4 x 4;Other (Comment) 7/2/2019  7:15 AM  
Procedure Tolerated Well 7/2/2019  7:15 AM  
Number of days: 3 Wound Heel Left (Active) Number of days: 2 Labs Recent Results (from the past 24 hour(s)) METABOLIC PANEL, BASIC Collection Time: 07/02/19  9:15 AM  
Result Value Ref Range Sodium 128 (L) 136 - 145 mmol/L Potassium 3.3 (L) 3.5 - 5.5 mmol/L Chloride 94 (L) 100 - 108 mmol/L  
 CO2 23 21 - 32 mmol/L Anion gap 11 3.0 - 18 mmol/L Glucose 107 (H) 74 - 99 mg/dL BUN 4 (L) 7.0 - 18 MG/DL Creatinine 0.69 0.6 - 1.3 MG/DL  
 BUN/Creatinine ratio 6 (L) 12 - 20 GFR est AA >60 >60 ml/min/1.73m2 GFR est non-AA >60 >60 ml/min/1.73m2 Calcium 8.0 (L) 8.5 - 10.1 MG/DL RENAL FUNCTION PANEL Collection Time: 07/03/19  3:40 AM  
Result Value Ref Range Sodium 125 (L) 136 - 145 mmol/L Potassium 4.4 3.5 - 5.5 mmol/L Chloride 94 (L) 100 - 108 mmol/L  
 CO2 22 21 - 32 mmol/L Anion gap 9 3.0 - 18 mmol/L Glucose 86 74 - 99 mg/dL BUN 5 (L) 7.0 - 18 MG/DL Creatinine 0.63 0.6 - 1.3 MG/DL  
 BUN/Creatinine ratio 8 (L) 12 - 20 GFR est AA >60 >60 ml/min/1.73m2 GFR est non-AA >60 >60 ml/min/1.73m2 Calcium 8.2 (L) 8.5 - 10.1 MG/DL Phosphorus 3.1 2.5 - 4.9 MG/DL Albumin 2.8 (L) 3.4 - 5.0 g/dL NUCLEAR CARDIAC STRESS TEST Collection Time: 07/03/19  7:53 AM  
Result Value Ref Range Target  bpm  
 
EXAM:  CT ANGIOGRAM OF THE ABDOMINAL AORTA AND BILATERAL LOWER EXTREMITIES WITH 
SAGITTAL AND CORONAL RECONSTRUCTIONS 
  
CLINICAL INDICATION/HISTORY: Atherosclerosis, unspecified; evaluate 
abdomen/pelvis/leg arteries. > Additional: Left foot pain and gangrene. 
  
COMPARISON: None. > Reference Exam: None. 
  
TECHNIQUE:  CT arteriogram of the abdominal aorta and both lower extremities was 
performed after intravenous contrast administration without complication.   
  
Extensive separate workstation post processing was performed by [employer] 
to include MIPs, color surface images, 3-D reconstructions, and curve planar 
reformat imaging.   
  
One or more dose reduction techniques were used on this CT: automated exposure 
control, adjustment of the mAs and/or kVp according to patient size, and 
iterative reconstruction techniques. The specific techniques used on this CT 
exam have been documented in the patient's electronic medical record. Digital 
Imaging and Communications in Medicine (DICOM) format image data are available 
to nonaffiliated external healthcare facilities or entities on a secure, media free, reciprocally searchable basis with patient authorization for at least a 
12-month period after this study. 
  
________________________________________________ 
  
FINDINGS: 
  
--- CT ARTERIOGRAM ---   
  
ABDOMINAL AORTA:  Mild to moderate diffuse atherosclerosis moderate luminal 
irregularity. No significant infrarenal abdominal aortic stenosis. No aneurysm 
or dissection. 
  
- Celiac axis:  No significant proximal stenosis. 
  
- Superior mesenteric artery:  No significant proximal stenosis. 
  
- Left renal:  Moderate to marked origin stenosis. 
  
- Right renal:  No significant stenosis. 
  
RIGHT LOWER EXTREMITY: 
  
- Iliac system: Moderate to marked atherosclerosis. Mild origin stenosis 
involving the common iliac artery. Multilevel stenosis involving the external 
iliac artery, most severe at the origin which is moderate. Tight stenosis 
involving the origin of the internal iliac artery. 
  
- Femoral/Popliteal system: Marked tight focal stenosis involving the mid common 
femoral artery. Additional moderate short segment stenosis involving the distal 
common femoral artery. Proximal profunda femoral artery is patent. Multilevel 
stenosis throughout the superficial femoral artery with multilevel tight 
stenosis or occlusion present within the proximal and mid aspects. Multilevel 
disease throughout the popliteal artery with long segment occlusion involving 
the mid to distal aspect. 
  
- Tibial/peroneal system:  Extensive atherosclerosis with suspected occlusions 
of the origins and proximal aspects of the trifurcation vessels. Some flow seen 
within the tibioperoneal vessels in the mid to distal calf. 
  
LEFT LOWER EXTREMITY: 
  
- Iliac system: Moderate to marked atherosclerosis. Suggestion of mild focal 
origin stenosis involving the common iliac artery secondary to calcified 
eccentric plaque. There is long segment occlusion of the external iliac artery along its entire length. Moderate to marked origin stenosis involving the 
internal iliac artery. 
  
- Femoral/popliteal system:  Reconstitution of the proximal common femoral 
artery which is diffusely diseased. No significant stenosis involving the mid to 
distal common femoral artery. Proximal profunda femoral artery is patent. Multilevel disease involving the superficial femoral artery throughout its 
entire length with multiple tandem stenosis. Short segment occlusion involving 
the distal aspect. Reconstitution of the proximal popliteal artery with several 
short segment occlusions throughout its course. Short segment occlusion 
involving the proximal popliteal artery 
  
- Tibioperoneal system: Extensive atherosclerosis. Occlusion of the proximal 
trifurcation vessels with reconstitution with the mid to distal aspects being 
patent however with multilevel disease. 
  
  
--- CT OF ABDOMEN/PELVIS  --- 
  
Assessment is moderately limited secondary to respiratory motion. 
  
LOWER CHEST: Dependent bilateral lower lobe atelectasis. 
  
LIVER, BILIARY: Liver is unremarkable. No biliary dilation. Gallbladder is 
limited evaluation due to respiratory motion. . 
  
PANCREAS: Unremarkable as visualized. 
  
SPLEEN: Unremarkable as visualized. 
  
ADRENALS: Unremarkable as visualized. 
  
KIDNEYS: No hydronephrosis. 
  
LYMPH NODES: No enlarged lymph nodes. 
  
GASTROINTESTINAL TRACT: No bowel dilation or wall thickening. 
  
PELVIC ORGANS: Moderately distended bladder. 
  
BONES: No acute or aggressive osseous abnormalities identified. Multilevel 
spondylosis. 
  
OTHER: No ascites. 
  
___________________________________________________ 
  
IMPRESSION IMPRESSION: 
  
1. ABDOMINAL AORTA:  Mild to moderate atherosclerosis without significant 
stenosis. 
  
2. RIGHT LOWER EXTREMITY: 
-HAYDEE:  Mild origin stenosis. -EIA:  Multilevel stenosis, most severe at the origin with moderate stenosis. -CFA:  Marked tight focal stenosis involving the mid aspect. Moderate short 
segment stenosis involving the distal aspect. -SFA:  Multilevel stenosis with multilevel tight stenosis or occlusions within 
the proximal and mid aspects. 
-Popliteal artery:  Multilevel stenosis with long segment occlusion involving 
the mid to distal aspect. 
-Tibioperoneal arteries:  Extensive atherosclerosis with suspected occlusions 
involving the origins and proximal aspects. 
  
3. LEFT LOWER EXTREMITY: 
-HAYDEE:  Suggestion of mild focal origin stenosis. -EIA:  Occluded. -CFA:  Proximal aspect occluded. No significant stenosis involving the mid to 
distal aspect. -SFA:  Multilevel stenosis throughout its entire length with short segment 
occlusion involving the distal aspect. -Popliteal artery:  Multiple short segment occlusions involving its entire 
length. 
-Tibioperoneal arteries:  Extensive atherosclerosis with suspected occlusion of 
the proximal trifurcation vessels. 
  
4. Other vascular findings:   
-Moderate to marked left renal artery origin stenosis. 
  
5. Non-vascular findings: 
-Assessment limited secondary to respiratory motion. Moderately distended 
bladder. 
   
Imaging CTA ABD ART W RUNOFF W WO CONT (Order: [id number]) - 6/30/2019 X-Ray: EXAM: Foot Series Complete 
  
Indication: Left foot pain. 
  
Technique:  Frontal, oblique, and lateral views of the left foot. 
  
Comparison: None 
_______________ FINDINGS: 
Osteopenia with old healed fourth and fifth metatarsal fractures. No acute 
fracture or destructive osseous abnormality. Small degenerative plantar cannula 
spur.  The soft tissues appear within normal limits.  No radiopaque foreign body 
is seen.  
  
_______________ IMPRESSION IMPRESSION: 
  
1.  No acute fracture, destructive osseous changes or plain film findings to 
suggest osteomyelitis. 
  
   
Imaging  
  
XR FOOT LT MIN 3 V (Order: [id number]) - 6/29/2019  
 
 
 
Procedures:   none Arash Keen DPM 
July 3, 2019

## 2019-07-03 NOTE — PROGRESS NOTES
Bedside and Verbal shift change report given to Shayy Doss RN (oncoming nurse) by Tutu Milian (offgoing nurse). Report included the following information SBAR, Kardex, Intake/Output and MAR.   
 
0000 NPO for pending procedure. Bedside and Verbal shift change report given to Kathy Sanches RN (oncoming nurse) by Shayy Doss RN (offgoing nurse). Report included the following information SBAR, Kardex, Intake/Output, MAR and Recent Results.

## 2019-07-03 NOTE — PROGRESS NOTES
Internal Medicine Progress Note Patient's Name: Costa Degroot Admit Date: 6/29/2019 Length of Stay: 4 Assessment/Plan Active Hospital Problems Diagnosis Date Noted  Gangrene of foot ([facility] 06/29/2019  Cellulitis of left lower extremity 06/29/2019  Malnourished ([facility] 06/29/2019  Peripheral vascular disease ([facility] 06/29/2019  Hyponatremia 06/29/2019  
 
- Cont IV vanco/cefepime 
- plan for basic wound care management 
- cardiology cleared for surgery negative stress test 
- will call oncology to determine activity of cancer , appears had treatment with chemo no later than 12/17/19 Subjective No complaints NAD Objective Visit Vitals /79 Pulse (!) 104 Temp 99.2 °F (37.3 °C) Resp 16 Ht 6' (1.829 m) Wt 59 kg (130 lb) SpO2 100% BMI 17.63 kg/m² Physical Exam: 
General Appearance: NAD, conversant Lungs: CTA with normal respiratory effort CV: RRR, no m/r/g Abdomen: soft, non-tender, normal bowel sounds Extremities: no cyanosis, + dry gangrene left distal 3rd toe w/ missing toenails on first three toes on left Neuro: No focal deficits, motor/sensory intact Lab/Data Reviewed: 
BMP:  
Lab Results Component Value Date/Time  (L) 07/03/2019 03:40 AM  
 K 4.4 07/03/2019 03:40 AM  
 CL 94 (L) 07/03/2019 03:40 AM  
 CO2 22 07/03/2019 03:40 AM  
 AGAP 9 07/03/2019 03:40 AM  
 GLU 86 07/03/2019 03:40 AM  
 BUN 5 (L) 07/03/2019 03:40 AM  
 CREA 0.63 07/03/2019 03:40 AM  
 GFRAA >60 07/03/2019 03:40 AM  
 GFRNA >60 07/03/2019 03:40 AM  
 
CBC:  
No results found for: WBC, HGB, HGBEXT, HCT, HCTEXT, PLT, PLTEXT, HGBEXT, HCTEXT, PLTEXT Imaging Reviewed: 
No results found. Medications Reviewed: 
Current Facility-Administered Medications Medication Dose Route Frequency  VANCOMYCIN INFORMATION NOTE   Other ONCE  
 amLODIPine (NORVASC) tablet 10 mg  10 mg Oral DAILY  potassium chloride (KLOR-CON) tablet 20 mEq  20 mEq Oral TID  vancomycin (VANCOCIN) 1,000 mg in 0.9% sodium chloride (MBP/ADV) 250 mL adv  1,000 mg IntraVENous Q8H  
 cloNIDine HCl (CATAPRES) tablet 0.1 mg  0.1 mg Oral BID  
 oxyCODONE-acetaminophen (PERCOCET) 5-325 mg per tablet 1-2 Tab  1-2 Tab Oral Q4H PRN  
 morphine injection 2 mg  2 mg IntraVENous Q6H PRN  
 cefepime (MAXIPIME) 1 g in 0.9% sodium chloride (MBP/ADV) 50 mL MBP  1 g IntraVENous Q12H  
 nicotine (NICODERM CQ) 14 mg/24 hr patch 1 Patch  1 Patch TransDERmal DAILY  aspirin chewable tablet 81 mg  81 mg Oral DAILY  atorvastatin (LIPITOR) tablet 40 mg  40 mg Oral QHS  heparin (porcine) injection 5,000 Units  5,000 Units SubCUTAneous Q8H  
 VANCOMYCIN INFORMATION NOTE   Other Rx Dosing/Monitoring

## 2019-07-03 NOTE — PROGRESS NOTES
[facility] 
[address] [place], [address] Felisha Kirkpatrick, Dr. Kayla Linton, & Dr. Andrea He 
[phone] FAX# [phone] Progress Note Patient: Elsa Lemons MRN: [id number]  SSN: [id number] YOB: 1955  Age: 62 y.o. Sex: male Admit Date: 6/29/2019 LOS: 4 days Subjective:  
 
Severe BLE PVD with LLE gangrene. Currently NPO for upcoming Nuc Stress Test per Cardiology. C/o LLE ant tib pruritus/bleeding. Denies BLE rest pain, n/v/d, rigors, diaphoresis, SOB, chest pain. States last radiation >3 months ago for h/o Non-small cell lung cancer, possible mediastinal mets per Nephrology. Currently on ASA 81mg. Currently on Maxipime and Vancomycin. Objective:  
 
Vitals:  
 07/02/19 2036 07/02/19 2042 07/03/19 0727 07/03/19 5649 BP: (!) 156/101 (!) 156/101 164/85 (!) 150/94 Pulse:  78 (!) 105 (!) 104 Resp:  16 16 16 Temp:  99.4 °F (37.4 °C) 97.2 °F (36.2 °C) 99.2 °F (37.3 °C) SpO2:  98% 96% 100% Weight:      
Height:      
  
 
Intake and Output: 
Current Shift: No intake/output data recorded. Last three shifts: 07/01 1901 - 07/03 0700 In: 1438.3 [P.O.:240; I.V.:1198.3] Out: 2570 [Urine:2570] Physical Exam:  
GENERAL: A&Ox3, cooperative, no distress, thin appearing EYE: PERRL, EOMIs, mucous membranes moist, non-icteric NECK: supple, symmetric. No JVD LUNG: clear to auscultation bilaterally HEART: regular rate and rhythm ABDOMEN: soft, non-tender, non-distended EXTREMITIES: LLE distal 3rd toe dry gangrene. No erythema or induration noted. Multiple skin tears with active serosanguineous drainage LLE ant tib. nonpalpable BLE [place]/DP/PT pulses. No edema BLE. NTTP BLE. Palpable 2+ BUE radial pulses. NEUROLOGIC:  strength 5/5. Motor 5/5. Face symmetric. Tongue midline Lab/Data Review: 
 
BMP:  
Lab Results Component Value Date/Time   (L) 07/03/2019 03:40 AM  
 K 4.4 07/03/2019 03:40 AM  
 CL 94 (L) 07/03/2019 03:40 AM  
 CO2 22 07/03/2019 03:40 AM  
 AGAP 9 07/03/2019 03:40 AM  
 GLU 86 07/03/2019 03:40 AM  
 BUN 5 (L) 07/03/2019 03:40 AM  
 CREA 0.63 07/03/2019 03:40 AM  
 GFRAA >60 07/03/2019 03:40 AM  
 GFRNA >60 07/03/2019 03:40 AM  
 
CTA ABD ART R/O 6/30/19: 1. ABDOMINAL AORTA:  Mild to moderate atherosclerosis without significant 
stenosis. 
  
2. RIGHT LOWER EXTREMITY: 
-HAYDEE:  Mild origin stenosis. -EIA:  Multilevel stenosis, most severe at the origin with moderate stenosis. -CFA:  Marked tight focal stenosis involving the mid aspect. Moderate short 
segment stenosis involving the distal aspect. -SFA:  Multilevel stenosis with multilevel tight stenosis or occlusions within 
the proximal and mid aspects. 
-Popliteal artery:  Multilevel stenosis with long segment occlusion involving 
the mid to distal aspect. 
-Tibioperoneal arteries:  Extensive atherosclerosis with suspected occlusions 
involving the origins and proximal aspects. 
  
3. LEFT LOWER EXTREMITY: 
-HAYDEE:  Suggestion of mild focal origin stenosis. -EIA:  Occluded. -CFA:  Proximal aspect occluded. No significant stenosis involving the mid to 
distal aspect. -SFA:  Multilevel stenosis throughout its entire length with short segment 
occlusion involving the distal aspect. -Popliteal artery:  Multiple short segment occlusions involving its entire 
length. 
-Tibioperoneal arteries:  Extensive atherosclerosis with suspected occlusion of 
the proximal trifurcation vessels. 
  
4. Other vascular findings:   
-Moderate to marked left renal artery origin stenosis. 
  
5. Non-vascular findings: 
-Assessment limited secondary to respiratory motion. Moderately distended 
bladder. XR LT FOOT 6/29/19:   
1. No acute fracture, destructive osseous changes or plain film findings to 
suggest osteomyelitis. BLE KELLI 6/29/19: Critical multilevel disease including inflow disease bilaterally. CBC 6/29/19: WBC 7.2, HGB 9.7, HCT 28.1,  PET SCAN [facility] 4/15/19: Activity at parenchymal band right upper lobe probably treatment effect after radiation therapy. Attention should be paid to this region on subsequent examinations. Resolution of malignant activity posteriorly at the right hilum compatible with excellent response to therapy. Increased activity at a superior mediastinal lymph node, just above that of blood pool, indeterminate. Could be new metastasis. Could consider follow-up with diagnostic CT at short interval. 
Activity right side prostate gland. Consider correlation with digital rectal exam and PSA. Emphysema. Assessment:  
 
Principal Problem: 
  Gangrene of foot ([address] (6/29/2019) Active Problems: 
  Cellulitis of left lower extremity (6/29/2019) Malnourished ([address] (6/29/2019) Peripheral vascular disease ([facility] (6/29/2019) Hyponatremia (6/29/2019) Severe BLE PVD with LLE gangrene Hyponatremia - ? SAIDH (malignancy vs. Idiopathic) HTN 
H/o RUL Non-small cell cancer s/p Radiation with [employer] 3/2018 possible mediastinal mets? COPD Malnutrition Tobacco Dependence ETOH abuse Plan:  
 
Nuclear Stress Test today per cardiology Continue local wound care per podiatry Local wound care to LLE ant tib skin tears Continue Abx per hospitalist 
Continue ASA Monitor BMP Consider Pulmonary vs. Oncology Consult for ?mediastinal mets and h/o non-small cell lung cancer Will await stress test/cardiology/pulmonary vs. oncology recommendations - would require extensive revascularization procedure vs. Major amputation. Signed By: DIANDRA Glasgow   
 July 3, 2019

## 2019-07-03 NOTE — PROGRESS NOTES
Pharmacy Dosing Services: Vancomycin Indication: SSTI Day of therapy: 4 Other Antimicrobials (Include dose, start day & day of therapy): 
Cefepime 1 gm IV every 12 hours Loading dose (date given): 1500 mg 
Current Maintenance dose: 1000 mg IV every 8 hours Goal Vancomycin Level: 15-20 
(Trough 15-20 for most infections, 20 for meningitis/osteomyelitis, pre-HD level ~25) Vancomycin Level (if drawn): 
 - 11 (10.41 hours after dose)  - 18.2 (7.2 hours after dose) 7/3 - 15.8 (8 hours post-dose) Significant Cultures:  
 Wound - Enterobacter, Enterococcus, Staph aureus Renal function stable? (unstable defined as SCr increase of 0.5 mg/dL or > 50% increase from baseline, whichever is greater) (Y/N): Y  
 
CAPD, Hemodialysis or Renal Replacement Therapy (Y/N): N Recent Labs  
  19 
0340 19 
0915 19 
0325 CREA 0.63 0.69 0.64  
BUN 5* 4* 3* Temp (24hrs), Av.7 °F (37.1 °C), Min:97.2 °F (36.2 °C), Max:99.7 °F (37.6 °C) Creatinine Clearance (Creatinine Clearance (ml/min)):  ml/min Regimen assessment: Continue same - no change in renal function Maintenance dose: 1000 mg IV every 8 hours Next scheduled level: Trough on  at 0430 Pharmacy will follow daily and adjust medications as appropriate for renal function and/or serum levels.  
 
Thank you, 
Angeline Gorman, PHARMD

## 2019-07-03 NOTE — PROGRESS NOTES
[facility] - Progress Note Admit Date: 6/29/2019 Patient seen and examined independently. Pharmacological nuclear stress test done today demonstrated no evidence for significant ongoing ischemia and preserved systolic function. No cardiac contraindication to proposed  surgery. Agree with assessment and plan as noted below. Brielle Dobson MD 
Assessment:  
 
Hospital Problems  Never Reviewed Codes Class Noted POA * (Principal) Gangrene of foot ([facility] ICD-10-CM: I04 
ICD-9-CM: 785.4  6/29/2019 Unknown Cellulitis of left lower extremity ICD-10-CM: L03.116 ICD-9-CM: 682.6  6/29/2019 Unknown Malnourished ([facility] ICD-10-CM: E46 
ICD-9-CM: 263.9  6/29/2019 Unknown Peripheral vascular disease ([facility] ICD-10-CM: I73.9 ICD-9-CM: 443.9  6/29/2019 Unknown Hyponatremia ICD-10-CM: E87.1 ICD-9-CM: 276.1  6/29/2019 Unknown - Left lower extremity PAD/gangrene - Preoperative risk assessment for vascular intervention 
- Nuclear stress test 07/03/19 with normal perfusion - Hyponatremia - Decreased functional capacity - Tobacco use 
- HTN Plan:  
 
- Nuclear stress test without any perfusion abnormalities, no cardiac contraindication to proceed with vascular surgery Subjective: No new complaints. Objective:  
  
Patient Vitals for the past 8 hrs: 
 BP  
07/03/19 0950 149/79  
07/03/19 0928 166/90 Patient Vitals for the past 96 hrs: 
 Weight 07/03/19 0950 130 lb (59 kg) 07/03/19 0928 130 lb (59 kg) Intake/Output Summary (Last 24 hours) at 7/3/2019 1306 Last data filed at 7/2/2019 2328 Gross per 24 hour Intake 620 ml Output 820 ml Net -200 ml Physical Exam: 
General:  alert, cooperative, no distress Neck:  nontender, no JVD Lungs:  clear to auscultation bilaterally Heart:  regular rate and rhythm, S1, S2 normal, no murmur, click, rub or gallop Abdomen:  abdomen is soft without significant tenderness, masses, organomegaly or guarding Extremities:  extremities normal, atraumatic, no cyanosis or edema Data Review:  
 
Labs: Results:  
   
Chemistry Recent Labs  
  07/03/19 
0340 07/02/19 
0915 07/02/19 
5336 GLU 86 107* 94 * 128* 127* K 4.4 3.3* 3.6 CL 94* 94* 94* CO2 22 23 24 BUN 5* 4* 3*  
CREA 0.63 0.69 0.64 CA 8.2* 8.0* 7.9*  
PHOS 3.1  --   --   
AGAP 9 11 9 BUCR 8* 6* 5* ALB 2.8*  --   --   
  
CBC w/Diff No results for input(s): WBC, RBC, HGB, HCT, PLT, GRANS, LYMPH, EOS, HGBEXT, HCTEXT, PLTEXT in the last 72 hours. Cardiac Enzymes No results found for: CPK, CK, CKMMB, CKMB, RCK3, CKMBT, CKNDX, CKND1, NAHID, TROPT, TROIQ, MICHELA, TROPT, TNIPOC, BNP, BNPP Coagulation No results for input(s): PTP, INR, APTT in the last 72 hours. No lab exists for component: INREXT Lipid Panel Lab Results Component Value Date/Time Cholesterol, total 133 04/18/2012 03:50 AM  
 HDL Cholesterol 74 (H) 04/18/2012 03:50 AM  
 LDL, calculated 41.6 04/18/2012 03:50 AM  
 VLDL, calculated 17.4 04/18/2012 03:50 AM  
 Triglyceride 87 04/18/2012 03:50 AM  
 CHOL/HDL Ratio 1.8 04/18/2012 03:50 AM  
  
BNP No results found for: BNP, BNPP, XBNPT Liver Enzymes Recent Labs  
  07/03/19 
0340 ALB 2.8* Digoxin Thyroid Studies Lab Results Component Value Date/Time TSH 0.82 06/29/2019 07:55 PM  
    
 
Signed By: Castillo Muniz. Kathy Lema PA-C July 3, 2019

## 2019-07-04 NOTE — PROGRESS NOTES
Internal Medicine Progress Note Patient's Name: Janell Worley Admit Date: 6/29/2019 Length of Stay: 5 Assessment/Plan Active Hospital Problems Diagnosis Date Noted  Gangrene of foot ([facility] 06/29/2019  Cellulitis of left lower extremity 06/29/2019  Malnourished ([facility] 06/29/2019  Peripheral vascular disease ([facility] 06/29/2019  Hyponatremia 06/29/2019  
 
- Cont IV vanco/cefepime - IV vs PO per podiatry 
- attempt MRI again, ? OM 
- wound care per podiatry 
- cardiology cleared for surgery negative stress test 
- will call [facility] to determine activity of cancer tomorrow - Per nephrology, Lasix MWF and avoid NaCl tabs 
- no emergent intervention per VS 
 
Subjective No complaints NAD, would like to go home Objective Visit Vitals /89 (BP 1 Location: Right arm, BP Patient Position: Sitting) Pulse (!) 110 Temp 97.5 °F (36.4 °C) Resp 20 Ht 6' (1.829 m) Wt 59 kg (130 lb) SpO2 100% BMI 17.63 kg/m² Physical Exam: 
General Appearance: NAD, conversant Lungs: CTA with normal respiratory effort CV: RRR, no m/r/g Abdomen: soft, non-tender, normal bowel sounds Extremities: no cyanosis, no peripheral edema Neuro: No focal deficits, motor/sensory intact Lab/Data Reviewed: 
BMP:  
Lab Results Component Value Date/Time  (L) 07/04/2019 03:19 AM  
 K 4.6 07/04/2019 03:19 AM  
 CL 98 (L) 07/04/2019 03:19 AM  
 CO2 21 07/04/2019 03:19 AM  
 AGAP 9 07/04/2019 03:19 AM  
 GLU 79 07/04/2019 03:19 AM  
 BUN 6 (L) 07/04/2019 03:19 AM  
 CREA 0.61 07/04/2019 03:19 AM  
 GFRAA >60 07/04/2019 03:19 AM  
 GFRNA >60 07/04/2019 03:19 AM  
 
CBC:  
Lab Results Component Value Date/Time WBC 5.7 07/04/2019 03:19 AM  
 HGB 8.7 (L) 07/04/2019 03:19 AM  
 HCT 25.9 (L) 07/04/2019 03:19 AM  
  07/04/2019 03:19 AM  
 
 
Imaging Reviewed: 
No results found. Medications Reviewed: 
Current Facility-Administered Medications Medication Dose Route Frequency  [START ON 7/5/2019] furosemide (LASIX) tablet 20 mg  20 mg Oral Q MON, WED & FRI  
 [START ON 7/5/2019] VANCOMYCIN INFORMATION NOTE   Other ONCE  
 vancomycin (VANCOCIN) 1,000 mg in 0.9% sodium chloride (MBP/ADV) 250 mL adv  1,000 mg IntraVENous Q8H  
 cloNIDine HCl (CATAPRES) tablet 0.1 mg  0.1 mg Oral BID  
 oxyCODONE-acetaminophen (PERCOCET) 5-325 mg per tablet 1-2 Tab  1-2 Tab Oral Q4H PRN  
 morphine injection 2 mg  2 mg IntraVENous Q6H PRN  
 cefepime (MAXIPIME) 1 g in 0.9% sodium chloride (MBP/ADV) 50 mL MBP  1 g IntraVENous Q12H  
 nicotine (NICODERM CQ) 14 mg/24 hr patch 1 Patch  1 Patch TransDERmal DAILY  aspirin chewable tablet 81 mg  81 mg Oral DAILY  atorvastatin (LIPITOR) tablet 40 mg  40 mg Oral QHS  heparin (porcine) injection 5,000 Units  5,000 Units SubCUTAneous Q8H  
 VANCOMYCIN INFORMATION NOTE   Other Rx Dosing/Monitoring Bee Livingston PA-C [address] [place], [place] [address] Office:  [phone] Pager: [phone]

## 2019-07-04 NOTE — CONSULTS
Consult Note Consult requested by: Dr Luisito Francois Fabricio Fletcher is a 62 y.o. male [address] who is being seen on consult for HypoNa. Chief Complaint Patient presents with  Leg Pain Admission diagnosis: Gangrene of foot ([facility] HPI: 62 yr old AAM with PMH of HTN, HypoNa, Lung Ca & PAD admitted for pain in his left foot. Na has been in the mid to high 120s for years. Past Medical History:  
Diagnosis Date  Depression  Emphysema (subcutaneous) (surgical) resulting from a procedure  Hypertension  Hyponatremia  Non-small cell lung cancer ([facility] 2018  
 rt upper lung  Seizure ([facility]  Seizures ([facility] History reviewed. No pertinent surgical history. Social History Socioeconomic History  Marital status:  Spouse name: Not on file  Number of children: Not on file  Years of education: Not on file  Highest education level: Not on file Occupational History  Not on file Social Needs  Financial resource strain: Not on file  Food insecurity:  
  Worry: Not on file Inability: Not on file  Transportation needs:  
  Medical: Not on file Non-medical: Not on file Tobacco Use  Smoking status: Current Every Day Smoker  Smokeless tobacco: Never Used Substance and Sexual Activity  Alcohol use: Yes  Drug use: No  
 Sexual activity: Not on file Lifestyle  Physical activity:  
  Days per week: Not on file Minutes per session: Not on file  Stress: Not on file Relationships  Social connections:  
  Talks on phone: Not on file Gets together: Not on file Attends Christianity service: Not on file Active member of club or organization: Not on file Attends meetings of clubs or organizations: Not on file Relationship status: Not on file  Intimate partner violence:  
  Fear of current or ex partner: Not on file Emotionally abused: Not on file Physically abused: Not on file Forced sexual activity: Not on file Other Topics Concern  Not on file Social History Narrative  Not on file History reviewed. No pertinent family history. Allergies Allergen Reactions  Pcn [Penicillins] Unknown (comments) Home Medications:  
 
Current Facility-Administered Medications Medication Dose Route Frequency  [START ON 7/5/2019] furosemide (LASIX) tablet 20 mg  20 mg Oral Q MON, WED & FRI  
 [START ON 7/5/2019] VANCOMYCIN INFORMATION NOTE   Other ONCE  
 amLODIPine (NORVASC) tablet 10 mg  10 mg Oral DAILY  potassium chloride (KLOR-CON) tablet 20 mEq  20 mEq Oral TID  vancomycin (VANCOCIN) 1,000 mg in 0.9% sodium chloride (MBP/ADV) 250 mL adv  1,000 mg IntraVENous Q8H  
 cloNIDine HCl (CATAPRES) tablet 0.1 mg  0.1 mg Oral BID  
 oxyCODONE-acetaminophen (PERCOCET) 5-325 mg per tablet 1-2 Tab  1-2 Tab Oral Q4H PRN  
 morphine injection 2 mg  2 mg IntraVENous Q6H PRN  
 cefepime (MAXIPIME) 1 g in 0.9% sodium chloride (MBP/ADV) 50 mL MBP  1 g IntraVENous Q12H  
 nicotine (NICODERM CQ) 14 mg/24 hr patch 1 Patch  1 Patch TransDERmal DAILY  aspirin chewable tablet 81 mg  81 mg Oral DAILY  atorvastatin (LIPITOR) tablet 40 mg  40 mg Oral QHS  heparin (porcine) injection 5,000 Units  5,000 Units SubCUTAneous Q8H  
 VANCOMYCIN INFORMATION NOTE   Other Rx Dosing/Monitoring Review of Systems:  
Review of Systems: 
General : Neg 
Psychological: Neg Ophthalmology: Neg 
ENT: Neg Allergy & Immunology: Neg 
Hematology: Neg 
Endocrine: Neg 
Breast: Neg Respiratory: Neg 
Cardiology: Neg 
Genito-Urinary: Neg 
Musculoskeletal: Leg pain Neuro: Neg 
Dermatological: Neg 
 
Data Review: 
 
Labs: Results:  
   
Chemistry Recent Labs  
  07/04/19 
0319 07/03/19 
0340 07/02/19 
1035 GLU 79 86 107* * 125* 128* K 4.6 4.4 3.3*  
CL 98* 94* 94* CO2 21 22 23 BUN 6* 5* 4*  
CREA 0.61 0.63 0.69 CA 8.4* 8.2* 8.0*  
 AGAP 9 9 11 BUCR 10* 8* 6* ALB 2.6* 2.8*  --   
  
CBC w/Diff Recent Labs  
  07/04/19 
0319 WBC 5.7  
RBC 2.91* HGB 8.7* HCT 25.9*  
 Coagulation No results for input(s): PTP, INR, APTT in the last 72 hours. No lab exists for component: INREXT Iron/Ferritin No results for input(s): IRON in the last 72 hours. No lab exists for component: TIBCCALC BNP No results for input(s): BNPP in the last 72 hours. Cardiac Enzymes No results for input(s): CPK, CKND1, NAHID in the last 72 hours. No lab exists for component: Chyrl Jaime Liver Enzymes Recent Labs  
  07/04/19 0319 ALB 2.6* Thyroid Studies Lab Results Component Value Date/Time TSH 0.82 06/29/2019 07:55 PM  
    
  
 
Physical Assessment:  
 
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 Normal TSH & Cortisol. Would avoid NaCl tabs with HTN. Start Lasix 20 mg MWF. Check serum Osmolality to r/o Pseudo-HypoNa as Higher Globulin fraction than Albumin. Emaciated, r/o HIV, Paraprotein etc. 
HTN: would stop Amlodipine as starting Lasix. PAD: per Vasc & IM. On Plavix. Anant Puga MD 
July 4, 2019 Pager : [phone]

## 2019-07-04 NOTE — PROGRESS NOTES
Problem: Pain Goal: *Control of Pain Outcome: Progressing Towards Goal 
  
Problem: Patient Education: Go to Patient Education Activity Goal: Patient/Family Education Outcome: Progressing Towards Goal 
  
Problem: Falls - Risk of 
Goal: *Absence of Falls Description Document Cynthia Bonilla Fall Risk and appropriate interventions in the flowsheet. Outcome: Progressing Towards Goal 
  
Problem: Patient Education: Go to Patient Education Activity Goal: Patient/Family Education Outcome: Progressing Towards Goal 
  
Problem: Pressure Injury - Risk of 
Goal: *Prevention of pressure injury Description Document Mikie Scale and appropriate interventions in the flowsheet. Outcome: Progressing Towards Goal 
  
Problem: Patient Education: Go to Patient Education Activity Goal: Patient/Family Education Outcome: Progressing Towards Goal 
  
Problem: Discharge Planning Goal: *Discharge to safe environment Outcome: Progressing Towards Goal 
  
Problem: Nutrition Deficit Goal: *Optimize nutritional status Outcome: Progressing Towards Goal

## 2019-07-04 NOTE — PROGRESS NOTES
Vascular Surgery Progress Note Patient ID: 
Darryl Benoit [id number] 
74 y.o. 
1955 Admit Date: 2019 POD * No surgery found * Subjective:  
 
Patient has no new complaints. Stress test report noted. Objective:  
 
Visit Vitals BP (!) 147/92 (BP 1 Location: Left arm, BP Patient Position: At rest) Pulse (!) 101 Temp 98.1 °F (36.7 °C) Resp 20 Ht 6' (1.829 m) Wt 59 kg (130 lb) SpO2 98% BMI 17.63 kg/m² Temp (24hrs), Av.4 °F (36.9 °C), Min:98.1 °F (36.7 °C), Max:98.8 °F (37.1 °C) Physical Exam: 
NAD, thin, AOx3 LLE: superficial anterior calf ulcer.  (+)thin. Motor/sensation intact. Labs:  
Recent Results (from the past 24 hour(s)) Nelly Pleitez Collection Time: 19 12:10 PM  
Result Value Ref Range Vancomycin,trough 15.8 10.0 - 20.0 ug/mL Reported dose date: [id number] Reported dose time: 400 Reported dose: 1,000 MG UNITS RENAL FUNCTION PANEL Collection Time: 19  3:19 AM  
Result Value Ref Range Sodium 128 (L) 136 - 145 mmol/L Potassium 4.6 3.5 - 5.5 mmol/L Chloride 98 (L) 100 - 108 mmol/L  
 CO2 21 21 - 32 mmol/L Anion gap 9 3.0 - 18 mmol/L Glucose 79 74 - 99 mg/dL BUN 6 (L) 7.0 - 18 MG/DL Creatinine 0.61 0.6 - 1.3 MG/DL  
 BUN/Creatinine ratio 10 (L) 12 - 20 GFR est AA >60 >60 ml/min/1.73m2 GFR est non-AA >60 >60 ml/min/1.73m2 Calcium 8.4 (L) 8.5 - 10.1 MG/DL Phosphorus 3.1 2.5 - 4.9 MG/DL Albumin 2.6 (L) 3.4 - 5.0 g/dL CBC W/O DIFF Collection Time: 19  3:19 AM  
Result Value Ref Range WBC 5.7 4.6 - 13.2 K/uL  
 RBC 2.91 (L) 4.70 - 5.50 M/uL HGB 8.7 (L) 13.0 - 16.0 g/dL HCT 25.9 (L) 36.0 - 48.0 % MCV 89.0 74.0 - 97.0 FL  
 MCH 29.9 24.0 - 34.0 PG  
 MCHC 33.6 31.0 - 37.0 g/dL  
 RDW 13.6 11.6 - 14.5 % PLATELET 744 304 - 317 K/uL MPV 7.3 (L) 9.2 - 11.8 FL Data Review: CBC:  
Lab Results Component Value Date/Time WBC 5.7 07/04/2019 03:19 AM  
 RBC 2.91 (L) 07/04/2019 03:19 AM  
 HGB 8.7 (L) 07/04/2019 03:19 AM  
 HCT 25.9 (L) 07/04/2019 03:19 AM  
 PLATELET 007 59/77/8887 03:19 AM  
  
 
Assessment:  
 
Principal Problem: 
  Gangrene of foot ([address] (6/29/2019) Active Problems: 
  Cellulitis of left lower extremity (6/29/2019) Malnourished ([address] (6/29/2019) Peripheral vascular disease ([address] (6/29/2019) Hyponatremia (6/29/2019) Plan/Recommendations/Medical Decision Making: No indication for emergent intervention. Will need revascularization for limb salvage. Awaiting some input from oncology as to patient's prognosis - need to clarify prior to potentially undertaking a prolonged surgical revascularization and associated recovery.   Discussed with patient and hospitalist. 
 
 
Vandana Keller MD 
July 4, 2019 
10:51 AM

## 2019-07-04 NOTE — PROGRESS NOTES
conducted a Follow up consultation and Spiritual Assessment for Fish Grimaldo, who is a 62 y.o.,male. The  provided the following Interventions: 
Continued the relationship of care and support. Listened empathically. Offered prayer and assurance of continued prayer on patients behalf. Chart reviewed. The following outcomes were achieved: 
Patient expressed gratitude for pastoral care visit. Assessment: 
There are no further spiritual or Congregation issues which require Spiritual Care Services interventions at this time. Plan: 
Chaplains will continue to follow and will provide pastoral care on an as needed/requested basis.  recommends bedside caregivers page  on duty if patient shows signs of acute spiritual or emotional distress. [facility] Staff  Spiritual Care  
[phone]

## 2019-07-04 NOTE — PROGRESS NOTES
Bedside shift change report given to Moy [address]ales [address] (oncoming nurse) by LIANNE MCKEON [facility] RN (offgoing nurse). Report included the following information SBAR, Kardex, Intake/Output and MAR.

## 2019-07-04 NOTE — PROGRESS NOTES
Unfortunately patient unable to leave today 2/2 no recommendations of IV vs Oral abx from podiatry. There has been no MRI (patient noncompliance) but no alternative suggested. We will need a definitive plan for abx by podiatry based on whether they believe there is bone involvement. If patient has questions about this or needs explaination why he cannot dc today I would ask nursing calls podiatry to speak with patient.

## 2019-07-04 NOTE — PROGRESS NOTES
1915  Received bedside report from [facility]. Patient in bed resting,  Call bell in reach. 2046  Patient complaining of pain of 10  Gave morphine as ordered. 2230 Patient complaining of pain of 10  Gave Percocet as ordered. 0230  Patient pulled out IV on left hand. Reinserted IV in right forearm. 9200  Patient complaining of pain of 9, gave Percocet as ordered 0600  Patient in bed resting quietly, call bell within reach. No further concerns at this time.

## 2019-07-05 NOTE — ROUTINE PROCESS
0730  Bedside and Verbal shift change report given to Eliecer Jha RN, BSN 
 (oncoming nurse) by Hadley Cyr RN, BSN (offgoing nurse). Report included the following information SBAR, Kardex, Procedure Summary, Intake/Output, MAR and Recent Results. Call placed to physician regarding MRI. Verbal order for Ativan 1 mg prior to MRI. 
 
1400  Patient off the floor for MRI escorted by this nurse and transport member. Ativan 1 mg administered in MRI Suite. Noted patient dozing quietly prior to this nurse leaving MRI suite. 1445 received call from MRI that patient was unable to receive MRI. Upon entering MRI suite, noted that patient was asleep. Able to arouse, but patient is in a sedated state. Transported patient back to floor and notified PA and nursing supervisor. Bell 349 Dr. Ashley Valderrama regarding MRI outcome.   Patient to be seen by podiatry in am.

## 2019-07-05 NOTE — PROGRESS NOTES
2026 Received bedside, patient sitting in bed complaining of pain gave Percocet as ordered. Patient A&O x4.   
 
0230 New IV placed by ED tech. 0600  Patient had uneventful night. In bed resting quietly no further concerns at this time.

## 2019-07-05 NOTE — PROGRESS NOTES
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 Normal TSH & Cortisol. Would avoid NaCl tabs with HTN. Keep on Lasix 20 mg MWF. Emaciation: Neg HIV. 
HTN: controlled. PAD: per Vasc & IM. On Plavix Subjective:Patient complaints of: No complaints. No SOB/CP/N/V. Patient Active Problem List  
Diagnosis Code  Gangrene of foot ([facility] I96  
 Cellulitis of left lower extremity L03. 80  
 Malnourished ([facility] E46  Peripheral vascular disease ([facility]) I73.9  Hyponatremia E87.1 Current Facility-Administered Medications Medication Dose Route Frequency Provider Last Rate Last Dose  carvedilol (COREG) tablet 3.125 mg  3.125 mg Oral BID WITH MEALS Beryl Ellington MD   3.125 mg at 07/05/19 0962  [START ON 7/6/2019] VANCOMYCIN INFORMATION NOTE   Other ONCE Beryl Ellington MD      
 furosemide (LASIX) tablet 20 mg  20 mg Oral Q MON, WED & Can Carmen Villagran MD   20 mg at 07/05/19 0824  
 vancomycin (VANCOCIN) 1,000 mg in 0.9% sodium chloride (MBP/ADV) 250 mL adv  1,000 mg IntraVENous Q8H Beryl Ellington  mL/hr at 07/05/19 1033 1,000 mg at 07/05/19 1033  cloNIDine HCl (CATAPRES) tablet 0.1 mg  0.1 mg Oral BID Therese James MD   0.1 mg at 07/05/19 0824  
 oxyCODONE-acetaminophen (PERCOCET) 5-325 mg per tablet 1-2 Tab  1-2 Tab Oral Q4H PRN Beryl Ellington MD   2 Tab at 07/05/19 7308  morphine injection 2 mg  2 mg IntraVENous Q6H PRN Beryl Ellington MD   2 mg at 07/03/19 2046  cefepime (MAXIPIME) 1 g in 0.9% sodium chloride (MBP/ADV) 50 mL MBP  1 g IntraVENous Q12H Welby Army H,  mL/hr at 07/05/19 0231 1 g at 07/05/19 0231  
 nicotine (NICODERM CQ) 14 mg/24 hr patch 1 Patch  1 Patch TransDERmal DAILY Dennie Crimes H, DO   1 Patch at 07/05/19 9047  aspirin chewable tablet 81 mg  81 mg Oral DAILY Altamese Mini, DO   81 mg at 07/05/19 4088  
 atorvastatin (LIPITOR) tablet 40 mg  40 mg Oral QHS Dennie Crimes H, DO   40 mg at 07/04/19 2237  
 heparin (porcine) injection 5,000 Units  5,000 Units SubCUTAneous Q8H Dennie Crimes H, DO   5,000 Units at 07/05/19 3815  VANCOMYCIN INFORMATION NOTE   Other Rx Dosing/Monitoring Dennie Crimes H, DO      
 
 
Objective Vitals:  
 07/04/19 6174 07/05/19 7653 07/05/19 6632 07/05/19 2745 BP: 137/85 157/87 (!) 168/91 101/86 Pulse: (!) 106 100 (!) 104 (!) 119 Resp: 20 18 16 18 Temp: 99.2 °F (37.3 °C) 99.3 °F (37.4 °C) 98.2 °F (36.8 °C) 98.2 °F (36.8 °C) SpO2: 98% 100% 93% 96% Weight:      
Height:      
 
 
 
Intake/Output Summary (Last 24 hours) at 7/5/2019 1142 Last data filed at 7/5/2019 2215 Gross per 24 hour Intake 950 ml Output 2000 ml Net -1050 ml Admission weight: Weight: 59 kg (130 lb) (06/29/19 1136) Last Weight Metrics: 
Weight Loss Metrics 7/3/2019 4/29/2017 3/11/2017 Today's Wt 130 lb 150 lb 150 lb BMI 17.63 kg/m2 20.34 kg/m2 20.34 kg/m2 Physical Assessment:  
 
General: NAD, alert and oriented. Neck: No jvd. LUNGS: Clear to Auscultation, No rales, rhonchi or wheezes. CVS EXM: S1, S2  RRR, no murmurs/gallops/rubs. Abdomen: soft, non tender. Lower Extremities:  no edema. Lab CBC w/Diff Recent Labs  
  07/04/19 
0319 WBC 5.7  
RBC 2.91* HGB 8.7* HCT 25.9*  
 Chemistry Recent Labs  
  07/05/19 
0420 07/04/19 
0319 07/03/19 
0340 GLU 87 79 86 * 128* 125* K 4.4 4.6 4.4  
CL 96* 98* 94* CO2 22 21 22 BUN 7 6* 5*  
CREA 0.67 0.61 0.63 CA 8.4* 8.4* 8.2* AGAP 8 9 9 BUCR 10* 10* 8* ALB 2.7* 2.6* 2.8* PHOS 3.5 3.1 3.1 No results found for: IRON, FE, TIBC, IBCT, PSAT, FERR Lab Results Component Value Date/Time Calcium 8.4 (L) 07/05/2019 04:20 AM  
 Phosphorus 3.5 07/05/2019 04:20 AM  
  
 
Cody Talavrea MD 
[facility] Pager: [phone]

## 2019-07-05 NOTE — PROGRESS NOTES
Problem: Discharge Planning Goal: *Discharge to safe environment Outcome: Progressing Towards Goal 
 Home with hh 
 
Plan home, pt to go on po antibxs per jesus. Cm to follow for hh needs.

## 2019-07-05 NOTE — PROGRESS NOTES
Internal Medicine Progress Note Patient's Name: Keith Yost Admit Date: 6/29/2019 Length of Stay: 6 Assessment/Plan Active Hospital Problems Diagnosis Date Noted  Gangrene of foot ([facility] 06/29/2019  Cellulitis of left lower extremity 06/29/2019  Malnourished ([facility] 06/29/2019  Peripheral vascular disease ([facility] 06/29/2019  Hyponatremia 06/29/2019  
 
- Cont abx, okay for discharge on PO abx per podiatry 
- attempt MRI again - unsuccessful 
- wound care per podiatry 
- cardiology cleared for surgery negative stress test 
- attempted to call [facility] to inquire about cancer status - no one available to take call - Per nephrology, Lasix MWF and avoid NaCl tabs 
- no emergent intervention per VS 
 
Subjective Patient reports foot pain, would like to go home Objective Visit Vitals /86 Pulse (!) 119 Temp 98.2 °F (36.8 °C) Resp 18 Ht 6' (1.829 m) Wt 59 kg (130 lb) SpO2 96% BMI 17.63 kg/m² Physical Exam: 
General Appearance: NAD, conversant Lungs: CTA with normal respiratory effort CV: RRR, no m/r/g Abdomen: soft, non-tender, normal bowel sounds Extremities: no cyanosis, no peripheral edema Neuro: No focal deficits, motor/sensory intact Lab/Data Reviewed: 
BMP:  
Lab Results Component Value Date/Time  (L) 07/05/2019 04:20 AM  
 K 4.4 07/05/2019 04:20 AM  
 CL 96 (L) 07/05/2019 04:20 AM  
 CO2 22 07/05/2019 04:20 AM  
 AGAP 8 07/05/2019 04:20 AM  
 GLU 87 07/05/2019 04:20 AM  
 BUN 7 07/05/2019 04:20 AM  
 CREA 0.67 07/05/2019 04:20 AM  
 GFRAA >60 07/05/2019 04:20 AM  
 GFRNA >60 07/05/2019 04:20 AM  
 
CBC:  
No results found for: WBC, HGB, HGBEXT, HCT, HCTEXT, PLT, PLTEXT, HGBEXT, HCTEXT, PLTEXT Imaging Reviewed: 
No results found. Medications Reviewed: 
Current Facility-Administered Medications Medication Dose Route Frequency  carvedilol (COREG) tablet 3.125 mg  3.125 mg Oral BID WITH MEALS  
  [START ON 7/6/2019] VANCOMYCIN INFORMATION NOTE   Other ONCE  
 furosemide (LASIX) tablet 20 mg  20 mg Oral Q MON, WED & FRI  vancomycin (VANCOCIN) 1,000 mg in 0.9% sodium chloride (MBP/ADV) 250 mL adv  1,000 mg IntraVENous Q8H  
 cloNIDine HCl (CATAPRES) tablet 0.1 mg  0.1 mg Oral BID  
 oxyCODONE-acetaminophen (PERCOCET) 5-325 mg per tablet 1-2 Tab  1-2 Tab Oral Q4H PRN  
 morphine injection 2 mg  2 mg IntraVENous Q6H PRN  
 cefepime (MAXIPIME) 1 g in 0.9% sodium chloride (MBP/ADV) 50 mL MBP  1 g IntraVENous Q12H  
 nicotine (NICODERM CQ) 14 mg/24 hr patch 1 Patch  1 Patch TransDERmal DAILY  aspirin chewable tablet 81 mg  81 mg Oral DAILY  atorvastatin (LIPITOR) tablet 40 mg  40 mg Oral QHS  heparin (porcine) injection 5,000 Units  5,000 Units SubCUTAneous Q8H  
 VANCOMYCIN INFORMATION NOTE   Other Rx Dosing/Monitoring Betsey Rosas PA-C [address] [place], [place] [address] Hospitalist Division Office:  [phone] Pager: [phone]

## 2019-07-05 NOTE — PROGRESS NOTES
Bedside shift change report given to [address] (oncoming nurse) by [employer] (offgoing nurse). Report included the following information SBAR, Kardex, Intake/Output and MAR.

## 2019-07-05 NOTE — PROGRESS NOTES
Problem: Pain Goal: *Control of Pain Outcome: Progressing Towards Goal 
  
Problem: Patient Education: Go to Patient Education Activity Goal: Patient/Family Education Outcome: Progressing Towards Goal 
  
Problem: Falls - Risk of 
Goal: *Absence of Falls Description Document Milta Raring Fall Risk and appropriate interventions in the flowsheet. Outcome: Progressing Towards Goal 
  
Problem: Patient Education: Go to Patient Education Activity Goal: Patient/Family Education Outcome: Progressing Towards Goal 
  
Problem: Pressure Injury - Risk of 
Goal: *Prevention of pressure injury Description Document Mikie Scale and appropriate interventions in the flowsheet. Outcome: Progressing Towards Goal 
  
Problem: Patient Education: Go to Patient Education Activity Goal: Patient/Family Education Outcome: Progressing Towards Goal 
  
Problem: Discharge Planning Goal: *Discharge to safe environment Outcome: Progressing Towards Goal 
  
Problem: Nutrition Deficit Goal: *Optimize nutritional status Outcome: Progressing Towards Goal

## 2019-07-05 NOTE — PROGRESS NOTES
Nutrition initial assessment/Plan of care RECOMMENDATIONS:  
1. ANIA Diet with 1000 mL FR (Liberalized to increase PO intake) 2. Ensure Enlive TID (elizabeth) 3. Monitor labs, weight and PO intake 4. RD to follow GOALS:  
1. Met/Ongoing: PO intake meets >75% of protein/calorie needs by 7/10 
2. Ongoing: Weight Maintenance/gradual gain (1-2 lb/week) by 7/12 ASSESSMENT: 
Wt status is classified as underweight per Body mass index is 17.63 kg/m². Pt w/ Adequate PO intake per vitals (%). Ensure Enlive TID for additional kcal/protien to promote weight gain and wound healing. Labs noted. Pt w/ hyponatremia and H/H (8.7/25.9). Nutrition recommendations listed. RD to follow. Nutrition Diagnoses:  
Unintentional weight loss related to decrease energy intake /cancer as evidenced by a 37 lb or 22.5% loss x 1 year. Underweight related to inadequate energy intake PTA as evidenced by a BMI of 17.6 and 73% IBW. Meets Criteria for Chronic Malnutrition  
[x] Severe Malnutrition, as evidenced by: 
 [x] Severe muscle wasting, loss of subcutaneous fat 
 [] Nutritional intake of <75% of recommended intake for >1 month 
 [x] Weight loss of  >5% in 1 month, >7.5% in 3 months, >10% in 6 months, >20% in 1 year 
 [] Severe edema Nutrition Risk:  [] High  [x] Moderate []  Low SUBJECTIVE/OBJECTIVE:  
(7/5): Pt seen in room after lunch with RN, Laurel Dowling, assisting patient. Observed <50% of meal consumed and Ensure at bedside untouched. Discussed with Pt that he was now on a fluid restriction d/t hyponatremia and that it was important to consume his supplements first as they are the most nutrient dense beverage and then he can consume what is left in water. Also discussed the importance of adequate nutrition so to consume what he could of his meals first of then drink his supplements. Pt agreeable to both. Will continue to monitor. (6/30):  Pt admitted for gangrene of L foot.  MST received for 2-13 lb recent unintentional weight loss.  lb x 2 years ago per documented weight records, but Pt stated 165 lb x 1 year ago and that weight loss increased 6 months or so ago when he was Dx with cancer. (37 lb or 22.5% x 1 year per current bed scale weight of 127.8 lb) Pt seen in room later in the day; appears thin with noted muscle wasting and SQ fat loss. Reports having a good appetite now but has been up and down during his radiation treatments. Denies having any food allergies or problems chewing/swallowing. Reports appetite has been improving again. Stated he had been getting Ensure from his doctor before but not recently. Placed orders for Ensure TID for additional kcal/protein and liberalized diet. Provided a menu and encouraged to implement preference with dietary. Will continue to monitor. Information Obtained from:  
 [x] Chart Review [x] Patient 
 [] Family/Caregiver [x] Nurse/Physician 
 [] Interdisciplinary Meeting/Rounds Diet: Cardiac Diet Medications: [x] Reviewed Lasix Allergies: [x] Reviewed Encounter Diagnoses ICD-10-CM ICD-9-CM 1. PAD (peripheral artery disease) ([facility]) I73.9 443.9 2. Cellulitis of left foot L03.116 682.7 3. Gangrene of toe of left foot ([facility]) I96 785.4 4. Hyponatremia E87.1 276.1 Past Medical History:  
Diagnosis Date  Depression  Emphysema (subcutaneous) (surgical) resulting from a procedure  Hypertension  Hyponatremia  Non-small cell lung cancer ([facility] 2018  
 rt upper lung  Seizure ([facility]  Seizures ([facility] Labs:   
Lab Results Component Value Date/Time  Sodium 126 (L) 07/05/2019 04:20 AM  
 Potassium 4.4 07/05/2019 04:20 AM  
 Chloride 96 (L) 07/05/2019 04:20 AM  
 CO2 22 07/05/2019 04:20 AM  
 Anion gap 8 07/05/2019 04:20 AM  
 Glucose 87 07/05/2019 04:20 AM  
 BUN 7 07/05/2019 04:20 AM  
 Creatinine 0.67 07/05/2019 04:20 AM  
 Calcium 8.4 (L) 07/05/2019 04:20 AM  
 Magnesium 1.7 04/29/2017 11:03 AM  
 Phosphorus 3.5 07/05/2019 04:20 AM  
 Albumin 2.7 (L) 07/05/2019 04:20 AM  
 
Anthropometrics: BMI (calculated): 17.6 Last 3 Recorded Weights in this Encounter 06/29/19 1136 07/03/19 7402 07/03/19 8626 Weight: 59 kg (130 lb) 59 kg (130 lb) 59 kg (130 lb) Ht Readings from Last 1 Encounters:  
07/03/19 6' (1.829 m) Weight Metrics 7/3/2019 4/29/2017 3/11/2017 Weight 130 lb 150 lb 150 lb BMI 17.63 kg/m2 20.34 kg/m2 20.34 kg/m2 Per Care Everywhere: 
 
 
Patient Vitals for the past 100 hrs: 
 % Diet Eaten 07/05/19 0425 200 % 07/04/19 1800 70 % 07/04/19 1238 100 % 07/04/19 0847 90 % UBW: 165 lb x 1 year ago per Pt [x] Weight Loss (37 lb or 22.5% x 1 year) 
[] Weight Gain 
[] Weight Stable Estimated Nutrition Needs: [x] MSJ  [] Other: 
Calories: 2693-2599 kcal Based on:   [x] Actual BW   
Protein:   71-89 g Based on:   [x] Actual BW Fluid:       0272-5591 ml Based on:   [x] Actual BW  
 
 
Nutrition Problems Identified:  
[x] Suboptimal PO intake (variable) [] Food Allergies [] Difficulty chewing/swallowing/poor dentition 
[] Constipation/Diarrhea  
[] Nausea/Vomiting  
[] None 
[x] Other: Wound healing Plan:  
[x] Therapeutic Diet [x]  Obtained/adjusted food preferences/tolerances and/or snacks options [x]  Continue supplements added  
[] Occupational therapy following for feeding techniques []  HS snack added  
[]  Modify diet texture  
[]  Modify diet for food allergies []  Educate patient  
[]  Assist with menu selection  
[x]  Monitor PO intake on meal rounds  
[x]  Continue inpatient monitoring and intervention  
[x]  Participated in discharge planning/Interdisciplinary rounds/Team meetings  
[]  Other:  
 
Education Needs: 
 [] Not appropriate for teaching at this time due to: 
 [x] Identified and addressed Nutrition Monitoring and Evaluation: 
[x] Continue ongoing monitoring and intervention 
[] Juan Jose Wodoard

## 2019-07-05 NOTE — PROGRESS NOTES
Will no longer follow. Available if needed. Dr. Faiza Contreras on call for the weekend.  Evette Zeng MD

## 2019-07-06 NOTE — ROUTINE PROCESS
Bedside and Verbal shift change report given to Xochitl Garza RN, BSN (oncoming nurse) by Anju Gee RN, BSN 
 (offgoing nurse). Report included the following information SBAR, Kardex, Procedure Summary, Intake/Output, MAR and Recent Results.   
 
Anju Gee RN, BSN

## 2019-07-06 NOTE — PROGRESS NOTES
Bedside and Verbal shift change report given to Yosef Vidal RN (oncoming nurse) by Darnell Mcduffie RN (offgoing nurse). Report included the following information SBAR, Kardex, Procedure Summary, Intake/Output and MAR.  
 
0900- Complaint of pain 9 of 10 to left foot, prn mediation given. Call light placed in reach. 1000-Pain reassessment 6 of 10. 
 
1120- Complaint of pain 10 of 10 to left foot prn morphine given. NAD. Call light placed in reach. <<----- Click to add NO pertinent Family History

## 2019-07-06 NOTE — PROGRESS NOTES
Internal Medicine Progress Note Patient's Name: Costa Degroot Admit Date: 6/29/2019 Length of Stay: 7 Assessment/Plan Active Hospital Problems Diagnosis Date Noted  Gangrene of foot ([facility] 06/29/2019  Cellulitis of left lower extremity 06/29/2019  Malnourished ([facility] 06/29/2019  Peripheral vascular disease ([facility] 06/29/2019  Hyponatremia 06/29/2019  
 
- Cont abx, okay for discharge on PO abx per podiatry 
-ID for oral abx recommendations 
- attempt MRI again - unsuccessful 
- wound care per podiatry 
- cardiology cleared for surgery negative stress test 
- Per nephrology, Lasix MWF and avoid NaCl tabs 
- no emergent intervention per VS 
 
Subjective Patient states pain in foot still, and that he wants to leave,  No other complaints. Patient states that his PCN allergy was when he was 16years old, denies allergy caused angioedema, SOB, wheeze, resp compromise. States he says he has a PCN allergy only due to the fact that he received a pcn shot in the 1970s as a kid and that he did not like shots as a child, not because he developed a reaction Objective Visit Vitals BP (!) 161/95 (BP 1 Location: Left arm, BP Patient Position: At rest) Pulse (!) 115 Temp 99.2 °F (37.3 °C) Resp 20 Ht 6' (1.829 m) Wt 59 kg (130 lb) SpO2 96% BMI 17.63 kg/m² Physical Exam: 
General Appearance: NAD, conversant Lungs: CTA with normal respiratory effort CV: Tachycardia, no m/r/g Abdomen: soft, non-tender, normal bowel sounds Extremities: no cyanosis, no peripheral edema Neuro: No focal deficits, motor/sensory intact Lab/Data Reviewed: 
BMP:  
Lab Results Component Value Date/Time   (L) 07/06/2019 04:36 AM  
 K 4.5 07/06/2019 04:36 AM  
 CL 93 (L) 07/06/2019 04:36 AM  
 CO2 22 07/06/2019 04:36 AM  
 AGAP 10 07/06/2019 04:36 AM  
 GLU 89 07/06/2019 04:36 AM  
 BUN 9 07/06/2019 04:36 AM  
 CREA 0.74 07/06/2019 04:36 AM  
 GFRAA >60 07/06/2019 04:36 AM  
 GFRNA >60 07/06/2019 04:36 AM  
 
CBC:  
No results found for: WBC, HGB, HGBEXT, HCT, HCTEXT, PLT, PLTEXT, HGBEXT, HCTEXT, PLTEXT Imaging Reviewed: 
No results found. Medications Reviewed: 
Current Facility-Administered Medications Medication Dose Route Frequency  carvedilol (COREG) tablet 3.125 mg  3.125 mg Oral BID WITH MEALS  VANCOMYCIN INFORMATION NOTE   Other ONCE  
 furosemide (LASIX) tablet 20 mg  20 mg Oral Q MON, WED & FRI  vancomycin (VANCOCIN) 1,000 mg in 0.9% sodium chloride (MBP/ADV) 250 mL adv  1,000 mg IntraVENous Q8H  
 cloNIDine HCl (CATAPRES) tablet 0.1 mg  0.1 mg Oral BID  
 oxyCODONE-acetaminophen (PERCOCET) 5-325 mg per tablet 1-2 Tab  1-2 Tab Oral Q4H PRN  
 morphine injection 2 mg  2 mg IntraVENous Q6H PRN  
 cefepime (MAXIPIME) 1 g in 0.9% sodium chloride (MBP/ADV) 50 mL MBP  1 g IntraVENous Q12H  
 nicotine (NICODERM CQ) 14 mg/24 hr patch 1 Patch  1 Patch TransDERmal DAILY  aspirin chewable tablet 81 mg  81 mg Oral DAILY  atorvastatin (LIPITOR) tablet 40 mg  40 mg Oral QHS  heparin (porcine) injection 5,000 Units  5,000 Units SubCUTAneous Q8H  
 VANCOMYCIN INFORMATION NOTE   Other Rx Dosing/Monitoring Nate Valdovinos PA-C [address] [place], [place] [address] Pager: [phone] Office: [phone]

## 2019-07-06 NOTE — PROGRESS NOTES
Assumed care of patient, patient sitting up in chair. C/o wet bed and pain medication. Sbar report received from [employer]. Call light within reach. 2030: Linens changed and warm blanket given to patient. Bedside / verbal shift change report given to Mady Hooker RN   (oncoming nurse) by Joseph Mg RN (offgoing nurse). Report included the following information SBAR, Kardex, Intake/Output, MAR and Recent Results.

## 2019-07-06 NOTE — DISCHARGE INSTRUCTIONS
DISCHARGE SUMMARY from Nurse    PATIENT INSTRUCTIONS:    After general anesthesia or intravenous sedation, for 24 hours or while taking prescription Narcotics:  · Limit your activities  · Do not drive and operate hazardous machinery  · Do not make important personal or business decisions  · Do  not drink alcoholic beverages  · If you have not urinated within 8 hours after discharge, please contact your surgeon on call. Report the following to your surgeon:  · Excessive pain, swelling, redness or odor of or around the surgical area  · Temperature over 100.5  · Nausea and vomiting lasting longer than 4 hours or if unable to take medications  · Any signs of decreased circulation or nerve impairment to extremity: change in color, persistent  numbness, tingling, coldness or increase pain  · Any questions    What to do at Home:  Recommended activity: Ambulate in house,     If you experience any of the following symptoms like increasing pain  , please follow up with primary doctor  . *  Please give a list of your current medications to your Primary Care Provider. *  Please update this list whenever your medications are discontinued, doses are      changed, or new medications (including over-the-counter products) are added. *  Please carry medication information at all times in case of emergency situations. These are general instructions for a healthy lifestyle:    No smoking/ No tobacco products/ Avoid exposure to second hand smoke  Surgeon General's Warning:  Quitting smoking now greatly reduces serious risk to your health.     Obesity, smoking, and sedentary lifestyle greatly increases your risk for illness    A healthy diet, regular physical exercise & weight monitoring are important for maintaining a healthy lifestyle    You may be retaining fluid if you have a history of heart failure or if you experience any of the following symptoms:  Weight gain of 3 pounds or more overnight or 5 pounds in a week, increased swelling in our hands or feet or shortness of breath while lying flat in bed. Please call your doctor as soon as you notice any of these symptoms; do not wait until your next office visit. The discharge information has been reviewed with the patient. The patient verbalized understanding. Discharge medications reviewed with the patient and appropriate educational materials and side effects teaching were provided. Patient armband removed and shredded  MyChart Activation    Thank you for requesting access to [employer]. Please follow the instructions below to securely access and download your online medical record. [employer] allows you to send messages to your doctor, view your test results, renew your prescriptions, schedule appointments, and more. How Do I Sign Up? 1. In your internet browser, go to [email or web address]  2. Click on the First Time User? Click Here link in the Sign In box. You will be redirect to the New Member Sign Up page. 3. Enter your [employer] Access Code exactly as it appears below. You will not need to use this code after youve completed the sign-up process. If you do not sign up before the expiration date, you must request a new code. [employer] Access Code: [email or web address]  Expires: 2019 11:36 AM (This is the date your [employer] access code will )    4. Enter the last four digits of your Social Security Number (xxxx) and Date of Birth (mm/dd/yyyy) as indicated and click Submit. You will be taken to the next sign-up page. 5. Create a [employer] ID. This will be your [employer] login ID and cannot be changed, so think of one that is secure and easy to remember. 6. Create a [employer] password. You can change your password at any time. 7. Enter your Password Reset Question and Answer. This can be used at a later time if you forget your password. 8. Enter your e-mail address. You will receive e-mail notification when new information is available in [address]   9. Click Sign Up. You can now view and download portions of your medical record. 10. Click the Download Summary menu link to download a portable copy of your medical information. Additional Information    If you have questions, please visit the Frequently Asked Questions section of the [employer] website at [email or web address] Remember, [employer] is NOT to be used for urgent needs.  For medical emergencies, dial 911.      ________________________________________________________________________________________________________________________________

## 2019-07-06 NOTE — PROGRESS NOTES
Problem: Pain Goal: *Control of Pain Outcome: Progressing Towards Goal 
  
Problem: Patient Education: Go to Patient Education Activity Goal: Patient/Family Education Outcome: Progressing Towards Goal 
  
Problem: Falls - Risk of 
Goal: *Absence of Falls Description Document Lia Storey Fall Risk and appropriate interventions in the flowsheet. Outcome: Progressing Towards Goal 
  
Problem: Patient Education: Go to Patient Education Activity Goal: Patient/Family Education Outcome: Progressing Towards Goal 
  
Problem: Pressure Injury - Risk of 
Goal: *Prevention of pressure injury Description Document Mikie Scale and appropriate interventions in the flowsheet. Outcome: Progressing Towards Goal 
  
Problem: Patient Education: Go to Patient Education Activity Goal: Patient/Family Education Outcome: Progressing Towards Goal 
  
Problem: Discharge Planning Goal: *Discharge to safe environment Outcome: Progressing Towards Goal 
  
Problem: Nutrition Deficit Goal: *Optimize nutritional status Outcome: Progressing Towards Goal

## 2019-07-06 NOTE — PROGRESS NOTES
In Patient Progress note Admit Date: 6/29/2019 Impression: 1. 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 Na level stable in mid 120's -> 125meq/l this am.  Reviewing prior chemistries, his Na level has been running low for many years. This is certainly nothing new. His Na levels largely run in the low 130's but they will frequently dip down into the 120's like this. 2.  Foot gangrene - on ABX, podiatry following. Vascular note reviewed -> he will need revascularization at some point for limb salvage but they are trying to clarify overall prognosis first, especially in terms of the underlying CA. Plan:  
 
Since this hyponatremia is chronic and stable, we do not need to make any changes at this time. He is appropriately on a fluid restriction which we'll plan to continue. He's on a low dose lasix just MWF. I agree with keeping this at a very low dose otherwise I'm concerned he may get dry as he's at risk for volume depletion. NaCl tablets on hold for now but could reconsider this later if Na is dropping as outpatient. Samsca could also be considered (especially in hospital) if Na were to drop lower -> but there's no indication for this now since Na is stable and chronically low like this. He is stable for d/c at any time from a renal standpoint. We'll check back with him on Monday if still here. Subjective:  
 
Events noted. Current Facility-Administered Medications:  
  ciprofloxacin HCl (CIPRO) tablet 750 mg, 750 mg, Oral, Q12H, Maury Troy PA-C 
  amoxicillin (AMOXIL) capsule 500 mg, 500 mg, Oral, Q8H, Maury Troy PA-C 
  carvedilol (COREG) tablet 3.125 mg, 3.125 mg, Oral, BID WITH MEALS, Vanessa Yen MD, 3.125 mg at 07/06/19 0375   furosemide (LASIX) tablet 20 mg, 20 mg, Oral, Q MON, WED & Gloria Sadi, Jennifer Augustin MD, 20 mg at 07/05/19 0694   cloNIDine HCl (CATAPRES) tablet 0.1 mg, 0.1 mg, Oral, BID, Janett Cornejo MD, 0.1 mg at 07/06/19 0903 
  oxyCODONE-acetaminophen (PERCOCET) 5-325 mg per tablet 1-2 Tab, 1-2 Tab, Oral, Q4H PRN, Gilma Beach MD, 2 Tab at 07/06/19 4902   morphine injection 2 mg, 2 mg, IntraVENous, Q6H PRN, Gilma Beach MD, 2 mg at 07/06/19 1125 
  nicotine (NICODERM CQ) 14 mg/24 hr patch 1 Patch, 1 Patch, TransDERmal, DAILY, [employer], 1 Patch at 07/06/19 0146   aspirin chewable tablet 81 mg, 81 mg, Oral, DAILY, [employer], 81 mg at 07/06/19 7494   atorvastatin (LIPITOR) tablet 40 mg, 40 mg, Oral, QHS, [employer], 40 mg at 07/05/19 2102   heparin (porcine) injection 5,000 Units, 5,000 Units, SubCUTAneous, Q8H, [employer], 5,000 Units at 07/06/19 1124 Objective:  
 
Visit Vitals BP (!) 159/95 (BP 1 Location: Right arm, BP Patient Position: At rest) Comment: Notified RN  
Pulse 100 Temp 97.6 °F (36.4 °C) Resp 16 Comment: Breaths were short and not deep Ht 6' (1.829 m) Wt 59 kg (130 lb) SpO2 97% BMI 17.63 kg/m² Intake/Output Summary (Last 24 hours) at 7/6/2019 1332 Last data filed at 7/6/2019 6182 Gross per 24 hour Intake 240 ml Output 800 ml Net -560 ml Physical Exam:  
 
Cachectic. Decreased BS.  RRR. No pitting edema. Abdomen soft, benign. Data Review: 
 
Recent Labs  
  07/04/19 0319 WBC 5.7  
RBC 2.91* HCT 25.9* MCV 89.0 MCH 29.9 MCHC 33.6 RDW 13.6 Recent Labs  
  07/06/19 
0436 07/05/19 
0420 07/04/19 
0319 BUN 9 7 6*  
CREA 0.74 0.67 0.61  
CA 8.7 8.4* 8.4* ALB 2.9* 2.7* 2.6*  
K 4.5 4.4 4.6 * 126* 128* CL 93* 96* 98* CO2 22 22 21 PHOS 4.0 3.5 3.1 GLU 89 87 79 Brain MD Charlie 
Cell [phone] Pager [phone]

## 2019-07-06 NOTE — PROGRESS NOTES
Podiatry Surgery Progress Note Patient: Charisma Choudhury MRN: [id number]  SSN: [id number] YOB: 1955  Age: 62 y.o. Sex: male Assessment:  
 
Patient Active Problem List  
Diagnosis Code  Gangrene of foot ([facility] I96  
 Cellulitis of left lower extremity L03. 80  
 Malnourished ([facility] E46  Peripheral vascular disease (HCC) I73.9  Hyponatremia E87.1 Plan: Attempted MRI yesterday, unable to obtain secondary to patient movement. Reviewed Sed Rate results Reviewed vascular noted for limb salvage procedure prior to any surgical intervention. Reviewed Cardiology noted. Changed dressing today with betadine soaked 4x4 to hallux, 2nd and 3rd digit left, lateral left heel and Aquacel AG to the medial left heel. If discharged continue [facility] with betadine and DSD Total time spent with patient: 30 Minutes Care Plan discussed with: Patient and Nursing Staff Discussed:  Care Plan Disposition:  Stable Mr. Georgia Houstno is a 62 y.o. male who was seen on AM rounds. Patient is complaining of pain over the left foot. Subjective:  
Past Medical History Past Medical History:  
Diagnosis Date  Depression  Emphysema (subcutaneous) (surgical) resulting from a procedure  Hypertension  Hyponatremia  Non-small cell lung cancer ([facility] 2018  
 rt upper lung  Seizure ([facility]  [facility]izures ([facility] Social History Socioeconomic History  Marital status:  Spouse name: Not on file  Number of children: Not on file  Years of education: Not on file  Highest education level: Not on file Occupational History  Not on file Social Needs  Financial resource strain: Not on file  Food insecurity:  
  Worry: Not on file Inability: Not on file  Transportation needs:  
  Medical: Not on file Non-medical: Not on file Tobacco Use  Smoking status: Current Every Day Smoker  Smokeless tobacco: Never Used Substance and Sexual Activity  Alcohol use: Yes  Drug use: No  
 Sexual activity: Not on file Lifestyle  Physical activity:  
  Days per week: Not on file Minutes per session: Not on file  Stress: Not on file Relationships  Social connections:  
  Talks on phone: Not on file Gets together: Not on file Attends Sabianist service: Not on file Active member of club or organization: Not on file Attends meetings of clubs or organizations: Not on file Relationship status: Not on file  Intimate partner violence:  
  Fear of current or ex partner: Not on file Emotionally abused: Not on file Physically abused: Not on file Forced sexual activity: Not on file Other Topics Concern  Not on file Social History Narrative  Not on file Current Medications Current Facility-Administered Medications Medication Dose Route Frequency Provider Last Rate Last Dose  carvedilol (COREG) tablet 3.125 mg  3.125 mg Oral BID WITH MEALS Petar Graham MD   3.125 mg at 07/05/19 1803  VANCOMYCIN INFORMATION NOTE   Other ONCE Petar Graham MD      
 furosemide (LASIX) tablet 20 mg  20 mg Oral Q MON, WED & Carmen Wesley MD   20 mg at 07/05/19 0824  
 vancomycin (VANCOCIN) 1,000 mg in 0.9% sodium chloride (MBP/ADV) 250 mL adv  1,000 mg IntraVENous Q8H Petar Graham  mL/hr at 07/06/19 0400 1,000 mg at 07/06/19 0400  cloNIDine HCl (CATAPRES) tablet 0.1 mg  0.1 mg Oral BID Stas Simpson MD   0.1 mg at 07/05/19 1803  
 oxyCODONE-acetaminophen (PERCOCET) 5-325 mg per tablet 1-2 Tab  1-2 Tab Oral Q4H PRN Petar Graham MD   2 Tab at 07/06/19 5316  morphine injection 2 mg  2 mg IntraVENous Q6H PRN Petar Graham MD   2 mg at 07/06/19 1230  cefepime (MAXIPIME) 1 g in 0.9% sodium chloride (MBP/ADV) 50 mL MBP  1 g IntraVENous Q12H Romain WANG  mL/hr at 07/06/19 0253 1 g at 07/06/19 5533  nicotine (NICODERM CQ) 14 mg/24 hr patch 1 Patch  1 Patch TransDERmal DAILY Thor [place] IRA H, DO   1 Patch at 07/05/19 1803  aspirin chewable tablet 81 mg  81 mg Oral DAILY Carron Marichuye, DO   81 mg at 07/05/19 5995  
 atorvastatin (LIPITOR) tablet 40 mg  40 mg Oral QHS Thor Hoh H, DO   40 mg at 07/05/19 2102  heparin (porcine) injection 5,000 Units  5,000 Units SubCUTAneous Q8H Thor Hoh H, DO   5,000 Units at 07/06/19 7159  VANCOMYCIN INFORMATION NOTE   Other Rx Dosing/Monitoring Carron Cliche, DO Patient Allergies Allergies Allergen Reactions  Pcn [Penicillins] Unknown (comments) Objective:  
General Exam 
alert, cooperative, no distress, appears stated age Vitals Visit Vitals BP (!) 161/95 (BP 1 Location: Left arm, BP Patient Position: At rest) Pulse (!) 115 Temp 99.2 °F (37.3 °C) Resp 20 Ht 6' (1.829 m) Wt 59 kg (130 lb) SpO2 96% BMI 17.63 kg/m² REVIEW OF SYSTEMS: 
General: denies chronic fatigue, weight loss, fever, anemia, bruising, depression, nervousness, panic attacks HEENT: denies ringing in ears, ear infections, dizzy spells, poor vision, glaucoma, sinus trouble, hoarseness, eye infections GI: denies diarrhea, gas, bloating, heartburn, regurgitation, difficulty swallowing, painful swallowing, nausea, vomiting, constipation, abdominal pain, decreased appetite, blood in stools, black stools, jaundice, dark urine Lungs: denies pneumonia, asthma, cough, SOB, hemoptysis Heart: denies chest pain, irregular heart beat, ankle swelling, 
Skin: denies rashes, hives, allergic reaction Urinary: denies UTI, kidney stones, decreased urine force and flow, urination at night, blood in urine, painful urination Bones and Joints: denies arthritis, rheumatism, left foot pain, gout, osteoporosis Neurologic: denies stroke, seizures, headaches, numbness, tingling Physical Exam:   
 Mr Malagon Lacks 10 N. o. male who is pleasant, alert and oriented x3, in no apparent distress. . Patient is well-developed and nourished, with good attention to hygiene and body habitus. Mood and affect normal, appropriate to situation.  
  
Left foot: Gangrene tip of 3rd toe.  NO odor 
  
Vascular Exam:  
Dorsalis Pedis  and Posterior Tibial non palpable with mild edema of left foot  (no pulses audible with doppler) Skin temp warm to cool. Pedal hair is absent.   
  
Neurological Exam:  
Light touch protective sensation is diminshed to both feet. There is noted Loss of protective sensation. 
  
Musculoskeletal Exam:  
Muscle tone is normal for age and situation. There is equinus of the left limb. The muscle strength is 5/5 for the flexors, extensors, inverters, and everter's. 
  
Dermatological Exam:  
Skin is of abnormal texture and turgor with some atrophic skin changes noting decreased hair growth, nail changes (thickening), pigmentary changes, skin texture (thin,shiney), skin color (rubor, red) . R/L Bilateral. There is diffuse xerosis. There is noted subungual debris. 
  
Left foot 3rd toe gangrene with ischemic changes to 2nd and hallux and posterior plantar left heel. 
  
 
 
 
Wound Foot Other (Comment); Left (Active) Dressing Status Other (Comment) 7/2/2019  7:20 PM  
Dressing Type Open to air 7/4/2019  8:30 PM  
Drainage Amount None 7/2/2019  7:15 AM  
Wound Odor None 7/2/2019  7:15 AM  
Cleansing and Cleansing Agents  Dermal wound cleanser;Betadine 7/2/2019  7:15 AM  
Dressing Changed Changed/New 7/2/2019  7:15 AM  
Dressing Type Applied 4 x 4;Other (Comment) 7/2/2019  7:15 AM  
Procedure Tolerated Well 7/2/2019  7:15 AM  
Number of days: 6 Wound Heel Left (Active) Dressing Type Open to air 7/4/2019  8:30 PM  
Number of days: 5 Labs Recent Results (from the past 24 hour(s)) Amaya Coelho Collection Time: 07/05/19  8:52 AM  
Result Value Ref Range Vancomycin,trough 18.0 10.0 - 20.0 ug/mL Reported dose date: [id number] Reported dose time: 0100 Reported dose: 1000MG UNITS RENAL FUNCTION PANEL Collection Time: 07/06/19  4:36 AM  
Result Value Ref Range Sodium 125 (L) 136 - 145 mmol/L Potassium 4.5 3.5 - 5.5 mmol/L Chloride 93 (L) 100 - 108 mmol/L  
 CO2 22 21 - 32 mmol/L Anion gap 10 3.0 - 18 mmol/L Glucose 89 74 - 99 mg/dL BUN 9 7.0 - 18 MG/DL Creatinine 0.74 0.6 - 1.3 MG/DL  
 BUN/Creatinine ratio 12 12 - 20 GFR est AA >60 >60 ml/min/1.73m2 GFR est non-AA >60 >60 ml/min/1.73m2 Calcium 8.7 8.5 - 10.1 MG/DL Phosphorus 4.0 2.5 - 4.9 MG/DL Albumin 2.9 (L) 3.4 - 5.0 g/dL IMPRESSION IMPRESSION: 
  
1. ABDOMINAL AORTA:  Mild to moderate atherosclerosis without significant 
stenosis. 
  
2. RIGHT LOWER EXTREMITY: 
-HAYDEE:  Mild origin stenosis. -EIA:  Multilevel stenosis, most severe at the origin with moderate stenosis. -CFA:  Marked tight focal stenosis involving the mid aspect. Moderate short 
segment stenosis involving the distal aspect. -SFA:  Multilevel stenosis with multilevel tight stenosis or occlusions within 
the proximal and mid aspects. 
-Popliteal artery:  Multilevel stenosis with long segment occlusion involving 
the mid to distal aspect. 
-Tibioperoneal arteries:  Extensive atherosclerosis with suspected occlusions 
involving the origins and proximal aspects. 
  
3. LEFT LOWER EXTREMITY: 
-HAYDEE:  Suggestion of mild focal origin stenosis. -EIA:  Occluded. -CFA:  Proximal aspect occluded. No significant stenosis involving the mid to 
distal aspect. -SFA:  Multilevel stenosis throughout its entire length with short segment 
occlusion involving the distal aspect.  
-Popliteal artery:  Multiple short segment occlusions involving its entire 
length. 
-Tibioperoneal arteries:  Extensive atherosclerosis with suspected occlusion of 
the proximal trifurcation vessels. 
  
 4. Other vascular findings:   
-Moderate to marked left renal artery origin stenosis. 
  
5. Non-vascular findings: 
-Assessment limited secondary to respiratory motion. Moderately distended 
bladder. 
   
Imaging  
  
CTA ABD ART W RUNOFF W WO CONT (Order: [id number]) - 6/30/2019  
  
 
 
 
X-Ray: EXAM: Foot Series Complete 
  
Indication: Left foot pain. 
  
Technique:  Frontal, oblique, and lateral views of the left foot. 
  
Comparison: None 
_______________ FINDINGS: 
Osteopenia with old healed fourth and fifth metatarsal fractures. No acute 
fracture or destructive osseous abnormality. Small degenerative plantar cannula 
spur.  The soft tissues appear within normal limits.  No radiopaque foreign body 
is seen.  
  
_______________ IMPRESSION IMPRESSION: 
  
1.  No acute fracture, destructive osseous changes or plain film findings to 
suggest osteomyelitis. 
  
   
Imaging  
  
XR FOOT LT MIN 3 V (Order: [id number]) - 6/29/2019  
 
 
 
 
Procedures:   none Signa Males, DPM 
July 6, 2019

## 2019-07-07 NOTE — PROGRESS NOTES
07/07/19 I called to [facility] help desk- patient's discharge antibiotic of Linezolid  600 mg twice daily for 10 days. This medication needs a preauthorization but can not be today since the office is closed. The [employer] number to call tomorrow is [phone]. Alyce JIM notified. Callie Moralez. Mathew Giraldo RN, BSN [facility] [phone], Pager [phone] 
[email or web address]

## 2019-07-07 NOTE — PROGRESS NOTES
Bedside and Verbal shift change report given to Macrina Mccoy RN (oncoming nurse) by Javier Valenzuela RN (offgoing nurse). Report included the following information SBAR, Kardex, Procedure Summary, Intake/Output and MAR.  
 
1130- Amoxicillin given per MD order, pt informed to notify this nurse for any signs of a reaction. 1500- Pt able to be discharged home, but pt stated his ride will be unable to pick him up. Pt stated he did not want to wait for his ride and stated he had money to take a cab and would call one to come pick him up, and would return in the morning to [facility] to have medication filled. I have reviewed discharge instructions with the patient. The patient verbalized understanding.

## 2019-07-07 NOTE — PROGRESS NOTES
Assumed care of patient, patient is due to be d/c to afternoon. Alycia Cochran was paged x2 to write d/c orders for patient. Patient is eager to go home. 2015: Dr. Salma Vasquez on unit to assess patient for d/c. Due to patient requiring dressing change to his right foot. Home health ha not been contact and their are no order as to the specific of dressing change require and frequency. Dr. Salma Vasquez spoke to Mr. Maryann Perez about his concern and the risk of him being d/c with out proper wound care in place. Patient was in agreeance with staying at [facility] for one more night in order to have the proper wound care required for his left foot. 2030; Offered to redress patient foot and assist with changing into a gown, patient declined. He also declined having the prevalent boots placed on his foot. 6781: Bedside / verbal shift change report given to Mendez Ramsey   (oncoming nurse) by [employer] RN (offgoing nurse). Report included the following information SBAR, Kardex, Intake/Output, MAR and Recent Results.

## 2019-07-07 NOTE — PROGRESS NOTES
Problem: Discharge Planning Goal: *Discharge to safe environment Outcome: Progressing Towards Goal 
 Home with hh 
 Patient got dc'd with po amoxicillin.

## 2019-07-07 NOTE — DISCHARGE SUMMARY
[address] [place] [address] Group Hospitalist Division Discharge Summary Patient: Corrine Whitfield MRN: [id number]  CSN: [id number] YOB: 1955  Age: 62 y.o. Sex: male DOA: 6/29/2019 LOS:  LOS: 8 days   Discharge Date: 7/7/2019 Admission Diagnoses: Gangrene of foot ([facility] Liliane Hunter Discharge Diagnoses:   
Problem List as of 7/7/2019 Never Reviewed Codes Class Noted - Resolved * (Principal) Gangrene of foot ([facility] ICD-10-CM: T76 
ICD-9-CM: 785.4  6/29/2019 - Present Cellulitis of left lower extremity ICD-10-CM: L03.116 ICD-9-CM: 682.6  6/29/2019 - Present Malnourished ([facility] ICD-10-CM: E46 
ICD-9-CM: 263.9  6/29/2019 - Present Peripheral vascular disease ([facility] ICD-10-CM: I73.9 ICD-9-CM: 443.9  6/29/2019 - Present Hyponatremia ICD-10-CM: E87.1 ICD-9-CM: 276.1  6/29/2019 - Present Discharge Condition: Stable Discharge To: Home Consults: Cardiology, Nephrology, Vascular Surgery and Podiatry Hospital Course: \"Vini Mcclelland is a 62 y.o. male with a PMHx listed below who presented to the ED with pain in his left foot. Upon further questioning, the pain actually started about a month ago but only worsened over the past 2-3 days. He states that's when the 3rd toe turned black and his toenails started falling off. He admits to fever and chills as well. He states pain in both legs with ambulation. He only takes plavix and can't tell me why. He hasn't smoked in quite some time. He drinks occasionally. In the ED, he was given broad spectrum IV antibiotics. \" 
 
Wound care consulted. Vascular sx, cardiology, podiatry, nephrology consulted. Patient has severe multilevel vascular disease, statin and ASA initiated. Xray of foot was unremarkable for fracture, and osteo. Patient was unable to tolerate MRI on multiple occasions. Micrology resulted with resistance to multiple oral abx. Started on oral abx.  Lasix initiated for presumed [facility] given his malignancy and or idiopathic. Negative nuclear stress test. Patient improved. VSS, ready for discharge. Encouraged to follow up with PCP, and vascular surgery, as well as podiatry. Vascular sx recs multilevel revascularization. Home health ordered with wound care. Patient given abx for wound and encouraged to return to the ED if new or worsening symptoms. He is educated on allergic symptoms to potentially be aware of I.e sudden SOB, wheezing, angioedema, rash. He once again states that his \"allergy to PCN\" was only an allergy listed because he did not want the PCN injection as a child and not because he was truly allergic to PCN. Inpatient Treatment:  
 
 
- Cont abx, okay for discharge on PO abx per podiatry 
-ID for oral abx recommendations 
- attempt MRI again - unsuccessful 
- wound care per podiatry 
- cardiology cleared for surgery negative stress test 
- Per nephrology, Lasix MWF and avoid NaCl tabs 
- no emergent intervention per VS 
 
Physical Exam: 
General appearance: alert, cooperative, no distress, appears stated age Head: Normocephalic, without obvious abnormality, atraumatic Lungs: clear to auscultation bilaterally Heart: regular rate and rhythm, S1, S2 normal, no murmur, click, rub or gallop Abdomen: soft, non-tender. Bowel sounds normal. No masses,  no organomegaly Extremities: gangrenous left third toe distal tip portion, DPP/PTP non palpable,  
Skin: decreased hair growth left, atrophic shiny skin bilat LE, xerosis Neurologic: Grossly normal 
PSY: Mood and affect normal, appropriately behaved Significant Diagnostic Studies: All lab results for the last 24 hours reviewed. No results found. Discharge Medications:   Cannot display discharge medications since this patient is not currently admitted. Activity: Activity as tolerated Diet: Low fat, Low cholesterol Wound Care: Keep wound clean and dry and As directed Follow-up: PCP, Podiatry, Vascular surgery Discharge time: >35 Minutes Patience Walter PA-C [address] [facility] Hospitalist Division Pager: [phone] Office: [phone] 
 
7/7/2019, 4:08 PM

## 2019-07-08 NOTE — PROGRESS NOTES
7/8/19--Request from weekend CM to follow-up on home health order for wound care and obtain [facility]. Placed call to patient's cell number and got VM, left a message for patient to return my call. Call placed to DC Caregiver listed on chart Elsy Goel. Informed him for the reason for my call in coordinating home health and need for [facility]. He states that he is at work right now and will give my number to his father when he goes home for lunch today. Asked son if his father was able to get his antibiotic prescription filled and he stated that he is busy with work, taking care of his family and his father and no one is considerate of his time and what he has to do. The son states that he will take the prescription to the pharmacy later today. 7/8/19--1315- Received call back from patient. He gave verbal consent for [facility] for [address] [facility]. Best contact number for patient is [phone]. Referral placed in [facility] to [facility]  and spoke to Mike Meeks who indicated pt will need a PCP established. Called patient and left VMM for return call for PCP approval. 
 
 
 
Annalise Hussein RN Outcomes Manager 
[phone]

## 2019-07-09 NOTE — PROGRESS NOTES
7/9/19--Attempted to call patient again today to discuss arranging PCP appt for himso he can get Home Health and had to leave [facility]. Called DC Caregiver, son Mr Valeria Main and made him aware of reason for the call. He tried to call his dad for 3 way conversation and his father didn't answer. He stated that when he goes home for lunch he will tell his father to call this writer back. 7/9/19 1220-- VMM from patient. He has requested that this writer stop calling his son and said on the message that someone has been out to his home. This writer has had great difficulty making contact with the patient directly by calling his number [phone]. Pt either does not answer the phone or does not return VM messages and that was reason for contacting his son as DC Caregiver noted on the chart. Called [facility] and spoke to Pelham. Informed her of the difficulty I was having being able to speak to the pt to get permission to make PCP appt for him and might be best if Gregorio Yuan can call me when she is at the patient's house to assist with this communication. My number given to Pelham who will pass this onto the nurse. 7/9/19 1445 Patient called this writer and agrees/ gave verbal permission for this writer to make a new PCP appt for him. Advised that I will call him back at 3:30 with the PCP name,location/phone number. Referred to Yuri Rodriguez to assist with PCP appt. 
 
7/9/19 1515--PCP appt scheduled with Ruby Francisco NP on 7/29/19 @ 11:30. Notified Fior at [facility] of this appt. Will call pt at 0 as previously informed him. 7/9/19 1535-- Left VMM for patient with PCP appt date and time Gerald Paul RN Outcomes Manager 
[phone]

## 2019-07-09 NOTE — PROGRESS NOTES
PCP appointment made: 7/9/19 New or Established: New Doctor: Hattie Kanner, NP Date : 7/29/19 Time : 11:30A See provider location/contact information in follow up.

## 2019-07-10 PROBLEM — E86.0 DEHYDRATION: Status: ACTIVE | Noted: 2019-01-01

## 2019-07-10 PROBLEM — A41.9 SEPSIS (HCC): Status: ACTIVE | Noted: 2019-01-01

## 2019-07-10 PROBLEM — I96 GANGRENE (HCC): Status: ACTIVE | Noted: 2019-01-01

## 2019-07-10 PROBLEM — N17.9 AKI (ACUTE KIDNEY INJURY) (HCC): Status: ACTIVE | Noted: 2019-01-01

## 2019-07-10 PROBLEM — E87.20 LACTIC ACIDEMIA: Status: ACTIVE | Noted: 2019-01-01

## 2019-07-10 NOTE — H&P
Internal Medicine History and Physical 
   
 
 
Subjective HPI: Marco Antonio Carlisle is a 62 y.o. male with a PMHx listed below of who presented to the ED with complaints of worsening LLE pain. Patient was admitted on 6/30/19 and discharged three days ago for gangrenous toes. Vascular surgery and podiatry were consulted then and he was recommended for multilevel revascularization, he was to follow up as out patient and said that his son could transport him. Per patient now, son was unable to transport patient to f/u appointment, so he returned due to persistent symptoms. In the ED vascular surgery consulted, podiatry consulted. He presents with worsening creatinine and sodium. I spoke with Nephrology and they recommend fluids. His only complaint is pain, no fevers, chills, weakness, AMS. Sodium 121, will be admitted for further eval. He doesn't know if he has been taking his lasix, but says hes been taking his abx PMHx: 
Past Medical History:  
Diagnosis Date  Depression  Emphysema (subcutaneous) (surgical) resulting from a procedure  Hypertension  Hyponatremia  Non-small cell lung cancer ([facility] 2018  
 rt upper lung  Seizure ([facility]  Seizures ([facility] PSurgHx: 
History reviewed. No pertinent surgical history. SocialHx: 
Social History Socioeconomic History  Marital status:  Spouse name: Not on file  Number of children: Not on file  Years of education: Not on file  Highest education level: Not on file Occupational History  Not on file Social Needs  Financial resource strain: Not on file  Food insecurity:  
  Worry: Not on file Inability: Not on file  Transportation needs:  
  Medical: Not on file Non-medical: Not on file Tobacco Use  Smoking status: Current Every Day Smoker  Smokeless tobacco: Never Used Substance and Sexual Activity  Alcohol use: Yes  Drug use: No  
 Sexual activity: Not on file Lifestyle  Physical activity:  
  Days per week: Not on file Minutes per session: Not on file  Stress: Not on file Relationships  Social connections:  
  Talks on phone: Not on file Gets together: Not on file Attends Restoration service: Not on file Active member of club or organization: Not on file Attends meetings of clubs or organizations: Not on file Relationship status: Not on file  Intimate partner violence:  
  Fear of current or ex partner: Not on file Emotionally abused: Not on file Physically abused: Not on file Forced sexual activity: Not on file Other Topics Concern  Not on file Social History Narrative  Not on file Allergies: Allergies Allergen Reactions  Pcn [Penicillins] Unknown (comments) Patients states that he was allergic to PCN as a child because he did not like receiving PCN injections, not because he developed a reaction to PCN administration FamilyHx: 
History reviewed. No pertinent family history. Prior to Admission Medications Prescriptions Last Dose Informant Patient Reported? Taking?  
amoxicillin (AMOXIL) 500 mg capsule   No No  
Sig: Take 1 Cap by mouth every eight (8) hours. atorvastatin (LIPITOR) 40 mg tablet   No No  
Sig: Take 1 Tab by mouth nightly. carvedilol (COREG) 3.125 mg tablet   No No  
Sig: Take 1 Tab by mouth two (2) times daily (with meals). ciprofloxacin HCl (CIPRO) 750 mg tablet   No No  
Sig: Take 1 Tab by mouth every twelve (12) hours. clopidogrel (PLAVIX) 75 mg tab   Yes No  
Sig: Take 75 mg by mouth daily. furosemide (LASIX) 20 mg tablet   No No  
Sig: Take 1 Tab by mouth every Monday, Wednesday, Friday. oxyCODONE-acetaminophen (PERCOCET) 5-325 mg per tablet   No No  
Sig: Take 1-2 Tabs by mouth every eight (8) hours as needed for Pain for up to 3 days. Max Daily Amount: 6 Tabs. Facility-Administered Medications: None Review of Systems: CONST: Fever, weight loss, fatigue or chills HEENT: Recent changes in vision, vertigo, epistaxis, dysphagia and hoarseness CV: Chest pain, palpitations, edema and varicosities RESP: Cough, shortness of breath, wheezing, hemoptysis, snoring and reactive airway disease GI: Nausea, vomiting, abdominal pain, change in bowel habits, hematochezia, melena, and GERD  
: Hematuria, dysuria, frequency, urgency, nocturia and stress urinary incontinence MS: Weakness, joint pain and arthritis, edema, erythema, Gangrene ENDO: Polyuria, polydipsia, polyphagia, poor wound healing, heat intolerance, cold intolerance LYMPH/HEME: Anemia, bruising and history of blood transfusions INTEG: Dermatitis, abnormal moles NEURO: Dizziness, headache, fainting, seizures and stroke PSYCH: Anxiety and depression A comprehensive review of systems was negative except for that written in the History of Present Illness. 
  
Objective Visit Vitals /90 Pulse 92 Temp 97.6 °F (36.4 °C) Resp 22 Wt 59 kg (130 lb) SpO2 92% BMI 17.63 kg/m² Physical Exam: 
General Appearance: NAD, conversant HENT: normocephalic/atraumatic, dry mucus membranes Lungs: CTA with normal respiratory effort Cardiovascular: RRR, no m/r/g, capillary refill < 2 sec, B/L DP/PT pulses +3/4 Abdomen: soft, non distended, non-tender, active bowel sounds, no guarding or rigidity Extremities: open dry wound anterior, lateral proximal tibia of LLE, Gangrenous left first and third toe distal portion, DPP/PTP non palpable, edematous foot, TTP Skin: decreased hair growth, atrophic, skinny bilat LE consistent with PVD Neuro: moves all extremities, no focal deficits, nml tone Psych: appropriate affect, alert and oriented to person, place and time Laboratory Studies: All lab results for the last 24 hours reviewed. Imaging Reviewed: 
[facility] Result Date: 7/10/2019 EXAM: XR CHEST PORT CLINICAL INDICATION/HISTORY: Left lower extremity wound, sepsis -Additional: Non-small cell lung carcinoma of the right upper lobe COMPARISON: Several prior chest radiographs, most recently June 22, 2018. TECHNIQUE: Frontal view of the chest _______________ FINDINGS: HEART AND MEDIASTINUM: Normal cardiac size and mediastinal contours. LUNGS AND PLEURAL SPACES: Linear/nodular opacity projecting over the right upper lobe with adjacent surgical clips redemonstrated without change. No focal pneumonic consolidation, pneumothorax, or pleural effusion. BONY THORAX AND SOFT TISSUES: No acute osseous abnormality _______________ IMPRESSION: 1. No acute radiographic abnormality. 2. Linear and nodular area of opacity projecting over the right upper lobe in keeping with history of malignancy. EKG:  
Normal sinus rhythm Cannot rule out Anterior infarct , age undetermined Abnormal ECG When compared with ECG of 03-JUL-2019 09:28,  
premature supraventricular complexes are no longer present ID interval has decreased Assessment/Plan Active Hospital Problems Diagnosis Date Noted  Sepsis ([facility] [address] 07/10/2019 Priority: 1 - One  Gangrene ([address] 07/10/2019 Priority: 2 - Two  Lactic acidemia 07/10/2019  [place] (acute kidney injury) ([address] 07/10/2019  Dehydration 07/10/2019  Hyponatremia 06/29/2019  Malnourished ([address] 06/29/2019  Peripheral vascular disease ([facility] [facility] 06/29/2019 Sepsis -Dry Gangrenous appearing first and third toe  
-Recent wound culture from previous admission susceptible to rocephin and vanc, continue abx 
-repeat wound culture Gangrene/PVD 
-Recent admission discharge on PO oral abx, was expected to f/u with vasc for multilevel revascularization but was unable to get to f/u due to ride issues and extent of pain 
-Vasc consulted 
-podiatry consulted -open dry wound anterior, lateral LLE proximal tibia Hyponatremia -worsened Na since discharge, worsening SIADH vs dehydration vs idiopathic? Appears dry 
-previous admission had work up by nephrology, presumed SIADH vs Idiopathic and was given PO lasix for management 
-poor historian and his is unsure if he has been taking the lasix at home 
-Nephrology consult  
-monitor for seizures, and other possible neurological symptoms, correct no more than 8meq in 24hours period 
-spoke with nephrology - rec gentle fluids 
-recheck sodium 
-monitor BMP BROOKLYN 
-potentially from lasix? Unsure if he has been taking them, he is unsure as well 
-appears volume depleted 
-will give gentle diuresis and monitor 
-Renal Dose medications  
-Avoid nephrotoxins - Cont acceptable home medications for chronic conditions  
- DVT protocol I have personally reviewed all pertinent labs, films and EKGs that have officially resulted. I reviewed available electronic documentation outlining the initial presentation as well as the emergency room physician's encounter. Patience Walter PA-C Internal Medicine, Hospitalist 
Pager: [phone] [facility]

## 2019-07-10 NOTE — ED NOTES
4:16 PM :Pt care assumed from Dr. Isaiah Tomas , ED provider. Pt complaint(s), current treatment plan, progression and available diagnostic results have been discussed thoroughly. Rounding occurred: no Intended Disposition: ADMIT Pending diagnostic reports and/or labs (please list): repeat lactic, labs, admit with vascular consultation CT 6/30: 
. LEFT LOWER EXTREMITY: 
-HAYDEE:  Suggestion of mild focal origin stenosis. -EIA:  Occluded. -CFA:  Proximal aspect occluded. No significant stenosis involving the mid to 
distal aspect. -SFA:  Multilevel stenosis throughout its entire length with short segment 
occlusion involving the distal aspect. -Popliteal artery:  Multiple short segment occlusions involving its entire 
length. 
-Tibioperoneal arteries:  Extensive atherosclerosis with suspected occlusion of 
the proximal trifurcation vessels. 5:36 PM 
Spoke with Dr. Shanel Weiner, Vascular surgery. Will review imaging from last visit and consult on patient during admission. Does not recommend further imaging at this point. Repeat lactic 2.2  
 
5:54 PM: Spoke with Dr. Osito Concepcion, hospitalist. Recommends consultation with podiatry. 6:31 PM: Spoke with Dr. Job Velez, podiatry. Will consult on patient.

## 2019-07-10 NOTE — ED PROVIDER NOTES
EMERGENCY DEPARTMENT HISTORY AND PHYSICAL EXAM 
 
3:19 PM 
 
 
Date: 7/10/2019 Patient Name: Sana Johns History of Presenting Illness Chief Complaint Patient presents with  Foot Pain History Provided By: Patient Additional History (Context): Sana Johns is a 62 y.o. male who presents with increased left leg pain patient was discharged 3 days ago with severe peripheral vascular disease he was supposed to have home health vascular follow-up he returns to the emergency department saying he has not been able to follow-up and has increased leg pain. [place] any fevers chills nausea vomiting any new trauma PCP: Mayte Quarles NP Current Facility-Administered Medications Medication Dose Route Frequency Provider Last Rate Last Dose  cefTRIAXone (ROCEPHIN) 1 g in 0.9% sodium chloride (MBP/ADV) 50 mL MBP  1 g IntraVENous Q24H Sally Ni  mL/hr at 07/10/19 1529 1 g at 07/10/19 1529  
 vancomycin (VANCOCIN) 1500 mg in  ml infusion  1,500 mg IntraVENous Leoncio Kern  mL/hr at 07/10/19 1552 1,500 mg at 07/10/19 1552  sodium chloride (NS) flush 5-10 mL  5-10 mL IntraVENous PRN Sally Ni MD      
 oxyCODONE-acetaminophen (PERCOCET) 5-325 mg per tablet 1 Tab  1 Tab Oral NOW Sally Ni MD      
 
Current Outpatient Medications Medication Sig Dispense Refill  amoxicillin (AMOXIL) 500 mg capsule Take 1 Cap by mouth every eight (8) hours. 27 Cap 0  
 ciprofloxacin HCl (CIPRO) 750 mg tablet Take 1 Tab by mouth every twelve (12) hours. 18 Tab 0  
 atorvastatin (LIPITOR) 40 mg tablet Take 1 Tab by mouth nightly. 30 Tab 0  carvedilol (COREG) 3.125 mg tablet Take 1 Tab by mouth two (2) times daily (with meals). 60 Tab 0  
 furosemide (LASIX) 20 mg tablet Take 1 Tab by mouth every Monday, Wednesday, Friday. 12 Tab 0  clopidogrel (PLAVIX) 75 mg tab Take 75 mg by mouth daily. Past History Past Medical History: Past Medical History:  
Diagnosis Date  Depression  Emphysema (subcutaneous) (surgical) resulting from a procedure  Hypertension  Hyponatremia  Non-small cell lung cancer ([facility] [address] 2018  
 rt upper lung  Seizure ([facility] [address]  [facility] Past Surgical History: 
History reviewed. No pertinent surgical history. Family History: 
History reviewed. No pertinent family history. Social History: 
Social History Tobacco Use  Smoking status: Current Every Day Smoker  Smokeless tobacco: Never Used Substance Use Topics  Alcohol use: Yes  Drug use: No  
 
 
Allergies: Allergies Allergen Reactions  Pcn [Penicillins] Unknown (comments) Patients states that he was allergic to PCN as a child because he did not like receiving PCN injections, not because he developed a reaction to PCN administration Review of Systems Review of Systems Constitutional: Positive for activity change, appetite change and fatigue. Negative for chills and fever. Respiratory: Negative for shortness of breath. Cardiovascular: Negative for chest pain. Gastrointestinal: Negative for abdominal pain, nausea and vomiting. Musculoskeletal: Positive for arthralgias and joint swelling. Skin: Positive for wound. Negative for rash. Neurological: Negative for dizziness, syncope and weakness. All other systems reviewed and are negative. Physical Exam  
 
Visit Vitals BP (!) 158/93 (BP 1 Location: Right arm, BP Patient Position: At rest) Pulse 91 Temp 97.6 °F (36.4 °C) Resp 18 Wt 59 kg (130 lb) SpO2 96% BMI 17.63 kg/m² Physical Exam  
Constitutional: He is oriented to person, place, and time. He appears well-developed and well-nourished. No distress. HENT:  
Head: Normocephalic and atraumatic. Mouth/Throat: Oropharynx is clear and moist.  
Eyes: Pupils are equal, round, and reactive to light. Conjunctivae and EOM are normal. No scleral icterus. Neck: Normal range of motion. Neck supple. Cardiovascular: Normal rate, regular rhythm and normal heart sounds. No murmur heard. Pulmonary/Chest: Effort normal and breath sounds normal. No respiratory distress. Abdominal: Soft. Bowel sounds are normal. He exhibits no distension. There is no tenderness. Musculoskeletal: He exhibits no edema. Left foot shows significant ulcerative lesion to his heel as well as dry gangrene appearing eschar first and third toe increased edema noted to that toe poor capillary refill increased tenderness swelling erythema unable to obtain pulses by Doppler Lymphadenopathy:  
  He has no cervical adenopathy. Neurological: He is alert and oriented to person, place, and time. Coordination normal.  
Skin: Skin is warm and dry. No rash noted. Psychiatric: He has a normal mood and affect. His behavior is normal.  
Nursing note and vitals reviewed. Diagnostic Study Results Labs - Recent Results (from the past 12 hour(s)) CBC WITH AUTOMATED DIFF Collection Time: 07/10/19  3:20 PM  
Result Value Ref Range WBC 15.5 (H) 4.6 - 13.2 K/uL  
 RBC 2.74 (L) 4.70 - 5.50 M/uL HGB 7.9 (L) 13.0 - 16.0 g/dL HCT 24.3 (L) 36.0 - 48.0 % MCV 88.7 74.0 - 97.0 FL  
 MCH 28.8 24.0 - 34.0 PG  
 MCHC 32.5 31.0 - 37.0 g/dL  
 RDW 15.1 (H) 11.6 - 14.5 % PLATELET 365 652 - 841 K/uL MPV 7.4 (L) 9.2 - 11.8 FL  
 NEUTROPHILS 82 (H) 40 - 73 % LYMPHOCYTES 11 (L) 21 - 52 % MONOCYTES 7 3 - 10 % EOSINOPHILS 0 0 - 5 % BASOPHILS 0 0 - 2 %  
 ABS. NEUTROPHILS 12.7 (H) 1.8 - 8.0 K/UL  
 ABS. LYMPHOCYTES 1.7 0.9 - 3.6 K/UL  
 ABS. MONOCYTES 1.1 0.05 - 1.2 K/UL  
 ABS. EOSINOPHILS 0.0 0.0 - 0.4 K/UL  
 ABS. BASOPHILS 0.0 0.0 - 0.1 K/UL  
 DF AUTOMATED    
POC LACTIC ACID Collection Time: 07/10/19  3:26 PM  
Result Value Ref Range Lactic Acid (POC) 3.95 (HH) 0.40 - 2.00 mmol/L Radiologic Studies -  
XR CHEST PORT    (Results Pending) Medical Decision Making I am the first provider for this patient. I reviewed the vital signs, available nursing notes, past medical history, past surgical history, family history and social history. Vital Signs-Reviewed the patient's vital signs. EKG: 
 
Records Reviewed: Nursing Notes, Old Medical Records, Previous Radiology Studies and Previous Laboratory Studies (Time of Review: 3:19 PM) ED Course: Progress Notes, Reevaluation, and Consults: 
 
 
Provider Notes (Medical Decision Making): MDM Number of Diagnoses or Management Options Cellulitis of left lower extremity:  
Elevated lactic acid level:  
Gangrene of foot [facility]):  
Diagnosis management comments: Known severe peripheral vascular disease patient was supposed to follow-up with outpatient vascular however did not clear if he has been taking his antibiotics will begin septic evaluation 3:36 PM 
Lactic significantly elevated sepsis protocol enacted fluids started antibiotics had already been written for 
 
 
3:57 PM 
Care transferred to Dr. Vero Frost and Ricki Kenney Amount and/or Complexity of Data Reviewed Clinical lab tests: ordered Critical Care Time:  
 
 
Diagnosis Clinical Impression:  
1. Gangrene of foot ([address] 2. Cellulitis of left lower extremity 3. Elevated lactic acid level Disposition: admit Follow-up Information None Patient's Medications Start Taking No medications on file Continue Taking AMOXICILLIN (AMOXIL) 500 MG CAPSULE    Take 1 Cap by mouth every eight (8) hours. ATORVASTATIN (LIPITOR) 40 MG TABLET    Take 1 Tab by mouth nightly. CARVEDILOL (COREG) 3.125 MG TABLET    Take 1 Tab by mouth two (2) times daily (with meals). CIPROFLOXACIN HCL (CIPRO) 750 MG TABLET    Take 1 Tab by mouth every twelve (12) hours. CLOPIDOGREL (PLAVIX) 75 MG TAB    Take 75 mg by mouth daily.   
 FUROSEMIDE (LASIX) 20 MG TABLET    Take 1 Tab by mouth every Monday, Wednesday, Friday. These Medications have changed No medications on file Stop Taking No medications on file  
 
_______________________________ Please note that this dictation was completed with [email or web address], the computer voice recognition software. Quite often unanticipated grammatical, syntax, homophones, and other interpretive errors are inadvertently transcribed by the computer software. Please disregard these errors. Please excuse any errors that have escaped final proofreading.

## 2019-07-11 NOTE — PROGRESS NOTES
Problem: Discharge Planning Goal: *Discharge to safe environment Outcome: Progressing Towards Goal 
 Plan: home health vs snf

## 2019-07-11 NOTE — CONSULTS
[facility] 
[address] [place], [address] Sandy Alonso, Dr. Bianca العلي 
[phone] FAX# [phone] Consult Patient: Flor Gates MRN: [id number]  SSN: [id number] YOB: 1955  Age: 62 y.o. Sex: male Subjective:  
  
Flor Gates is a 62 y.o. male who is being seen for left foot and leg gangrene. He is readmitted to the hospital with recurrent pain and worsening hyponatremia. Recent hospitalization revealed severe multilevel vascular disease. He has also a history of non-small cell lung cancer. Today he reports pain in foot that feels better when he hangs it down. He is a current smoker and drinker. Past Medical History:  
Diagnosis Date  Depression  Emphysema (subcutaneous) (surgical) resulting from a procedure  Hypertension  Hyponatremia  Non-small cell lung cancer ([facility] [address] 2018  
 rt upper lung  Seizure ([facility]  Seizures ([facility] History reviewed. No pertinent surgical history. History reviewed. No pertinent family history. Social History Tobacco Use  Smoking status: Current Every Day Smoker  Smokeless tobacco: Never Used Substance Use Topics  Alcohol use: Yes Current Facility-Administered Medications Medication Dose Route Frequency Provider Last Rate Last Dose  heparin (porcine) injection 5,000 Units  5,000 Units SubCUTAneous Q8H Madlyn Khat H, DO   5,000 Units at 07/11/19 1413  VANCOMYCIN INFORMATION NOTE 1 Each  1 Each Other Rx Dosing/Monitoring Evi Troy PA-C      
 therapeutic multivitamin [facility]) tablet 1 Tab  1 Tab Oral DAILY Madlyn Khat H, DO   1 Tab at 07/11/19 1213  cefTRIAXone (ROCEPHIN) 1 g in 0.9% sodium chloride (MBP/ADV) 50 mL MBP  1 g IntraVENous Q24H Jennie Espitia PA-C 100 mL/hr at 07/11/19 1427 1 g at 07/11/19 1427  sodium chloride (NS) flush 5-10 mL  5-10 mL IntraVENous PRN DIANDRA Arceo-SHANAE      
 0.9% sodium chloride infusion  75 mL/hr IntraVENous CONTINUOUS DIANDRA Arceo-C 75 mL/hr at 07/11/19 1049 75 mL/hr at 07/11/19 1049  acetaminophen (TYLENOL) tablet 650 mg  650 mg Oral Q4H PRN DIANDRA Arceo-C      
 HYDROcodone-acetaminophen [facility]) 5-325 mg per tablet 1 Tab  1 Tab Oral Q4H PRN DIANDRA Arceo-C   1 Tab at 07/10/19 2309  ondansetron (ZOFRAN) injection 4 mg  4 mg IntraVENous Q4H PRN LoxjacquelinanMaury PA-C      
 atorvastatin (LIPITOR) tablet 40 mg  40 mg Oral QHS Dori Carvalho PA-C   40 mg at 07/10/19 2154  carvedilol (COREG) tablet 3.125 mg  3.125 mg Oral BID WITH MEALS Dori Carvalho PA-C   3.125 mg at 07/11/19 9574  clopidogrel (PLAVIX) tablet 75 mg  75 mg Oral DAILY DIANDRA Arceo-C   75 mg at 07/11/19 1065  morphine injection 2-3 mg  2-3 mg IntraVENous Q4H PRN Stas Simpson MD   3 mg at 07/11/19 1049 Allergies Allergen Reactions  Pcn [Penicillins] Unknown (comments) Patients states that he was allergic to PCN as a child because he did not like receiving PCN injections, not because he developed a reaction to PCN administration Review of Systems: 
Pertinent items are noted in the History of Present Illness. Objective:  
 
Vitals:  
 07/11/19 1100 07/11/19 1200 07/11/19 1518 07/11/19 1524 BP: (!) 165/101 (!) 149/96  142/74 Pulse: 95 91  90 Resp: 21 19 20 Temp:  98.9 °F (37.2 °C)  98.7 °F (37.1 °C) SpO2: 96% 97%  95% Weight:   61.1 kg (134 lb 11.2 oz) Physical Exam: 
GENERAL: alert, cooperative, no distress, appears older than stated age EYE: negative THROAT & NECK: normal and no erythema or exudates noted. LUNG: clear to auscultation bilaterally HEART: regular rate and rhythm, S1, S2 normal, no murmur, click, rub or gallop ABDOMEN: soft, non-tender.  Bowel sounds normal. No masses,  no organomegaly EXTREMITIES: Left lower leg with cavernous wound anteriorly with surrounding gangrene. Left toes with dry gangrene. Assessment:  
 
Hospital Problems  Never Reviewed Codes Class Noted POA Lactic acidemia ICD-10-CM: E87.2 ICD-9-CM: 276.2  7/10/2019 Unknown [place] (acute kidney injury) ([facility] ICD-10-CM: N17.9 ICD-9-CM: 584.9  7/10/2019 Unknown Dehydration ICD-10-CM: E86.0 ICD-9-CM: 276.51  7/10/2019 Unknown * (Principal) Sepsis ([facility] ICD-10-CM: A41.9 ICD-9-CM: 038.9, 995.91  7/10/2019 Unknown Gangrene ([facility] ICD-10-CM: Z58 
ICD-9-CM: 785.4  7/10/2019 Unknown Malnourished ([facility] ICD-10-CM: E46 
ICD-9-CM: 263.9  6/29/2019 Yes Peripheral vascular disease ([facility] ICD-10-CM: I73.9 ICD-9-CM: 443.9  6/29/2019 Yes Hyponatremia ICD-10-CM: E87.1 ICD-9-CM: 276.1  6/29/2019 Yes Plan: Will discuss prognosis related to CA with oncology. Likely he will need left AKA in the near future. Signed By: Annie Lynne MD   
 July 11, 2019

## 2019-07-11 NOTE — PROGRESS NOTES
Pharmacy Dosing Services: Vancomycin Indication: SSTI Day of therapy: 2 Other Antimicrobials (Include dose, start day & day of therapy): 
Ceftriaxone 1g q24hr Loading dose (date given): 1500 mg given [email or web address] Current Maintenance dose: 750 mg x 1 dose [email or web address], then per level Goal Vancomycin Level: Vancomycin Trough: 15 - 20 mcg/mL (most infections) Vancomycin Level (if drawn): due at time of 3rd dose due [email or web address]@[facility] Significant Cultures: Blood Cx (7/10) - NGTD Weight 59 kg (130 lb) Recent Labs  
  19 
0230 07/10/19 
1520 CREA 2.35* 1.41* BUN 16 12 WBC 13.3* 15.5* Temp (72hrs), Av.4 °F (36.9 °C), Min:97.6 °F (36.4 °C), Max:99 °F (37.2 °C) Estimated Creatinine Clearance Estimated Creatinine Clearance: 26.8 mL/min (A) (based on SCr of 2.35 mg/dL (H)). Estimated Creatinine Clearance (using IBW): 35.3 mL/min CAPD, Hemodialysis or Renal Replacement Therapy: N/A Renal function stable? (unstable defined as SCr increase of 0.5 mg/dL or > 50% increase from baseline, whichever is greater) (Y/N): N Regimen assessment: Resume vancomycin therapy, dose per levels due to renal instability Maintenance dose: 750mg x 1 dose today then per level Next scheduled level: Bimal@[facility] Pharmacy will follow daily and adjust medications as appropriate for renal function and/or serum levels. Thank you, Shameka Cruz, [facility] Clinical Pharmacy Coordinator [phone]

## 2019-07-11 NOTE — PROGRESS NOTES
Hematology/Oncology: Echo Navarro Notified about Mr. Lawson's admission and need for oncology input given gangrenous LLE/foot. Prior CT-A of A/P and BLE from recent hospitalization reviewed. He did not get chest imaging or bone scan or MRI of brain (all of which I've ordered to help restage him). He previously had stage III disease, received concurrent chemoRx/XRT in Dec 2018 but has not seen me in follow up since completion of therapy. He has a long standing history of alcohol and tobacco use that undoubtedly are contributing to not only known malignancy but also micro & macrovascular disease. He clinically has a large wound in LLE and dry gangrene on toes, weak and much more debilitated vs prior office visit ~ 7 months ago. Sclera discolored but anicteric, OP w/o ulcers, NECK w/o masses or JVD, COR reg w/o S3, LUNGS w/ poor insp effort, no rales, ABD soft, quiet, no SMG, no ascites, EXT as above IMPRESSION: 
1.  Stage III NSCLC, s/p chemoRx/XRT, completed in Dec 2018 w/ prior restaging CTs in March 2019 w/ only expected/XRT related changes, no admitted w/ presumed sepsis, symptomatic LLE ischemia and hyponatremia RECOMMENDATIONS: 
1.  CT of chest (C-), especially given hyponatremia. 2.  Await nephrology input, but he should be safe to get gadolinium to image brain and get bone scan to complete restaging. 3.  Once I see staging studies, can comment on expected survival from Onc viewpoint to guide any plans for surgical/percutaneous interventions. 4.  I will ask one of my partners to follow up on data on Saturday.

## 2019-07-11 NOTE — CONSULTS
Consultation Note Assessment:  
 
 
Patient Active Problem List  
Diagnosis Code  [facility] foot ([facility] I96  
 Cellulitis of left lower extremity L03. 80  
 Malnourished ([facility] E46  Peripheral vascular disease (HCC) I73.9  Hyponatremia E87.1  Lactic acidemia E87.2  [place] (acute kidney injury) ([facility] N17.9  Dehydration E86.0  Sepsis ([facility] A41.9  Gangrene ([facility] [id number] Plan:  
 
Discussed dx and tx options at length with patient I do not think he needs urgent amputation of toes and would wait until revascularization first 
 
He is also considering  proximal amputation and will discuss further with vascular sx Betadine to toes and Aquacel Ag to heel daily Subjective:  
 
I was asked by  Ana Yasmin to evaluate Marco Antonio Carlisle for left foot gangrene. He  is a 62 y.o. male admitted on 7/10/2019 for Sepsis ([facility] [A41.9] Hyponatremia [E87.1] Gangrene ([facility] Jewell Duane Hyponatremia [E87.1]. He has MMI and was recently d/c this past weekend but returned as he was not able to get his abx. He has chronic pain to his LLE and is currently unable to walk with it. Allergies Allergen Reactions  Pcn [Penicillins] Unknown (comments) Patients states that he was allergic to PCN as a child because he did not like receiving PCN injections, not because he developed a reaction to PCN administration Current Facility-Administered Medications Medication Dose Route Frequency  heparin (porcine) injection 5,000 Units  5,000 Units SubCUTAneous Q8H  
 cefTRIAXone (ROCEPHIN) 1 g in 0.9% sodium chloride (MBP/ADV) 50 mL MBP  1 g IntraVENous Q24H  
 sodium chloride (NS) flush 5-10 mL  5-10 mL IntraVENous PRN  
 0.9% sodium chloride infusion  75 mL/hr IntraVENous CONTINUOUS  
 acetaminophen (TYLENOL) tablet 650 mg  650 mg Oral Q4H PRN  
 HYDROcodone-acetaminophen (NORCO) 5-325 mg per tablet 1 Tab  1 Tab Oral Q4H PRN  
 ondansetron (ZOFRAN) injection 4 mg  4 mg IntraVENous Q4H PRN  
  atorvastatin (LIPITOR) tablet 40 mg  40 mg Oral QHS  carvedilol (COREG) tablet 3.125 mg  3.125 mg Oral BID WITH MEALS  clopidogrel (PLAVIX) tablet 75 mg  75 mg Oral DAILY  morphine injection 2-3 mg  2-3 mg IntraVENous Q4H PRN Past Medical History:  
Diagnosis Date  Depression  Emphysema (subcutaneous) (surgical) resulting from a procedure  Hypertension  Hyponatremia  Non-small cell lung cancer ([facility] 2018  
 rt upper lung  Seizure ([facility]  Seizures ([facility] History reviewed. No pertinent surgical history. History reviewed. No pertinent family history. Social History Socioeconomic History  Marital status:  Spouse name: Not on file  Number of children: Not on file  Years of education: Not on file  Highest education level: Not on file Occupational History  Not on file Social Needs  Financial resource strain: Not on file  Food insecurity:  
  Worry: Not on file Inability: Not on file  Transportation needs:  
  Medical: Not on file Non-medical: Not on file Tobacco Use  Smoking status: Current Every Day Smoker  Smokeless tobacco: Never Used Substance and Sexual Activity  Alcohol use: Yes  Drug use: No  
 Sexual activity: Not on file Lifestyle  Physical activity:  
  Days per week: Not on file Minutes per session: Not on file  Stress: Not on file Relationships  Social connections:  
  Talks on phone: Not on file Gets together: Not on file Attends Jew service: Not on file Active member of club or organization: Not on file Attends meetings of clubs or organizations: Not on file Relationship status: Not on file  Intimate partner violence:  
  Fear of current or ex partner: Not on file Emotionally abused: Not on file Physically abused: Not on file Forced sexual activity: Not on file Other Topics Concern  Not on file Social History Narrative  Not on file Physical Exam: 
 
 
Vitals:  
 07/11/19 0400 07/11/19 0500 07/11/19 0600 07/11/19 0700 BP: (!) 168/94 (!) 163/102 134/83 139/82 Pulse: 90 87 87 97 Resp: 23 20 21 25 Temp: 98.9 °F (37.2 °C) SpO2: 95% 97% 99% 94% Weight: OBJECTIVE: 
 
 REVIEW OF SYSTEMS: 
General: denies chronic fatigue, weight loss, fever, anemia, bruising, depression, nervousness, panic attacks HEENT: denies ringing in ears, ear infections, dizzy spells, poor vision, glaucoma, sinus trouble, hoarseness, eye infections GI: denies diarrhea, gas, bloating, heartburn, regurgitation, difficulty swallowing, painful swallowing, nausea, vomiting, constipation, abdominal pain, decreased appetite, blood in stools, black stools, jaundice, dark urine Lungs: denies pneumonia, asthma, cough, SOB, hemoptysis Heart: denies chest pain, irregular heart beat, ankle swelling, 
Skin: denies rashes, hives, allergic reaction Urinary: denies UTI, kidney stones, decreased urine force and flow, urination at night, blood in urine, painful urination Bones and Joints: denies arthritis, rheumatism, left foot pain, gout, osteoporosis Neurologic: denies stroke, seizures, headaches, numbness, tingling 
  
Physical Exam:   
Mr Parmjit Umanzor 40 D. o. male who is pleasant, alert and oriented x3, in no apparent distress. . Patient is well-developed and nourished, with good attention to hygiene and body habitus. Mood and affect normal, appropriate to situation.  
  
Left foot: Gangrene tip of 3rd toe and now hallux.  NO odor 
  
Vascular Exam:  
Dorsalis Pedis  and Posterior Tibial non palpable with mild edema of left foot  (no pulses audible with doppler) Skin temp warm to cool. Pedal hair is absent.   
  
Neurological Exam:  
Light touch protective sensation is diminshed to both feet. There is noted Loss of protective sensation. 
  
Musculoskeletal Exam:  
Muscle tone is normal for age and situation.  There is equinus of the left limb. The muscle strength is 5/5 for the flexors, extensors, inverters, and everter's. 
  
Dermatological Exam:  
Skin is of abnormal texture and turgor with some atrophic skin changes noting decreased hair growth, nail changes (thickening), pigmentary changes, skin texture (thin,shiney), skin color (rubor, red) . R/L Bilateral. There is diffuse xerosis. There is noted subungual debris. 
  
Left foot 3rd toe gangrene with ischemic changes to 2nd and hallux and posterior plantar left heel-now full thickness but no probing to bone or deep soft tissue. Recent Results (from the past 24 hour(s)) CULTURE, BLOOD Collection Time: 07/10/19  3:05 PM  
Result Value Ref Range Special Requests: PERIPHERAL Culture result: NO GROWTH AFTER 16 HOURS    
CBC WITH AUTOMATED DIFF Collection Time: 07/10/19  3:20 PM  
Result Value Ref Range WBC 15.5 (H) 4.6 - 13.2 K/uL  
 RBC 2.74 (L) 4.70 - 5.50 M/uL HGB 7.9 (L) 13.0 - 16.0 g/dL HCT 24.3 (L) 36.0 - 48.0 % MCV 88.7 74.0 - 97.0 FL  
 MCH 28.8 24.0 - 34.0 PG  
 MCHC 32.5 31.0 - 37.0 g/dL  
 RDW 15.1 (H) 11.6 - 14.5 % PLATELET 398 480 - 089 K/uL MPV 7.4 (L) 9.2 - 11.8 FL  
 NEUTROPHILS 82 (H) 40 - 73 % LYMPHOCYTES 11 (L) 21 - 52 % MONOCYTES 7 3 - 10 % EOSINOPHILS 0 0 - 5 % BASOPHILS 0 0 - 2 %  
 ABS. NEUTROPHILS 12.7 (H) 1.8 - 8.0 K/UL  
 ABS. LYMPHOCYTES 1.7 0.9 - 3.6 K/UL  
 ABS. MONOCYTES 1.1 0.05 - 1.2 K/UL  
 ABS. EOSINOPHILS 0.0 0.0 - 0.4 K/UL  
 ABS. BASOPHILS 0.0 0.0 - 0.1 K/UL  
 DF AUTOMATED METABOLIC PANEL, COMPREHENSIVE Collection Time: 07/10/19  3:20 PM  
Result Value Ref Range Sodium 121 (L) 136 - 145 mmol/L Potassium 3.9 3.5 - 5.5 mmol/L Chloride 87 (L) 100 - 108 mmol/L  
 CO2 21 21 - 32 mmol/L Anion gap 13 3.0 - 18 mmol/L Glucose 92 74 - 99 mg/dL BUN 12 7.0 - 18 MG/DL Creatinine 1.41 (H) 0.6 - 1.3 MG/DL  
 BUN/Creatinine ratio 9 (L) 12 - 20 GFR est AA >60 >60 ml/min/1.73m2 GFR est non-AA 51 (L) >60 ml/min/1.73m2 Calcium 8.1 (L) 8.5 - 10.1 MG/DL Bilirubin, total 0.4 0.2 - 1.0 MG/DL  
 ALT (SGPT) 26 16 - 61 U/L  
 AST (SGOT) 49 (H) 15 - 37 U/L Alk. phosphatase 76 45 - 117 U/L Protein, total 7.4 6.4 - 8.2 g/dL Albumin 2.6 (L) 3.4 - 5.0 g/dL Globulin 4.8 (H) 2.0 - 4.0 g/dL A-G Ratio 0.5 (L) 0.8 - 1.7 MAGNESIUM Collection Time: 07/10/19  3:20 PM  
Result Value Ref Range Magnesium 2.0 1.6 - 2.6 mg/dL CULTURE, BLOOD Collection Time: 07/10/19  3:20 PM  
Result Value Ref Range Special Requests: PERIPHERAL Culture result: NO GROWTH AFTER 16 HOURS    
POC LACTIC ACID Collection Time: 07/10/19  3:26 PM  
Result Value Ref Range Lactic Acid (POC) 3.95 (HH) 0.40 - 2.00 mmol/L  
EKG, 12 LEAD, INITIAL Collection Time: 07/10/19  4:08 PM  
Result Value Ref Range Ventricular Rate 94 BPM  
 Atrial Rate 94 BPM  
 P-R Interval 166 ms  
 QRS Duration 68 ms Q-T Interval 390 ms QTC Calculation (Bezet) 487 ms Calculated P Axis 46 degrees Calculated R Axis -16 degrees Calculated T Axis 46 degrees Diagnosis Normal sinus rhythm Cannot rule out Anterior infarct , age undetermined Abnormal ECG When compared with ECG of 03-JUL-2019 09:28, 
premature supraventricular complexes are no longer present MD interval has decreased POC LACTIC ACID Collection Time: 07/10/19  5:23 PM  
Result Value Ref Range Lactic Acid (POC) 2.26 (HH) 0.40 - 2.00 mmol/L POC LACTIC ACID Collection Time: 07/10/19  7:54 PM  
Result Value Ref Range Lactic Acid (POC) 1.21 0.40 - 2.00 mmol/L  
CBC WITH AUTOMATED DIFF Collection Time: 07/11/19  2:30 AM  
Result Value Ref Range WBC 13.3 (H) 4.6 - 13.2 K/uL  
 RBC 2.69 (L) 4.70 - 5.50 M/uL HGB 7.9 (L) 13.0 - 16.0 g/dL HCT 23.1 (L) 36.0 - 48.0 % MCV 85.9 74.0 - 97.0 FL  
 MCH 29.4 24.0 - 34.0 PG  
 MCHC 34.2 31.0 - 37.0 g/dL  
 RDW 15.2 (H) 11.6 - 14.5 % PLATELET 747 (H) 398 - 420 K/uL MPV 7.7 (L) 9.2 - 11.8 FL  
 NEUTROPHILS 83 (H) 40 - 73 % LYMPHOCYTES 9 (L) 21 - 52 % MONOCYTES 7 3 - 10 % EOSINOPHILS 1 0 - 5 % BASOPHILS 0 0 - 2 %  
 ABS. NEUTROPHILS 11.0 (H) 1.8 - 8.0 K/UL  
 ABS. LYMPHOCYTES 1.2 0.9 - 3.6 K/UL  
 ABS. MONOCYTES 1.0 0.05 - 1.2 K/UL  
 ABS. EOSINOPHILS 0.1 0.0 - 0.4 K/UL  
 ABS. BASOPHILS 0.0 0.0 - 0.1 K/UL  
 DF AUTOMATED METABOLIC PANEL, BASIC Collection Time: 07/11/19  2:30 AM  
Result Value Ref Range Sodium 121 (L) 136 - 145 mmol/L Potassium 4.3 3.5 - 5.5 mmol/L Chloride 91 (L) 100 - 108 mmol/L  
 CO2 20 (L) 21 - 32 mmol/L Anion gap 10 3.0 - 18 mmol/L Glucose 90 74 - 99 mg/dL BUN 16 7.0 - 18 MG/DL Creatinine 2.35 (H) 0.6 - 1.3 MG/DL  
 BUN/Creatinine ratio 7 (L) 12 - 20 GFR est AA 34 (L) >60 ml/min/1.73m2 GFR est non-AA 28 (L) >60 ml/min/1.73m2 Calcium 7.5 (L) 8.5 - 10.1 MG/DL  
 
 
 
 
INDRA Shepard DPM, [facility] [address] [facility] [phone]  
7/11/2019,

## 2019-07-11 NOTE — PROGRESS NOTES
Internal Medicine Progress Note Patient's Name: Jas Eagle Admit Date: 7/10/2019 Length of Stay: 1 Assessment/Plan Principal Problem: 
  Sepsis ([address] (7/10/2019) Active Problems: 
  Gangrene ([address] (7/10/2019) Malnourished ([address] (6/29/2019) Peripheral vascular disease ([address] (6/29/2019) Hyponatremia (6/29/2019) Lactic acidemia (7/10/2019) [place] (acute kidney injury) ([facility] (7/10/2019) Dehydration (7/10/2019) Sepsis -Dry Gangrenous appearing first and third toe  
-Recent wound culture from previous admission susceptible to rocephin and vanc, continue abx, renally dose 
-repeat wound culture  
  
Gangrene/PVD 
-Recent admission discharge on PO oral abx, was expected to f/u with vasc for multilevel revascularization but was unable to get to f/u due to ride issues and extent of pain 
-Vasc consulted 
-podiatry consulted - appreciate services -open dry wound anterior, lateral LLE proximal tibia  
  
Hyponatremia 
-worsened Na since discharge, worsening SIADH vs dehydration vs idiopathic? Appears dry 
-previous admission had work up by nephrology, presumed SIADH vs Idiopathic and was given PO lasix for management 
-poor historian and his is unsure if he has been taking the lasix at home 
-Nephrology consult  
-monitor for seizures, and other possible neurological symptoms, correct no more than 8meq in 24hours period 
-spoke with nephrology - rec gentle fluids 
-monitor BMP 
  
[place] 
-potentially from lasix? Unsure if he has been taking them, he is unsure as well 
-appears volume depleted 
-will give gentle diuresis and monitor 
-Renal Dose medications  
-Avoid nephrotoxins 
-nephrology consulted - Cont acceptable home medications for chronic conditions  
- DVT protocol I have personally reviewed all pertinent labs and films that have officially resulted over the last 24 hours. I have personally checked for all pending labs that are awaiting final results. Interval History Vesta Chong is a 62 y.o. male with a PMHx listed below of who presented to the ED with complaints of worsening LLE pain. Patient was admitted on 6/30/19 and discharged three days ago for gangrenous toes. Vascular surgery and podiatry were consulted then and he was recommended for multilevel revascularization, he was to follow up as out patient and said that his son could transport him. Per patient now, son was unable to transport patient to f/u appointment, so he returned due to persistent symptoms.   
  
In the ED vascular surgery consulted, podiatry consulted. He presents with worsening creatinine and sodium. I spoke with Nephrology and they recommend fluids. His only complaint is pain, no fevers, chills, weakness, AMS. Sodium 121, will be admitted for further eval. He doesn't know if he has been taking his lasix, but says hes been taking his abx\" 
  
Subjective Pt s/e @ bedside. No major events overnight. only complaints is pain, says he want his foot fixed and has decreased sensation Objective Visit Vitals BP (!) 165/101 Pulse 95 Temp 98.9 °F (37.2 °C) Resp 21 Wt 59 kg (130 lb) SpO2 96% BMI 17.63 kg/m² Physical Exam: 
General Appearance: NAD, conversant HENT: normocephalic/atraumatic, moist mucus membranes Neck: No JVD, supple Lungs: CTA with normal respiratory effort CV: RRR, no m/r/g Abdomen: soft, non-tender, normal bowel sounds Extremities: no cyanosis, pitting edema, erythema, and tenderness to left foot, DPP/TPP non-palpable, shiny atrophic skin, no hair, open wound, dry necrotic proximal anterior tibial shaft Neuro: No new focal deficits, diminished sensation entire left foot Intake and Output: 
Current Shift:  07/11 0701 - 07/11 1900 In: 300 [I.V.:300] Out: - Last three shifts:  07/09 1901 - 07/11 0700 In: 865 [I.V.:865] Out: -  
 
Lab/Data Reviewed: All lab results for the last 24 hours reviewed. Imaging Reviewed: Xr Foot Lt Min 3 V Result Date: 7/11/2019 EXAM: XR FOOT LT MIN 3 V CLINICAL INDICATION/HISTORY: pain, gangrene to 1-3 digits -Additional: None COMPARISON: June 29, 2018 TECHNIQUE: 3 views of the left foot _______________ FINDINGS: BONES: Alignment is anatomic. Bony mineralization is normal. No acute fractures. Chronic healed fracture deformity of the fourth and fifth metatarsal diaphysis is noted. There is a thin linear ossific fragment along the dorsal dorsal to the talus suggesting sequela of prior capsular avulsion injury, unchanged. Calcaneal discopathy. SOFT TISSUES: Mild dorsal soft tissue swelling about the forefoot. Mild soft tissue swelling about the first and second digits are noted. There is atrophy over the distal aspect of the third digit. No radiopaque foreign bodies. _______________ IMPRESSION: 1. Nonspecific soft tissue swelling about the dorsum of the foot as well as the first and second digits along with soft tissue atrophy overlying the distal third digit. 2.  No acute osseous abnormality. Specifically, no findings to suggest osteoarthritis. Xr Chest [facility] Result Date: 7/10/2019 EXAM: XR CHEST PORT CLINICAL INDICATION/HISTORY: Left lower extremity wound, sepsis -Additional: Non-small cell lung carcinoma of the right upper lobe COMPARISON: Several prior chest radiographs, most recently June 22, 2018. TECHNIQUE: Frontal view of the chest _______________ FINDINGS: HEART AND MEDIASTINUM: Normal cardiac size and mediastinal contours. LUNGS AND PLEURAL SPACES: Linear/nodular opacity projecting over the right upper lobe with adjacent surgical clips redemonstrated without change. No focal pneumonic consolidation, pneumothorax, or pleural effusion. BONY THORAX AND SOFT TISSUES: No acute osseous abnormality _______________ IMPRESSION: 1. No acute radiographic abnormality. 2. Linear and nodular area of opacity projecting over the right upper lobe in keeping with history of malignancy. Medications Reviewed: 
Current Facility-Administered Medications Medication Dose Route Frequency  heparin (porcine) injection 5,000 Units  5,000 Units SubCUTAneous Q8H  
 VANCOMYCIN INFORMATION NOTE 1 Each  1 Each Other Rx Dosing/Monitoring  vancomycin (VANCOCIN) 750 mg in 0.9% sodium chloride (MBP/ADV) 250 mL ADV  750 mg IntraVENous ONCE  therapeutic multivitamin (THERAGRAN) tablet 1 Tab  1 Tab Oral DAILY  cefTRIAXone (ROCEPHIN) 1 g in 0.9% sodium chloride (MBP/ADV) 50 mL MBP  1 g IntraVENous Q24H  
 sodium chloride (NS) flush 5-10 mL  5-10 mL IntraVENous PRN  
 0.9% sodium chloride infusion  75 mL/hr IntraVENous CONTINUOUS  
 acetaminophen (TYLENOL) tablet 650 mg  650 mg Oral Q4H PRN  
 HYDROcodone-acetaminophen (NORCO) 5-325 mg per tablet 1 Tab  1 Tab Oral Q4H PRN  
 ondansetron (ZOFRAN) injection 4 mg  4 mg IntraVENous Q4H PRN  
 atorvastatin (LIPITOR) tablet 40 mg  40 mg Oral QHS  carvedilol (COREG) tablet 3.125 mg  3.125 mg Oral BID WITH MEALS  clopidogrel (PLAVIX) tablet 75 mg  75 mg Oral DAILY  morphine injection 2-3 mg  2-3 mg IntraVENous Q4H PRN Darylene Rhody, PA-C 7 [facility] Hospitalist Division Pager: [phone] Office: [phone]

## 2019-07-11 NOTE — PROGRESS NOTES
Problem: Discharge Planning Goal: *Discharge to safe environment Outcome: Progressing Towards Goal 
 Plan: home health vs snf Reason for Readmission:   Sepsis/Gangrene RRAT Score and Risk Level:     16 Level of Readmission:     
   
Care Conference scheduled:  Not at this time Resources/supports as identified by patient/family:  Lives with son. Has [employer] Challenges facing patient (as identified by patient/family and CM): Finances/Medication cost?  Pt denies Transportation   Sig challenge Support system or lack thereof? As above Living arrangements? Lives with son Self-care/ADLs/Cognition? Alert and oriented x 4. Reports independent with ADL's Current Advanced Directive/Advance Care Plan:  Not on file Plan for utilizing home health: CM to follow and assess Transition of Care Plan:    Based on readmission, the patient's previous Plan of Care 
 has been evaluated and/or modified. The current Transition of Care Plan is:        
 
Juan Carlos Rascon pt and verified all face sheet info. Pt states that he lives with his son in a 2nd floor apt. He reports that the building has no elevator and the stairs are becoming more of an issue for him. He uses a cane for ambulation but no other DME. He states he missed outpatient follow up appts recently because he had no one to take him. He states he is able to obtain needed meds at a pharmacy, but that \"the pain med they gave me wasn't strong enough so I ran out early\". Also denied having had Rx for antibiotic on discharge from last hospitalization. Pt denies having snf stay in past or home health services. He would like his son to participate in discharge planning. Discussed possibility of snf recommendation and pt said he would be agreeable \"as long as insurance would pay for it\". Also agreeable to home health.   Gave pt snf list and he stated his son will be here later today and they will review it and give this CM several choices in case snf recommended. Transportation may be issue at discharge. Patient has designated _son_______________________ to participate in his/her discharge plan and to receive any needed information. Theresa Raymundo Address: 
Phone number: [phone] Care Management Interventions PCP Verified by CM: Yes Last Visit to PCP: 06/11/19 Mode of Transport at Discharge: Self(pt states he is not sure how he will get home. Son works) Transition of Care Consult (CM Consult): Discharge Planning Current Support Network: Own Home(son lives with him) Confirm Follow Up Transport: Self(pt unsure of tranp at d/c) Plan discussed with Pt/Family/Caregiver: Yes Discharge Location Discharge Placement: Other:(home health vs snf)

## 2019-07-11 NOTE — CONSULTS
Consult Note Consult requested by: Edgar Best DO 
 
ADMIT DATE: 7/10/2019  CONSULT DATE: July 11, 2019 Admission diagnosis: Sepsis ([address] Reason for Nephrology Consultation: [place], hyponatremia Assessment:  
1 acute kidney injury: Likely secondary to prerenal azotemia due to poor intake and being on Lasix versus ATN or AIN which is unlikely. Urine studies ordered and pending on gentle hydration which should be continued 2 Acute on chronic hyponatremia, chronic. Continue with gentle hydration baseline in the 120s etiology due to SIADH, but could may have acute volume depletion with normal saline. Check sodium every 6 hours 3 concern for sepsis secondary to dry gangrene and foot infection on Vanco and Rocephin. Follow Vanco levels closely 4 gangrene/peripheral vascular disease Follow sodium every 6 hours. Continue gentle hydration. Follow renal parameters and urine tests. Get a renal ultrasound Plan: 
1 gentle hydration with saline 2 sodium every 6 hours to be checked 3 follow renal parameters 4 renal ultrasound Please call with questions Emi Lay MD [facility] Cell [phone] Pager: [phone] HPI: Patient is a 79-year-old male with past medical history of hypertension, hyponatremia, non-small cell lung cancer, history of seizures, history of emphysema who presents to the emergency room with complaints of left lower extremity pain. Patient was just discharged 3 days back for gangrenous toes. He was seen by vascular surgery and podiatry and was going to follow-up as outpatient. But patient continues to have persistent symptoms of pain and therefore came to the emergency room. On presentation it was noted that patient's creatinine seems to be trending up. Was 0.7 on presentation up to 2.35 today. Moreover patient's sodium has also trended down to 121. Denies any fevers chills, does complain of pain. Past Medical History: Diagnosis Date  Depression  Emphysema (subcutaneous) (surgical) resulting from a procedure  Hypertension  Hyponatremia  Non-small cell lung cancer ([facility] 2018  
 rt upper lung  Seizure ([facility]  Seizures ([facility] History reviewed. No pertinent surgical history. Social History Socioeconomic History  Marital status:  Spouse name: Not on file  Number of children: Not on file  Years of education: Not on file  Highest education level: Not on file Occupational History  Not on file Social Needs  Financial resource strain: Not on file  Food insecurity:  
  Worry: Not on file Inability: Not on file  Transportation needs:  
  Medical: Not on file Non-medical: Not on file Tobacco Use  Smoking status: Current Every Day Smoker  Smokeless tobacco: Never Used Substance and Sexual Activity  Alcohol use: Yes  Drug use: No  
 Sexual activity: Not on file Lifestyle  Physical activity:  
  Days per week: Not on file Minutes per session: Not on file  Stress: Not on file Relationships  Social connections:  
  Talks on phone: Not on file Gets together: Not on file Attends Congregational service: Not on file Active member of club or organization: Not on file Attends meetings of clubs or organizations: Not on file Relationship status: Not on file  Intimate partner violence:  
  Fear of current or ex partner: Not on file Emotionally abused: Not on file Physically abused: Not on file Forced sexual activity: Not on file Other Topics Concern  Not on file Social History Narrative  Not on file History reviewed. No pertinent family history. Allergies Allergen Reactions  Pcn [Penicillins] Unknown (comments) Patients states that he was allergic to PCN as a child because he did not like receiving PCN injections, not because he developed a reaction to PCN administration Home Medications:  
 
Medications Prior to Admission Medication Sig  
 amoxicillin (AMOXIL) 500 mg capsule Take 1 Cap by mouth every eight (8) hours.  ciprofloxacin HCl (CIPRO) 750 mg tablet Take 1 Tab by mouth every twelve (12) hours.  atorvastatin (LIPITOR) 40 mg tablet Take 1 Tab by mouth nightly.  carvedilol (COREG) 3.125 mg tablet Take 1 Tab by mouth two (2) times daily (with meals).  furosemide (LASIX) 20 mg tablet Take 1 Tab by mouth every Monday, Wednesday, Friday.  [] oxyCODONE-acetaminophen (PERCOCET) 5-325 mg per tablet Take 1-2 Tabs by mouth every eight (8) hours as needed for Pain for up to 3 days. Max Daily Amount: 6 Tabs.  clopidogrel (PLAVIX) 75 mg tab Take 75 mg by mouth daily. Current Inpatient Medications:  
 
Current Facility-Administered Medications Medication Dose Route Frequency  heparin (porcine) injection 5,000 Units  5,000 Units SubCUTAneous Q8H  
 VANCOMYCIN INFORMATION NOTE 1 Each  1 Each Other Rx Dosing/Monitoring  therapeutic multivitamin (THERAGRAN) tablet 1 Tab  1 Tab Oral DAILY  cefTRIAXone (ROCEPHIN) 1 g in 0.9% sodium chloride (MBP/ADV) 50 mL MBP  1 g IntraVENous Q24H  
 sodium chloride (NS) flush 5-10 mL  5-10 mL IntraVENous PRN  
 0.9% sodium chloride infusion  75 mL/hr IntraVENous CONTINUOUS  
 acetaminophen (TYLENOL) tablet 650 mg  650 mg Oral Q4H PRN  
 HYDROcodone-acetaminophen (NORCO) 5-325 mg per tablet 1 Tab  1 Tab Oral Q4H PRN  
 ondansetron (ZOFRAN) injection 4 mg  4 mg IntraVENous Q4H PRN  
 atorvastatin (LIPITOR) tablet 40 mg  40 mg Oral QHS  carvedilol (COREG) tablet 3.125 mg  3.125 mg Oral BID WITH MEALS  clopidogrel (PLAVIX) tablet 75 mg  75 mg Oral DAILY  morphine injection 2-3 mg  2-3 mg IntraVENous Q4H PRN Review of Systems: No fever or chills. No sore throat. No cough or hemoptysis. No shortness of breath or chest pain. No orthopnea or paroxysmal nocturnal dyspnea. Good appetite. No nausea, vomiting, abdominal pain, melena or hematochezia. No constipation or diarrhea. No dysuria, no gross hematuria of voiding difficulties. No ankle swelling, no joint paints. No muscle aches. No skin changes. No dizziness or lightheadedness. No headaches. Physical Assessment:  
 
Vitals:  
 07/11/19 1100 07/11/19 1200 07/11/19 1518 07/11/19 1524 BP: (!) 165/101 (!) 149/96  142/74 Pulse: 95 91  90 Resp: 21 19 20 Temp:  98.9 °F (37.2 °C)  98.7 °F (37.1 °C) SpO2: 96% 97%  95% Weight:   61.1 kg (134 lb 11.2 oz) Last 3 Recorded Weights in this Encounter  
 07/10/19 1511 07/11/19 1518 Weight: 59 kg (130 lb) 61.1 kg (134 lb 11.2 oz) Admission weight: Weight: 59 kg (130 lb) (07/10/19 1511) Intake/Output Summary (Last 24 hours) at 7/11/2019 1647 Last data filed at 7/11/2019 1300 Gross per 24 hour Intake 1365 ml Output  Net 1365 ml Patient is in no apparent distress. Neck: no cervical lymphadenopathy or thyromegaly. Lungs: good air entry, clear to auscultation bilaterally. Trachea at the midline. Cardiovascular system: S1, S2, regular rate and rhythm. Abdomen: soft, non tender, non distended. Positive bowel sounds. No hepatosplenomegaly. No abdominal bruits. Neurologic: Alert, oriented time three. Data Review: 
 
Labs: Results:  
   
Chemistry Recent Labs  
  07/11/19 
1240 07/11/19 
0230 07/10/19 
1520 GLU  --  90 92 * 121* 121* K  --  4.3 3.9 CL  --  91* 87* CO2  --  20* 21 BUN  --  16 12 CREA  --  2.35* 1.41* CA  --  7.5* 8.1* AGAP  --  10 13 BUCR  --  7* 9* AP  --   --  76  
TP  --   --  7.4 ALB  --   --  2.6*  
GLOB  --   --  4.8* AGRAT  --   --  0.5* CBC w/Diff Recent Labs  
  07/11/19 
0230 07/10/19 
1520 WBC 13.3* 15.5*  
RBC 2.69* 2.74* HGB 7.9* 7.9*  
HCT 23.1* 24.3*  
* 375 GRANS 83* 82* LYMPH 9* 11* EOS 1 0  
  
  
 Iron/Ferritin No results for input(s): IRON in the last 72 hours. No lab exists for component: TIBCCALC  
PTH/VIT D No results for input(s): PTH in the last 72 hours. No lab exists for component: VITD Efrem Leos MD 
7/11/2019 
4:47 PM 
 
 
July 11, 2019

## 2019-07-11 NOTE — ROUTINE PROCESS
TRANSFER - IN REPORT: 
 
Verbal report received from Napoleon Singh RN on Lynette Vázquez  being received from ICU 2700 (unit) for routine progression of care Report consisted of patients Situation, Background, Assessment and  
Recommendations(SBAR). Information from the following report(s) SBAR, Kardex, Intake/Output and MAR was reviewed with the receiving nurse. Opportunity for questions and clarification was provided. Assessment will be completed upon patients arrival to unit and care assumed.

## 2019-07-11 NOTE — ROUTINE PROCESS
Assumed care of patient at [address] [place] [address] report received from Letitia Moyer RN with SBAR, Intake & Output. Pt awake, alert and oriented X 3. Noted left foot wound with bleeding open to ar. No verbal distress noted. Bed in lowest position & wheels locked. Call bell within reach. [address] tab given for leg pain. 2001 - Temp 100   Notified Dr. Lashell Marley. Tylenol 650 mg po given. 7/12/19 
0014 -  Temp 98.7  Patient awake . 7083 -  Morphine IV prn given for leg pain. 0715 - Bedside and Verbal shift change report given to Rosalie Florez RN (oncoming nurse) by Cheyenne Wallace RN BSN ( off going Nurse ) inclusive of SBAR and plan of care, Intake & Output.

## 2019-07-11 NOTE — PROGRESS NOTES
Arrived from ED. Alert and oriented. Follows commands. RAC 18. NS @75. VSS. Pain rated 10. Bath complete. mepilex applied to sacrum. Pulses absent bilateral feet. Feet cold. Open wound left shin. Black toes left foot. Left heel is black. mepilex applied for protection. Bedside shift change report given to louise (oncoming nurse) by Teagan Harmon RN 
 (offgoing nurse). Report included the following information SBAR, MAR and Recent Results.

## 2019-07-11 NOTE — ROUTINE PROCESS
Bedside and Verbal shift change report given to Tory Morales RN (oncoming nurse) by Sabrina Norman RN, BSN (offgoing nurse). Report given with SBAR, Kardex, Intake/Output, MAR and Recent Results.

## 2019-07-11 NOTE — DIABETES MGMT
NUTRITIONAL ASSESSMENT AND GLYCEMIC CONTROL PLAN OF CARE Robbie Delgado           64 y.o.           7/10/2019                
1. Gangrene of foot ([address] 2. Cellulitis of left lower extremity 3. Elevated lactic acid level 4. Leukocytosis, unspecified type 5. Anemia, unspecified type 6. Hyponatremia [x]  No Cultural, Episcopalian or ethnic dietary need identified. []  Cultural, Episcopalian and ethnic food preferences identified and addressed [x]  Participated in discharge planning/Interdisciplinary rounds Food allergies: [x]  No        []  Yes- 
ASSESSMENT:  
Pt is underweight related to inadequate caloric intake as evidenced by 73% ideal weight and BMI= 17.6kg/m2. Pt's record indicates he weighed 126 lbs 7 months ago, has gained 4 lbs. since 12/2018. However long term he has lost 20 lbs from his usual weight of 150 lbs. (3/2017). Nutrient deficit as evidenced by pt's report of  poor intake at  meals prior to admission. Altered nutrition related lab values r/t hyponatremia AEB Na= 121. Altered nutrition related lab values AEB Cr 2.35. INTERVENTIONS/PLAN:  
Encouraged increased intake at meals to meet calorie and protein requirements. Added Chocolate Ensure Enlive tid. Added multivitamin. SUBJECTIVE/OBJECTIVE: Information obtained from:Pt requested Chocolate Ensure. Pt reports he has no appetite. Diet: Cardiac- No data found. Medications: [x]                Reviewed Most Recent POC Glucose:  
Recent Labs  
  07/11/19 
0230 07/10/19 
1520 GLU 90 92 Labs:  
Lab Results Component Value Date/Time Hemoglobin A1c 5.2 04/18/2012 03:50 AM  
 
Lab Results Component Value Date/Time  Sodium 121 (L) 07/11/2019 02:30 AM  
 Potassium 4.3 07/11/2019 02:30 AM  
 Chloride 91 (L) 07/11/2019 02:30 AM  
 CO2 20 (L) 07/11/2019 02:30 AM  
 Anion gap 10 07/11/2019 02:30 AM  
 Glucose 90 07/11/2019 02:30 AM  
 BUN 16 07/11/2019 02:30 AM  
 Creatinine 2.35 (H) 07/11/2019 02:30 AM  
 Calcium 7.5 (L) 07/11/2019 02:30 AM  
 Magnesium 2.0 07/10/2019 03:20 PM  
 Phosphorus 3.3 07/07/2019 03:55 AM  
 Albumin 2.6 (L) 07/10/2019 03:20 PM  
 
 
Anthropometrics:       Ideal Wt 178 lbs BMI= kg/m2 Wt Readings from Last 1 Encounters:  
07/10/19 59 kg (130 lb) Ht Readings from Last 1 Encounters:  
07/03/19 6' (1.829 m) Estimated Nutrition Needs: 2065 Kcals/day, Protein (g): 88 g Fluid (ml): 2000 ml Based on:   [x]          Actual BW    []          IBW   []            Adjusted BW   
Nutrition Diagnoses:  
   
Meets Criteria for Chronic Malnutrition  
[x]Moderate Malnutrition, as evidenced by: 
 [x] Mild muscle wasting, loss of subcutaneous fat 
 [x] Nutritional intake <75% of recommended intake for >1 month 
 [x] Weight loss of  5% in 1 month, 7.5% in 3 months, 10% in 6 months, or 20% in 1 year Nutrition Interventions: Ensure Enlive supplements tid Goal:  
  Weight gain 1 lb. per week by 7/21/19. Intake will meet >75% of energy and protein requirements by  7/16. Glucose will be within target range of 70-180mg/dl by   7/14. Nutrition Monitoring and Evaluation   
 
[x]     Monitor po intake on meal rounds 
[x]     Continue inpatient monitoring and intervention 
[]     Other: 
 
 
Nutrition Risk:  []   High     [x]  Moderate    []  Minimal/Uncompromised Davey Mauro RD 
pgr [phone]

## 2019-07-11 NOTE — PROGRESS NOTES
Problem: Pain Goal: *Control of Pain Outcome: Progressing Towards Goal 
  
Problem: Patient Education: Go to Patient Education Activity Goal: Patient/Family Education Outcome: Progressing Towards Goal 
  
Problem: Falls - Risk of 
Goal: *Absence of Falls Description Document Trey Hull Fall Risk and appropriate interventions in the flowsheet. Outcome: Progressing Towards Goal 
  
Problem: Patient Education: Go to Patient Education Activity Goal: Patient/Family Education Outcome: Progressing Towards Goal 
  
Problem: Impaired Skin Integrity/Pressure Injury Treatment Goal: *Improvement of Existing Pressure Injury Outcome: Progressing Towards Goal 
Goal: *Prevention of pressure injury Description Document Mikie Scale and appropriate interventions in the flowsheet. Outcome: Progressing Towards Goal 
  
Problem: Patient Education: Go to Patient Education Activity Goal: Patient/Family Education Outcome: Progressing Towards Goal

## 2019-07-11 NOTE — CDMP QUERY
This patient has been diagnosed with Sepsis. Please document in the progress notes the Clinical Indicators and any associated Acute Organ Dysfunction that supports this diagnosis or state that the diagnosis has been ruled out. Current documentation: Temp 97.6 (on antibiotic since discharge), HR  91  RR 18  02 sat 96% Lactic acid 3.95  Serum sodium 121  Creatinine 2.35   Gangrene and cellulitis of left foot Risks: 63 yo male with PMH PVD, no follow up post hospital discharge Indicators: as above Temp 97.6 (on antibiotic since discharge), HR  91  RR 18  02 sat 96% Lactic acid 3.95  Serum sodium 121  Creatinine 2.35   Gangrene and cellulitis of left foot Treatment: ICU care, IV abx, Podiatry and Vascular and Nephrology consults, IV fluids, serial labs =>Severe sepsis associated with cellulitis and L foot gangrene, [place], lactic acidosis, hyponatremia 
=>other explanation 
=> unable to determine Sepsis  (BSV Approved definition) Documented Source of suspected or confirmed infection as manifested by 2 or more of the following, not easily explained by another co-existing condition:  Temperature:  >38C (100.9F)   -OR-  <36C  (96.8F)  Heart Rate:  > 90 bpm 
 Respiratory Rate:  > 20 / min  -OR-  PaCO2 < 32 
 WBC:  > 12K  -OR- < 4K  -OR- Bands > 10 Explicitly link all related/associated Acute Organ Dysfunction to Sepsis:     
 Encephalopathy  Sepsis-induced Hypotension (or)  Septic Shock         [SBP < 90 (or) MAP <65 (or) SBP drop > 40 points from baseline]  Hypoxemia (or)  Acute Respiratory Failure         [need for invasive or non-invasive ventilation OR- pO2 < 60 mmHg]  Acute Kidney Injury (or) Acute Renal Failure OR- Oliguria         [serum Creatinine > 2.0 OR- Urine Output < 0.5ml/kg/hr for 2 hours]  Ileus (absent bowel sounds)  Coagulopathy  DIC  Thrombocytopenia         [Platelet Count < 943,603 OR- INR > 1.5 OR- aPTT >60 sec]  Hyperbilirubinemia     [Bilirubin > 2 mg/dL]  Hyperlactatemia   [Lactic Acid > 2.0]  Critical-Illness Myopathy or Polyneuropathy Severe Sepsis = Sepsis with associated Acute Organ Dysfunction Septic Shock = Refractory hypotension refractory to aggressive volume replacement and often requiring vasopressor therapy like dopamine  -OR-  Lactic Acidosis  [Lactic Acid > 4.0]. Thank you, 
Jaquelin Ocasio RN CDI   [phone]

## 2019-07-11 NOTE — PROGRESS NOTES
conducted an initial consultation and Spiritual Assessment for Nathanael Garza, who is a 62 y.o.,male. Patients Primary Language is: [place]. According to the patients EMR Holiness Affiliation is: Episcopal. The reason the Patient came to the hospital is:  
Patient Active Problem List  
 Diagnosis Date Noted  Lactic acidemia 07/10/2019  [place] (acute kidney injury) ([facility] 07/10/2019  Dehydration 07/10/2019  Sepsis ([facility] 07/10/2019  Gangrene ([facility] 07/10/2019  Gangrene of foot ([facility] 06/29/2019  Cellulitis of left lower extremity 06/29/2019  Malnourished ([facility] 06/29/2019  Peripheral vascular disease ([facility] 06/29/2019  Hyponatremia 06/29/2019 The  provided the following Interventions: 
Initiated a relationship of care and support with patient in room 1828-1867300 today. Listened empathically as patient shared briefly his story, but he is very weak and does not seem to have much energy at present. Provided information about Spiritual Care Services. Offered prayer and assurance of continued prayers on patients behalf. The following outcomes were achieved: 
Patient shared limited information about his medical narrative and spiritual journey/beliefs. Patient processed feeling about current hospitalization. Patient expressed gratitude for pastoral care visit. Assessment: 
Patient does not have any Jehovah's witness/cultural needs that will affect patients preferences in health care. There are no further spiritual or Jehovah's witness issues which require Spiritual Care Services interventions at this time. Plan: 
Chaplains will continue to follow and will provide pastoral care on an as needed/requested basis Tim Payne 3 Board Certified [address] [place] [address] Spiritual Care  
[phone]

## 2019-07-11 NOTE — H&P (VIEW-ONLY)
[facility]  [address] [facility], [address] [facility]  Dr. Bill Bustillo, Dr. Christie Gonzales  [phone] FAX# [phone]         Consult    Patient: Mikki Esteban MRN: [id number]  SSN: [id number]    YOB: 1955  Age: 62 y.o. Sex: male      Subjective:      Mikki Esteban is a 62 y.o. male who is being seen for left foot and leg gangrene. He is readmitted to the hospital with recurrent pain and worsening hyponatremia. Recent hospitalization revealed severe multilevel vascular disease. He has also a history of non-small cell lung cancer. Today he reports pain in foot that feels better when he hangs it down. He is a current smoker and drinker. Past Medical History:   Diagnosis Date    Depression     Emphysema (subcutaneous) (surgical) resulting from a procedure     Hypertension     Hyponatremia     Non-small cell lung cancer ([facility] 2018    rt upper lung    Seizure ([facility]     Seizures ([facility]      History reviewed. No pertinent surgical history. History reviewed. No pertinent family history.   Social History     Tobacco Use    Smoking status: Current Every Day Smoker    Smokeless tobacco: Never Used   Substance Use Topics    Alcohol use: Yes      Current Facility-Administered Medications   Medication Dose Route Frequency Provider Last Rate Last Dose    heparin (porcine) injection 5,000 Units  5,000 Units SubCUTAneous Q8H Viji Hurst H, DO   5,000 Units at 07/11/19 1413    VANCOMYCIN INFORMATION NOTE 1 Each  1 Each Other Rx Dosing/Monitoring Edmund Troy PA-C        therapeutic multivitamin (THERAGRAN) tablet 1 Tab  1 Tab Oral DAILY Viji Hurst H, DO   1 Tab at 07/11/19 1213    cefTRIAXone (ROCEPHIN) 1 g in 0.9% sodium chloride (MBP/ADV) 50 mL MBP  1 g IntraVENous Q24H Jessica FIERRO PA-C 100 mL/hr at 07/11/19 1427 1 g at 07/11/19 1427    sodium chloride (NS) flush 5-10 mL  5-10 mL IntraVENous PRN Elisha Addison PA-C        0.9% sodium chloride infusion  75 mL/hr IntraVENous CONTINUOUS DIANDRA Arnold-C 75 mL/hr at 07/11/19 1049 75 mL/hr at 07/11/19 1049    acetaminophen (TYLENOL) tablet 650 mg  650 mg Oral Q4H PRN Matheus Chimera M, PA-C        HYDROcodone-acetaminophen (NORCO) 5-325 mg per tablet 1 Tab  1 Tab Oral Q4H PRN OBINNA ArnoldC   1 Tab at 07/10/19 2309    ondansetron (ZOFRAN) injection 4 mg  4 mg IntraVENous Q4H PRN Matheus Chimera M, PA-C        atorvastatin (LIPITOR) tablet 40 mg  40 mg Oral QHS OBINNA ArnoldC   40 mg at 07/10/19 2154    carvedilol (COREG) tablet 3.125 mg  3.125 mg Oral BID WITH MEALS OBINNA ArnoldC   3.125 mg at 07/11/19 7224    clopidogrel (PLAVIX) tablet 75 mg  75 mg Oral DAILY OBINNA ArnoldC   75 mg at 07/11/19 9583    morphine injection 2-3 mg  2-3 mg IntraVENous Q4H PRN Janett Cornejo MD   3 mg at 07/11/19 1049        Allergies   Allergen Reactions    Pcn [Penicillins] Unknown (comments)     Patients states that he was allergic to PCN as a child because he did not like receiving PCN injections, not because he developed a reaction to PCN administration        Review of Systems:  Pertinent items are noted in the History of Present Illness. Objective:     Vitals:    07/11/19 1100 07/11/19 1200 07/11/19 1518 07/11/19 1524   BP: (!) 165/101 (!) 149/96  142/74   Pulse: 95 91  90   Resp: 21 19  20   Temp:  98.9 °F (37.2 °C)  98.7 °F (37.1 °C)   SpO2: 96% 97%  95%   Weight:   61.1 kg (134 lb 11.2 oz)         Physical Exam:  GENERAL: alert, cooperative, no distress, appears older than stated age  EYE: negative  THROAT & NECK: normal and no erythema or exudates noted. LUNG: clear to auscultation bilaterally  HEART: regular rate and rhythm, S1, S2 normal, no murmur, click, rub or gallop  ABDOMEN: soft, non-tender.  Bowel sounds normal. No masses,  no organomegaly  EXTREMITIES: Left lower leg with cavernous wound anteriorly with surrounding gangrene. Left toes with dry gangrene. Assessment:     Hospital Problems  Never Reviewed          Codes Class Noted POA    Lactic acidemia ICD-10-CM: E87.2  ICD-9-CM: 276.2  7/10/2019 Unknown        [place] (acute kidney injury) ([facility] ICD-10-CM: N17.9  ICD-9-CM: 584.9  7/10/2019 Unknown        Dehydration ICD-10-CM: E86.0  ICD-9-CM: 276.51  7/10/2019 Unknown        * (Principal) Sepsis ([facility] ICD-10-CM: A41.9  ICD-9-CM: 038.9, 995.91  7/10/2019 Unknown        Gangrene ([facility] ICD-10-CM: W58  ICD-9-CM: 785.4  7/10/2019 Unknown        Malnourished ([facility] ICD-10-CM: E46  ICD-9-CM: 263.9  6/29/2019 Yes        Peripheral vascular disease ([facility] ICD-10-CM: I73.9  ICD-9-CM: 443.9  6/29/2019 Yes        Hyponatremia ICD-10-CM: E87.1  ICD-9-CM: 276.1  6/29/2019 Yes              Plan: Will discuss prognosis related to CA with oncology. Likely he will need left AKA in the near future.      Signed By: Camilo Curiel MD     July 11, 2019

## 2019-07-11 NOTE — PROGRESS NOTES
Pt received to unit via bed from ICU 2700, accompanied by Patient's son, Elli Fierro RN and Alie Lester [facility] via bed to room 3422. He is A&Ox4. Dual skin assessment performed with this nurse and lEli Fierro RN; See Physical exam Skin for findings. Denies pain. No s/s of distress noted. No complaints voiced at this time. Both Patient and son oriented to room and use of call bell for assistance. Bed locked in low position; call yi in reach. DIANDRA Grimes was called made aware of Patient location. Also that Patient gave telephone number of Radiation Oncology MD to have hospital MD call, number in hard chart said \"I take a look at it. \" 
 
6300- Patient has several test ordered (CT scan, NM, MRI and US) and has telemetry monitor in place. Chasidy Kenyon was called made aware T.O. received okay for patient to go for test without nurse and without telemetry (RBV).

## 2019-07-11 NOTE — PROGRESS NOTES
PCP appointment made: 7/9/19 
  
New or Established: New Doctor: Dennie Pilgrim, NP Date : 7/29/19 Time : 11:30A 
  
See provider location/contact information in follow up. Luisa Silverman RN Outcomes Manager 
[phone]

## 2019-07-11 NOTE — PROGRESS NOTES
PT screen received and chart reviewed. Per chart review, admitting diagnosis and relevant prior level of function indicate patient appropriate for skilled PT/OT evaluation. Please order PT/OT evaluation as appropriate to better determine benefit from skilled PT/OT treatment and formal discharge recommendations. PT/OT evaluation is indicated. Thank you, Asha Andersen, PT, DPT Office Extension: [phone]

## 2019-07-11 NOTE — PROGRESS NOTES
Physical Exam  
Musculoskeletal:  
     Feet: 
 
Skin:  
 
  
 
 
0700:  Assumed care of pt at this time. Assessment as charted. Pt c/o 10/10 pain, recently medicated with IV morphine. Pt is resting, eyes opening and closing, watching TV. Call bell within reach, pt instructed to stay in bed at this time, pt reports understanding. 1000: Toes on left foot painted with betadine. 1300: TRANSFER - OUT REPORT: 
 
Verbal [facility]  [facility]  for routine progression of care Report consisted of patients Situation, Background, Assessment and  
Recommendations(SBAR). Information from the following report(s) ED Summary, Intake/Output, MAR and Recent Results was reviewed with the receiving nurse. Lines:  
Peripheral IV 07/10/19 Right Antecubital (Active) Site Assessment Clean, dry, & intact 7/11/2019 12:00 PM  
Phlebitis Assessment 0 7/11/2019 12:00 PM  
Infiltration Assessment 0 7/11/2019 12:00 PM  
Dressing Status Clean, dry, & intact 7/11/2019  7:50 AM  
Dressing Type Tape;Transparent 7/11/2019  7:50 AM  
Hub Color/Line Status Green; Infusing 7/11/2019  7:50 AM  
Action Taken Open ports on tubing capped 7/11/2019  7:50 AM  
Alcohol Cap Used Yes 7/11/2019  7:50 AM  
  
 
Opportunity for questions and clarification was provided. Patient transported with: 
 Registered Nurse

## 2019-07-12 NOTE — PROGRESS NOTES
Problem: Pain Goal: *Control of Pain Outcome: Progressing Towards Goal 
  
Problem: Falls - Risk of 
Goal: *Absence of Falls Description Document Areta Keren Fall Risk and appropriate interventions in the flowsheet. Outcome: Progressing Towards Goal 
  
Problem: Impaired Skin Integrity/Pressure Injury Treatment Goal: *Prevention of pressure injury Description Document Mikie Scale and appropriate interventions in the flowsheet. Outcome: Progressing Towards Goal 
  
Problem: Patient Education: Go to Patient Education Activity Goal: Patient/Family Education Outcome: Progressing Towards Goal 
  
Problem: Nutrition Deficit Goal: *Optimize nutritional status Outcome: Progressing Towards Goal

## 2019-07-12 NOTE — PROGRESS NOTES
[facility] 
[address] [place], [address] [facility] Dr. Allison Felix, Dr. Sherlyn Angelucci, & Dr. Beltran Parham 
[phone] FAX# [phone] Progress Note Patient: Mustapha Payan MRN: [id number]  SSN: [id number] YOB: 1955  Age: 62 y.o. Sex: male Admit Date: 7/10/2019 LOS: 2 days Subjective:  
 
(+)LLE pain. L anterior leg wound with exposed desiccated muscle. No purulence. Objective:  
 
Vitals:  
 07/11/19 1821 07/11/19 2100 07/12/19 0014 07/12/19 2593 BP: (!) 139/95 160/86 164/87 153/83 Pulse: 99 100 (!) 105 (!) 102 Resp: 18 18 18 18 Temp: 98.8 °F (37.1 °C) 100 °F (37.8 °C) 98.7 °F (37.1 °C) 98.1 °F (36.7 °C) SpO2: 92% 92% 90% 90% Weight:      
  
 
Intake and Output: 
Current Shift: No intake/output data recorded. Last three shifts: 07/10 1901 - 07/12 0700 In: 2258.8 [I.V.:2258.8] Out: - Physical Exam:  
NAD, alert, thin, cachectic LLE: (+)muscle wasting.  (+)anterior leg wound with exposed dessicated muscle. 
(+)L heel wound, appears superficial, (+)toe gangrene. Assessment:  
 
Principal Problem: 
  Sepsis ([address] (7/10/2019) Active Problems: 
  Malnourished ([address] (6/29/2019) Peripheral vascular disease ([address] (6/29/2019) Hyponatremia (6/29/2019) Lactic acidemia (7/10/2019) BROOKLYN (acute kidney injury) ([address] (7/10/2019) Dehydration (7/10/2019) Gangrene ([address] (7/10/2019) PAD with gangrene, clinically malnourished, poor functional status, hyponatremic, lung cancer, awaiting re-staging. Plan: No indication for emergent amputation or revascularization. Ideally would revascularize for limb salvage, but given his current condition (hyponatremia, malnourished), he is a poor candidate for complex open revascularization. The progression of his anterior leg wound does make potential open operation more challenging. Oncology input is noted and appreciated, will await restaging. Would probably need AKA unless his medical condition markedly improves AND restaging proves he is cancer free. Signed By: Rodney Nieves MD   
 July 12, 2019

## 2019-07-12 NOTE — PROGRESS NOTES
Internal Medicine Progress Note Patient's Name: Magali Barraza Admit Date: 7/10/2019 Length of Stay: 2 Assessment/Plan Principal Problem: 
  Sepsis ([address] (7/10/2019) Active Problems: 
  Malnourished ([address] (6/29/2019) Peripheral vascular disease ([address] (6/29/2019) Hyponatremia (6/29/2019) Lactic acidemia (7/10/2019) BROOK[place]N (acute kidney injury) ([address] (7/10/2019) Dehydration (7/10/2019) Gangrene ([address] (7/10/2019) Sepsis 
- gangrenous first and third toe - Recent wound culture from previous admission susceptible to rocephin and vanc, continue abx, renally dose 
- repeat wound culture  
  
Gangrene/PVD 
- Recent admission discharge on PO oral abx, was expected to f/u with vasc for multilevel revascularization but was unable to get to f/u due to ride issues and extent of pain - Vasc consulted - appreciate - podiatry consulted - appreciate - open dry wound anterior, lateral LLE proximal tibia - Betadine to toes and Aquacel Ag to heel daily per podiatry 
  
Hyponatremia 
- acute on chronic 
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 
- poor historian and his is unsure if he has been taking the lasix at home - Nephrology consult - appreciate 
- gentle IVF 
- monitor BMP - improving 
  
BROOKLYN 
- appears volume depleted 
- will give gentle diuresis and monitor - Renal Dose medications - Avoid nephrotoxins 
- nephrology following 
 
- Cont acceptable home medications for chronic conditions  
- DVT protocol I have personally reviewed all pertinent labs and films that have officially resulted over the last 24 hours. I have personally checked for all pending labs that are awaiting final results. Interval History Bimal Traore is a 62 y.o. male with a PMHx listed below of who presented to the ED with complaints of worsening LLE pain.   Patient was admitted on 6/30/19 and discharged three days ago for gangrenous toes. Vascular surgery and podiatry were consulted then and he was recommended for multilevel revascularization, he was to follow up as out patient and said that his son could transport him. Per patient now, son was unable to transport patient to f/u appointment, so he returned due to persistent symptoms.   
  
In the ED vascular surgery consulted, podiatry consulted. He presents with worsening creatinine and sodium. I spoke with Nephrology and they recommend fluids. His only complaint is pain, no fevers, chills, weakness, AMS. Sodium 121, will be admitted for further eval. He doesn't know if he has been taking his lasix, but says hes been taking his abx\" Per podiatry - no need for urgent amputation, wait until revascularization. Nephrology consulted - Acute on chronic hyponatremia improved with gentle IVF. Oncology consulted - bone scan ordered to complete restaging.  
  
Subjective Pt s/e @ bedside. No major events overnight. Patient c/o LLE pain. Objective Visit Vitals /86 (BP 1 Location: Left arm, BP Patient Position: Head of bed elevated (Comment degrees)) Pulse (!) 109 Temp 98.9 °F (37.2 °C) Resp 18 Wt 59 kg (130 lb) SpO2 93% BMI 17.63 kg/m² Physical Exam: 
General Appearance: NAD, conversant HENT: normocephalic/atraumatic, moist mucus membranes Neck: No JVD, supple Lungs: CTA with normal respiratory effort CV: RRR, no m/r/g Abdomen: soft, non-tender, normal bowel sounds Extremities: no cyanosis, pitting edema, erythema, and tenderness to left foot, DPP/TPP non-palpable, shiny atrophic skin, no hair, open wound, dry necrotic proximal anterior tibial shaft Neuro: No new focal deficits, diminished sensation entire left foot Intake and Output: 
Current Shift:  No intake/output data recorded. Last three shifts:  07/10 1901 - 07/12 0700 In: 2258.8 [I.V.:2258.8] Out: -  
 
Lab/Data Reviewed: BMP:  
Lab Results Component Value Date/Time  (L) 07/12/2019 12:10 PM  
 CREA 3.45 (H) 07/11/2019 08:00 PM  
 GFRAA 22 (L) 07/11/2019 08:00 PM  
 GFRNA 18 (L) 07/11/2019 08:00 PM  
 
CBC:  
Lab Results Component Value Date/Time WBC 12.2 07/12/2019 07:25 AM  
 HGB 7.8 (L) 07/12/2019 07:25 AM  
 HCT 22.8 (L) 07/12/2019 07:25 AM  
  (H) 07/12/2019 07:25 AM  
 
 
 
Imaging Reviewed: 
Nm Bone Scan Wh Body Result Date: 7/12/2019 WHOLE BODY BONE SCAN INDICATION: Lung cancer, non-small cell, staging exam COMPARISON: Prior CT angiogram with runoff of the lower extremities June 30, 2019 TECHNIQUE: Following intravenous administration of 42.0 mCi 99mTc-MDP, approximately three hour delayed whole body images were obtained in anterior and posterior projections. FINDINGS: Biodistribution of injected radiopharmaceutical appears as expected. Soft tissue infiltration adjacent to the injection site is noted. Likely degenerative uptake adjacent to the posterior left hand. Degenerative uptake noted involving the sacroiliac joints, hips, and knees. IMPRESSION: 1. No definite sonographic evidence of osseous metastatic disease. [address] Result Date: 7/12/2019 Retroperitoneal Ultrasound History: Acute kidney injury Comparison: No prior study available for comparison. Technique: Multiple gray scale sonographic images of the kidneys were obtained. Additional color Doppler and the Doppler waveform images were obtained . Findings: The right kidney measures 11.3 cm in length and is normal in echotexture. No focal lesions are seen. There is no evidence of hydronephrosis. The left kidney measures 11.2 cm in length and is normal in echotexture. No focal lesions are seen. There is no evidence of hydronephrosis. IMPRESSION: Normal appearance of bilateral kidneys without evidence of obstructive nephropathy. Medications Reviewed: 
Current Facility-Administered Medications Medication Dose Route Frequency  vancomycin (VANCOCIN) 1,000 mg in 0.9% sodium chloride (MBP/ADV) 250 mL adv  1,000 mg IntraVENous ONCE  
 [START ON 7/14/2019] VANCOMYCIN INFORMATION NOTE   Other ONCE  carvedilol (COREG) tablet 6.25 mg  6.25 mg Oral BID WITH MEALS  
 heparin (porcine) injection 5,000 Units  5,000 Units SubCUTAneous Q8H  
 VANCOMYCIN INFORMATION NOTE 1 Each  1 Each Other Rx Dosing/Monitoring  therapeutic multivitamin (THERAGRAN) tablet 1 Tab  1 Tab Oral DAILY  cefTRIAXone (ROCEPHIN) 1 g in 0.9% sodium chloride (MBP/ADV) 50 mL MBP  1 g IntraVENous Q24H  
 sodium chloride (NS) flush 5-10 mL  5-10 mL IntraVENous PRN  
 0.9% sodium chloride infusion  75 mL/hr IntraVENous CONTINUOUS  
 acetaminophen (TYLENOL) tablet 650 mg  650 mg Oral Q4H PRN  
 HYDROcodone-acetaminophen (NORCO) 5-325 mg per tablet 1 Tab  1 Tab Oral Q4H PRN  
 ondansetron (ZOFRAN) injection 4 mg  4 mg IntraVENous Q4H PRN  
 atorvastatin (LIPITOR) tablet 40 mg  40 mg Oral QHS  clopidogrel (PLAVIX) tablet 75 mg  75 mg Oral DAILY  morphine injection 2-3 mg  2-3 mg IntraVENous Q4H PRN Heidi Multani PA-C [facility] Office:  [phone] Pager: [phone]

## 2019-07-12 NOTE — PROGRESS NOTES
Problem: Pain Goal: *Control of Pain Outcome: Progressing Towards Goal 
  
Problem: Patient Education: Go to Patient Education Activity Goal: Patient/Family Education Outcome: Progressing Towards Goal 
  
Problem: Falls - Risk of 
Goal: *Absence of Falls Description Document Esaw Navarro Fall Risk and appropriate interventions in the flowsheet. Outcome: Progressing Towards Goal 
  
Problem: Patient Education: Go to Patient Education Activity Goal: Patient/Family Education Outcome: Progressing Towards Goal 
  
Problem: Impaired Skin Integrity/Pressure Injury Treatment Goal: *Improvement of Existing Pressure Injury Outcome: Progressing Towards Goal 
Goal: *Prevention of pressure injury Description Document Mikie Scale and appropriate interventions in the flowsheet. Outcome: Progressing Towards Goal 
  
Problem: Patient Education: Go to Patient Education Activity Goal: Patient/Family Education Outcome: Progressing Towards Goal 
  
Problem: Pressure Injury - Risk of 
Goal: *Prevention of pressure injury Description Document Mikie Scale and appropriate interventions in the flowsheet. Outcome: Progressing Towards Goal 
  
Problem: Patient Education: Go to Patient Education Activity Goal: Patient/Family Education Outcome: Progressing Towards Goal 
  
Problem: Discharge Planning Goal: *Discharge to safe environment Outcome: Progressing Towards Goal 
  
Problem: Nutrition Deficit Goal: *Optimize nutritional status Outcome: Progressing Towards Goal

## 2019-07-12 NOTE — PROGRESS NOTES
RENAL PROGRESS NOTE Daivd Asa Assessment/Plan: · BROOKLYN (ischemic atn due to volume depletion). Scr is up, uo is not measured. Monitor closely. Continue ivf given poor oral intake. · Acute on chronic hyponatremia in a setting of malignancy related siadh and volume depletion. Continue NS. Na is improving at appropriate rate. · Known non small cell lung cancer. Restaging in progress. · HTN. Increase coreg. · Lt le pad with ischemic wound. Vascular on the case. Subjective: 
Patient complaints off: c/o lt foot pain. No SOB at rest, no CP/N/V. Appetite is poor. Patient Active Problem List  
Diagnosis Code  Gangrene of foot ([address] I96  
 Cellulitis of left lower extremity L03. 80  
 Malnourished ([address] E46  Peripheral vascular disease (HCC) I73.9  Hyponatremia E87.1  Lactic acidemia E87.2  BR[place] (acute kidney injury) ([address] N17.9  Dehydration E86.0  Sepsis ([address] A41.9  Gangrene ([address] [id number] Current Facility-Administered Medications Medication Dose Route Frequency Provider Last Rate Last Dose  vancomycin (VANCOCIN) 1,000 mg in 0.9% sodium chloride (MBP/ADV) 250 mL adv  1,000 mg IntraVENous ONCE [place], [place] [START ON 7/14/2019] VANCOMYCIN INFORMATION NOTE   Other ONCE Fernando Quesada H, DO      
 heparin (porcine) injection 5,000 Units  5,000 Units SubCUTAneous Q8H Ferd Mooring H, DO   5,000 Units at 07/11/19 2243  VANCOMYCIN INFORMATION NOTE 1 Each  1 Each Other Rx Dosing/Monitoring Jud Troy, [facility]      
 therapeutic multivitamin [facility]) tablet 1 Tab  1 Tab Oral DAILY Ferd Mooring H, DO   Stopped at 07/12/19 0900  cefTRIAXone (ROCEPHIN) 1 g in 0.9% sodium chloride (MBP/ADV) 50 mL MBP  1 g IntraVENous Q24H Carlton Paredes PA-C   Stopped at 07/11/19 0359  sodium chloride (NS) flush 5-10 mL  5-10 mL IntraVENous PRN Rg Turcios PA-C      
 0.9% sodium chloride infusion  75 mL/hr IntraVENous CONTINUOUS Rg Turcios PA-C 75 mL/hr at 07/12/19 0104 75 mL/hr at 07/12/19 0104  acetaminophen (TYLENOL) tablet 650 mg  650 mg Oral Q4H PRN Rg Turcios PA-C   650 mg at 07/11/19 2243  
 HYDROcodone-acetaminophen (NORCO) 5-325 mg per tablet 1 Tab  1 Tab Oral Q4H PRN Rg Turcios PA-C   1 Tab at 07/12/19 8981  ondansetron (ZOFRAN) injection 4 mg  4 mg IntraVENous Q4H PRN LoxMaruy garcia PA-C      
 atorvastatin (LIPITOR) tablet 40 mg  40 mg Oral QHS Rg Turcios PA-C   40 mg at 07/11/19 2244  carvedilol (COREG) tablet 3.125 mg  3.125 mg Oral BID WITH MEALS Barak Hernandez   Stopped at 07/12/19 6664  clopidogrel (PLAVIX) tablet 75 mg  75 mg Oral DAILY Rg Turcios PA-C   Stopped at 07/12/19 1019  morphine injection 2-3 mg  2-3 mg IntraVENous Q4H PRN Fox Martinez MD   2 mg at 07/12/19 3730 Objective Vitals:  
 07/11/19 2100 07/12/19 0014 07/12/19 0415 07/12/19 1120 BP: 160/86 164/87 153/83 159/63 Pulse: 100 (!) 105 (!) 102 (!) 102 Resp: 18 18 18 18 Temp: 100 °F (37.8 °C) 98.7 °F (37.1 °C) 98.1 °F (36.7 °C) 97.9 °F (36.6 °C) SpO2: 92% 90% 90% 90% Weight:      
 
 
 
Intake/Output Summary (Last 24 hours) at 7/12/2019 1422 Last data filed at 7/12/2019 3102 Gross per 24 hour Intake 893.75 ml Output  Net 893.75 ml Admission weight: Weight: 59 kg (130 lb) (07/10/19 1511) Last Weight Metrics: 
Weight Loss Metrics 7/11/2019 7/3/2019 4/29/2017 3/11/2017 Today's Wt 134 lb 11.2 oz 130 lb 150 lb 150 lb BMI 18.27 kg/m2 17.63 kg/m2 20.34 kg/m2 20.34 kg/m2 Physical Assessment:  
 
General: NAD, alert and oriented. Neck: No jvd. LUNGS: diffusely diminished air entry, bl exp rhonchi. No crackles. CVS EXM: S1, S2  RRR. Abdomen: soft, non tender. Lower Extremities:  no edema. Lt foot dressed. Lab CBC w/Diff Recent Labs  
  07/12/19 
0725 07/11/19 
0230 07/10/19 
1520 WBC 12.2 13.3* 15.5*  
RBC 2.67* 2.69* 2.74* HGB 7.8* 7.9* 7.9*  
HCT 22.8* 23.1* 24.3*  
* 462* 375 GRANS 81* 83* 82* LYMPH 8* 9* 11* EOS 0 1 0 Chemistry Recent Labs  
  07/12/19 1210 07/12/19 
0725 07/11/19 
2000  07/11/19 
0230 07/10/19 
1520 GLU  --   --   --   --  90 92 * 126* 121*   < > 121* 121* K  --   --   --   --  4.3 3.9 CL  --   --   --   --  91* 87* CO2  --   --   --   --  20* 21 BUN  --   --   --   --  16 12 CREA  --   --  3.45*  --  2.35* 1.41* CA  --   --   --   --  7.5* 8.1* AGAP  --   --   --   --  10 13 BUCR  --   --   --   --  7* 9* AP  --   --   --   --   --  76  
TP  --   --   --   --   --  7.4 ALB  --   --   --   --   --  2.6*  
GLOB  --   --   --   --   --  4.8* AGRAT  --   --   --   --   --  0.5*  
 < > = values in this interval not displayed. No results found for: IRON, FE, TIBC, IBCT, PSAT, FERR Lab Results Component Value Date/Time Calcium 7.5 (L) 07/11/2019 02:30 AM  
 Phosphorus 3.3 07/07/2019 03:55 AM  
  
 
Eliza Boucher M.D. [facility] Phone [phone] Pager [phone]

## 2019-07-12 NOTE — PROGRESS NOTES
Bedside shift change report given to COLEEN Oscar (oncoming nurse) by Ricardo Key RN (offgoing nurse). Report included the following information SBAR, Kardex, Intake/Output, MAR and Recent Results. 0046 -- Zen from [facility] Carroll 892, patient will go down @ 12. 
 
0313 -- Call from [address] Brenner, patient will go from NM to CT. 
 
0900 -- Call from [facility] MRI, speak with MD about patient's tremor, the patient has had 9 MRI's  
   
0907 -- AM PO medications held, will continue to monitor. 2 mg Morphine given for pain level 10. Patient is aware that he is NPO, and patient will not allow nurse to take the ensure out of the room, patient educated on the importance of not eating or drinking and going for test in NM and CT, patient is adamant. Wound care provided to patient's leg and foot. 1030 -- Dr. Abbie Crenshaw paged the patient is refusing the MRI. 30 [facility] informed that nurse spoke with Dr. Abbie Crenshaw and he is aware the patient is refusing the MRI. 1120 -- Reassessment completed, no change in patient condition, will continue to monitor. 1305 -- Urine collected. 1320 -- Patient off the unit. 1440 -- Patient back on the unit, states, \"Y'all ain't doing nothing for my pain. \" Nurse replies, \"If you don't complete the test, so the source of your pain can be found, you will always be in pain. So, I suggest you let me give you the pain med and we take to back down to do the CT. \" Patient, \"I ain't doing nothing else the way I feel. \" 
 
3686 -- 3 mg Morphine given, pain level 10. 
 
1510 -- Pain reassessed, patient is resting  
   
1545 -- Reassessment completed, no change in patient condition, will continue to monitor. 1615 -- Meds given, foot bleeding profusely, wrapped with 4x4 guaze and kerlix gauze. 1820 -- Spoke with Doretha MENDIETA, patient states, \"I ain't going no where else today. \" 
Benjy Butt change report given to COLEEN Philippe (oncoming nurse) by Nancy Blake, RN (offgoing nurse). Report included the following information SBAR, Kardex, Intake/Output, MAR and Recent Results.

## 2019-07-12 NOTE — PROGRESS NOTES
Problem: Discharge Planning Goal: *Discharge to safe environment Outcome: Progressing Towards Goal 
 Plan : snf Spoke with patient in room, Patient stated that he is interested in SNF at discharge. He stated to match him out to [facility] Patient will need PT/OT to evaluate SNF need. Patient will need auth from his [place] once a SNF is  Accepting  and agreed upon.

## 2019-07-13 PROBLEM — N18.9 ACUTE KIDNEY INJURY SUPERIMPOSED ON CHRONIC KIDNEY DISEASE (HCC): Status: ACTIVE | Noted: 2019-01-01

## 2019-07-13 PROBLEM — E87.20 LACTIC ACIDEMIA: Status: RESOLVED | Noted: 2019-01-01 | Resolved: 2019-01-01

## 2019-07-13 NOTE — PROGRESS NOTES
Pharmacy Monitoring: Vancomycin Day #4 of ABX therapy Indication:  SSTI Current regimen: - Vancomycin - pharmacy to dose - Ceftriaxone 1 gm IV every 24 hours Recent Labs  
  19 
1045 19 
0350 19 
0725 19 
2000 19 
0230 WBC  --  15.5* 12.2  --  13.3*  
CREA 7.01* 6.70*  --  3.45* 2.35* BUN 40* 37*  --   --  16 Est CrCl: < 10 ml/min Temp (24hrs), Av.4 °F (36.9 °C), Min:98.1 °F (36.7 °C), Max:98.8 °F (37.1 °C) Cultures: pending Plan: Continue current regimen - Please note that vancomycin levels are randomly collected - not trough. - Patient is not receiving vancomycin until vancomycin level falls < 20 mcg/ml to avoid accumulation - Pharmacy will continue to trend renal function and dose vancomycin as appropriate.  
 
Thanks, 
Rakel Hull, PHARMD

## 2019-07-13 NOTE — PROGRESS NOTES
Problem: Pain Goal: *Control of Pain Outcome: Progressing Towards Goal 
  
Problem: Falls - Risk of 
Goal: *Absence of Falls Description Document Sona Cousin Fall Risk and appropriate interventions in the flowsheet.  
Outcome: Progressing Towards Goal

## 2019-07-13 NOTE — ANCILLARY DISCHARGE INSTRUCTIONS
Patient and/or next of kin has been given the [facility] Important Message From Medicare About Your Rights\" letter and all questions were answered. Patient refused to sign second copy. Copy of signed original letter given, letter explained.

## 2019-07-13 NOTE — PROGRESS NOTES
In Patient Progress note Admit Date: 7/10/2019 Impression:  
 
1 acute kidney injury: Likely secondary to prerenal azotemia due to poor intake and being on Lasix versus ATN or AIN which is unlikely. Vanc toxicity may also be contributing , high vanc levels yesterday 2 Acute on chronic hyponatremia, chronic. Continue with gentle hydration baseline in the 120s etiology due to SIADH, but could may have acute volume depletion with normal saline. Check sodium every 6 hours 3 concern for sepsis secondary to dry gangrene and foot infection on Vanco and Rocephin. Follow Vanco levels closely 4 gangrene/peripheral vascular disease Patient's renal parameters have worsened significantly. There is no urine output recorded since yesterday and per patient he has not voided since then. His creatinine is up from 3-6 range. Also his bicarb is down to 14 and his sodium is down to 124. His volume status looks dry. I am getting a stat BMP to confirm the bowel labs. We will also get nursing to place a Mccarthy catheter to evaluate for his outs. Noted that patient's bank trough was elevated at 23 yesterday 
  
Plan: 
1 continue hydration with saline 2 sodium every 6 hours to be checked 3 STAT BMP 
4 mccarthy in place , struict I/o 
5 urine studies  
  
Please call with questions 
  
Phil Wharton MD [facility] [phone] Pager: [phone] Subjective: In a lot of lower ext pain Says he has not voided for 24 hrs Current Facility-Administered Medications:  
  oxyCODONE-acetaminophen (PERCOCET) 5-325 mg per tablet 1-2 Tab, 1-2 Tab, Oral, Q4H PRN, Eugene Moralez DO, 2 Tab at 07/13/19 8780   [START ON 7/14/2019] VANCOMYCIN INFORMATION NOTE, , Other, ONCE, Ruby WANG, DO 
  carvedilol (COREG) tablet 6.25 mg, 6.25 mg, Oral, BID WITH MEALS, Audra Lerma MD, 6.25 mg at 07/13/19 2634   heparin (porcine) injection 5,000 Units, 5,000 Units, SubCUTAneous, Q8H, Karlos Courser H, DO, 5,000 Units at 07/13/19 1314 
  VANCOMYCIN INFORMATION NOTE 1 Each, 1 Each, Other, Rx Dosing/Monitoring, Cristy Troy PA-C 
  therapeutic multivitamin (THERAGRAN) tablet 1 Tab, 1 Tab, Oral, DAILY, Shira [place], DO, 1 Tab at 07/13/19 5585   cefTRIAXone (ROCEPHIN) 1 g in 0.9% sodium chloride (MBP/ADV) 50 mL MBP, 1 g, IntraVENous, Q24H, Maury Troy PA-C, Last Rate: 100 mL/hr at 07/13/19 1450, 1 g at 07/13/19 1450   sodium chloride (NS) flush 5-10 mL, 5-10 mL, IntraVENous, PRN, Maury Tory PA-C, 10 mL at 07/13/19 0624 
  0.9% sodium chloride infusion, 75 mL/hr, IntraVENous, CONTINUOUS, Maury Troy PA-C, Last Rate: 75 mL/hr at 07/13/19 1003, 75 mL/hr at 07/13/19 1003   acetaminophen (TYLENOL) tablet 650 mg, 650 mg, Oral, Q4H PRN, Lita Rodas PA-C, 650 mg at 07/11/19 2243   ondansetron (ZOFRAN) injection 4 mg, 4 mg, IntraVENous, Q4H PRN, Maury Troy PA-C 
  atorvastatin (LIPITOR) tablet 40 mg, 40 mg, Oral, QHS, Maury Troy PA-C, 40 mg at 07/12/19 2116   clopidogrel (PLAVIX) tablet 75 mg, 75 mg, Oral, DAILY, Lita Rodas PA-C, 75 mg at 07/13/19 2749   morphine injection 2-3 mg, 2-3 mg, IntraVENous, Q4H PRN, Bernardo Bergman MD, 3 mg at 07/13/19 1242 Objective:  
 
Visit Vitals /76 (BP 1 Location: Left arm) Pulse 92 Temp 98.1 °F (36.7 °C) Resp 18 Wt 59 kg (130 lb) SpO2 94% BMI 17.63 kg/m² Intake/Output Summary (Last 24 hours) at 7/13/2019 1522 Last data filed at 7/13/2019 9228 Gross per 24 hour Intake 2940 ml Output  Net 2940 ml Physical Exam:  
 
  
General: NAD, alert and oriented. Neck: No jvd. LUNGS: diffusely diminished air entry, bl exp rhonchi. No crackles. CVS EXM: S1, S2  RRR. Abdomen: soft, non tender. Lower Extremities:  no edema. Lt foot dressed. Data Review: 
 
Recent Labs  
  07/13/19 
0350 WBC 15.5*  
RBC 2.57* HCT 22.5*  
 MCV 87.5 MCH 29.2 MCHC 33.3 RDW 15.5* Recent Labs  
  07/13/19 
1045 07/13/19 
0350 07/12/19 1210 07/12/19 
0725 07/11/19 2000 07/11/19 
1240 07/11/19 
0230 BUN  --  37*  --   --   --   --  16  
CREA  --  6.70*  --   --  3.45*  --  2.35* CA  --  7.5*  --   --   --   --  7.5* ALB  --  1.9*  --   --   --   --   --   
K  --  4.9  --   --   --   --  4.3 * 124* 127* 126* 121* 124* 121* CL  --  95*  --   --   --   --  91* CO2  --  14*  --   --   --   --  20* PHOS  --  8.5*  --   --   --   --   --   
GLU  --  77  --   --   --   --  Lissy Coelho MD

## 2019-07-13 NOTE — ROUTINE PROCESS
Patient received in bed awake. Patient alert and oriented X4, denies pain and discomfort. Patient resting quietly. Frequent use items within reach. Bed locked in low position. Call bell within reach and patient verbalized understanding of use for assistance and needs. Dual skin assessment conducted with Denae HORNE. Patient has gangrene to his left great toe and 3rd toe, wound on the heel, and wound on the left shin.

## 2019-07-13 NOTE — PROGRESS NOTES
Progress Note Assessment:  
 
 
Patient Active Problem List  
Diagnosis Code  Gangrene of foot ([facility] I96  
 Cellulitis of left lower extremity L03. 80  
 Malnourished ([facility] E46  Peripheral vascular disease (HCC) I73.9  Hyponatremia E87.1  Lactic acidemia E87.2  [place] (acute kidney injury) ([facility] N17.9  Dehydration E86.0  Sepsis ([facility] A41.9  [facility] ([facility] [id number] Plan:  
 
Discussed dx and tx options at length with patient Continue local wound care until the determination for AKA Maintain Betadine to toes and Aquacel Ag to heel daily- NO dressing to toes please Subjective:  
 
Patient NAD but moderate ischemic pain to the left entire lower limb. States in just hurts everywhere not just at the gangrenous parts. Complains of no other acute issues. Allergies Allergen Reactions  Pcn [Penicillins] Unknown (comments) Patients states that he was allergic to PCN as a child because he did not like receiving PCN injections, not because he developed a reaction to PCN administration Current Facility-Administered Medications Medication Dose Route Frequency  [START ON 7/14/2019] VANCOMYCIN INFORMATION NOTE   Other ONCE  carvedilol (COREG) tablet 6.25 mg  6.25 mg Oral BID WITH MEALS  
 heparin (porcine) injection 5,000 Units  5,000 Units SubCUTAneous Q8H  
 VANCOMYCIN INFORMATION NOTE 1 Each  1 Each Other Rx Dosing/Monitoring  therapeutic multivitamin (THERAGRAN) tablet 1 Tab  1 Tab Oral DAILY  cefTRIAXone (ROCEPHIN) 1 g in 0.9% sodium chloride (MBP/ADV) 50 mL MBP  1 g IntraVENous Q24H  
 sodium chloride (NS) flush 5-10 mL  5-10 mL IntraVENous PRN  
 0.9% sodium chloride infusion  75 mL/hr IntraVENous CONTINUOUS  
 acetaminophen (TYLENOL) tablet 650 mg  650 mg Oral Q4H PRN  
 HYDROcodone-acetaminophen (NORCO) 5-325 mg per tablet 1 Tab  1 Tab Oral Q4H PRN  
 ondansetron (ZOFRAN) injection 4 mg  4 mg IntraVENous Q4H PRN  
  atorvastatin (LIPITOR) tablet 40 mg  40 mg Oral QHS  clopidogrel (PLAVIX) tablet 75 mg  75 mg Oral DAILY  morphine injection 2-3 mg  2-3 mg IntraVENous Q4H PRN Past Medical History:  
Diagnosis Date  Depression  Emphysema (subcutaneous) (surgical) resulting from a procedure  Hypertension  Hyponatremia  Non-small cell lung cancer ([facility] 2018  
 rt upper lung  Seizure ([facility]  [facility] History reviewed. No pertinent surgical history. History reviewed. No pertinent family history. Social History Socioeconomic History  Marital status:  Spouse name: Not on file  Number of children: Not on file  Years of education: Not on file  Highest education level: Not on file Occupational History  Not on file Social Needs  Financial resource strain: Not on file  Food insecurity:  
  Worry: Not on file Inability: Not on file  Transportation needs:  
  Medical: Not on file Non-medical: Not on file Tobacco Use  Smoking status: Current Every Day Smoker  Smokeless tobacco: Never Used Substance and Sexual Activity  Alcohol use: Yes  Drug use: No  
 Sexual activity: Not on file Lifestyle  Physical activity:  
  Days per week: Not on file Minutes per session: Not on file  Stress: Not on file Relationships  Social connections:  
  Talks on phone: Not on file Gets together: Not on file Attends Church service: Not on file Active member of club or organization: Not on file Attends meetings of clubs or organizations: Not on file Relationship status: Not on file  Intimate partner violence:  
  Fear of current or ex partner: Not on file Emotionally abused: Not on file Physically abused: Not on file Forced sexual activity: Not on file Other Topics Concern  Not on file Social History Narrative  Not on file Physical Exam: 
 
 
Vitals: 07/12/19 2030 07/12/19 2349 07/13/19 0444 07/13/19 0720 BP: 145/83 147/87 (!) 161/95 (!) 151/92 Pulse: (!) 103 (!) 101 97 92 Resp: 18 18 20 16 Temp: 98.4 °F (36.9 °C) 98.8 °F (37.1 °C) 98.3 °F (36.8 °C) 98.2 °F (36.8 °C) SpO2: 92% 92% 96% 95% Weight: OBJECTIVE: 
 
 REVIEW OF SYSTEMS: 
General: denies chronic fatigue, weight loss, fever, anemia, bruising, depression, nervousness, panic attacks HEENT: denies ringing in ears, ear infections, dizzy spells, poor vision, glaucoma, sinus trouble, hoarseness, eye infections GI: denies diarrhea, gas, bloating, heartburn, regurgitation, difficulty swallowing, painful swallowing, nausea, vomiting, constipation, abdominal pain, decreased appetite, blood in stools, black stools, jaundice, dark urine Lungs: denies pneumonia, asthma, cough, SOB, hemoptysis Heart: denies chest pain, irregular heart beat, ankle swelling, 
Skin: denies rashes, hives, allergic reaction Urinary: denies UTI, kidney stones, decreased urine force and flow, urination at night, blood in urine, painful urination Bones and Joints: denies arthritis, rheumatism, left foot pain, gout, osteoporosis Neurologic: denies stroke, seizures, headaches, numbness, tingling 
  
Physical Exam:   
Mr Elvia Madden 38 P. o. male who is pleasant, alert and oriented x3, in no apparent distress. . Patient is well-developed and nourished, with good attention to hygiene and body habitus. Mood and affect normal, appropriate to situation.  
  
Left foot: Gangrene distal 1/3 of 3rd toe and hallux dorsally encompassing entire nail bed and periungual area. Heel with full thickness wound and no changes. No probing to bone.   NO odor or drainage to any area. 
  
Vascular Exam:  
Dorsalis Pedis  and Posterior Tibial non palpable with mild edema of left foot  (no pulses audible with doppler) Skin temp warm to cool. Pedal hair is absent.   
  
Neurological Exam: Light touch protective sensation is diminshed to both feet. There is noted Loss of protective sensation. 
  
Musculoskeletal Exam:  
Muscle tone is normal for age and situation. There is equinus of the left limb. The muscle strength is 5/5 for the flexors, extensors, inverters, and everter's. 
  
Dermatological Exam:  
Skin is of abnormal texture and turgor with some atrophic skin changes noting decreased hair growth, nail changes (thickening), pigmentary changes, skin texture (thin,shiney), skin color (rubor, red) . R/L Bilateral. There is diffuse xerosis. There is noted subungual debris. 
  
 
 
 
 
 
 
Recent Results (from the past 24 hour(s)) GLUCOSE, POC Collection Time: 07/12/19 11:50 AM  
Result Value Ref Range Glucose (POC) 110 70 - 110 mg/dL Marco Lexx Collection Time: 07/12/19 12:10 PM  
Result Value Ref Range Vancomycin,trough 23.8 (HH) 10.0 - 20.0 ug/mL SODIUM Collection Time: 07/12/19 12:10 PM  
Result Value Ref Range Sodium 127 (L) 136 - 145 mmol/L  
URINALYSIS W/ RFLX MICROSCOPIC Collection Time: 07/12/19  1:05 PM  
Result Value Ref Range Color DARK YELLOW Appearance CLOUDY Specific gravity 1.015 1.005 - 1.030    
 pH (UA) 5.5 5.0 - 8.0 Protein 30 (A) NEG mg/dL Glucose NEGATIVE  NEG mg/dL Ketone NEGATIVE  NEG mg/dL Bilirubin NEGATIVE  NEG Blood NEGATIVE  NEG Urobilinogen 1.0 0.2 - 1.0 EU/dL Nitrites NEGATIVE  NEG Leukocyte Esterase NEGATIVE  NEG    
SODIUM, UR, RANDOM Collection Time: 07/12/19  1:05 PM  
Result Value Ref Range Sodium,urine random 34 20 - 110 MMOL/L  
OSMOLALITY, UR Collection Time: 07/12/19  1:05 PM  
Result Value Ref Range Osmolality,urine 263 50 - 1,400 MOSM/kg H2O  
CREATININE, UR, RANDOM Collection Time: 07/12/19  1:05 PM  
Result Value Ref Range Creatinine, urine 67.20 30 - 125 mg/dL URINE MICROSCOPIC ONLY Collection Time: 07/12/19  1:05 PM  
Result Value Ref Range WBC 2 to 4 0 - 4 /hpf  
 RBC 0 to 1 0 - 5 /hpf Epithelial cells 1+ 0 - 5 /lpf Bacteria NEGATIVE  NEG /hpf  
GLUCOSE, POC Collection Time: 07/12/19  4:35 PM  
Result Value Ref Range Glucose (POC) 96 70 - 110 mg/dL CBC WITH AUTOMATED DIFF Collection Time: 07/13/19  3:50 AM  
Result Value Ref Range WBC 15.5 (H) 4.6 - 13.2 K/uL  
 RBC 2.57 (L) 4.70 - 5.50 M/uL HGB 7.5 (L) 13.0 - 16.0 g/dL HCT 22.5 (L) 36.0 - 48.0 % MCV 87.5 74.0 - 97.0 FL  
 MCH 29.2 24.0 - 34.0 PG  
 MCHC 33.3 31.0 - 37.0 g/dL  
 RDW 15.5 (H) 11.6 - 14.5 % PLATELET 581 (H) 422 - 420 K/uL MPV 7.4 (L) 9.2 - 11.8 FL  
 NEUTROPHILS 82 (H) 40 - 73 % LYMPHOCYTES 10 (L) 21 - 52 % MONOCYTES 8 3 - 10 % EOSINOPHILS 0 0 - 5 % BASOPHILS 0 0 - 2 %  
 ABS. NEUTROPHILS 12.7 (H) 1.8 - 8.0 K/UL  
 ABS. LYMPHOCYTES 1.6 0.9 - 3.6 K/UL  
 ABS. MONOCYTES 1.2 0.05 - 1.2 K/UL  
 ABS. EOSINOPHILS 0.0 0.0 - 0.4 K/UL  
 ABS. BASOPHILS 0.0 0.0 - 0.1 K/UL  
 DF AUTOMATED RENAL FUNCTION PANEL Collection Time: 07/13/19  3:50 AM  
Result Value Ref Range Sodium 124 (L) 136 - 145 mmol/L Potassium 4.9 3.5 - 5.5 mmol/L Chloride 95 (L) 100 - 108 mmol/L  
 CO2 14 (L) 21 - 32 mmol/L Anion gap 15 3.0 - 18 mmol/L Glucose 77 74 - 99 mg/dL BUN 37 (H) 7.0 - 18 MG/DL Creatinine 6.70 (H) 0.6 - 1.3 MG/DL  
 BUN/Creatinine ratio 6 (L) 12 - 20 GFR est AA 10 (L) >60 ml/min/1.73m2 GFR est non-AA 8 (L) >60 ml/min/1.73m2 Calcium 7.5 (L) 8.5 - 10.1 MG/DL Phosphorus 8.5 (H) 2.5 - 4.9 MG/DL Albumin 1.9 (L) 3.4 - 5.0 g/dL [facility] Medina, Sampson OLIVER [facility] [id number] [facility] [phone]  
7/13/2019,

## 2019-07-13 NOTE — ROUTINE PROCESS
Bedside and Verbal shift change report given to Denae HORNE (oncoming nurse) by Chanel Serna RN 
 (offgoing nurse). Report included the following information SBAR, Kardex, MAR and Recent Results.

## 2019-07-13 NOTE — ROUTINE PROCESS
2000: Assumed care. Quietly resting in bed. Side rails up x 3 & padded. Denies any pain or discomfort at this time. Call light within reach. 2116: Due meds given. Medicated for pain per patient request. 
 
2214: Due med given. 2349: No change from previous assessment. 0200: Sleeping. 0444: No change from previous assessment. No urine output yet. Bladder scan done. There was 186 ml. 
 
0455: Medicated for pain per patient request. Due med given. 2200: Slept on & off thru night. Needs attended. 0710:Bedside and Verbal shift change report given to [address] [place] [address] (oncoming nurse) by me (offgoing nurse). Report included the following information SBAR, Kardex, Intake/Output, MAR, Recent Results and Cardiac Rhythm SR/ST.

## 2019-07-13 NOTE — PROGRESS NOTES
Internal Medicine Progress Note Patient's Name: Keyon Vargas Admit Date: 7/10/2019 Length of Stay: 3 Assessment/Plan Principal Problem: 
  Sepsis ([facility] (7/10/2019) Active Problems: 
  Peripheral vascular disease ([address] (6/29/2019) Gangrene ([address] (7/10/2019) Acute kidney injury superimposed on chronic kidney disease ([address] (7/10/2019) Malnourished ([address] (6/29/2019) Hyponatremia (6/29/2019) Dehydration (7/10/2019) Sepsis 
- gangrenous first and third toe - Recent wound culture from previous admission susceptible to rocephin and vanc, continue abx, renally dose 
- repeat wound culture  
  
Gangrene/PVD 
- Recent admission, discharged on PO oral abx, was expected to f/u with vasc for multilevel revascularization but was unable to get to f/u due to ride issues and extent of pain - Vasc consulted - appreciate - podiatry consulted - appreciate - open dry wound anterior, lateral LLE proximal tibia - Betadine to toes and Aquacel Ag to heel daily per podiatry - patient once again refused to complete MRI 
 
 [place] 
- creatinine significantly increased today 
- Renal Dose medications - Avoid nephrotoxins 
- nephrology following Malnourished - Nutritional supplements with all meals Hyponatremia 
- acute on chronic 
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 
- poor historian and his is unsure if he has been taking the lasix at home - Nephrology consult - appreciate 
- gentle IVF 
- improving 
 
- Cont acceptable home medications for chronic conditions  
- DVT protocol I have personally reviewed all pertinent labs and films that have officially resulted over the last 24 hours. I have personally checked for all pending labs that are awaiting final results. Interval History Karri Coker is a 62 y.o. male with a PMHx listed below of who presented to the ED with complaints of worsening LLE pain. Patient was admitted on 6/30/19 and discharged three days ago for gangrenous toes. Vascular surgery and podiatry were consulted then and he was recommended for multilevel revascularization, he was to follow up as out patient and said that his son could transport him. Per patient now, son was unable to transport patient to f/u appointment, so he returned due to persistent symptoms.   
  
In the ED vascular surgery consulted, podiatry consulted. He presents with worsening creatinine and sodium. I spoke with Nephrology and they recommend fluids. His only complaint is pain, no fevers, chills, weakness, AMS. Sodium 121, will be admitted for further eval. He doesn't know if he has been taking his lasix, but says hes been taking his abx\" Per podiatry - no need for urgent amputation, wait until revascularization. Nephrology consulted - Acute on chronic hyponatremia improved with gentle IVF. Oncology consulted - bone scan ordered to complete restaging - No definite sonographic evidence of osseous metastatic disease. Patient continues to refuse to complete MRI. 
  
Subjective Pt s/e @ bedside. No major events overnight. Patient c/o LLE pain. No cp, sob, abd pain, nvd. Objective Visit Vitals BP (!) 151/92 (BP 1 Location: Left arm, BP Patient Position: At rest) Pulse 92 Temp 98.2 °F (36.8 °C) Resp 16 Wt 59 kg (130 lb) SpO2 95% BMI 17.63 kg/m² Physical Exam: 
General Appearance: NAD, conversant HENT: normocephalic/atraumatic, moist mucus membranes Neck: No JVD, supple Lungs: CTA with normal respiratory effort CV: RRR, no m/r/g Abdomen: soft, non-tender, normal bowel sounds Extremities: no cyanosis, no peripheral edema, BLE are atrophic, no hair growth, LLE has necrotic skin over anterior tibia, L great toe is erythematous and edematous, L 3rd toe is gangrenous Neuro: No new focal deficits, diminished sensation entire left foot Intake and Output: 
Current Shift:  No intake/output data recorded. Last three shifts:  07/11 1901 - 07/13 0700 In: 3593.8 [P.O.:650; I.V.:2943.8] Out: -  
 
Lab/Data Reviewed: 
BMP:  
Lab Results Component Value Date/Time  (L) 07/13/2019 03:50 AM  
 K 4.9 07/13/2019 03:50 AM  
 CL 95 (L) 07/13/2019 03:50 AM  
 CO2 14 (L) 07/13/2019 03:50 AM  
 AGAP 15 07/13/2019 03:50 AM  
 GLU 77 07/13/2019 03:50 AM  
 BUN 37 (H) 07/13/2019 03:50 AM  
 CREA 6.70 (H) 07/13/2019 03:50 AM  
 GFRAA 10 (L) 07/13/2019 03:50 AM  
 GFRNA 8 (L) 07/13/2019 03:50 AM  
 
CBC:  
Lab Results Component Value Date/Time WBC 15.5 (H) 07/13/2019 03:50 AM  
 HGB 7.5 (L) 07/13/2019 03:50 AM  
 HCT 22.5 (L) 07/13/2019 03:50 AM  
  (H) 07/13/2019 03:50 AM  
 
 
 
Imaging Reviewed: 
Nm Bone Scan Wh Body Result Date: 7/12/2019 WHOLE BODY BONE SCAN INDICATION: Lung cancer, non-small cell, staging exam COMPARISON: Prior CT angiogram with runoff of the lower extremities June 30, 2019 TECHNIQUE: Following intravenous administration of 42.0 mCi 99mTc-MDP, approximately three hour delayed whole body images were obtained in anterior and posterior projections. FINDINGS: Biodistribution of injected radiopharmaceutical appears as expected. Soft tissue infiltration adjacent to the injection site is noted. Likely degenerative uptake adjacent to the posterior left hand. Degenerative uptake noted involving the sacroiliac joints, hips, and knees. IMPRESSION: 1. No definite sonographic evidence of osseous metastatic disease. Medications Reviewed: 
Current Facility-Administered Medications Medication Dose Route Frequency  oxyCODONE-acetaminophen (PERCOCET) 5-325 mg per tablet 1-2 Tab  1-2 Tab Oral Q4H PRN  
 [START ON 7/14/2019] VANCOMYCIN INFORMATION NOTE   Other ONCE  carvedilol (COREG) tablet 6.25 mg  6.25 mg Oral BID WITH MEALS  
  heparin (porcine) injection 5,000 Units  5,000 Units SubCUTAneous Q8H  
 VANCOMYCIN INFORMATION NOTE 1 Each  1 Each Other Rx Dosing/Monitoring  therapeutic multivitamin (THERAGRAN) tablet 1 Tab  1 Tab Oral DAILY  cefTRIAXone (ROCEPHIN) 1 g in 0.9% sodium chloride (MBP/ADV) 50 mL MBP  1 g IntraVENous Q24H  
 sodium chloride (NS) flush 5-10 mL  5-10 mL IntraVENous PRN  
 0.9% sodium chloride infusion  75 mL/hr IntraVENous CONTINUOUS  
 acetaminophen (TYLENOL) tablet 650 mg  650 mg Oral Q4H PRN  
 ondansetron (ZOFRAN) injection 4 mg  4 mg IntraVENous Q4H PRN  
 atorvastatin (LIPITOR) tablet 40 mg  40 mg Oral QHS  clopidogrel (PLAVIX) tablet 75 mg  75 mg Oral DAILY  morphine injection 2-3 mg  2-3 mg IntraVENous Q4H PRN Heidi Multani PA-C [facility] Office:  [phone] Pager: [phone]

## 2019-07-14 NOTE — PROGRESS NOTES
Problem: Pain Goal: *Control of Pain Outcome: Progressing Towards Goal 
  
Problem: Falls - Risk of 
Goal: *Absence of Falls Description Document Diane Payment Fall Risk and appropriate interventions in the flowsheet.  
Outcome: Progressing Towards Goal

## 2019-07-14 NOTE — PROGRESS NOTES
Internal Medicine Progress Note Patient's Name: Keyon Vargas Admit Date: 7/10/2019 Length of Stay: 4 Assessment/Plan Principal Problem: 
  Sepsis ([address] (7/10/2019) Active Problems: 
  Peripheral vascular disease ([address] (6/29/2019) Gangrene ([address] (7/10/2019) Acute kidney injury superimposed on chronic kidney disease ([address] (7/10/2019) Malnourished ([address] (6/29/2019) Hyponatremia (6/29/2019) Dehydration (7/10/2019) Sepsis 
- gangrenous first and third toe - Recent wound culture from previous admission susceptible to rocephin and vanc, continue abx, renally dose 
- repeat wound culture - Stenotrophomonas maltophilia  
  
Gangrene/PVD 
- Recent admission, discharged on PO oral abx, was expected to f/u with vasc for multilevel revascularization but was unable to get to f/u due to ride issues and extent of pain - Vasc consulted - appreciate - podiatry consulted - appreciate - open dry wound anterior, lateral LLE proximal tibia - Betadine to toes and Aquacel Ag to heel daily per podiatry - patient once again refused to complete MRI 
 
 [place] 
- creatinine significantly increased over the past couple days 
- vancomycin toxicity? 
- nephrology following - recommendations per them Malnourished - Nutritional supplements with all meals Hyponatremia 
- acute on chronic 
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 
- poor historian and his is unsure if he has been taking the lasix at home - Nephrology consult - appreciate 
- gentle IVF 
- improving 
 
- Cont acceptable home medications for chronic conditions  
- DVT protocol I have personally reviewed all pertinent labs and films that have officially resulted over the last 24 hours. I have personally checked for all pending labs that are awaiting final results. Interval History Bonifacio Lowery is a 62 y.o. male with a PMHx listed below of who presented to the ED with complaints of worsening LLE pain. Patient was admitted on 6/30/19 and discharged three days ago for gangrenous toes. Vascular surgery and podiatry were consulted then and he was recommended for multilevel revascularization, he was to follow up as out patient and said that his son could transport him. Per patient now, son was unable to transport patient to f/u appointment, so he returned due to persistent symptoms.   
  
In the ED vascular surgery consulted, podiatry consulted. He presents with worsening creatinine and sodium. I spoke with Nephrology and they recommend fluids. His only complaint is pain, no fevers, chills, weakness, AMS. Sodium 121, will be admitted for further eval. He doesn't know if he has been taking his lasix, but says hes been taking his abx\" Per podiatry - no need for urgent amputation, wait until revascularization. Nephrology consulted - Acute on chronic hyponatremia improved with gentle IVF. Oncology consulted - bone scan ordered to complete restaging - No definite sonographic evidence of osseous metastatic disease. Repeat wound culture 7/11 - Stenotrophomonas maltophilia. Patient continues to refuse to complete MRI. PT/OT recs SNF.  
  
Subjective Pt s/e @ bedside. No major events overnight. Patient c/o LLE pain. No cp, sob, abd pain, nvd. Objective Visit Vitals /82 (BP 1 Location: Left arm, BP Patient Position: At rest) Pulse 90 Temp 98 °F (36.7 °C) Resp 16 Wt 59 kg (130 lb) SpO2 98% BMI 17.63 kg/m² Physical Exam: 
General Appearance: NAD, conversant HENT: normocephalic/atraumatic, moist mucus membranes Neck: No JVD, supple Lungs: CTA with normal respiratory effort CV: RRR, no m/r/g Abdomen: soft, non-tender, normal bowel sounds Extremities: no cyanosis, no peripheral edema, BLE are atrophic, no hair growth, LLE has necrotic skin over anterior tibia, L great toe is erythematous and edematous, L 3rd toe is gangrenous Neuro: No new focal deficits, diminished sensation entire left foot Intake and Output: 
Current Shift:  07/14 0701 - 07/14 1900 In: 300 [P.O.:300] Out: 0 Last three shifts:  07/12 1901 - 07/14 0700 In: 4930.5 [P.O.:2208; I.V.:2722.5] Out: 400 [Urine:400] Lab/Data Reviewed: 
BMP:  
Lab Results Component Value Date/Time  (L) 07/14/2019 11:29 AM  
 K 5.4 07/14/2019 05:04 AM  
 CL 96 (L) 07/14/2019 05:04 AM  
 CO2 14 (L) 07/14/2019 05:04 AM  
 AGAP 15 07/14/2019 05:04 AM  
 GLU 87 07/14/2019 05:04 AM  
 BUN 46 (H) 07/14/2019 05:04 AM  
 CREA 7.74 (H) 07/14/2019 05:04 AM  
 GFRAA 9 (L) 07/14/2019 05:04 AM  
 GFRNA 7 (L) 07/14/2019 05:04 AM  
 
CBC:  
Lab Results Component Value Date/Time WBC 16.8 (H) 07/14/2019 05:04 AM  
 HGB 7.1 (L) 07/14/2019 05:04 AM  
 HCT 21.3 (L) 07/14/2019 05:04 AM  
  (H) 07/14/2019 05:04 AM  
 
 
 
Imaging Reviewed: 
No results found. Medications Reviewed: 
Current Facility-Administered Medications Medication Dose Route Frequency  [START ON 7/15/2019] VANCOMYCIN INFORMATION NOTE   Other ONCE  
 oxyCODONE-acetaminophen (PERCOCET) 5-325 mg per tablet 1-2 Tab  1-2 Tab Oral Q4H PRN  
 carvedilol (COREG) tablet 6.25 mg  6.25 mg Oral BID WITH MEALS  
 heparin (porcine) injection 5,000 Units  5,000 Units SubCUTAneous Q8H  
 VANCOMYCIN INFORMATION NOTE 1 Each  1 Each Other Rx Dosing/Monitoring  therapeutic multivitamin (THERAGRAN) tablet 1 Tab  1 Tab Oral DAILY  cefTRIAXone (ROCEPHIN) 1 g in 0.9% sodium chloride (MBP/ADV) 50 mL MBP  1 g IntraVENous Q24H  
 sodium chloride (NS) flush 5-10 mL  5-10 mL IntraVENous PRN  
 0.9% sodium chloride infusion  75 mL/hr IntraVENous CONTINUOUS  
 acetaminophen (TYLENOL) tablet 650 mg  650 mg Oral Q4H PRN  
 ondansetron (ZOFRAN) injection 4 mg  4 mg IntraVENous Q4H PRN  
  atorvastatin (LIPITOR) tablet 40 mg  40 mg Oral QHS  clopidogrel (PLAVIX) tablet 75 mg  75 mg Oral DAILY  morphine injection 2-3 mg  2-3 mg IntraVENous Q4H PRN Glenny Dalal PA-C [phone] Office:  [phone] Pager: [phone]

## 2019-07-14 NOTE — PROGRESS NOTES
Problem: Mobility Impaired (Adult and Pediatric) Goal: *Acute Goals and Plan of Care (Insert Text) Description Physical Therapy Goals Initiated 7/14/2019 and to be accomplished within 7 day(s) 1. Patient will move from supine to sit and sit to supine , scoot up and down and roll side to side in bed with supervision/set-up. 2.  Patient will transfer from bed to chair and chair to bed with supervision/set-up using the least restrictive device. 3.  Patient will perform sit to stand with supervision/set-up. 4.  Patient will ambulate with supervision/set-up for 10 feet with the least restrictive device. Outcome: Progressing Towards Goal 
  
PHYSICAL THERAPY EVALUATION Patient: Millicent Lawson (89 y.o. male) Date: 7/14/2019 Primary Diagnosis: Sepsis ([facility] [A41.9] Hyponatremia [E87.1] Gangrene ([facility] [place] Hyponatremia [E87.1] Precautions: Fall, NWB(LLE) PLOF:  Patient unreliable and poor historian. Claims independent with ADLs and ambulatory with walking stick PTA. ASSESSMENT : 
Based on the objective data described below, the patient presents with c/o pain, impaired balance and transfers,  and inability to ambulate. Unable to maintain NWB Left LE. Will benefit from skilled PT intervention to increase overall functional mobility independence and safety. Patient placed on bedpan sitting edge of bed per patient request requiring mod/max Assist sit <> stand. Had small BM, assisted patient getting botttom cleaned and pads/diaper changed requiring SBA to roll left/right holding onto bed siderail. Nurse Dulce Maria Najera made aware. Patient will benefit from skilled intervention to address the above impairments. Patient's rehabilitation potential is considered to be Fair Factors which may influence rehabilitation potential include:  
? None noted ? Mental ability/status ? Medical condition ? Home/family situation and support systems ?         Safety awareness 
? Pain tolerance/management 
? Other:  
 
Recommendations for nursing: 
Written on communication board: sit edge of bed with assist of 1 Verbally communicated to: nurse Ford Care PLAN : 
Recommendations and Planned Interventions:  
?           Bed Mobility Training             ? Neuromuscular Re-Education ? Transfer Training                   ? Orthotic/Prosthetic Training 
? Gait Training                          ? Modalities ? Therapeutic Exercises           ? Edema Management/Control ? Therapeutic Activities            ? Family Training/Education ? Patient Education ? Other (comment): Plan of care, bed mobility, transfers - NWB Left LE - unable to maintain weight bearing precaution Frequency/Duration: Patient will be followed by physical therapy 1 time per day/3-5 days per week to address goals. Discharge Recommendations: Myles Johnson Further Equipment Recommendations for Discharge: rolling walker and wheelchair SUBJECTIVE:  
Patient stated ? I need to have a bowel movement. ? OBJECTIVE DATA SUMMARY:  
 
Past Medical History:  
Diagnosis Date Depression Emphysema (subcutaneous) (surgical) resulting from a procedure Hypertension Hyponatremia Non-small cell lung cancer ([facility] 2018  
 rt upper lung Seizure ([facility] Seizures ([facility] History reviewed. No pertinent surgical history. Barriers to Learning/Limitations: yes;  sensory deficits-vision/hearing/speech Compensate with: Verbal Cues and Tactile Cues Home Situation: 
Home Situation Home Environment: Private residence # Steps to Enter: 0 One/Two Story Residence: Two story Living Alone: No 
Support Systems: Child(horacio) Patient Expects to be Discharged to[de-identified] Unknown Current DME Used/Available at Home: Cane, straight(walking stick) Tub or Shower Type: Tub/Shower combination(no grab bars or shower chair) Critical Behavior: 
Neurologic State: Alert Orientation Level: Oriented to person;Oriented to place Cognition: Decreased attention/concentration; Follows commands; Impulsive;Poor safety awareness Safety/Judgement: Decreased awareness of need for safety;Decreased insight into deficits Psychosocial 
Patient Behaviors: Restless Purposeful Interaction: Yes 
Skin Condition/Temp: Dry;Cool Skin Integrity: Wound (add Wound LDA) Skin Integumentary Skin Color: Black(left great toe and 3rd toe) Skin Condition/Temp: Dry;Cool Skin Integrity: Wound (add Wound LDA) Turgor: Epidermis thin w/ loss of subcut tissue Hair Growth: Absent Varicosities: Absent Strength:   
Strength: Generally decreased, functional(Both LE) Range Of Motion: 
AROM: Generally decreased, functional(Left LE) Functional Mobility: 
Bed Mobility: 
Supine to Sit: Stand-by assistance; Additional time Sit to Supine: Stand-by assistance; Additional time Transfers: 
Sit to Stand: Moderate assistance;Maximum assistance Stand to Sit: Moderate assistance;Maximum assistance Balance:  
Sitting: Impaired Sitting - Static: Good (unsupported) Sitting - Dynamic: Fair (occasional) Standing: Impaired; With support Standing - Static: Poor(Plus) Standing - Dynamic : (Unable to assess) Ambulation/Gait Training: 
Gait Description (WDL): (Unable to assess at this time) Pain: 
Pain level pre-treatment: 10/10 Pain level post-treatment: 10/10 Nurse Norbert Gutierrez made aware of pain c/o Activity Tolerance: 
Fair Please refer to the flowsheet for vital signs taken during this treatment. After treatment:  
?         Patient left in no apparent distress sitting up in chair ? Patient left in no apparent distress in bed 
? Call bell left within reach ? Nursing notified ? Caregiver present ? Bed alarm activated ? SCDs applied COMMUNICATION/EDUCATION:  
?         Role of Physical Therapy in the acute care setting. ?         Fall prevention education was provided and the patient/caregiver indicated understanding. ? Patient/family have participated as able in goal setting and plan of care. ?         Patient/family agree to work toward stated goals and plan of care. ?         Patient understands intent and goals of therapy, but is neutral about his/her participation. ? Patient is unable to participate in goal setting/plan of care: ongoing with therapy staff. ?         Other: Thank you for this referral. 
Conception Norris, PT Time Calculation: 24 mins Eval Complexity: History: MEDIUM  Complexity : 1-2 comorbidities / personal factors will impact the outcome/ POC Exam:MEDIUM Complexity : 3 Standardized tests and measures addressing body structure, function, activity limitation and / or participation in recreation  Presentation: MEDIUM Complexity : Evolving with changing characteristics  Clinical Decision Making:Medium Complexity    Overall Complexity:MEDIUM

## 2019-07-14 NOTE — PROGRESS NOTES
Problem: Self Care Deficits Care Plan (Adult) Goal: *Acute Goals and Plan of Care (Insert Text) Description Occupational Therapy Goals Initiated 7/14/2019 within 7 day(s). 1.  Patient will perform grooming tasks at EOB with good dynamic sitting balance and minimal verbal cues for safety. 2.  Patient will perform lower body dressing with supervision/set-up and < 3 rest breaks. 3.  Patient will perform functional task in standing for 8 minutes with minimal assistance and > 75% compliance with NWB of LLE in prep for ADLs. 4.  Patient will perform toilet transfers with minimal assistance/contact guard assist. 
5.  Patient will perform all aspects of toileting with minimal assistance/contact guard assist. 
6.  Patient will participate in upper extremity therapeutic exercise/activities with supervision/set-up for 8 minutes to increase BUE strength for functional transfers and ADLs. 7.  Patient will utilize energy conservation techniques during functional activities with minimal verbal cues. Outcome: Progressing Towards Goal 
  
OCCUPATIONAL THERAPY EVALUATION Patient: Bryna Lefort (59 y.o. male) Date: 7/14/2019 Primary Diagnosis: Sepsis ([facility] [A41.9] Hyponatremia [E87.1] Gangrene ([facility] [facility] [place] Hyponatremia [E87.1] Precautions:  Fall, NWB(LLE) PLOF: Pt is unreliable and poor historian, but reports independence in ADLs & walking stick for functional mobility. ASSESSMENT : 
Based on the objective data described below, the patient presents with impairments with regard to bed mobility, functional transfers and independence in ADLs due to sepsis & gangrene of LLE. Per Ike Short RN, pt received pain meds before session. Pt reporting 10/10 pain, A&O x3, reoriented to situation & mccarthy catheter due to perseveration on needing to urinate. DIANDRA Iglesias present during portion of session. Pt poor historian with regard to PLOF.  Supervision for supine-->sit; supervision to don sock on RLE. Functional reaching at EOB with fair dynamic sitting balance with max vc's for safety due to impulsivity and decreased insights into deficits. 2 sit to stands with mod A in prep for BSC transfer; max cueing for NWB to LLE; poor standing balance and non compliant with NWB. Deferred further tx due to noncompliance, restlessness, poor safety awareness. Pt emotionally liable t/o session. Returned supine with needs within reach, bed alarm on. Recommend SNF Upon d/c. Patient will benefit from skilled intervention to address the above impairments. Patient's rehabilitation potential is considered to be Fair Factors which may influence rehabilitation potential include: ? None noted ? Mental ability/status ? Medical condition ? Home/family situation and support systems ? Safety awareness ? Pain tolerance/management ? Other: PLAN : 
Recommendations and Planned Interventions:  
?               Self Care Training                  ? Therapeutic Activities ? Functional Mobility Training   ? Cognitive Retraining 
? Therapeutic Exercises           ? Endurance Activities ? Balance Training                    ? Neuromuscular Re-Education ? Visual/Perceptual Training     ? Home Safety Training 
? Patient Education                   ? Family Training/Education ? Other (comment): Frequency/Duration: Patient will be followed by occupational therapy 3-5 times a week to address goals. Discharge Recommendations: Myles Johnson Further Equipment Recommendations for Discharge: bedside commode SUBJECTIVE:  
Patient stated ? I have to pee.? OBJECTIVE DATA SUMMARY:  
 
Past Medical History:  
Diagnosis Date Depression Emphysema (subcutaneous) (surgical) resulting from a procedure Hypertension Hyponatremia Non-small cell lung cancer ([facility] 2018  
 rt upper lung Seizure ([facility] History reviewed. No pertinent surgical history. Barriers to Learning/Limitations: yes;  altered mental status (i.e.Sedation, Confusion) Compensate with: visual, verbal, tactile, kinesthetic cues/model Home Situation:  
Home Situation Home Environment: Private residence # Steps to Enter: 0 One/Two Story Residence: Two story Living Alone: No 
Support Systems: Child(horacio) Patient Expects to be Discharged to[de-identified] Unknown Current DME Used/Available at Home: Cane, straight(walking stick) Tub or Shower Type: Tub/Shower combination(no grab bars or shower chair) ? Right hand dominant   ? Left hand dominant Cognitive/Behavioral Status: 
Neurologic State: Alert Orientation Level: Oriented to person;Oriented to place;Oriented to time Cognition: Decreased attention/concentration; Follows commands; Impulsive;Poor safety awareness Safety/Judgement: Decreased insight into deficits; Decreased awareness of need for safety Skin: Intact (BUEs) Edema: None noted (BUEs) Vision/Perceptual:   
Acuity: Within Defined Limits Coordination: BUE Coordination: Within functional limits Fine Motor Skills-Upper: Right Intact; Left Intact Gross Motor Skills-Upper: Right Intact; Left Intact Balance: 
Sitting: Impaired Sitting - Static: Good (unsupported) Sitting - Dynamic: Fair (occasional) Standing: Impaired; With support Standing - Static: Fair Standing - Dynamic : Poor Strength: BUE Strength: Within functional limits Range of Motion: BUE 
AROM: Within functional limits Functional Mobility and Transfers for ADLs: 
Bed Mobility: 
Supine to Sit: Supervision Sit to Supine: Supervision Transfers: 
Sit to Stand: Moderate assistance Stand to Sit: Moderate assistance Toilet Transfer : (not assessed; poor standing balance & noncompliant for NWB) ADL Assessment: Feeding: Modified independent Oral Facial Hygiene/Grooming: Setup;Supervision Bathing: Moderate assistance Upper Body Dressing: Setup;Supervision Lower Body Dressing: Moderate assistance Toileting: Maximum assistance; Total assistance(briefs & mccarthy catheter) Cognitive Retraining Safety/Judgement: Decreased insight into deficits; Decreased awareness of need for safety Therapeutic Activity: 
Functional reaching at EOB with fair dynamic sitting balance with max vc's for safety due to impulsivity and decreased insights into deficits. 2 sit to stands with mod A and max cueing for NWB to LLE; pt with poor standing balance and non compliant with NWB. Pain: 
Pain level pre-treatment: 10/10 (per Norbert Gutierrez RN, pt just received pain meds) Pain level post-treatment: 10/10 Pain Intervention(s): Medication (see MAR); Rest, Ice, Repositioning Activity Tolerance: Fair-/Poor+ Please refer to the flowsheet for vital signs taken during this treatment. After treatment:  
? Patient left in no apparent distress sitting up in chair ? Patient left in no apparent distress in bed 
? Call bell left within reach ? Nursing notified ? Caregiver present ? Bed alarm activated COMMUNICATION/EDUCATION:  
? Role of Occupational Therapy in the acute care setting 
? Home safety education was provided and the patient/caregiver indicated understanding. ? Patient/family have participated as able in goal setting and plan of care. ? Patient/family agree to work toward stated goals and plan of care. ? Patient understands intent and goals of therapy, but is neutral about his/her participation. ? Patient is unable to participate in goal setting and plan of care. Thank you for this referral. 
Cheyanne Guzman MS OTR/L Time Calculation: 20 mins Eval Complexity: History: MEDIUM Complexity : Expanded review of history including physical, cognitive and psychosocial  history ;  
 Examination: HIGH Complexity : 5 or more performance deficits relating to physical, cognitive , or psychosocial skils that result in activity limitations and / or participation restrictions; Decision Making:HIGH Complexity : Patient presents with comorbidities that affect occupational performance. Signifigant modification of tasks or assistance (eg, physical or verbal) with assessment (s) is necessary to enable patient to complete evaluation

## 2019-07-14 NOTE — PROGRESS NOTES
Patient received in bed awake. Patient alert and oriented X4, denies pain and discomfort. Patient resting quietly. Frequent use items within reach. Bed locked in low position. Call bell within reach and patient verbalized understanding of use for assistance and needs. Dual skin assessment conducted with Denae HORNE. See skin assessment from 7/13/19, no change. 1600:  Patient has a incontinent bowl episode and was told to not put his hands in the stool. Patient was also instructed to not pick the skin off of his left leg wounds, but patient persists. 1825:  Charge RN placed dressing on left foot do to toes bleeding from patient picking at the skin.

## 2019-07-14 NOTE — PROGRESS NOTES
Problem: Pain Goal: *Control of Pain Outcome: Progressing Towards Goal 
  
Problem: Patient Education: Go to Patient Education Activity Goal: Patient/Family Education Outcome: Progressing Towards Goal 
  
Problem: Falls - Risk of 
Goal: *Absence of Falls Description Document Herbie Grubbs Fall Risk and appropriate interventions in the flowsheet. Outcome: Progressing Towards Goal 
  
Problem: Patient Education: Go to Patient Education Activity Goal: Patient/Family Education Outcome: Progressing Towards Goal 
  
Problem: Impaired Skin Integrity/Pressure Injury Treatment Goal: *Improvement of Existing Pressure Injury Outcome: Progressing Towards Goal 
Goal: *Prevention of pressure injury Description Document Mikie Scale and appropriate interventions in the flowsheet. Outcome: Progressing Towards Goal 
  
Problem: Discharge Planning Goal: *Discharge to safe environment Outcome: Progressing Towards Goal 
  
Problem: Nutrition Deficit Goal: *Optimize nutritional status Outcome: Progressing Towards Goal

## 2019-07-14 NOTE — ROUTINE PROCESS
1910: Assumed care. Awake. HOB elevated. No sob on RA. Call light within reach. 1959: Medicated for pain per patient request. 
 
2119: Due meds given. 0036: Incontinence care provided. Patient accidentally pulled PIV line. No change from previous assessment. 0041: Medicated for pain per patient request. 
 
6058: No change from previous assessment. 
 
0609: Slept on & off thru night. Needs attended. Due med given. 0720: Bedside and Verbal shift change report given to [address] [place] [address] (oncoming nurse) by me (offgoing nurse). Report included the following information SBAR, Kardex, Intake/Output, MAR and Recent Results.

## 2019-07-15 NOTE — PROGRESS NOTES
Problem: Pain Goal: *Control of Pain Outcome: Progressing Towards Goal 
  
Problem: Patient Education: Go to Patient Education Activity Goal: Patient/Family Education Outcome: Progressing Towards Goal 
  
Problem: Falls - Risk of 
Goal: *Absence of Falls Description Document Vidhi Mancera Fall Risk and appropriate interventions in the flowsheet. Outcome: Progressing Towards Goal 
  
Problem: Patient Education: Go to Patient Education Activity Goal: Patient/Family Education Outcome: Progressing Towards Goal 
  
Problem: Impaired Skin Integrity/Pressure Injury Treatment Goal: *Improvement of Existing Pressure Injury Outcome: Progressing Towards Goal 
Goal: *Prevention of pressure injury Description Document Mikie Scale and appropriate interventions in the flowsheet. Outcome: Progressing Towards Goal 
  
Problem: Patient Education: Go to Patient Education Activity Goal: Patient/Family Education Outcome: Progressing Towards Goal 
  
Problem: Patient Education: Go to Patient Education Activity Goal: Patient/Family Education Outcome: Progressing Towards Goal 
  
Problem: Pressure Injury - Risk of 
Goal: *Prevention of pressure injury Description Document Mikie Scale and appropriate interventions in the flowsheet. Outcome: Progressing Towards Goal 
  
Problem: Patient Education: Go to Patient Education Activity Goal: Patient/Family Education Outcome: Progressing Towards Goal 
  
Problem: Discharge Planning Goal: *Discharge to safe environment Outcome: Progressing Towards Goal 
  
Problem: Nutrition Deficit Goal: *Optimize nutritional status Outcome: Progressing Towards Goal

## 2019-07-15 NOTE — PROGRESS NOTES
In Patient Progress note Admit Date: 7/10/2019 Impression:  
 
1 oliguric acute kidney injury: Likely secondary Ischaemic ATN d/t poor intake v/s  Vanc toxicity Renal US with no hydro seen. Urine sodium low indicative of prerenal state initially but may have progressed to ATN 
2 Acute on chronic hyponatremia, chronic.  
3 concern for sepsis secondary to dry gangrene and foot infection on Vanco and Rocephin. Follow Vanco levels closely 4 gangrene/peripheral vascular disease 5 NG Metabolic acidoses 6 hyperkalemia Patient's renal parameters have worsened significantly. There is no urine output recorded since yesterday and per patient he has not voided since then. His creatinine is up to7 range. Also his bicarb is down to 14 . His volume status looks dry. Adding bicarb drip to provide volume and correct acidoses.  
 
   
Plan: 
1 continue hydration with bicarb drip 2 sodium every 6 hours to be checked 3 BMP tonight 4 mccarthy in place , struict I/o 
5 no acute indication for RRT , follow closely 
  
Please call with questions 
  
Yulia Chavez MD FASN Cell [phone] Pager: [phone] Subjective:  
 
lower ext pain Denies SOB/CP Current Facility-Administered Medications:  
  [START ON 7/15/2019] VANCOMYCIN INFORMATION NOTE, , Other, ONCE, Shira Westbrook DO 
  sodium bicarbonate (8.4%) 75 mEq in 0.45% sodium chloride 1,000 mL infusion, , IntraVENous, CONTINUOUS, Bichu, Jatin MCCLELLAND MD, Last Rate: 100 mL/hr at 07/14/19 1902 
  oxyCODONE-acetaminophen (PERCOCET) 5-325 mg per tablet 1-2 Tab, 1-2 Tab, Oral, Q4H PRN, Shira Melendezberry, DO, 2 Tab at 07/14/19 1523   carvedilol (COREG) tablet 6.25 mg, 6.25 mg, Oral, BID WITH MEALS, Barrett Lerma MD, 6.25 mg at 07/14/19 1731   heparin (porcine) injection 5,000 Units, 5,000 Units, SubCUTAneous, Q8H, Shira Westbrook DO, 5,000 Units at 07/14/19 2208   VANCOMYCIN INFORMATION NOTE 1 Each, 1 Each, Other, Rx Dosing/Monitoring, Nancy Troy PA-C 
  therapeutic multivitamin [facility]) tablet 1 Tab, 1 Tab, Oral, DAILY, Dianne Carrillo, DO, 1 Tab at 07/14/19 6825   cefTRIAXone (ROCEPHIN) 1 g in 0.9% sodium chloride (MBP/ADV) 50 mL MBP, 1 g, IntraVENous, Q24H, Maury Troy PA-C, Last Rate: 100 mL/hr at 07/14/19 1429, 1 g at 07/14/19 1429 
  sodium chloride (NS) flush 5-10 mL, 5-10 mL, IntraVENous, PRN, DIANDRA Arceo-C, 10 mL at 07/13/19 3347   acetaminophen (TYLENOL) tablet 650 mg, 650 mg, Oral, Q4H PRN, DIANDRA Arceo-C, 650 mg at 07/11/19 2243   ondansetron (ZOFRAN) injection 4 mg, 4 mg, IntraVENous, Q4H PRN, Maury Troy PA-C 
  clopidogrel (PLAVIX) tablet 75 mg, 75 mg, Oral, DAILY, Doir Carvalho PA-C, 75 mg at 07/14/19 1760   morphine injection 2-3 mg, 2-3 mg, IntraVENous, Q4H PRN, Stas Simpson MD, 2 mg at 07/13/19 1258 Objective:  
 
Visit Vitals /78 (BP 1 Location: Right arm, BP Patient Position: At rest) Pulse 96 Temp 97.7 °F (36.5 °C) Resp 16 Wt 59 kg (130 lb) SpO2 97% BMI 17.63 kg/m² Intake/Output Summary (Last 24 hours) at 7/14/2019 2230 Last data filed at 7/14/2019 1902 Gross per 24 hour Intake 2383.25 ml Output 450 ml Net 1933.25 ml Physical Exam:  
 
  
General: NAD, alert and oriented. Neck: No jvd. LUNGS: diffusely diminished air entry, bl exp rhonchi. No crackles. CVS EXM: S1, S2  RRR. Abdomen: soft, non tender. Lower Extremities:  no edema. Lt foot dressed. Data Review: 
 
Recent Labs  
  07/14/19 
6093 WBC 16.8*  
RBC 2.46* HCT 21.3*  
MCV 86.6 MCH 28.9 MCHC 33.3 RDW 16.0* Recent Labs  
  07/14/19 
1129 07/14/19 
0504 07/13/19 
1045 07/13/19 
0350 07/12/19 [phone] 07/12/19 
0725 BUN  --  46* 40* 37*  --   --   
CREA  --  7.74* 7.01* 6.70*  --   --   
CA  --  7.6* 7.7* 7.5*  --   --   
ALB  --  2.0*  --  1.9*  --   --   
K  --  5.4 5.0 4.9  --   --   
 * 125* 124*  127* 124* 127* 126* CL  --  96* 96* 95*  --   --   
CO2  --  14* 15* 14*  --   --   
PHOS  --  9.3*  --  8.5*  --   --   
GLU  --  87 100* 77  --   --   
 
 
James Horne MD

## 2019-07-15 NOTE — PROGRESS NOTES
Hematology/Oncology: Yamil Hernández Mr. Bro Zayas was seen in follow up after getting repeat CT of Chest and recently completed bone scan to help w/ restaging. He's currently confused, unable to lie still for MRI of brain, but had no focal motor changes when seen last week and while he's currently altered, he has no new focal motor changes to suggest occult CNS mets exist.  He has a long standing history of alcohol and tobacco use that undoubtedly are contributing to not only known malignancy but also micro & macrovascular disease affecting his LLE as well as potentially his current mental status. He apparently is planned to go to OR on Wed to address large wound in LLE and dry gangrene on toes w/ Left AKA. He remains very weak and much more debilitated vs prior hospital visit 4 days ago. Sclera discolored but anicteric, OP w/o ulcers, NECK w/o masses or JVD, COR reg w/o S3, LUNGS w/ poor insp effort, no rales, ABD soft, quiet, no SMG, no ascites, EXT as above IMPRESSION: 
1.  Stage III NSCLC, s/p chemoRx/XRT, completed in Dec 2018 w/ prior restaging CTs in March 2019 w/ only expected/XRT related changes, now admitted w/ presumed sepsis, symptomatic LLE ischemia and hyponatremia. His restaging bone scan (7/12/19) and CT of chest  (7/15/19) have no evidence of metastatic disease. He appears to be in a clinical CR/VGPR wrt his NSCLC. RECOMMENDATIONS: 
1.  CT of chest (C-) independently reviewed, he is certainly cleared from medical oncology viewpoint for left AKA which seems necessary to help reverse current metabolic complications of his vascular insufficiency. 2.  Would still get MRI of brain once stable for completeness, but given prior exam and setting, I would not wait on this to proceed with surgery. 3.  No further chemoRx is planned at this time, but I will follow peripherally while admitted. Appreciate care of all involved. TT 25min evaluation and coordination of care, >90% at bedside.

## 2019-07-15 NOTE — PROGRESS NOTES
0800  Assumed care of pt from COLEEN Farias. Awake, alert, restless; Kali. Safety sitter at bedside. 0930  Administering a.m. Medications. Pt sl agitated, difficulty taking carvedilol, vitamin and plavix, no difficulty taking percocet. Phone call from brother taken, conversing in normal fashion with brother. Dangling at bedside, moans, spasmodic movements. Breath sounds crackles posteriorly 1500  Dr. Patience Anguiano at bedside. Left leg examined, treated and redressed. Plans to phone pt's son and discuss surgical options. 1700  Unable to tolerate po pain medication, vomited up Percocet. Dr. Johana Kussmaul here, orders to increase morphine dose.

## 2019-07-15 NOTE — PROGRESS NOTES
Post Fall Documentation Daivd Asa witnessed/unwitnessed fall occurred on 7/15/19 (Date) at 18 (Time). Bed Alarm on, gripper socks on, siderails x3, bed in lowest resting position, door open. The answers to the following questions summarize the fall: In the patient's own words,: 
· What were you attempting to do when you fell? Going to bathroom. · Do you know why you fell? \"Its slippery on the floor\" · Do you have any pain/discomfort or any other complaints? No 
· Which part of your body made contact with the floor or other object? Buttocks Nurse: 
? Was this an assisted fall? no 
? Was fall witnessed? No 
? If witnessed, what part of the body made contact with the floor or other object?  n/a 
? Patients mental status after the fall/when found: Alert and oriented ? Any apparent injury:  No apparent injury ? Immediate interventions for injury/suspected injury? No interventions needed ? Patient assisted back to bed? Assist X2 
? Name of provider notified and time, any comments? Dr. Ming Mccormick    
? Name of family member notified and time: Maddie Gilmore. Son. 8200. Per son \" will come and see dad on his Lunch break\". Immediate VS and physical assessment documented in flow sheets. Neuro assessment every hour x 4 (for potential head injury or unwitnessed fall) documented in flow sheets.  
 
 
Casie Verduzco RN

## 2019-07-15 NOTE — PROGRESS NOTES
Internal Medicine Progress [facility]: [facility] Length of Stay: 5 Assessment/Plan Principal Problem: 
  Sepsis ([facility] [address] (7/10/2019) Active Problems: 
  Peripheral vascular disease ([address] (6/29/2019) Gangrene ([facility] [address] (7/10/2019) Acute kidney injury superimposed on chronic kidney disease ([facility] [address] (7/10/2019) Malnourished ([address] (6/29/2019) Hyponatremia (6/29/2019) Dehydration (7/10/2019) Sepsis 
- gangrenous first and third toe - Recent wound culture from previous admission susceptible to rocephin and vanc, continue abx, renally dose 
- repeat wound culture - Stenotrophomonas maltophilia  
  
Gangrene/PVD 
- Recent admission, discharged on PO oral abx, was expected to f/u with vasc for multilevel revascularization but was unable to get to f/u due to ride issues and extent of pain - Vasc consulted - appreciate - podiatry consulted - appreciate - open dry wound anterior, lateral LLE proximal tibia - Betadine to toes and Aquacel Ag to heel daily per podiatry - patient once again refused to complete MRI 
- plan for amputation mid week per VS 
 
 [place] 
- creatinine increasing 
- strict I&Os, mccarthy 
- bicarb gtt 
- nephrology following - recommendations per them Malnourished - Nutritional supplements with all meals Hyponatremia 
- acute on chronic 
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 
- poor historian and his is unsure if he has been taking the lasix at home - Nephrology consult - appreciate 
- gentle IVF 
- improving 
 
- Cont acceptable home medications for chronic conditions  
- DVT protocol I have personally reviewed all pertinent labs and films that have officially resulted over the last 24 hours. I have personally checked for all pending labs that are awaiting final results. Interval History Kel Long is a 62 y.o. male with a PMHx listed below of who presented to the ED with complaints of worsening LLE pain. Patient was admitted on 6/30/19 and discharged three days ago for gangrenous toes. Vascular surgery and podiatry were consulted then and he was recommended for multilevel revascularization, he was to follow up as out patient and said that his son could transport him. Per patient now, son was unable to transport patient to f/u appointment, so he returned due to persistent symptoms.   
  
In the ED vascular surgery consulted, podiatry consulted. He presents with worsening creatinine and sodium. I spoke with Nephrology and they recommend fluids. His only complaint is pain, no fevers, chills, weakness, AMS. Sodium 121, will be admitted for further eval. He doesn't know if he has been taking his lasix, but says hes been taking his abx\" Per podiatry - no need for urgent amputation, wait until revascularization. Nephrology consulted - Acute on chronic hyponatremia improved with gentle IVF. Oncology consulted - bone scan ordered to complete restaging - No definite sonographic evidence of osseous metastatic disease. Repeat wound culture 7/11 - Stenotrophomonas maltophilia. Patient continues to refuse to complete MRI. PT/OT recs SNF. Amputation planned for mid week. 
  
Subjective Pt s/e @ bedside. No major events overnight. Patient c/o LLE pain. No cp, sob, abd pain, nvd. Objective Visit Vitals /69 Pulse 96 Temp 98.4 °F (36.9 °C) Resp 20 Wt 58.8 kg (129 lb 10.1 oz) SpO2 100% BMI 17.58 kg/m² Physical Exam: 
General Appearance: NAD, conversant HENT: normocephalic/atraumatic, moist mucus membranes Neck: No JVD, supple Lungs: CTA with normal respiratory effort CV: RRR, no m/r/g Abdomen: soft, non-tender, normal bowel sounds Extremities: no cyanosis, no peripheral edema, BLE are atrophic, no hair growth, LLE has necrotic skin over anterior tibia, L great toe is erythematous and edematous, L 3rd toe is gangrenous Neuro: No new focal deficits, diminished sensation entire left foot Intake and Output: 
Current Shift:  07/15 0701 - 07/15 1900 In: 2050 [P.O.:50; I.V.:2000] Out: - Last three shifts:  07/13 1901 - 07/15 0700 In: 2799.9 [P.O.:1002; I.V.:1797.9] Out: 950 [Urine:950] Lab/Data Reviewed: 
BMP:  
Lab Results Component Value Date/Time  (L) 07/15/2019 12:00 PM  
 K 5.4 07/15/2019 03:50 AM  
 CL 97 (L) 07/15/2019 03:50 AM  
 CO2 13 (L) 07/15/2019 03:50 AM  
 AGAP 15 07/15/2019 03:50 AM  
 GLU 75 07/15/2019 03:50 AM  
 BUN 52 (H) 07/15/2019 03:50 AM  
 CREA 8.68 (H) 07/15/2019 03:50 AM  
 GFRAA 8 (L) 07/15/2019 03:50 AM  
 GFRNA 6 (L) 07/15/2019 03:50 AM  
 
CBC:  
No results found for: WBC, HGB, HGBEXT, HCT, HCTEXT, PLT, PLTEXT, HGBEXT, HCTEXT, PLTEXT Imaging Reviewed: 
Ct Chest Wo Cont Result Date: 7/15/2019 EXAM: CT chest INDICATION: Right lung cancer. Follow-up examination. COMPARISON: None. TECHNIQUE: Axial CT imaging from the thoracic inlet through the diaphragm without intravenous contrast. Multiplanar reformats were generated. One or more dose reduction techniques were used on this CT: automated exposure control, adjustment of the mAs and/or kVp according to patient size, and iterative reconstruction techniques. The specific techniques used on this CT exam have been documented in the patient's electronic medical record. Digital Imaging and Communications in Medicine (DICOM) format image data are available to nonaffiliated external healthcare facilities or entities on a secured, media free, reciprocally searchable basis with patient authorization for at least a 12-month period after this study. _______________ FINDINGS: BASE OF THE NECK: Normal. LUNGS: Significant respiratory motion artifact is present.  Fiducial markers are present in the right upper lobe. Small area of scarring is present within the right upper lobe which measures up to 1.7 cm (axial image 103). No focal nodular area in this area of scarring to suggest recurrent disease, however prior studies are not available for review. Interstitial edema is present in both lower lungs. AIRWAY: Secretions are present within the right main and segmental bronchi causing narrowing of the airway within this region. PLEURA: Small to moderate bilateral pleural effusions with adjacent atelectasis. MEDIASTINUM: Coronary artery calcifications are present. Small pericardial effusion. Vasculature is unremarkable. LYMPH NODES: No enlarged lymph nodes. UPPER ABDOMEN: Full thickening is present within the visualized portions of the stomach, which could be suggestive of mild gastritis. BONES: No acute or aggressive osseous abnormalities identified. OTHER: None. _______________ IMPRESSION: 1. Mild interstitial edema is present in both lower lungs. Small to moderate bilateral pleural effusions with adjacent atelectasis. Small pericardial effusion is present. 2. Minimal soft tissue prominence is present in the right upper lobe at the site of the prior malignancy which has the appearance of soft tissue scarring. No focal nodular regions to suggest recurrent disease, however prior studies are not available for review. 3. Doubtful thickening and surrounding edema within the visualized portions of the proximal stomach, which could be suggestive of mild gastritis. 4. Layering secretions are present in the distal trachea in the right main bronchus, concerning for small amount of aspiration. Medications Reviewed: 
Current Facility-Administered Medications Medication Dose Route Frequency  [START ON 7/16/2019] VANCOMYCIN INFORMATION NOTE   Other ONCE  
 morphine injection 4 mg  4 mg IntraVENous Q4H PRN  
 sodium bicarbonate (8.4%) 75 mEq in 0.45% sodium chloride 1,000 mL infusion   IntraVENous CONTINUOUS  
 oxyCODONE-acetaminophen (PERCOCET) 5-325 mg per tablet 1-2 Tab  1-2 Tab Oral Q4H PRN  
 carvedilol (COREG) tablet 6.25 mg  6.25 mg Oral BID WITH MEALS  
 heparin (porcine) injection 5,000 Units  5,000 Units SubCUTAneous Q8H  
 VANCOMYCIN INFORMATION NOTE 1 Each  1 Each Other Rx Dosing/Monitoring  therapeutic multivitamin (THERAGRAN) tablet 1 Tab  1 Tab Oral DAILY  cefTRIAXone (ROCEPHIN) 1 g in 0.9% sodium chloride (MBP/ADV) 50 mL MBP  1 g IntraVENous Q24H  
 sodium chloride (NS) flush 5-10 mL  5-10 mL IntraVENous PRN  
 acetaminophen (TYLENOL) tablet 650 mg  650 mg Oral Q4H PRN  
 ondansetron (ZOFRAN) injection 4 mg  4 mg IntraVENous Q4H PRN Rodolfo Smith PA-C [facility] Office:  [phone] Pager: [phone]

## 2019-07-15 NOTE — PROGRESS NOTES
1447: PT tx session held; patient in bed writhing and moaning in pain. Not appropriate for mobility. Will f/u with patient on 7/16. Jerrica Rodriguez PT, DPT Office extension: [phone]

## 2019-07-15 NOTE — PROGRESS NOTES
Physical Exam  
Skin:  
 
  
  
Primary Nurse Dinora Ojeda RN and Link Montero RN performed a dual skin assessment on this patient No impairment noted 7/15/19 0740 Bedside and Verbal shift change report given to Slade HORNE (oncoming nurse) by Dinora HORNE (offgoing nurse). Report included the following information SBAR, Kardex and ED Summary.

## 2019-07-15 NOTE — PROGRESS NOTES
Problem: Discharge Planning Goal: *Discharge to safe environment Outcome: Progressing Towards Goal 
 Plan : snf Spoke with patient in room, to make him aware of potential facilities that have accepted patient, Patient stated that he is in too much pain to discuss ask if I could speak with his son patient stated  Yes. Call patient's son to discuss discharge planning options. Patient 's son  Stated that his dad had expressed son concerns/ question about his medical condition. Son stated that he will be here at 12 pm during his lunch break and would like to speak with the MD , Nursing etc. About his father's  Condition. Nursing staff and MD made aware.

## 2019-07-15 NOTE — ROUTINE PROCESS
Bedside and Verbal shift change report given to Dinora RN (oncoming nurse) by Twila Bower RN 
 (offgoing nurse). Report included the following information SBAR, Kardex, MAR and Recent Results.

## 2019-07-15 NOTE — PROGRESS NOTES
Pharmacy Dosing Services: Vancomycin Indication: SSTI Day of therapy: 6 Other Antimicrobials (Include dose, start day & day of therapy): 
Ceftriaxone 1 gm IV every 24 hours Loading dose (date given): 1500 mg (7/10 at 1500) Current Maintenance dose: Dosing per level 750mg x 1 ([email or web address]) 1000mg x 1 ([email or web address]) Goal Vancomycin Level: 15-20 
(Trough 15-20 for most infections, 20 for meningitis/osteomyelitis, pre-HD level ~25) Vancomycin Level (if drawn):  
[email or web address] - 23.8 (Random - 23 hours post-dose) [email or web address] - 26 (Random - 20hr post dose) [email or web address] - 26.2 (Random - 31 hours post-dose) [email or web address] - 24.7 (Random - 54 hrs post dose) Significant Cultures:  
Bcx (7/10) - NGTD x 4 days Wound Cx (7/10) - candida parapsilosis, stenotrophomonas maltophila, E. Faecalis, Staph species. Renal function stable? (unstable defined as SCr increase of 0.5 mg/dL or > 50% increase from baseline, whichever is greater) (Y/N): N  
 
CAPD, Hemodialysis or Renal Replacement Therapy (Y/N): N Recent Labs  
  07/15/19 
0350 19 
0504 19 
1045 19 
0350 CREA 8.68* 7.74* 7.01* 6.70* BUN 52* 46* 40* 37* WBC  --  16.8*  --  15.5* Temp (24hrs), Av °F (36.7 °C), Min:97.5 °F (36.4 °C), Max:98.8 °F (37.1 °C) Creatinine Clearance (Creatinine Clearance (ml/min)): <10 ml/min Regimen assessment:  
- Continue dosing per level given [place] - will redose once level is < 20 mcg/ml Maintenance dose: No dose needed Next scheduled level: Random on  at 0400 Pharmacy will follow daily and adjust medications as appropriate for renal function and/or serum levels. Thank you, Wagner Tierney, PHARMD

## 2019-07-15 NOTE — PROGRESS NOTES
[facility] 
[address] [place], [address] [facility] [address] Hollie Menjivar, Dr. Alex Perez, & Dr. Marichuy Puga 
[phone] FAX# [phone] Progress Note Patient: Willis Cevallos MRN: [id number]  SSN: [id number] YOB: 1955  Age: 62 y.o. Sex: male Admit Date: 7/10/2019 LOS: 5 days Subjective:  
 
Severe BLE PVD with worsening multiple areas of LLE gangrene. Per nursing, patient fell last PM. Patient poor historian now with AMS. Currently on Plavix 75mg. Currently on rocephin and Vancomycin. Objective:  
 
Vitals:  
 07/15/19 0503 07/15/19 3192 07/15/19 4314 07/15/19 1107 BP: 151/80  153/89 139/84 Pulse: 96  94 98 Resp: 17  22 20 Temp: 98.1 °F (36.7 °C)  97.7 °F (36.5 °C) 97.9 °F (36.6 °C) SpO2: 96%  94% 97% Weight:  58.8 kg (129 lb 10.1 oz) Intake and Output: 
Current Shift: 07/15 0701 - 07/15 1900 In: 1500 [P.O.:50; I.V.:1450] Out: - Last three shifts: 07/13 1901 - 07/15 0700 In: 2799.9 [P.O.:1002; I.V.:1797.9] Out: 950 [Urine:950] Physical Exam:  
GENERAL: Disoriented, uncooperative, mild distress, thin appearing EYE: PERRL, EOMIs, mucous membranes moist, non-icteric NECK: supple, symmetric. No JVD LUNG: clear to auscultation bilaterally HEART: regular rate and rhythm ABDOMEN: soft, non-tender, non-distended EXTREMITIES: LLE 3rd toe dry gangrene. No erythema or induration noted. LLE 1st toe with edema/induration and gangrenous changes. Large LLE ant tib dry superficial gangrene, no drainage or erythema noted. LLE heel with skin slough and induration. LLE foot non-pitting edema. nonpalpable BLE [place]/DP/PT pulses. No edema BLE. NTTP BLE. Palpable 2+ BUE radial pulses. NEUROLOGIC: No lateralizing deficits Lab/Data Review: 
 
BMP:  
Lab Results Component Value Date/Time   (L) 07/15/2019 12:00 PM  
 K 5.4 07/15/2019 03:50 AM  
 CL 97 (L) 07/15/2019 03:50 AM  
 CO2 13 (L) 07/15/2019 03:50 AM  
 AGAP 15 07/15/2019 03:50 AM  
 GLU 75 07/15/2019 03:50 AM  
 BUN 52 (H) 07/15/2019 03:50 AM  
 CREA 8.68 (H) 07/15/2019 03:50 AM  
 GFRAA 8 (L) 07/15/2019 03:50 AM  
 GFRNA 6 (L) 07/15/2019 03:50 AM  
 
CBC 7/14/19: WBC 16.8, HGB 7.1, HCt 21.3,  CTA ABD ART R/O 6/30/19: 1. ABDOMINAL AORTA:  Mild to moderate atherosclerosis without significant 
stenosis. 
  
2. RIGHT LOWER EXTREMITY: 
-HAYDEE:  Mild origin stenosis. -EIA:  Multilevel stenosis, most severe at the origin with moderate stenosis. -CFA:  Marked tight focal stenosis involving the mid aspect. Moderate short 
segment stenosis involving the distal aspect. -SFA:  Multilevel stenosis with multilevel tight stenosis or occlusions within 
the proximal and mid aspects. 
-Popliteal artery:  Multilevel stenosis with long segment occlusion involving 
the mid to distal aspect. 
-Tibioperoneal arteries:  Extensive atherosclerosis with suspected occlusions 
involving the origins and proximal aspects. 
  
3. LEFT LOWER EXTREMITY: 
-HAYDEE:  Suggestion of mild focal origin stenosis. -EIA:  Occluded. -CFA:  Proximal aspect occluded. No significant stenosis involving the mid to 
distal aspect. -SFA:  Multilevel stenosis throughout its entire length with short segment 
occlusion involving the distal aspect. -Popliteal artery:  Multiple short segment occlusions involving its entire 
length. 
-Tibioperoneal arteries:  Extensive atherosclerosis with suspected occlusion of 
the proximal trifurcation vessels. 
  
4. Other vascular findings:   
-Moderate to marked left renal artery origin stenosis. 
  
5. Non-vascular findings: 
-Assessment limited secondary to respiratory motion.  Moderately distended 
bladder. 
  
XR LT FOOT 6/29/19:   
1.  No acute fracture, destructive osseous changes or plain film findings to 
suggest osteomyelitis. 
  
BLE KELLI 6/29/19: Critical multilevel disease including inflow disease bilaterally. 
  
 CBC 6/29/19: WBC 7.2, HGB 9.7, HCT 28.1,  
  
PET SCAN [facility] 4/15/19: Activity at parenchymal band right upper lobe probably treatment effect after radiation therapy. Attention should be paid to this region on subsequent examinations. Resolution of malignant activity posteriorly at the right hilum compatible with excellent response to therapy. Increased activity at a superior mediastinal lymph node, just above that of blood pool, indeterminate. Could be new metastasis. Could consider follow-up with diagnostic CT at short interval. 
Activity right side prostate gland. Consider correlation with digital rectal exam and PSA. Emphysema. Assessment:  
 
Principal Problem: 
  Sepsis ([address] (7/10/2019) Active Problems: 
  Malnourished ([address] (6/29/2019) Peripheral vascular disease ([address] (6/29/2019) Hyponatremia (6/29/2019) Acute kidney injury superimposed on chronic kidney disease ([address] (7/10/2019) Dehydration (7/10/2019) Gangrene ([address] (7/10/2019) Severe BLE PVD with worsening multiple LLE gangrene AMS Leukocytosis Hyponatremia - ? SAIDH (malignancy vs. Idiopathic) HTN 
H/o RUL Non-small cell cancer s/p Radiation with [employer] 3/2018 possible mediastinal mets? COPD Malnutrition Tobacco Dependence ETOH abuse Plan:  
 
Continue local wound care With worsening AMS/[place]/malnutrition/multiple areas of gangrene, would recommend L above knee amputation at this time- discussed with patient's son who agrees at this time Continue Abx Hold Plavix Monitor BMP/CBC - will need hyponatremia corrected best can at this point. Will try to find OR time likely mid week/Wed AM for amputation. Signed By: DIANDRA Menjivar   
 July 15, 2019

## 2019-07-16 PROBLEM — D64.9 ANEMIA: Status: ACTIVE | Noted: 2019-01-01

## 2019-07-16 NOTE — CDMP QUERY
Pt admitted with sepsis, /Pt noted to have AMS. If possible, please document in the progress notes and d/c summary if you are evaluating and / or treating any of the following: ? Metabolic Encephalopathy ? Septic Encephalopathy ? Toxic Encephalopathy 
? Encephalopathy due to medications or drugs (please specify) ? Toxic Metabolic Encephalopathy 
? Other Encephalopathy 
? Other, please specify ? Clinically unable to determine The medical record reflects the following: 
   Risk Factors: 61 yo male admitted with sepsis, gangrene left foot Clinical Indicators: Per Vascular Note  7/16   CT of chest (C-) independently reviewed, he is certainly cleared from medical oncology viewpoint for left AKA which seems necessary to help reverse current metabolic complications of his vascular insufficiency Per Heme-Onc PN 7/15  He's currently confused, unable to lie still for MRI of brain, but had no focal motor changes when seen last week and while he's currently altered, he has no new focal motor changes to suggest occult CNS mets exist 
 
 
Treatment: Close nursing observation, Sitter @ bedside Thank you, 
Ellen Ni RN CDI   [phone]

## 2019-07-16 NOTE — ANESTHESIA PREPROCEDURE EVALUATION
Relevant Problems No relevant active problems Anesthetic History Review of Systems / Medical History Patient summary reviewed and pertinent labs reviewed Pulmonary COPD Neuro/Psych  
 
seizures Psychiatric history Cardiovascular Hypertension PAD and cardiac stents GI/Hepatic/Renal 
  
 
 
Renal disease: ESRD Endo/Other Anemia Other Findings Physical Exam 
 
Airway Comments: Unable to eval Cardiovascular Rhythm: irregular Rate: normal 
 
 
 
 Dental 
 
 
Comments: Unable to eval  
Pulmonary Rhonchi:bilateral 
 
 
 
 
 
 Abdominal 
GI exam deferred Other Findings Anesthetic Plan ASA: 4 Anesthesia type: general 
 
 
 
 
Induction: Intravenous Anesthetic plan and risks discussed with: Family

## 2019-07-16 NOTE — PROGRESS NOTES
NUTRITION follow-up/Plan of care RECOMMENDATIONS:  
 
1. Resume regular diet when medically indicated 2. Ensure Enlive TID 3. Monitor weight, labs and PO intake 3. RD to follow GOALS:  
 
1. Ongoing: PO intake meets >75% of protein/calorie needs by 7/21 
2. Ongoing: Promote gradual weight gain (1-2 lb by 7/23) ASSESSMENT:  
 
Weight status is classified as underweight per BMI of 17.6. Patient with 13.7% weight loss over past 2 years per documented weights. PO intake is poor. Continue Ensure Enlive TID for additional calories/protein. Labs noted. Nutrition recommendations listed. RD to follow. Meets Criteria for Chronic Malnutrition  
  
[x]Moderate Malnutrition, as evidenced by: 
 [x] Mild muscle wasting, loss of subcutaneous fat 
 [x] Nutritional intake <75% of recommended intake for >1 month 
 [x] Weight loss of 5% in 1 month, 7.5% in 3 months, 10% in 6 months, or 20% in 1 year (13.7% ~ 2 years (4/29/2017) [] Mild edema Nutrition Risk:  []   High [x]  Moderate [] Low SUBJECTIVE/OBJECTIVE:  
 
Admitted with sepsis and left foot and leg gangrene. Currently NPO for renal duplex procedure. Poor PO intake per vitals. Noted plan for L AKA later this week. Per prior RD note:  lb x 2 years ago per documented weight records, but pt stated 165 lb x 1 year ago and that weight loss increased 6 months or so ago when he was Dx with cancer. (35.5 lb or 21.5% x 1 year per current weight of 129.5 lb and 13.7% weight loss over past 2 years per documented weights). No food allergies on record. Will monitor. Information Obtained From:  
[x] Chart Review 
[] Patient 
[] Family/Caregiver [x] Nurse/Physician  
[] Patient Rounds/Interdisciplinary Meeting Diet: NPO Patient Vitals for the past 100 hrs: 
 % Diet Eaten  
07/15/19 1300 20 % 07/15/19 0846 20 % 07/14/19 1800 0 % 07/14/19 1355 10 % 07/14/19 1006 90 % 07/13/19 1728 0 %  
07/13/19 1317 50 % 07/13/19 0831 0 % 07/12/19 1823 75 % Medications: [x] Reviewed (IVF: Sodium bicarbonate(8.4%) at 75 ml/hr; MVI) Encounter Diagnoses ICD-10-CM ICD-9-CM 1. Gangrene of foot (HCC) I96 785.4 2. Cellulitis of left lower extremity L03.116 682.6 3. Elevated lactic acid level R79.89 276.2 4. Leukocytosis, unspecified type D72.829 288.60  
5. Anemia, unspecified type D64.9 285.9 6. Hyponatremia E87.1 276.1 Past Medical History:  
Diagnosis Date  Depression  Emphysema (subcutaneous) (surgical) resulting from a procedure  Hypertension  Hyponatremia  Non-small cell lung cancer ([facility] 2018  
 rt upper lung  [facility]  Seizures ([facility] Labs:   
Lab Results Component Value Date/Time Sodium 128 (L) 07/16/2019 04:55 AM  
 Potassium 5.7 (H) 07/16/2019 04:55 AM  
 Chloride 96 (L) 07/16/2019 04:55 AM  
 CO2 16 (L) 07/16/2019 04:55 AM  
 Anion gap 16 07/16/2019 04:55 AM  
 Glucose 67 (L) 07/16/2019 04:55 AM  
 BUN 65 (H) 07/16/2019 04:55 AM  
 Creatinine 8.99 (H) 07/16/2019 04:55 AM  
 Calcium 7.3 (L) 07/16/2019 04:55 AM  
 Magnesium 2.0 07/10/2019 03:20 PM  
 Phosphorus 10.6 (H) 07/16/2019 04:55 AM  
 Albumin 1.9 (L) 07/16/2019 04:55 AM  
 
Anthropometrics: BMI (calculated): 17.6 Last 3 Recorded Weights in this Encounter  
 07/11/19 1518 07/12/19 1544 07/15/19 9805 Weight: 61.1 kg (134 lb 11.2 oz) 59 kg (130 lb) 58.8 kg (129 lb 10.1 oz) Ht Readings from Last 1 Encounters:  
07/12/19 6' (1.829 m) Weight Metrics 7/15/2019 7/3/2019 4/29/2017 3/11/2017 Weight 129 lb 10.1 oz 130 lb 150 lb 150 lb BMI 17.58 kg/m2 17.63 kg/m2 20.34 kg/m2 20.34 kg/m2 [x]  Weight Loss (21.5% x 1 year per reported history but 13.7% x 2 years per doc weights) 
[]  Weight Gain 
[]  Weight Stable  
[]  New wt n/a on record Estimated Nutrition Needs:  
2065 Kcals/day Protein (g): 88 g Nutrition Problems Identified:  
[x] Suboptimal PO intake  
[] Food Allergies [] Difficulty chewing/swallowing/poor dentition 
[] Constipation/Diarrhea  
[] Nausea/Vomiting  
[] None 
[x] Other: Wound healing Plan:  
[] Therapeutic Diet 
[]  Obtained/adjusted food preferences/tolerances and/or snacks options [x]  Continue dietary supplements as prescribed 
[] Occupational therapy following for feeding techniques []  HS snack added  
[]  Modify diet texture  
[]  Modify diet for food allergies []  Assist with menu selection  
[x]  Monitor PO intake on meal rounds  
[x]  Continue inpatient monitoring and intervention  
[]  Participated in discharge planning/Interdisciplinary rounds/Team meetings  
[]  Other:  
 
Education Needs: 
 [x] Not appropriate for teaching at this time due to: NPO 
 [] Identified and addressed Nutrition Monitoring and Evaluation: 
 [x] Continue inpatient monitoring and interventions [] Other:  
 
Zeb Bravo

## 2019-07-16 NOTE — PROGRESS NOTES
RENAL PROGRESS NOTE Dyann Million Assessment/Plan: · [place] (ischemic atn due to volume depletion/vanc nephrotoxicity). Scr is up into uremic range, uo is marginal. Renal function is unlikely to iprove without dialysis. Risk and benefits of acute dialysis d/w patient's son Mr. Vielka Cid and he consented. Will ask vascular for temporary dialysis catheter. First dialysis today or tomorrow. In general patient's son is aware of the fact that while acute dialysis and lt aka may prolong patient's life, quality of life will likely be poor. · Acute on chronic hyponatremia in a setting of malignancy related siadh,  volume depletion and severe [place]. Continue isotonic ivf. Na is improving at appropriate range, avoid too rapid correction with dialysis, will dialyze against Na of 130. · Hyperkalemia. Dialysis will address. · Met acidosis. Continue isotonic bicarb. Dialysis will also address · Known non small cell lung cancer. Restaging in progress. Oncology recommendations noted. · HTN. Controlled. · Anemia. May need transfusions. · Lt le pad with ischemic wound. Vascular on the case. Plans for aka noted. Consider using alternative abx instead of vanc. · Severe anemia. Getting transfused. Subjective: 
Patient complaints off: c/o lt foot pain. Sleepy, confused. Denies cp/sob. Oral intake is poor. Patient Active Problem List  
Diagnosis Code  Gangrene of foot ([address] I96  
 Cellulitis of left lower extremity L03. 80  
 Malnourished ([address] E46  Peripheral vascular disease (HCC) I73.9  Hyponatremia E87.1  Acute kidney injury superimposed on chronic kidney disease (HCC) N17.9, N18.9  Dehydration E86.0  Sepsis ([address] A41.9  Gangrene ([address] [id number] Current Facility-Administered Medications Medication Dose Route Frequency Provider Last Rate Last Dose  [START ON 7/19/2019] VANCOMYCIN INFORMATION NOTE   Other PRN Bhavik Hayes PA-C      
 0.9% sodium chloride infusion 250 mL  250 mL IntraVENous PRN Brijesh Rosen PA      
 morphine injection 4 mg  4 mg IntraVENous Q4H PRN Jung Cortez MD   4 mg at 07/16/19 9585  sodium bicarbonate (8.4%) 150 mEq in sterile water 1,000 mL infusion   IntraVENous CONTINUOUS Libby Toro MD 75 mL/hr at 07/15/19 2314    
 oxyCODONE-acetaminophen (PERCOCET) 5-325 mg per tablet 1-2 Tab  1-2 Tab Oral Q4H PRN Sidonie Fly H, DO   2 Tab at 07/16/19 1215  carvedilol (COREG) tablet 6.25 mg  6.25 mg Oral BID WITH MEALS Fish Lerma MD   6.25 mg at 07/16/19 9665  heparin (porcine) injection 5,000 Units  5,000 Units SubCUTAneous Q8H Sidonie Fly H, DO   5,000 Units at 07/16/19 7750  VANCOMYCIN INFORMATION NOTE 1 Each  1 Each Other Rx Dosing/Monitoring Tristen Troy PA-C      
 therapeutic multivitamin [facility]) tablet 1 Tab  1 Tab Oral DAILY Sidonie Fly H, DO   1 Tab at 07/16/19 5445  cefTRIAXone (ROCEPHIN) 1 g in 0.9% sodium chloride (MBP/ADV) 50 mL MBP  1 g IntraVENous Q24H Bhavik Hayes PA-C   Stopped at 07/15/19 1600  
 sodium chloride (NS) flush 5-10 mL  5-10 mL IntraVENous PRN Bhavik Hayes PA-C   10 mL at 07/15/19 0503  acetaminophen (TYLENOL) tablet 650 mg  650 mg Oral Q4H PRN Bhavik Hayes PA-C   650 mg at 07/11/19 2243  ondansetron (ZOFRAN) injection 4 mg  4 mg IntraVENous Q4H PRN Tristen Troy PA-C Objective Vitals:  
 07/16/19 0006 07/16/19 0453 07/16/19 4075 07/16/19 1130 BP: 124/74 148/61 147/87 148/77 Pulse: 90 87 98 98 Resp: 20 20 18 19 Temp: 98.2 °F (36.8 °C) 98 °F (36.7 °C) 98.1 °F (36.7 °C) 98.8 °F (37.1 °C) SpO2: 98%  96% 94% Weight:      
Height:      
 
 
 
Intake/Output Summary (Last 24 hours) at 7/16/2019 1222 Last data filed at 7/16/2019 7419 Gross per 24 hour Intake 1451.25 ml Output 625 ml Net 826.25 ml Admission weight: Weight: 59 kg (130 lb) (07/10/19 1511) Last Weight Metrics: 
Weight Loss Metrics 7/15/2019 7/3/2019 4/29/2017 3/11/2017 Today's Wt 129 lb 10.1 oz 130 lb 150 lb 150 lb BMI 17.58 kg/m2 17.63 kg/m2 20.34 kg/m2 20.34 kg/m2 Physical Assessment:  
 
General: NAD, alert and oriented. Neck: No jvd. LUNGS: diffusely diminished air entry, bl exp rhonchi. No crackles. CVS EXM: S1, S2  RRR. Abdomen: soft, non tender. Lower Extremities:  no edema. Lt foot dressed. Lab CBC w/Diff Recent Labs  
  07/16/19 
0455 07/14/19 
0504 WBC 20.0* 16.8*  
RBC 2.39* 2.46* HGB 6.9* 7.1*  
HCT 20.1* 21.3*  
* 465* GRANS 87* 85* LYMPH 6* 8*  
EOS 0 0 Chemistry Recent Labs  
  07/16/19 
1015 07/16/19 
0455 07/15/19 
1200 07/15/19 
0350  07/14/19 
9982 GLU  --  67*  --  75  --  87  
* 128* 125* 125*   < > 125* K  --  5.7*  --  5.4  --  5.4 CL  --  96*  --  97*  --  96* CO2  --  16*  --  13*  --  14* BUN  --  65*  --  52*  --  46* CREA  --  8.99*  --  8.68*  --  7.74* CA  --  7.3*  --  7.4*  --  7.6* AGAP  --  16  --  15  --  15  
BUCR  --  7*  --  6*  --  6* ALB  --  1.9*  --  1.9*  --  2.0*  
PHOS  --  10.6*  --  9.8*  --  9.3*  
 < > = values in this interval not displayed. No results found for: IRON, FE, TIBC, IBCT, PSAT, FERR Lab Results Component Value Date/Time Calcium 7.3 (L) 07/16/2019 04:55 AM  
 Phosphorus 10.6 (H) 07/16/2019 04:55 AM  
  
 
Jimmy Vasquez M.D. [facility] Phone [phone] Pager [phone]

## 2019-07-16 NOTE — PROGRESS NOTES
Physical Exam  
Skin:  
 
  
  
 
1900  Bedside and Verbal shift change report given to Render Miu, RN (oncoming nurse) by Ashish Mendez (offgoing nurse). Report included the following information SBAR, Kardex, Intake/Output, MAR, Accordion, Recent Results, Cardiac Rhythm SR, Alarm Parameters  and Quality Measures. 1st degree AVB

## 2019-07-16 NOTE — PROGRESS NOTES
Internal Medicine Progress Note Patient's Name: Willis Cevallos Admit Date: 7/10/2019 Length of Stay: 6 Assessment/Plan Principal Problem: 
  Sepsis ([address] (7/10/2019) Active Problems: 
  Peripheral vascular disease ([address] (6/29/2019) Gangrene ([address] (7/10/2019) Acute kidney injury superimposed on chronic kidney disease ([address] (7/10/2019) Malnourished ([address] (6/29/2019) Hyponatremia (6/29/2019) Dehydration (7/10/2019) Anemia (7/16/2019) Sepsis 
- gangrenous first and third toe - Recent wound culture from previous admission susceptible to rocephin and vanc, continue abx, renally dose 
- repeat wound culture - Stenotrophomonas maltophilia, enterococcus faecalis, coag neg staph 
  
Gangrene/PVD 
- Recent admission, discharged on PO oral abx, was expected to f/u with vasc for multilevel revascularization but was unable to get to f/u due to ride issues and extent of pain - Vasc consulted - appreciate - podiatry consulted - appreciate - open dry wound anterior, lateral LLE proximal tibia - Betadine to toes and Aquacel Ag to heel daily per podiatry - patient continuously refused to complete MRI 
- plan for amputation tomorrow 
 
 [place] 
- strict I&Os, mccarthy 
- dialysis to start today or tomorrow pending TDC placement 
- nephrology following - recommendations per them 
- refused renal US today Malnourished - Nutritional supplements with all meals Hyponatremia 
- acute on chronic, stable - Nephrology consult - appreciate 
- improving Anemia 
- PRBCs transfused as needed for hgb <7 
- monitor H&H 
 
- Cont acceptable home medications for chronic conditions  
- DVT protocol I have personally reviewed all pertinent labs and films that have officially resulted over the last 24 hours. I have personally checked for all pending labs that are awaiting final results. Interval History Jaret Pagan is a 62 y.o. male with a PMHx listed below of who presented to the ED with complaints of worsening LLE pain. Patient was admitted on 6/30/19 and discharged three days ago for gangrenous toes. Vascular surgery and podiatry were consulted then and he was recommended for multilevel revascularization, he was to follow up as out patient and said that his son could transport him. Per patient now, son was unable to transport patient to f/u appointment, so he returned due to persistent symptoms.   
  
In the ED vascular surgery consulted, podiatry consulted. He presents with worsening creatinine and sodium. I spoke with Nephrology and they recommend fluids. His only complaint is pain, no fevers, chills, weakness, AMS. Sodium 121, will be admitted for further eval. He doesn't know if he has been taking his lasix, but says hes been taking his abx\" Per podiatry - no need for urgent amputation, wait until revascularization. Nephrology consulted - Acute on chronic hyponatremia improved with gentle IVF. Oncology consulted - bone scan ordered to complete restaging - No definite sonographic evidence of osseous metastatic disease. Repeat wound culture 7/11 - Stenotrophomonas maltophilia. Patient continually refused to complete MRI. PT/OT recs SNF. Amputation planned for 7/17. Patient renal function declined rapidly, patient will have dialysis 7/17. PRBCs transfused as needed for hgb <7. 
  
Subjective Pt s/e @ bedside. No major events overnight. Patient c/o LLE pain. No cp, sob, abd pain, nvd. Objective Visit Vitals /71 (BP 1 Location: Right arm, BP Patient Position: At rest) Pulse 96 Temp 98.3 °F (36.8 °C) Resp 20 Ht 6' (1.829 m) Wt 58.8 kg (129 lb 10.1 oz) SpO2 97% BMI 17.58 kg/m² Physical Exam: 
General Appearance: NAD, confused HENT: normocephalic/atraumatic, moist mucus membranes Neck: No JVD, supple Lungs: CTA with normal respiratory effort CV: RRR, no m/r/g Abdomen: soft, non-tender, normal bowel sounds Extremities: no cyanosis, no peripheral edema, BLE are atrophic, no hair growth, LLE has necrotic skin over anterior tibia, L great toe is erythematous and edematous, L 3rd toe is gangrenous Neuro: No new focal deficits, diminished sensation entire left foot Intake and Output: 
Current Shift:  No intake/output data recorded. Last three shifts:  07/14 1901 - 07/16 0700 In: 4074.2 [P.O.:120; I.V.:3954.2] Out: 1125 [WPSRM:8103] Lab/Data Reviewed: 
BMP:  
Lab Results Component Value Date/Time  (L) 07/16/2019 10:15 AM  
 K 5.7 (H) 07/16/2019 04:55 AM  
 CL 96 (L) 07/16/2019 04:55 AM  
 CO2 16 (L) 07/16/2019 04:55 AM  
 AGAP 16 07/16/2019 04:55 AM  
 GLU 67 (L) 07/16/2019 04:55 AM  
 BUN 65 (H) 07/16/2019 04:55 AM  
 CREA 8.99 (H) 07/16/2019 04:55 AM  
 GFRAA 7 (L) 07/16/2019 04:55 AM  
 GFRNA 6 (L) 07/16/2019 04:55 AM  
 
CBC:  
Lab Results Component Value Date/Time WBC 20.0 (H) 07/16/2019 04:55 AM  
 HGB 6.9 (L) 07/16/2019 04:55 AM  
 HCT 20.1 (L) 07/16/2019 04:55 AM  
  (H) 07/16/2019 04:55 AM  
 
 
 
Imaging Reviewed: 
No results found. Medications Reviewed: 
Current Facility-Administered Medications Medication Dose Route Frequency  [START ON 7/19/2019] VANCOMYCIN INFORMATION NOTE   Other PRN  
 0.9% sodium chloride infusion 250 mL  250 mL IntraVENous PRN  
 morphine injection 4 mg  4 mg IntraVENous Q4H PRN  
 sodium bicarbonate (8.4%) 150 mEq in sterile water 1,000 mL infusion   IntraVENous CONTINUOUS  
 oxyCODONE-acetaminophen (PERCOCET) 5-325 mg per tablet 1-2 Tab  1-2 Tab Oral Q4H PRN  
 carvedilol (COREG) tablet 6.25 mg  6.25 mg Oral BID WITH MEALS  
 heparin (porcine) injection 5,000 Units  5,000 Units SubCUTAneous Q8H  
 VANCOMYCIN INFORMATION NOTE 1 Each  1 Each Other Rx Dosing/Monitoring  therapeutic multivitamin (THERAGRAN) tablet 1 Tab  1 Tab Oral DAILY  cefTRIAXone (ROCEPHIN) 1 g in 0.9% sodium chloride (MBP/ADV) 50 mL MBP  1 g IntraVENous Q24H  sodium chloride (NS) flush 5-10 mL  5-10 mL IntraVENous PRN  
 acetaminophen (TYLENOL) tablet 650 mg  650 mg Oral Q4H PRN  
 ondansetron (ZOFRAN) injection 4 mg  4 mg IntraVENous Q4H PRN Eugenie Carson PA-C [address] [place] [address] Hospitalist Division Office:  [phone] Pager: [phone]

## 2019-07-16 NOTE — PROGRESS NOTES
RENAL PROGRESS NOTE Gianni Pillai Assessment/Plan: · [place] (ischemic atn due to volume depletion/vanc nephrotoxicity). Scr is up into uremic range. On the positive side uo us up. Monitor closely, may need dialysis in the next few days. Continue ivf given poor oral intake. · Acute on chronic hyponatremia in a setting of malignancy related siadh,  volume depletion and severe [place]. Continue isotonic ivf. Na is improving slowly, patient is asymptomatic. · Met acidosis. Change ivf to isotonic bicarb. · Known non small cell lung cancer. Restaging in progress. Oncology recommendations noted. · HTN. Controlled. · Anemia. May need transfusions. · Lt le pad with ischemic wound. Vascular on the case. Plans for aka noted. Consider using alternative abx instead of vanc. Subjective: 
Patient complaints off: c/o lt foot pain. No SOB at rest, no CP/N/V. Appetite is better, drinking fluids. Patient Active Problem List  
Diagnosis Code  Gangrene of foot ([facility] [address] I96  
 Cellulitis of left lower extremity L03. 80  
 Malnourished ([address] E46  Peripheral vascular disease (HCC) I73.9  Hyponatremia E87.1  Acute kidney injury superimposed on chronic kidney disease (HCC) N17.9, N18.9  Dehydration E86.0  Sepsis ([address] A41.9  Gangrene ([address] [id number] Current Facility-Administered Medications Medication Dose Route Frequency Provider Last Rate Last Dose  [START ON 7/16/2019] VANCOMYCIN INFORMATION NOTE   Other ONCE Carissa [place], [place]      
 morphine injection 4 mg  4 mg IntraVENous Q4H PRN Phoebe Christiansen MD      
 sodium bicarbonate (8.4%) 75 mEq in 0.45% sodium chloride 1,000 mL infusion   IntraVENous CONTINUOUS Komal, Lex Henderson  mL/hr at 07/15/19 1400    
 oxyCODONE-acetaminophen (PERCOCET) 5-325 mg per tablet 1-2 Tab  1-2 Tab Oral Q4H PRN Demaris Saltness, DO   Stopped at 07/15/19 1654  carvedilol (COREG) tablet 6.25 mg  6.25 mg Oral BID WITH MEALS Fish Lerma MD   6.25 mg at 07/15/19 1865  
 heparin (porcine) injection 5,000 Units  5,000 Units SubCUTAneous Q8H Sidonie Fly H, DO   5,000 Units at 07/15/19 1530  
 VANCOMYCIN INFORMATION NOTE 1 Each  1 Each Other Rx Dosing/Monitoring Tristen Troy PA-C      
 therapeutic multivitamin [facility]) tablet 1 Tab  1 Tab Oral DAILY Sidonie Fly H, DO   1 Tab at 07/15/19 0038  cefTRIAXone (ROCEPHIN) 1 g in 0.9% sodium chloride (MBP/ADV) 50 mL MBP  1 g IntraVENous Q24H Bhavik Hayes PA-C 100 mL/hr at 07/15/19 1530 1 g at 07/15/19 1530  
 sodium chloride (NS) flush 5-10 mL  5-10 mL IntraVENous PRN Bhavik Hayes PA-C   10 mL at 07/15/19 0503  acetaminophen (TYLENOL) tablet 650 mg  650 mg Oral Q4H PRN Bhavik Hayes PA-C   650 mg at 07/11/19 2243  ondansetron (ZOFRAN) injection 4 mg  4 mg IntraVENous Q4H PRN Tristen Troy PA-C Objective Vitals:  
 07/15/19 0846 07/15/19 1107 07/15/19 1538 07/15/19 1914 BP: 153/89 139/84 148/69 142/67 Pulse: 94 98 96 94 Resp: 22 20 20 20 Temp: 97.7 °F (36.5 °C) 97.9 °F (36.6 °C) 98.4 °F (36.9 °C) 97.7 °F (36.5 °C) SpO2: 94% 97% 100% 97% Weight:      
 
 
 
Intake/Output Summary (Last 24 hours) at 7/15/2019 2119 Last data filed at 7/15/2019 1800 Gross per 24 hour Intake 2470 ml Output 400 ml Net 2070 ml Admission weight: Weight: 59 kg (130 lb) (07/10/19 1511) Last Weight Metrics: 
Weight Loss Metrics 7/15/2019 7/3/2019 4/29/2017 3/11/2017 Today's Wt 129 lb 10.1 oz 130 lb 150 lb 150 lb BMI 17.58 kg/m2 17.63 kg/m2 20.34 kg/m2 20.34 kg/m2 Physical Assessment:  
 
General: NAD, alert and oriented. Neck: No jvd. LUNGS: diffusely diminished air entry, bl exp rhonchi. No crackles. CVS EXM: S1, S2  RRR. Abdomen: soft, non tender. Lower Extremities:  no edema. Lt foot dressed. Lab CBC w/Diff Recent Labs  
  07/14/19 
0504 07/13/19 
0350 WBC 16.8* 15.5*  
RBC 2.46* 2.57* HGB 7.1* 7.5* HCT 21.3* 22.5*  
* 449* GRANS 85* 82* LYMPH 8* 10* EOS 0 0 Chemistry Recent Labs  
  07/15/19 
1200 07/15/19 
0350 07/14/19 
1129 07/14/19 
0504 07/13/19 
1045 07/13/19 
0350 GLU  --  75  --  87 100* 77 * 125* 126* 125* 124*  127* 124* K  --  5.4  --  5.4 5.0 4.9 CL  --  97*  --  96* 96* 95* CO2  --  13*  --  14* 15* 14* BUN  --  52*  --  46* 40* 37* CREA  --  8.68*  --  7.74* 7.01* 6.70* CA  --  7.4*  --  7.6* 7.7* 7.5* AGAP  --  15  --  15 13 15 BUCR  --  6*  --  6* 6* 6* ALB  --  1.9*  --  2.0*  --  1.9*  
PHOS  --  9.8*  --  9.3*  --  8.5* No results found for: IRON, FE, TIBC, IBCT, PSAT, FERR Lab Results Component Value Date/Time Calcium 7.4 (L) 07/15/2019 03:50 AM  
 Phosphorus 9.8 (H) 07/15/2019 03:50 AM  
  
 
Nieves Ardon M.D. [facility] Phone [phone] Pager [phone]

## 2019-07-16 NOTE — ROUTINE PROCESS
4794 assumed care of pt after bedside verbal report was given by off going nurse, pt resting in bed awake with sitter at bedside, no acute distress noted, sodium bicarb infusing at 75 ml/hr, will continue to monitor 8752 pt off unit to vascular [phone] attempted to call pt's son to obtain consent for packed red blood cell transfusion, no answer, voicemail left, will try again 1529 PRBC infusion started at 90 ml/hr, pt's family at bedside, no acute distress noted, will monitor 1545 pt resting in bed quietly, no changes noted, will monitor 1848 PRBC infusion completed, pt tolerated without any adverse reactions, no distress noted, will monitor 1951 Bedside and Verbal shift change report given to Flor Branham RN (oncoming nurse) by Carlos Choe RN (offgoing nurse). Report included the following information SBAR, Kardex and MAR.

## 2019-07-16 NOTE — PROGRESS NOTES
[facility] 
[address] [place], [address] [facility] Dr. Guera Friedman, Dr. Romana Romano, & Dr. Fernando Akers 
[phone] FAX# [phone] Progress Note Patient: Warden Potts MRN: [id number]  SSN: [id number] YOB: 1955  Age: 62 y.o. Sex: male Admit Date: 7/10/2019 LOS: 6 days Subjective:  
 
Severe BLE PVD with worsening multiple areas of LLE gangrene. Patient poor historian now with AMS. Currently on rocephin and Vancomycin. Plavix held. Currently NPO for Renal Duplex this AM. Objective:  
 
Vitals:  
 07/15/19 1538 07/15/19 1914 07/16/19 0006 07/16/19 4842 BP: 148/69 142/67 124/74 148/61 Pulse: 96 94 90 87 Resp: 20 20 20 20 Temp: 98.4 °F (36.9 °C) 97.7 °F (36.5 °C) 98.2 °F (36.8 °C) 98 °F (36.7 °C) SpO2: 100% 97% 98% Weight:      
  
 
Intake and Output: 
Current Shift: No intake/output data recorded. Last three shifts: 07/14 1901 - 07/16 0700 In: 4074.2 [P.O.:120; I.V.:3954.2] Out: 1125 [[id number]] Physical Exam:  
GENERAL: Disoriented, uncooperative, mild distress, thin appearing EYE: PERRL, EOMIs, mucous membranes moist, non-icteric NECK: supple, symmetric. No JVD LUNG: clear to auscultation bilaterally HEART: regular rate and rhythm ABDOMEN: soft, non-tender, non-distended EXTREMITIES: LLE 3rd toe dry gangrene. No erythema or induration noted. LLE 1st toe with edema/induration and gangrenous changes. Large LLE ant tib dry superficial gangrene, no drainage or erythema noted. LLE heel with skin slough and induration. LLE foot non-pitting edema. nonpalpable BLE Femora/DP/PT pulses. No edema BLE. NTTP BLE. Palpable 2+ BUE radial pulses. NEUROLOGIC: No lateralizing deficits Lab/Data Review: 
BMP:  
Lab Results Component Value Date/Time   (L) 07/16/2019 04:55 AM  
 K 5.7 (H) 07/16/2019 04:55 AM  
 CL 96 (L) 07/16/2019 04:55 AM  
 CO2 16 (L) 07/16/2019 04:55 AM  
 AGAP 16 07/16/2019 04:55 AM  
 GLU 67 (L) 07/16/2019 04:55 AM  
 BUN 65 (H) 07/16/2019 04:55 AM  
 CREA 8.99 (H) 07/16/2019 04:55 AM  
 GFRAA 7 (L) 07/16/2019 04:55 AM  
 GFRNA 6 (L) 07/16/2019 04:55 AM  
 
CBC:  
Lab Results Component Value Date/Time WBC 20.0 (H) 07/16/2019 04:55 AM  
 HGB 6.9 (L) 07/16/2019 04:55 AM  
 HCT 20.1 (L) 07/16/2019 04:55 AM  
  (H) 07/16/2019 04:55 AM  
  
CT CHEST WO 7/15/19: . Mild interstitial edema is present in both lower lungs. Small to moderate 
bilateral pleural effusions with adjacent atelectasis. Small pericardial 
effusion is present. 
  
2. Minimal soft tissue prominence is present in the right upper lobe at the site 
of the prior malignancy which has the appearance of soft tissue scarring. No 
focal nodular regions to suggest recurrent disease, however prior studies are 
not available for review. 
  
3. Doubtful thickening and surrounding edema within the visualized portions of 
the proximal stomach, which could be suggestive of mild gastritis. 
  
4. Layering secretions are present in the distal trachea in the right main 
bronchus, concerning for small amount of aspiration. [facility] 7/12/19: 1. No definite sonographic evidence of osseous metastatic disease. CTA ABD ART R/O 6/30/19: 1. ABDOMINAL AORTA:  Mild to moderate atherosclerosis without significant 
stenosis. 
  
2. RIGHT LOWER EXTREMITY: 
-HAYDEE:  Mild origin stenosis. -EIA:  Multilevel stenosis, most severe at the origin with moderate stenosis. -CFA:  Marked tight focal stenosis involving the mid aspect. Moderate short 
segment stenosis involving the distal aspect. -SFA:  Multilevel stenosis with multilevel tight stenosis or occlusions within 
the proximal and mid aspects. 
-Popliteal artery:  Multilevel stenosis with long segment occlusion involving 
the mid to distal aspect. 
-Tibioperoneal arteries:  Extensive atherosclerosis with suspected occlusions involving the origins and proximal aspects. 
  
3. LEFT LOWER EXTREMITY: 
-HAYDEE:  Suggestion of mild focal origin stenosis. -EIA:  Occluded. -CFA:  Proximal aspect occluded. No significant stenosis involving the mid to 
distal aspect. -SFA:  Multilevel stenosis throughout its entire length with short segment 
occlusion involving the distal aspect. -Popliteal artery:  Multiple short segment occlusions involving its entire 
length. 
-Tibioperoneal arteries:  Extensive atherosclerosis with suspected occlusion of 
the proximal trifurcation vessels. 
  
4. Other vascular findings:   
-Moderate to marked left renal artery origin stenosis. 
  
5. Non-vascular findings: 
-Assessment limited secondary to respiratory motion. Moderately distended 
bladder. 
  
XR LT FOOT 6/29/19:   
1.  No acute fracture, destructive osseous changes or plain film findings to 
suggest osteomyelitis. 
  
BLE KELLI 6/29/19: Critical multilevel disease including inflow disease bilaterally. 
  
CBC 6/29/19: WBC 7.2, HGB 9.7, HCT 28.1,  
  
PET SCAN [facility] 4/15/19: Activity at parenchymal band right upper lobe probably treatment effect after radiation therapy. Attention should be paid to this region on subsequent examinations. Resolution of malignant activity posteriorly at the right hilum compatible with excellent response to therapy. Increased activity at a superior mediastinal lymph node, just above that of blood pool, indeterminate. Could be new metastasis. Could consider follow-up with diagnostic CT at short interval. 
Activity right side prostate gland. Consider correlation with digital rectal exam and PSA. Emphysema. Assessment:  
 
Principal Problem: 
  Sepsis ([address] (7/10/2019) Active Problems: 
  Malnourished ([address] (6/29/2019) Peripheral vascular disease ([address] (6/29/2019) Hyponatremia (6/29/2019) Acute kidney injury superimposed on chronic kidney disease ([address] (7/10/2019) Dehydration (7/10/2019) Gangrene ([facility] [address] (7/10/2019) Severe BLE PVD with worsening multiple LLE gangrene Worsening [place] AMS Leukocytosis Hyponatremia - ? SAIDH (malignancy vs. Idiopathic) HTN 
H/o RUL Non-small cell cancer s/p Radiation with [employer] 3/2018 possible mediastinal mets? COPD Malnutrition Tobacco Dependence ETOH abuse Plan:  
 
Continue local wound care Await renal duplex results With worsening AMS/[place]/malnutrition/multiple areas of gangrene, would recommend L above knee amputation at this time- discussed with patient's son yesterday who agrees to proceed. Unable to reach today for consent Continue Abx Hold Plavix Monitor BMP/CBC Plan for L AKA and temporary HD catheter tomorrow AM  
Transfusion 2U PRBCs NPO after midnight PT/INR, CBC, BMP in AM 
May want to consider Palliative consult Signed By: DIANDRA Alcantar   
 July 16, 2019

## 2019-07-16 NOTE — PROGRESS NOTES
Renal artery exam unsuccessfully attempted. Patient is highly agitated and uncooperative, although medicated. There is also an excessive amount of bowel gas/noise obscuring abdominal vessels and kidneys. Exam terminated, patient sent back to room.

## 2019-07-16 NOTE — PROGRESS NOTES
Pharmacy Dosing Services: Vancomycin Indication: SSTI Day of therapy: 7 Other Antimicrobials (Include dose, start day & day of therapy): 
Ceftriaxone 1 gm IV every 24 hours Loading dose (date given): 1500 mg (7/10 at 1500) Current Maintenance dose: Dosing per level 750mg x 1 ([email or web address]) 1000mg x 1 ([email or web address]) Goal Vancomycin Level: 15-20 
(Trough 15-20 for most infections, 20 for meningitis/osteomyelitis, pre-HD level ~25) Vancomycin Level (if drawn):  
[email or web address]@[employer] - 23.8 (Random - 23 hours post-dose) Elvia@[email or web address] - 26 (Random - 20hr post dose) Rick@[employer] - 26.2 (Random - 31 hours post-dose) [email or web address] - 24.7 (Random - 54 hrs post dose) Fara@[employer] - 23.9 (Random - 79 hrs post dose) Significant Cultures:  
Bcx (7/10) - NGTD x 4 days Wound Cx (7/10) - candida parapsilosis, stenotrophomonas maltophila, E. Faecalis, Staph species. Renal function stable? (unstable defined as SCr increase of 0.5 mg/dL or > 50% increase from baseline, whichever is greater) (Y/N): N  
 
CAPD, Hemodialysis or Renal Replacement Therapy (Y/N): N Recent Labs  
  19 
0455 07/15/19 
0350 19 
0504 19 
1045 CREA  --  8.68* 7.74* 7.01* BUN  --  52* 46* 40* WBC 20.0*  --  16.8*  -- Temp (24hrs), Av °F (36.7 °C), Min:97.7 °F (36.5 °C), Max:98.4 °F (36.9 °C) Creatinine Clearance (Creatinine Clearance (ml/min)): <10 ml/min Regimen assessment:  
- Continue dosing per level given [place] - will redose once level is < 20 mcg/ml. - Patient not clearing vancomycin at this time (~ t1/2 calculation shows >200 hours) Maintenance dose: No dose needed Next scheduled level: Repeat random level on  [email or web address] will follow daily and adjust medications as appropriate for renal function and/or serum levels. Thank you, Abdi Trinh, PHARMD

## 2019-07-17 PROBLEM — J96.00 ACUTE RESPIRATORY FAILURE (HCC): Status: ACTIVE | Noted: 2019-01-01

## 2019-07-17 NOTE — PROGRESS NOTES
Patient is in the OR for lt aka and temporary dialysis catheter placement. First short dialysis later today, 2nd treatment tomorrow.

## 2019-07-17 NOTE — CONSULTS
[facility]  Pulmonary, Critical Care, and Sleep Medicine    Name: Mustapha Payan MRN: [id number]   : 1955 Hospital: [facility]   Date: 2019        Critical Care Consult      IMPRESSION:   · Gangrene LLE s/p L AKA- 19  · BROOKLYN likely Ischemic ATN  · Anemia  · Leukocytosis, reactive vs responsive  · SIRS response- questionable pneumonia? · Bandemia  · Hyponatremia  · Hypoalbunemia  · Malnourished    Hx:  -Non small cell ca of R lung s/p chemo- Resolved per Heme/Oncology  -PVD     Patient Active Problem List   Diagnosis Code    Gangrene of foot (HCC) I96    Cellulitis of left lower extremity L03. 80    Malnourished ([facility] [address] E46    Peripheral vascular disease ([facility] I73.9    Hyponatremia E87.1    Acute kidney injury superimposed on chronic kidney disease ([facility] N17.9, N18.9    Dehydration E86.0    Sepsis ([facility] [address] [facility] A41.9    Gangrene ([facility] I96    Anemia D64.9        RECOMMENDATIONS:   · Resp:   -Titrate FiO2/ supp O2 for SpO2 >90%  -Pulmonary Hygiene  -May benefit most from HD  -Daily CXR  -Daily ABG's  · I/D:    -(+) leukocytosis WBC (21.5K), Afebrile   -2% Bands   -Last lactic 1.21 7/10/19;  Will observe closely for any s/sx of emerging or new infections from L AKA    -ABX : Vancomycin/ Rocephin  · Hem/Onc:    -Daily CBC;   -Hgb/Hct (7.5/21.6) and plts are stable-Monitor closely for blood loss  -Will repeat CBC post HD  · CVS:    -Monitor Hemodynamics   -Check cardiac panel,   -ECHO complete- EF 59-06%  · Metabolic:   -Daily BMP;   -monitor e-lytes; replace PRN  · Renal:   -[place] placed in OR  -Plan for HD today   -Consider additional HD tomorrow  -Nephrology following  -Galvez catheter / Diuresis  · Endocrine:   -POC Glucose q6  · GI:   -SUP, Trend LFTs, Zofran PRN for N/V   · Musc/Skin:   -No acute issues, wound care  · Neuro:   -Fentanyl PCA/gtt for 10/10 post op pain  -Precedex gtt  · Fluids:   · Code Status: Full Code     Best practice:  · Sepsis Bundle per [facility] Protocol  · Glycemic control; avoid Hypoglycemia  · IHI ICU Bundles:  ·  Mccarthy Bundle Followed    · Mech Vent patients/ Pulmonary pts:   · VAP bundle, Aim to keep peak plateau pressure 31-07WA H2O  · Aspiration Precautions - HOB >30'  · Daily sedation holiday as indicated  · SBT as tolerated/appropriate  · Stress ulcer prophylaxis. Protonix   · DVT prophylaxis. · Need for Lines, mccarthy assessed. · Restraints need. · Palliative care evaluation. Subjective/History:       Patient is a 62 y.o. male with history of severe PVD, 1ppd tobacco use, ETOH, & Non-small cell lung ca (which has since resolved per Heme onc Dr. Anurag Collins s/p Chemo) present in [facility] for gangrene toes. According to his son, pt has been in and out of multiple hospitals between [facility], [address] [place] [address], and [facility] for LLE pain 2/2 gangrene. Upon this hospitalization, pt had developed BR[place] likely due to ischemia, and had elevated CK. Pt went in for L AKA 7/17 and [place] was placed for HD today. Upon my assessment, patient came to ICU in 10/10 pain, disoriented, agitated, and attempting to get out of bed. He had significantly garbled speech, yet maintained his O2sats. He was briefly placed on Precedex gtt and Fentanyl gtt for pain. Pt appears in distress. ICU will be consulted. Pt may require intubation. The patient is critically ill and can not provide additional history due to Unable to speak. Past Medical History:   Diagnosis Date    Anemia     Chronic kidney disease     Depression     Emphysema (subcutaneous) (surgical) resulting from a procedure     Hypertension     Hyponatremia     Non-small cell lung cancer ([facility] [address] 2018    rt upper lung    Peripheral vascular disease ([address]     Seizure ([facility] [address] [facility]     Seizures ([address]     Sepsis ([facility] [address]         History reviewed. No pertinent surgical history. Prior to Admission medications    Medication Sig Start Date End Date Taking?  Authorizing Provider   amoxicillin (AMOXIL) 500 mg capsule Take 1 Cap by mouth every eight (8) hours. 7/7/19   Nina Troy PA-C   ciprofloxacin HCl (CIPRO) 750 mg tablet Take 1 Tab by mouth every twelve (12) hours. 7/7/19   Ganga Richard PA-C   atorvastatin (LIPITOR) 40 mg tablet Take 1 Tab by mouth nightly. 7/6/19   Ganga Richard PA-C   carvedilol (COREG) 3.125 mg tablet Take 1 Tab by mouth two (2) times daily (with meals). 7/6/19   Ganga Richard PA-C   furosemide (LASIX) 20 mg tablet Take 1 Tab by mouth every Monday, Wednesday, Friday. 7/8/19   Ganga Richard PA-C   clopidogrel (PLAVIX) 75 mg tab Take 75 mg by mouth daily.     Provider, Historical       Current Facility-Administered Medications   Medication Dose Route Frequency    sodium chloride (NS) flush 5-40 mL  5-40 mL IntraVENous Q8H    insulin lispro (HUMALOG) injection   SubCUTAneous ONCE    0.9% sodium chloride infusion  25 mL/hr IntraVENous CONTINUOUS    prochlorperazine (COMPAZINE) injection 5 mg  5 mg IntraVENous ONCE    [START ON 7/19/2019] VANCOMYCIN INFORMATION NOTE   Other ONCE    dexmedeTOMidine (PRECEDEX) 400 mcg in 0.9% sodium chloride 100 mL infusion  0.2-0.7 mcg/kg/hr IntraVENous TITRATE    fentaNYL citrate (PF) injection 12.5 mcg  12.5 mcg IntraVENous ONCE    fentaNYL citrate (PF) 50 mcg/mL injection        carvedilol (COREG) tablet 6.25 mg  6.25 mg Oral BID WITH MEALS    heparin (porcine) injection 5,000 Units  5,000 Units SubCUTAneous Q8H    VANCOMYCIN INFORMATION NOTE 1 Each  1 Each Other Rx Dosing/Monitoring    therapeutic multivitamin (THERAGRAN) tablet 1 Tab  1 Tab Oral DAILY    cefTRIAXone (ROCEPHIN) 1 g in 0.9% sodium chloride (MBP/ADV) 50 mL MBP  1 g IntraVENous Q24H       Allergies   Allergen Reactions    Pcn [Penicillins] Unknown (comments)     Patients states that he was allergic to PCN as a child because he did not like receiving PCN injections, not because he developed a reaction to PCN administration         Social History     Tobacco Use    Smoking status: Current Every Day Smoker    Smokeless tobacco: Never Used   Substance Use Topics    Alcohol use: Yes        History reviewed. No pertinent family history. Review of Systems:  Pertinent items are noted in HPI. Objective:   Vital Signs:    Visit Vitals  /89 (BP 1 Location: Right arm, BP Patient Position: At rest;Head of bed elevated (Comment degrees)) Comment (BP Patient Position): 30   Pulse 100   Temp 99 °F (37.2 °C)   Resp 19   Ht 6' (1.829 m)   Wt 58.8 kg (129 lb 10.1 oz)   SpO2 100%   BMI 17.58 kg/m²       O2 Device: Oxygen mask   O2 Flow Rate (L/min): 2 l/min   Temp (24hrs), Av.3 °F (36.8 °C), Min:98 °F (36.7 °C), Max:99 °F (37.2 °C)       Intake/Output:   Last shift:       07 -  190  In: 250 [I.V.:250]  Out: 50   Last 3 shifts: 07/15 190 -  0700  In: 899.5 [I.V.:581.3]  Out: 1375 [Urine:1375]    Intake/Output Summary (Last 24 hours) at 2019 1303  Last data filed at 2019 1002  Gross per 24 hour   Intake 568.2 ml   Output 800 ml   Net -231.8 ml       Ventilator Settings:  Mode Rate Tidal Volume Pressure FiO2 PEEP                    Peak airway pressure:      Minute ventilation:        ARDS network Guidelines:   Lung protective strategy and Plateau  Pressure goal < 30 cm H2O goals  Oxygenation Goals PaO2 55-80 mm Hg or SaO2 88-95%  PH goal 7.30-7.45    VAP bundle:  Reviewed. [place] tube to suction at 20-30 cm Hg. Maintain [place] tube with 5-10ml air every 4 hours  Routine oral care every 4 hours  Elevation of head > 45 degree  Daily sedation holiday and SBT evaluation starting at 6.00am.    Physical Exam:     General:  Agitated   Head:  Normocephalic, without obvious abnormality, atraumatic. Eyes:  Conjunctivae/corneas clear. PERRL,   Nose: Nares normal. Septum midline. Mucosa normal. No drainage or sinus tenderness.    Throat: Lips, mucosa, and tongue normal. Teeth and gums normal.   Neck: Supple, symmetrical, trachea midline, no adenopathy, no carotid bruit and no JVD. Lungs:   Coarse, crackles, and rhonchi bilateral lungs and lobes. Heart:  RRR, S1, S2 normal, no m/r/g   Abdomen:   Soft, non-tender. Bowel sounds normal. No masses,  No organomegaly. Extremities: Extremities normal, atraumatic, no cyanosis or edema. Pulses: 2+ and symmetric all extremities. Skin: Skin color, texture, turgor normal. No rashes or lesions   Neurologic: Agitated, disoriented, illogical     Devices:  · ETT: None  · OGT: None  · Lines:  Yes  · Drains: None  · Galvez: Yes    Data:     Recent Results (from the past 24 hour(s))   GLUCOSE, POC    Collection Time: 07/16/19  5:21 PM   Result Value Ref Range    Glucose (POC) 85 70 - 110 mg/dL   GLUCOSE, POC    Collection Time: 07/16/19  9:26 PM   Result Value Ref Range    Glucose (POC) 91 70 - 110 mg/dL   RENAL FUNCTION PANEL    Collection Time: 07/17/19 12:00 AM   Result Value Ref Range    Sodium 130 (L) 136 - 145 mmol/L    Potassium 5.7 (H) 3.5 - 5.5 mmol/L    Chloride 98 (L) 100 - 108 mmol/L    CO2 18 (L) 21 - 32 mmol/L    Anion gap 14 3.0 - 18 mmol/L    Glucose 79 74 - 99 mg/dL    BUN 75 (H) 7.0 - 18 MG/DL    Creatinine 9.26 (H) 0.6 - 1.3 MG/DL    BUN/Creatinine ratio 8 (L) 12 - 20      GFR est AA 7 (L) >60 ml/min/1.73m2    GFR est non-AA 6 (L) >60 ml/min/1.73m2    Calcium 7.3 (L) 8.5 - 10.1 MG/DL    Phosphorus 10.3 (H) 2.5 - 4.9 MG/DL    Albumin 1.8 (L) 3.4 - 5.0 g/dL   PROTHROMBIN TIME + INR    Collection Time: 07/17/19 12:00 AM   Result Value Ref Range    Prothrombin time 15.1 11.5 - 15.2 sec    INR 1.2 0.8 - 1.2     CBC WITH AUTOMATED DIFF    Collection Time: 07/17/19 12:00 AM   Result Value Ref Range    WBC 21.5 (H) 4.6 - 13.2 K/uL    RBC 2.58 (L) 4.70 - 5.50 M/uL    HGB 7.5 (L) 13.0 - 16.0 g/dL    HCT 21.6 (L) 36.0 - 48.0 %    MCV 83.7 74.0 - 97.0 FL    MCH 29.1 24.0 - 34.0 PG    MCHC 34.7 31.0 - 37.0 g/dL    RDW 15.8 (H) 11.6 - 14.5 %    PLATELET 329 915 - 448 K/uL    MPV 7.3 (L) 9.2 - 11.8 FL    NEUTROPHILS 84 (H) 42 - 75 %    BAND NEUTROPHILS 2 0 - 5 %    LYMPHOCYTES 7 (L) 20 - 51 %    MONOCYTES 7 2 - 9 %    EOSINOPHILS 0 0 - 5 %    BASOPHILS 0 0 - 3 %    ABS. NEUTROPHILS 18.1 (H) 1.8 - 8.0 K/UL    ABS. LYMPHOCYTES 1.5 0.8 - 3.5 K/UL    ABS. MONOCYTES 1.5 (H) 0 - 1.0 K/UL    ABS. EOSINOPHILS 0.0 0.0 - 0.4 K/UL    ABS. BASOPHILS 0.0 0.0 - 0.1 K/UL    PLATELET COMMENTS ADEQUATE PLATELETS      RBC COMMENTS ANISOCYTOSIS  1+        RBC COMMENTS HYPOCHROMIA  1+        DF MANUAL     SODIUM    Collection Time: 07/17/19 11:30 AM   Result Value Ref Range    Sodium 132 (L) 136 - 145 mmol/L           No results for input(s): FIO2I, IFO2, HCO3I, IHCO3, HCOPOC, PCO2I, PCOPOC, IPHI, PHI, PHPOC, PO2I, PO2POC in the last 72 hours. No lab exists for component: IPOC2    Telemetry:normal sinus rhythm    Imaging:  I have personally reviewed the patients radiographs and have reviewed the reports:    CXR [date]:    CXR Results  (Last 48 hours)    None            CT HEAD/CHEST/ABD/PELVIS [7/17/19]: IMPRESSION  IMPRESSION:     1. Mild interstitial edema is present in both lower lungs. Small to moderate  bilateral pleural effusions with adjacent atelectasis. Small pericardial  effusion is present.     2. Minimal soft tissue prominence is present in the right upper lobe at the site  of the prior malignancy which has the appearance of soft tissue scarring. No  focal nodular regions to suggest recurrent disease, however prior studies are  not available for review.     3. Doubtful thickening and surrounding edema within the visualized portions of  the proximal stomach, which could be suggestive of mild gastritis.     4. Layering secretions are present in the distal trachea in the right main  bronchus, concerning for small amount of aspiration.         Total of   83  min critical care time spent at bedside during the course of care providing evaluation,management and care decisions and ordering appropriate treatment related to critical care problems exclusive of procedures. The reason for providing this level of medical care for this critically ill patient was due a critical illness that impaired one or more vital organ systems such that there was a high probability of imminent or life threatening deterioration in the patients condition. This care involved high complexity decision making to assess, manipulate, and support vital system functions, to treat this degree vital organ system failure and to prevent further life threatening deterioration of the patients condition.         Briana Molina NP-BC     Pulmonary, Critical Care Medicine  [facility]

## 2019-07-17 NOTE — PROGRESS NOTES
Problem: Pain  Goal: *Control of Pain  Outcome: Progressing Towards Goal     Problem: Patient Education: Go to Patient Education Activity  Goal: Patient/Family Education  Outcome: Progressing Towards Goal     Problem: Falls - Risk of  Goal: *Absence of Falls  Description  Document Milta Raring Fall Risk and appropriate interventions in the flowsheet. Outcome: Progressing Towards Goal  Note:   Fall Risk Interventions:  Mobility Interventions: Bed/chair exit alarm, Patient to call before getting OOB    Mentation Interventions: Adequate sleep, hydration, pain control, Bed/chair exit alarm, Door open when patient unattended    Medication Interventions: Bed/chair exit alarm    Elimination Interventions: Bed/chair exit alarm, Call light in reach    History of Falls Interventions: Bed/chair exit alarm, Door open when patient unattended         Problem: Patient Education: Go to Patient Education Activity  Goal: Patient/Family Education  Outcome: Progressing Towards Goal     Problem: Impaired Skin Integrity/Pressure Injury Treatment  Goal: *Improvement of Existing Pressure Injury  Outcome: Progressing Towards Goal  Goal: *Prevention of pressure injury  Description  Document Mikie Scale and appropriate interventions in the flowsheet.   Outcome: Progressing Towards Goal  Note:   Pressure Injury Interventions:  Sensory Interventions: Assess changes in LOC, Check visual cues for pain    Moisture Interventions: Absorbent underpads    Activity Interventions: Pressure redistribution bed/mattress(bed type)    Mobility Interventions: Assess need for specialty bed, Float heels    Nutrition Interventions: Document food/fluid/supplement intake    Friction and Shear Interventions: Foam dressings/transparent film/skin sealants, Minimize layers                Problem: Patient Education: Go to Patient Education Activity  Goal: Patient/Family Education  Outcome: Progressing Towards Goal     Problem: Pressure Injury - Risk of  Goal: *Prevention of pressure injury  Description  Document Mikie Scale and appropriate interventions in the flowsheet.   Outcome: Progressing Towards Goal  Note:   Pressure Injury Interventions:  Sensory Interventions: Assess changes in LOC, Check visual cues for pain    Moisture Interventions: Absorbent underpads    Activity Interventions: Pressure redistribution bed/mattress(bed type)    Mobility Interventions: Assess need for specialty bed, Float heels    Nutrition Interventions: Document food/fluid/supplement intake    Friction and Shear Interventions: Foam dressings/transparent film/skin sealants, Minimize layers                Problem: Patient Education: Go to Patient Education Activity  Goal: Patient/Family Education  Outcome: Progressing Towards Goal     Problem: Discharge Planning  Goal: *Discharge to safe environment  Outcome: Progressing Towards Goal     Problem: Nutrition Deficit  Goal: *Optimize nutritional status  Outcome: Progressing Towards Goal     Problem: Patient Education: Go to Patient Education Activity  Goal: Patient/Family Education  Outcome: Progressing Towards Goal     Problem: Patient Education: Go to Patient Education Activity  Goal: Patient/Family Education  Outcome: Progressing Towards Goal     Problem: Ventilator Management  Goal: *Adequate oxygenation and ventilation  Outcome: Progressing Towards Goal  Goal: *Patient maintains clear airway/free of aspiration  Outcome: Progressing Towards Goal  Goal: *Absence of infection signs and symptoms  Outcome: Progressing Towards Goal  Goal: *Normal spontaneous ventilation  Outcome: Progressing Towards Goal     Problem: Patient Education: Go to Patient Education Activity  Goal: Patient/Family Education  Outcome: Progressing Towards Goal

## 2019-07-17 NOTE — PROGRESS NOTES
ABG ordered twice earlier. Pt was unstable and  inconsolable making it unsafe to obtain an ABG. Pt has since been intubated and currently receiving dialysis directly after, intubation, OG tube insertion(pt agitated) and CXR. RT unable to obtain ABG. Will attempt again after dialysis.

## 2019-07-17 NOTE — PROGRESS NOTES
9331-  Bedside and Verbal shift change report given to Catherine Yuen RN (oncoming nurse) by Liza Perrin RN (offgoing nurse). Report included the following information SBAR, Kardex, Intake/Output, MAR and Recent Results.

## 2019-07-17 NOTE — PROGRESS NOTES
1230: Received report from [address] [place] [address]. Received patient AMS,patient tries to get out of bed, and remove equipment. 1515: Patient intubated due to not protecting airway, sedation introduced to patient. 1600: Patient revieved dialysis    1900: Bedside and Verbal shift change report given to Karen Oliva  (oncoming nurse) by Jorge Joseph (offgoing nurse). Report included the following information Procedure Summary, Intake/Output, Recent Results, Med Rec Status and Cardiac Rhythm sinus .

## 2019-07-17 NOTE — INTERVAL H&P NOTE
H&P Update:  Marco Antonio Carlisle was seen and examined. History and physical has been reviewed. The patient has been examined. There have been no significant clinical changes since the completion of the originally dated History and Physical.  Patient identified by surgeon; surgical site was confirmed by patient and surgeon. Plan for L AKA and insertion of temporary dialysis catheter in OR today. Pt unable to consent due to altered mental status; consent already obtained from patient's son. I spoke with patient and son at bedside preoperatively and again discussed the plan and explained associated risks. Pt's son understands and accepts.

## 2019-07-17 NOTE — PROGRESS NOTES
Pt to be discharged from PT this am secondary to surgery today for amputation and subsequent change of pt mobility plan of care. Please reorder after surgery as appropriate with updated activity guidelines/restrictions.     Shabbir Gayle, PTA

## 2019-07-17 NOTE — ANESTHESIA POSTPROCEDURE EVALUATION
Procedure(s):  Left Above Knee Amputation and placement of temporary hemodialysis catheter ([place]) with venogram.    general    Anesthesia Post Evaluation      Multimodal analgesia: multimodal analgesia used between 6 hours prior to anesthesia start to PACU discharge  Patient location during evaluation: PACU  Patient participation: complete - patient participated  Level of consciousness: agitated, awake and confused  Pain management: adequate  Airway patency: patent  Anesthetic complications: no  Cardiovascular status: acceptable  Respiratory status: acceptable  Hydration status: acceptable  Post anesthesia nausea and vomiting:  none      Vitals Value Taken Time   /107 7/17/2019 11:25 AM   Temp 36.7 °C (98.1 °F) 7/17/2019 10:09 AM   Pulse 101 7/17/2019 11:27 AM   Resp 23 7/17/2019 11:27 AM   SpO2 100 % 7/17/2019 11:27 AM   Vitals shown include unvalidated device data.

## 2019-07-17 NOTE — PERIOP NOTES
1006 Pt received to PACU and connected to monitor. Bedside report given by Mikhail Cheng RN. Pt presents aggressive and combative. HR > 105, unable to obtain full intial VS upon arrival. Nurses at bedside. Will continue to monitor. 1010 Unable to reorient patient and deescalate behavior. Pt not following commands and still presents with poor safety awareness. Anesthesia notified. 1 Dr. Anna Cutler at bedside to assess pt status. Pt will be transferred to stepdown ICU for further care. 1052 Sitter at bedside to assist w/ care. Will follow pt through transfer. 1124 Phlebotomy at bedside for lab draw. [address] [facility],[address] to update pt's family on current status and room assignment. [address] [facility] [address]    Vitals:    07/17/19 1041 07/17/19 1051 07/17/19 1056 07/17/19 1112   BP:    159/88   Pulse: (!) 103 (!) 102 (!) 102 (!) 101   Resp: 22 20 19 21   Temp:       SpO2: 96% 100% 98% 100%   Weight:       Height:         Cardiac rhythm: Sinus Tachycardia     Lines and Drains  Peripheral Intravenous Line:   Peripheral IV 07/17/19 Left Forearm (Active)   Site Assessment Clean, dry, & intact 7/17/2019 12:38 PM   Phlebitis Assessment 0 7/17/2019 12:38 PM   Dressing Status Clean, dry, & intact 7/17/2019 12:38 PM   Dressing Type Transparent;Tape;Other (comments) 7/17/2019 12:38 PM   Hub Color/Line Status Green; Infusing 7/17/2019 12:38 PM   Action Taken Open ports on tubing capped 7/17/2019 12:38 PM   Alcohol Cap Used Yes 7/17/2019 12:38 PM    and Drain(s): Wound  Wound Toe (Comment  which one) Other (Comment); Left (Active)   Dressing Status Removed;Dry 7/15/2019  3:30 PM   Dressing Type Gauze wrap (gloria);4 x 4 7/15/2019  3:30 PM   Drainage Amount None 7/15/2019  3:30 PM   Wound Odor None 7/15/2019  3:30 PM   Cleansing and Cleansing Agents  Betadine 7/15/2019  3:30 PM   Dressing Changed Changed/New 7/15/2019  3:30 PM   Dressing Type Applied 4 x 4;Gauze wrap (gloria) 7/15/2019  3:30 PM   Procedure Tolerated Fair 7/15/2019 3:30 PM   Number of days: 17       Wound Heel Left (Active)   Dressing Status Clean, dry, and intact; Old drainage 7/15/2019  3:30 PM   Dressing Type 4 x 4;Gauze wrap (gloria) 7/15/2019  3:30 PM   Non-staged Wound Description Full thickness 7/15/2019  3:30 PM   Tissue Type Percent Black 60 % 7/15/2019  3:30 PM   Tissue Type Percent Maroon/Purple 20 % 7/15/2019  3:30 PM   Tissue Type Percent Red 20 7/15/2019  3:30 PM   Drainage Amount Small 7/15/2019  3:30 PM   Drainage Color Brown 7/15/2019  3:30 PM   Wound Odor None 7/15/2019  3:30 PM   Shanel-wound Assessment Black 7/15/2019  3:30 PM   Cleansing and Cleansing Agents  Betadine 7/15/2019  3:30 PM   Dressing Changed Changed/New 7/15/2019  3:30 PM   Dressing Type Applied Gauze wrap (gloria) 7/15/2019  3:30 PM   Procedure Tolerated Fair 7/15/2019  3:30 PM   Number of days: 16       Wound Toe (Comment  which one) Left (Active)   Dressing Status Removed 7/15/2019  3:30 PM   Dressing Type 4 x 4;Gauze wrap (gloria) 7/15/2019  3:30 PM   Condition of Base Other (comment) 7/14/2019  8:00 PM   Tissue Type Percent Black 100 % 7/15/2019  3:30 PM   Drainage Amount None 7/15/2019  3:30 PM   Wound Odor None 7/15/2019  3:30 PM   Shanel-wound Assessment Black 7/15/2019  3:30 PM   Dressing Changed Changed/New 7/15/2019  3:30 PM   Dressing Type Applied 4 x 4;Gauze wrap (gloria) 7/15/2019  3:30 PM   Number of days: 7       Wound (Active)   Dressing Status Removed;Dry 7/15/2019  3:30 PM   Dressing Type 4 x 4;Gauze wrap (gloria) 7/15/2019  3:30 PM   Non-staged Wound Description Full thickness 7/15/2019  3:30 PM   Tissue Type Percent Black 80 % 7/15/2019  3:30 PM   Tissue Type Percent Red 10 7/15/2019  3:30 PM   Tissue Type Percent Yellow 10 7/15/2019  3:30 PM   Drainage Amount None 7/15/2019  3:30 PM   Dressing Type Applied 4 x 4;Gauze wrap (gloria) 7/15/2019  3:30 PM   Procedure Tolerated Fair 7/15/2019  3:30 PM   Number of days:        Wound Leg Left (Active)   Dressing Status Intact;Breakthrough drainage 7/17/2019 10:09 AM   Dressing Type Fluffs;Elastic bandage;Stockinette; Sutures 7/17/2019 10:09 AM   Incision Site Well Approximated Yes 7/17/2019 10:09 AM   Number of days: 0        Intake and Output    Intake/Output Summary (Last 24 hours) at 7/17/2019 1241  Last data filed at 7/17/2019 1002  Gross per 24 hour   Intake 568.2 ml   Output 800 ml   Net -231.8 ml         Report called to Smiley Benito in 2700 at [phone]    Patient transported to [address] [place] [address]  at [facility]

## 2019-07-17 NOTE — PERIOP NOTES
Spoke with bedside RN, Rach Butterfield. Asked RN to complete pre-procedure checklist and pre-surgery prep.    K 5.7 - Dr. Sherlyn Posada aware.

## 2019-07-17 NOTE — PROGRESS NOTES
Internal Medicine Progress Note    Patient's Name: Dereck Asa  Admit Date: 7/10/2019  Length of Stay: 7      Assessment/Plan     Principal Problem:    Sepsis ([address] (7/10/2019)    Active Problems:    Peripheral vascular disease ([address] (6/29/2019)      Gangrene ([address] (7/10/2019)      Acute kidney injury superimposed on chronic kidney disease ([address] (7/10/2019)      Acute respiratory failure ([address] (7/17/2019)      Malnourished ([address] (6/29/2019)      Hyponatremia (6/29/2019)      Dehydration (7/10/2019)      Anemia (7/16/2019)      Sepsis  - gangrenous first and third toe   - Recent wound culture from previous admission susceptible to rocephin and vanc, continue abx, renally dose  - repeat wound culture - Stenotrophomonas maltophilia, enterococcus faecalis, coag neg staph     Gangrene/PVD  - Recent admission, discharged on PO oral abx, was expected to f/u with vasc for multilevel revascularization but was unable to get to f/u due to ride issues and extent of pain  - Vasc consulted - appreciate  - podiatry consulted - appreciate   - open dry wound anterior, lateral LLE proximal tibia   - Betadine to toes and Aquacel Ag to heel daily per podiatry  - patient continuously refused to complete MRI  - L AKA done today     [place]  - strict I&Os, mccarthy  - HD catheter placed  - nephrology following - recommendations per them  - refused renal US    Acute resp failure  - intubated 7/17  - PCCM consulted - vent mgmt per them    Malnourished  - Nutritional supplements with all meals    Hyponatremia  - acute on chronic, stable  - Nephrology consult - appreciate  - improving    Anemia  - PRBCs transfused as needed for hgb <7  - monitor H&H    - Cont acceptable home medications for chronic conditions   - DVT protocol    I have personally reviewed all pertinent labs and films that have officially resulted over the last 24 hours. I have personally checked for all pending labs that are awaiting final results.     Interval History Ede Sarmiento is a 62 y.o. male with a PMHx listed below of who presented to the ED with complaints of worsening LLE pain. Patient was admitted on 6/30/19 and discharged three days ago for gangrenous toes. Vascular surgery and podiatry were consulted then and he was recommended for multilevel revascularization, he was to follow up as out patient and said that his son could transport him. Per patient now, son was unable to transport patient to f/u appointment, so he returned due to persistent symptoms.       In the ED vascular surgery consulted, podiatry consulted. He presents with worsening creatinine and sodium. I spoke with Nephrology and they recommend fluids. His only complaint is pain, no fevers, chills, weakness, AMS. Sodium 121, will be admitted for further eval. He doesn't know if he has been taking his lasix, but says hes been taking his abx\"    Per podiatry - no need for urgent amputation, wait until revascularization. Nephrology consulted - Acute on chronic hyponatremia improved with gentle IVF. Oncology consulted - bone scan ordered to complete restaging - No definite sonographic evidence of osseous metastatic disease. Repeat wound culture 7/11 - Stenotrophomonas maltophilia. Patient continually refused to complete MRI. PT/OT recs SNF. Patient renal function declined rapidly, patient will have dialysis 7/17. PRBCs transfused as needed for hgb <7. L AKA done 7/17. Patient developed acute resp failure postoperatively and was intubated. Aztreonam started d/t thick yellow secretions seen during intubation.     Subjective     Pt s/e @ bedside. No major events overnight. Patient seen s/p surgery - confusion, agitation.     Objective     Visit Vitals  /73   Pulse 78   Temp 98.8 °F (37.1 °C) (Axillary)   Resp 13   Ht 6' (1.829 m)   Wt 58.8 kg (129 lb 10.1 oz)   SpO2 100%   BMI 17.58 kg/m²       Physical Exam:  General Appearance: NAD, confused  HENT: normocephalic/atraumatic, moist mucus membranes  Neck: No JVD, supple  Lungs: CTA with normal respiratory effort  CV: RRR, no m/r/g  Abdomen: soft, non-tender, normal bowel sounds  Extremities: no cyanosis, no peripheral edema, BLE are atrophic, no hair growth, LLE has necrotic skin over anterior tibia, L great toe is erythematous and edematous, L 3rd toe is gangrenous  Neuro: No new focal deficits, diminished sensation entire left foot    Intake and Output:  Current Shift:  07/17 0701 - 07/17 1900  In: 250 [I.V.:250]  Out: 350 [Urine:300]  Last three shifts:  07/15 1901 - 07/17 0700  In: 899.5 [I.V.:581.3]  Out: 1375 [Urine:1375]    Lab/Data Reviewed:  BMP:   Lab Results   Component Value Date/Time     (L) 07/17/2019 02:25 PM    K 5.7 (H) 07/17/2019 02:25 PM     07/17/2019 02:25 PM    CO2 20 (L) 07/17/2019 02:25 PM    AGAP 12 07/17/2019 02:25 PM    GLU 78 07/17/2019 02:25 PM    BUN 76 (H) 07/17/2019 02:25 PM    CREA 9.38 (H) 07/17/2019 02:25 PM    GFRAA 7 (L) 07/17/2019 02:25 PM    GFRNA 6 (L) 07/17/2019 02:25 PM     CBC:   Lab Results   Component Value Date/Time    WBC 19.8 (H) 07/17/2019 02:25 PM    HGB 6.7 (L) 07/17/2019 02:25 PM    HCT 19.4 (L) 07/17/2019 02:25 PM     07/17/2019 02:25 PM         Imaging Reviewed:  Xr Chest Sngl V    Result Date: 7/17/2019  EXAM: CHEST RADIOGRAPH, SINGLE VIEW CLINICAL INDICATION/HISTORY: Short of breath. Suspect fluid overload COMPARISON: Single view chest 7/10/2019 TECHNIQUE: Portable frontal view of the chest was obtained. _______________ FINDINGS: SUPPORT DEVICES: None. HEART AND MEDIASTINUM: There is central vascular congestion compared to recent chest film although heart size remains normal. LUNGS AND PLEURAL SPACES: New opacification inferior left hemithorax with blunting left costophrenic angle with similar but less prominent changes on the right. Linear density peripheral right upper lobe with several metallic densities consistent with fiducial markers is stable.   BONY THORAX AND SOFT TISSUES: No acute osseous abnormality. _______________     IMPRESSION: 1. New bilateral pleural effusions with probable lower lobe areas of atelectasis, left greater than right. Mild central vascular congestion although heart size remains normal.    Xr Chest Port    Result Date: 7/17/2019  EXAM: CHEST RADIOGRAPH, SINGLE VIEW CLINICAL INDICATION/HISTORY: Respiratory failure. Intubated COMPARISON: AP portable chest film done earlier today and on 7/10/2019 TECHNIQUE: Portable frontal view of the chest was obtained. _______________ FINDINGS: SUPPORT DEVICES: Endotracheal tube is now present in good position well above the aristeo. NG tube is also present extending below the diaphragm although the tip is not included on this exam. HEART AND MEDIASTINUM: Heart size remains normal. Pulmonary vascularity appears less engorged possibly related to better inspiration on this exam.  Aorta is normal in caliber with mild calcification. LUNGS AND PLEURAL SPACES: The interstitial changes in the lung bases are less prominent. Blunting of the costophrenic angles also appears less prominent with minimal blunting remaining. No definite focal consolidation. No pneumothorax. BONY THORAX AND SOFT TISSUES: No acute osseous abnormality. _______________     IMPRESSION: 1. Endotracheal tube and NG tube good position. 2. Better inspiration with only possible tiny effusions suspected on this study and resolved basilar interstitial changes which were seen on recent exam done earlier this afternoon. Xr Abd Port  1 V    Result Date: 7/17/2019  EXAM: KUB INDICATION: NG tube placement COMPARISON: None. _______________ FINDINGS: Portable supine abdominal film was performed. NG tube is present with tip and sidehole extending well below the diaphragm. Mild blunting bilateral costophrenic angles consistent with small effusions. This appears smaller than on chest film done earlier this afternoon.  Nonobstructive bowel gas pattern. _______________ IMPRESSION: 1. NG tube good position. Nonobstructive bowel gas pattern. 2. Findings suspicious for small bilateral effusions which appear smaller than on chest film done earlier this afternoon. Xr Angio Extremity Uni Si    Result Date: 7/17/2019  Fluoroscopy was used during surgery for this procedure under the supervision of the attending surgeon. Start Time:     0800 hours End Time:      0950 hours # of Images:  0       IMPRESSION:   Administrative report.   CB       Medications Reviewed:  Current Facility-Administered Medications   Medication Dose Route Frequency    sodium chloride (NS) flush 5-40 mL  5-40 mL IntraVENous Q8H    sodium chloride (NS) flush 5-40 mL  5-40 mL IntraVENous PRN    0.9% sodium chloride infusion  25 mL/hr IntraVENous CONTINUOUS    0.9% sodium chloride infusion 250 mL  250 mL IntraVENous PRN    0.9% sodium chloride infusion 250 mL  250 mL IntraVENous PRN    prochlorperazine (COMPAZINE) injection 5 mg  5 mg IntraVENous ONCE    0.9% sodium chloride infusion  50 mL/hr IntraVENous DIALYSIS PRN    heparin (porcine) 1,000 unit/mL injection 1,000 Units  1,000 Units InterCATHeter DIALYSIS PRN    [START ON 7/19/2019] VANCOMYCIN INFORMATION NOTE   Other ONCE    sodium chloride (NS) flush 5-40 mL  5-40 mL IntraVENous Q8H    sodium chloride (NS) flush 5-40 mL  5-40 mL IntraVENous PRN    naloxone (NARCAN) injection 0.1 mg  0.1 mg IntraVENous PRN    ondansetron (ZOFRAN) injection 4 mg  4 mg IntraVENous PRN    senna (SENOKOT) tablet 8.6 mg  1 Tab Oral BID    fentaNYL (PF) 900 mcg/30 ml infusion soln  0-200 mcg/hr IntraVENous TITRATE    propofol (DIPRIVAN) infusion  0-50 mcg/kg/min IntraVENous TITRATE    chlorhexidine (PERIDEX) 0.12 % mouthwash 10 mL  10 mL Oral Q12H    albuterol-ipratropium (DUO-NEB) 2.5 MG-0.5 MG/3 ML  3 mL Nebulization Q6H RT    fentaNYL citrate (PF) injection 25-50 mcg  25-50 mcg IntraVENous Q1H PRN    [START ON 7/18/2019] cefepime (MAXIPIME) 0.5 g in 0.9% sodium chloride 100 mL IVPB  0.5 g IntraVENous Q24H    cefepime (MAXIPIME) 1 g in 0.9% sodium chloride (MBP/ADV) 50 mL MBP  1 g IntraVENous ONCE    dextrose 10 % infusion  50 mL/hr IntraVENous CONTINUOUS    oxyCODONE-acetaminophen (PERCOCET) 5-325 mg per tablet 1-2 Tab  1-2 Tab Oral Q4H PRN    carvedilol (COREG) tablet 6.25 mg  6.25 mg Oral BID WITH MEALS    heparin (porcine) injection 5,000 Units  5,000 Units SubCUTAneous Q8H    VANCOMYCIN INFORMATION NOTE 1 Each  1 Each Other Rx Dosing/Monitoring    therapeutic multivitamin (THERAGRAN) tablet 1 Tab  1 Tab Oral DAILY    sodium chloride (NS) flush 5-10 mL  5-10 mL IntraVENous PRN    acetaminophen (TYLENOL) tablet 650 mg  650 mg Oral Q4H PRN    ondansetron (ZOFRAN) injection 4 mg  4 mg IntraVENous Q4H PRN       STEPHANIE FlahertyPhyllis Ville [address]  Office:  [phone]  Pager: [phone]

## 2019-07-17 NOTE — PROGRESS NOTES
Vascular Surgery Progress Note  Patient ID:  Art Orta  [id number]  62 y.o.  1955    Admit Date: 7/10/2019  POD Day of Surgery    Procedure/Surgery:  Procedure(s):  Left Above Knee Amputation and Uldall Insertion      Subjective:     Patient c/o LLE pain. Objective:     Visit Vitals  /78 (BP 1 Location: Right arm, BP Patient Position: At rest)   Pulse 99   Temp 98.1 °F (36.7 °C)   Resp 20   Ht 6' (1.829 m)   Wt 58.8 kg (129 lb 10.1 oz)   SpO2 91%   BMI 17.58 kg/m²       Temp (24hrs), Av.3 °F (36.8 °C), Min:98 °F (36.7 °C), Max:98.8 °F (37.1 °C)        Physical Exam:  LLE in dressings, contracted at knee.     Labs:   Recent Results (from the past 24 hour(s))   DUPLEX RENAL ART/MARJAN BILATERAL    Collection Time: 19  8:40 AM   Result Value Ref Range    Left kidney length 11.98 cm    Left kidney width 5.64 cm   GLUCOSE, POC    Collection Time: 19  8:47 AM   Result Value Ref Range    Glucose (POC) 72 70 - 110 mg/dL   SODIUM    Collection Time: 19 10:15 AM   Result Value Ref Range    Sodium 128 (L) 136 - 145 mmol/L   TYPE + CROSSMATCH    Collection Time: 19 10:15 AM   Result Value Ref Range    Crossmatch Expiration 2019     ABO/Rh(D) O POSITIVE     Antibody screen NEG     CALLED TO: ANNY MUNGUIA, 3000, ON 2019 AT 1325 BY TSH     Unit number [id number]     Blood component type  LR     Unit division 00     Status of unit ISSUED     Crossmatch result Compatible    GLUCOSE, POC    Collection Time: 19 11:51 AM   Result Value Ref Range    Glucose (POC) 84 70 - 110 mg/dL   GLUCOSE, POC    Collection Time: 19  5:21 PM   Result Value Ref Range    Glucose (POC) 85 70 - 110 mg/dL   GLUCOSE, POC    Collection Time: 19  9:26 PM   Result Value Ref Range    Glucose (POC) 91 70 - 110 mg/dL   RENAL FUNCTION PANEL    Collection Time: 19 12:00 AM   Result Value Ref Range    Sodium 130 (L) 136 - 145 mmol/L    Potassium 5.7 (H) 3.5 - 5.5 mmol/L    Chloride 98 (L) 100 - 108 mmol/L    CO2 18 (L) 21 - 32 mmol/L    Anion gap 14 3.0 - 18 mmol/L    Glucose 79 74 - 99 mg/dL    BUN 75 (H) 7.0 - 18 MG/DL    Creatinine 9.26 (H) 0.6 - 1.3 MG/DL    BUN/Creatinine ratio 8 (L) 12 - 20      GFR est AA 7 (L) >60 ml/min/1.73m2    GFR est non-AA 6 (L) >60 ml/min/1.73m2    Calcium 7.3 (L) 8.5 - 10.1 MG/DL    Phosphorus 10.3 (H) 2.5 - 4.9 MG/DL    Albumin 1.8 (L) 3.4 - 5.0 g/dL   PROTHROMBIN TIME + INR    Collection Time: 07/17/19 12:00 AM   Result Value Ref Range    Prothrombin time 15.1 11.5 - 15.2 sec    INR 1.2 0.8 - 1.2     CBC WITH AUTOMATED DIFF    Collection Time: 07/17/19 12:00 AM   Result Value Ref Range    WBC 21.5 (H) 4.6 - 13.2 K/uL    RBC 2.58 (L) 4.70 - 5.50 M/uL    HGB 7.5 (L) 13.0 - 16.0 g/dL    HCT 21.6 (L) 36.0 - 48.0 %    MCV 83.7 74.0 - 97.0 FL    MCH 29.1 24.0 - 34.0 PG    MCHC 34.7 31.0 - 37.0 g/dL    RDW 15.8 (H) 11.6 - 14.5 %    PLATELET 078 311 - 918 K/uL    MPV 7.3 (L) 9.2 - 11.8 FL    NEUTROPHILS 84 (H) 42 - 75 %    BAND NEUTROPHILS 2 0 - 5 %    LYMPHOCYTES 7 (L) 20 - 51 %    MONOCYTES 7 2 - 9 %    EOSINOPHILS 0 0 - 5 %    BASOPHILS 0 0 - 3 %    ABS. NEUTROPHILS 18.1 (H) 1.8 - 8.0 K/UL    ABS. LYMPHOCYTES 1.5 0.8 - 3.5 K/UL    ABS. MONOCYTES 1.5 (H) 0 - 1.0 K/UL    ABS. EOSINOPHILS 0.0 0.0 - 0.4 K/UL    ABS.  BASOPHILS 0.0 0.0 - 0.1 K/UL    PLATELET COMMENTS ADEQUATE PLATELETS      RBC COMMENTS ANISOCYTOSIS  1+        RBC COMMENTS HYPOCHROMIA  1+        DF MANUAL         Data Review:    CBC:   Lab Results   Component Value Date/Time    WBC 21.5 (H) 07/17/2019 12:00 AM    RBC 2.58 (L) 07/17/2019 12:00 AM    HGB 7.5 (L) 07/17/2019 12:00 AM    HCT 21.6 (L) 07/17/2019 12:00 AM    PLATELET 498 98/35/3472 12:00 AM      BMP:   Lab Results   Component Value Date/Time    Glucose 79 07/17/2019 12:00 AM    Sodium 130 (L) 07/17/2019 12:00 AM    Potassium 5.7 (H) 07/17/2019 12:00 AM    Chloride 98 (L) 07/17/2019 12:00 AM    CO2 18 (L) 07/17/2019 12:00 AM    BUN 75 (H) 07/17/2019 12:00 AM    Creatinine 9.26 (H) 07/17/2019 12:00 AM    Calcium 7.3 (L) 07/17/2019 12:00 AM       Assessment:     Principal Problem:    Sepsis ([address] (7/10/2019)    Active Problems:    Malnourished ([address] (6/29/2019)      Peripheral vascular disease ([address] (6/29/2019)      Hyponatremia (6/29/2019)      Acute kidney injury superimposed on chronic kidney disease ([address] (7/10/2019)      Dehydration (7/10/2019)      Gangrene ([address] (7/10/2019)      Anemia (7/16/2019)    PAD with gangrene LLE. Plan/Recommendations/Medical Decision Making:     Left AKA and nontunnelled HD catheter placement in OR.     Eric Moss MD  July 17, 2019  8:02 AM

## 2019-07-17 NOTE — BRIEF OP NOTE
BRIEF OPERATIVE NOTE    Date of Procedure: 7/17/2019   Preoperative Diagnosis: Peripheral vascular disease, with gangrene, and acute renal failure  Postoperative Diagnosis: Peripheral vascular disease with gangrene, and acute renal failure  Procedure(s):  Left Above Knee Amputation and placement of temporary hemodialysis catheter ([place]) with venogram  Surgeon(s) and Role:     * Jhonny Prescott MD - Primary           Surgical Staff:  Circ-1: Geovany Fortune RN  Scrub Tech-1: Parisa Mireles  Surg Asst-1: Shayna Estevez  Event Time In Time Out   Incision Start 07/17/2019 0836    Incision Close 07/17/2019 8565      Anesthesia: General   Estimated Blood Loss: 50 ml  Specimens:   ID Type Source Tests Collected by Time Destination   1 : Right above tghe knee amputation Other Leg, Left  Jhonny Prescott MD 7/17/2019 0919 Pathology      Findings: Left thigh musculature viable with no evidence of infection. Right femoral approach catheter tip in IVC and ready for immediate use. Complications: None. Implants: 20 cm nontunnelled hemodialysis catheter via right femoral vein.

## 2019-07-17 NOTE — PROGRESS NOTES
1230 Patient received from transferring RN from third floor. Patient moved to ICU bed with three performing lift, during process patient surgical dressing fell off. Area redressed with zeroform gauze as on original dressing, then covered with a telfa, the covered with Sharon gauze then ace wrap for compression, wound visualized with moderate wound closure with sutures, dressed wound then covered in stockinet. From time of arrival until 1515, patient very combative with extreme expressions of pain, confusion. Dr. Bertin Cosby notified, asked for and received consult from intensivist whom ordered several changes to pain medication. With multiple doses of pain medication, it became apparent patient would need intubation for effective treatment of pain control as well as dialysis that the patient required. 1515 Patient intubated with 7.5 ET tube using glydiscope by NP with standby assist by intensivist MD. This writer then added OG tube. ETT at 22 at the lip with good breath sounds, OG placement verified as well as ETT by xray. OG placed on intermittent suction. Patient antibiotic held for administration after dialysis. Patient on Fentany pca continuous, propofol drip and precedex drip with good comfort control. 1900 Report given to oncoming COLEEN Davis at the bedside inclusive of SBAR, lines drains drips and plan.

## 2019-07-17 NOTE — CDMP QUERY
Pt admitted with  Sepsis and PVD with dry gangrene and has malnutrition documented. For coding purposes please further specify type of malnutrition.  Underweight with  Moderate Chronic Malnutrition   Underweight with severe chronic malnutrition    Underweight with mild moderate chronic malnutrition   Other (please specify)   Unable to determine    The medical record reflects the following:      Risk Factors: 63 yo male admitted with sepsis, gangrene      Clinical Indicators: 7/16 Per Dietician note    Meets Criteria for Chronic Malnutrition       [x]Moderate Malnutrition, as evidenced by:              [x] Mild muscle wasting, loss of subcutaneous fat              [x] Nutritional intake <75% of recommended intake for >1 month              [x] Weight loss of 5% in 1 month, 7.5% in 3 months, 10% in 6 months, or 20% in 1 year (13.7% ~ 2 years (4/29/2017)     => Weight status is classified as underweight per BMI of 17.6. Patient with 13.7% weight loss over past 2 years per documented weights. PO intake is       Treatment: 1. Resume regular diet when medically indicated  2. Ensure Enlive TID  3.  Monitor weight, labs and PO intake  3. RD to follow        Thank you,  Ellen Ni RN CDI   [phone]

## 2019-07-17 NOTE — PERIOP NOTES
Pt arrived from 3S with son at bedside. Pt confused. Pt armband reviewed. Surgery and Anes consents completed with son. NS gtt started per Dr. Josh Obregon.

## 2019-07-17 NOTE — PROCEDURES
Judi Dorantes NP   Nurse Practitioner   Pulmonary Critical Care Disease   Procedures                             []Hover for details  [facility]  Pulmonary, Critical Care, and Sleep Medicine     Name: Jonas De La Cruz   MRN: [id number]     : 1955   Hospital: [address] [place] [address]   Date: 19            Intubation Note     Procedure: Emergent intubation     Indication: Respiratory insufficiency     Anesthesia- Rapid sequence:  Etomidate 20 mg,   Paralysis:      After assessing the airway, the patient underwent preoxygenation with 100% FiO2 for 5 min. Etomidate was given in rapid IV push. The Sellick maneuver was performed throughout the entire sequence. After adequate sedation intubation was performed. A 3 Glidescope blade was used to visualize the epiglottis and vocal chords. After positive identification of the vocal cords a 7.5 ET tube was placed into the trachea with direct visualization. The tube was seen passing through the vocal chords without difficulty. CO2 colorimetry was employed immediately to verify tube in airway with appropriate color change indicating detection of CO2. Water vapor was seen within the ET tube, and auscultation of the abdomen revealed no bubbling souds. Auscultation  and inspection of the chest after intubation showed symmetric chest excursion and symmetric air entry bilaterally. Chest X-ray has been ordered and is pending. The patient has been placed on a mechanical ventilator. There were no complications.         Briana Molina NP-BC     Pulmonary, Critical Care Medicine  [facility]

## 2019-07-17 NOTE — PROGRESS NOTES
Holding OT tx session for today. Pt scheduled for L AKA this AM. Will need updated OT orders & activity level following surgical procedure. Thank you.     Amaris Coyle MS OTR/L  Office Ext: [phone]

## 2019-07-17 NOTE — DIALYSIS
ACUTE HEMODIALYSIS FLOW SHEET    HEMODIALYSIS ORDERS: Physician: Dr. Jenelle Pillai     Dialyzer: revaclear   Duration: 2 hr  BFR: 300   DFR: 600   Dialysate:  Temp 35.5 K+   2    Ca+  2.5 Na 140 Bicarb 35   Weight:  58.8 kg    Patient Chart [x]     Unable to Obtain []   Dry weight/UF Goal: 500 ml Access Right femoral catheter  Needle Gauge NA    Heparin []  Bolus      Units    [] Hourly       Units    [x]None      Catheter locking solution Heparin   Pre BP:   91/87    Pulse:     78      Respirations: 15 Temperature:   98.8 Ax  Tx:         ml/Bolus  Other        [] N/A   Labs: Pre        Post:        [] N/A   Additional Orders(medications, blood products, hypotension management):       [x] N/A     [x] [facility] Consent Verified     CATHETER ACCESS: []N/A   [x]Right   []Left   []IJ     [x]Fem   []Chest wall   [] First use X-ray verified     []Tunnel                [] Non Tunneled   [x]No S/S infection  []Redness  []Drainage []Cultured []Swelling []Pain   [x]Medical Aseptic Prep Utilized   []Dressing Changed  [] Biopatch  Date:       []Clotted   []Patent   Flows: [x]Good  []Poor  []Reversed   If access problem,  notified: []Yes    [x]N/A  Date:           GRAFT/FISTULA ACCESS:  []N/A     []Right     []Left     []UE     []LE   []AVG   []AVF        []Buttonhole    []Medical Aseptic Prep Utilized   []No S/S infection  []Redness  []Drainage []Cultured []Swelling []Pain    Bruit:   [] Strong    [] Weak       Thrill :   [] Strong    [] Weak       Needle Gauge: NA   Length: NA   If access problem,  notified: []Yes     [x]N/A  Date:        Please describe access if present and not used:NA       GENERAL ASSESSMENT:    LUNGS:  Rate 15 SaO2%       99 % [] N/A    [x] Clear  [] Coarse  [] Crackles  [] Wheezing        [] Diminished     Location : []RLL   []LLL    []RUL  []HAVEN   Cough: []Productive  []Dry  []N/A   Respirations:  [x]Easy  []Labored   Therapy:  []RA  []NC  l/min    Mask: []NRB []Venti       O2% []Ventilator  [x]Intubated  [] Trach  [] BiPaP   CARDIAC: [x]Regular      [] Irregular   [] Pericardial Rub  [] JVD        []  Monitored  [] Bedside  [] Remotely monitored [] N/A  Rhythm:    EDEMA: [] None  [x]Generalized  [] Pitting [] 1    [] 2    [] 3    [] 4                 [] Facial  [] Pedal  []  UE  [] LE   SKIN:   [x] Warm  [] Hot     [] Cold   [x] Dry (New L AKA. Skin impairment on sacrum.)       [] Pale   [] Diaphoretic                  [] Flushed  [] Jaundiced  [] Cyanotic  [] Rash  [] Weeping   LOC:    [] Alert      []Oriented:    [] Person     [] Place  []Time               [] Confused  [] Lethargic  [x] Medicated  [] Non-responsive     GI / ABDOMEN:  [] Flat    [] Distended    [x] Soft    [] Firm   []  Obese                             [] Diarrhea  [] Bowel Sounds  [] Nausea  [] Vomiting       / URINE ASSESSMENT:[] Voiding   [] Oliguria  [] Anuria   [x]  Galvez     [] Incontinent    []  Incontinent Brief      []  Bathroom Privileges     PAIN: [] 0 []1  []2   []3   []4   []5   []6   []7   []8   []9   []10            Scale 0-10  Action/Follow Up: (Patient is sedated and intubated).    MOBILITY:  [] Amb    [] Amb/Assist    [x] Bed    [] Wheelchair  [] Stretcher      All Vitals and Treatment Details on Attached [facility] [place] [facility] [place]: [address]     [x] 1st Time Acute  [] Stat  [] Routine  [] Urgent     [] Acute Room  []  Bedside  [x] ICU/CCU  [] ER   Isolation Precautions:  [x] Dialysis   [] Airborne   [] Contact    [] Reverse   Special Considerations:         [] Blood Consent Verified [x]N/A     ALLERGIES: PCN  [] NKA          Code Status:  [x] Full Code  [] DNR  [] Other           HBsAg ONLY: Date Drawn 7/11/2019         [x]Negative []Positive []Unknown  2nd RN check :  N.S., RN   HBsAb: Date 7/11/2019    [x] Susceptible   [] [id number] []Not Drawn  [] Drawn     Current Labs:    Date of Labs: 7/17/2019          Today [x]    Results for Arabella María (MRN [id number]) as of 7/17/2019 17:04   Ref. Range 7/17/2019 14:25   WBC Latest Ref Range: 4.6 - 13.2 K/uL 19.8 (H)   RBC Latest Ref Range: 4.70 - 5.50 M/uL 2.30 (L)   HGB Latest Ref Range: 13.0 - 16.0 g/dL 6.7 (L)   HCT Latest Ref Range: 36.0 - 48.0 % 19.4 (L)   MCV Latest Ref Range: 74.0 - 97.0 FL 84.3   MCH Latest Ref Range: 24.0 - 34.0 PG 29.1   MCHC Latest Ref Range: 31.0 - 37.0 g/dL 34.5   RDW Latest Ref Range: 11.6 - 14.5 % 16.1 (H)   PLATELET Latest Ref Range: 135 - 420 K/uL 327   MPV Latest Ref Range: 9.2 - 11.8 FL 7.3 (L)   NEUTROPHILS Latest Ref Range: 40 - 73 % 87 (H)   LYMPHOCYTES Latest Ref Range: 21 - 52 % 7 (L)   MONOCYTES Latest Ref Range: 3 - 10 % 6   EOSINOPHILS Latest Ref Range: 0 - 5 % 0   BASOPHILS Latest Ref Range: 0 - 2 % 0   DF Latest Units:   AUTOMATED   ABS. NEUTROPHILS Latest Ref Range: 1.8 - 8.0 K/UL 17.2 (H)   ABS. LYMPHOCYTES Latest Ref Range: 0.9 - 3.6 K/UL 1.3   ABS. MONOCYTES Latest Ref Range: 0.05 - 1.2 K/UL 1.2   ABS. EOSINOPHILS Latest Ref Range: 0.0 - 0.4 K/UL 0.0   ABS. BASOPHILS Latest Ref Range: 0.0 - 0.1 K/UL 0.0   Sodium Latest Ref Range: 136 - 145 mmol/L 132 (L)   Potassium Latest Ref Range: 3.5 - 5.5 mmol/L 5.7 (H)   Chloride Latest Ref Range: 100 - 108 mmol/L 100   CO2 Latest Ref Range: 21 - 32 mmol/L 20 (L)   Anion gap Latest Ref Range: 3.0 - 18 mmol/L 12   Glucose Latest Ref Range: 74 - 99 mg/dL 78   BUN Latest Ref Range: 7.0 - 18 MG/DL 76 (H)   Creatinine Latest Ref Range: 0.6 - 1.3 MG/DL 9.38 (H)   BUN/Creatinine ratio Latest Ref Range: 12 - 20   8 (L)   Calcium Latest Ref Range: 8.5 - 10.1 MG/DL 7.3 (L)   GFR est non-AA Latest Ref Range: >60 ml/min/1.73m2 6 (L)   GFR est AA Latest Ref Range: >60 ml/min/1.73m2 7 (L)   CK Latest Ref Range: 39 - 308 U/L 5,909 (H)   CK-MB Index Latest Ref Range: 0.0 - 4.0 % 2.2   CK - MB Latest Ref Range: <3.6 ng/ml 129.7 (H)   Troponin-I, Qt. Latest Ref Range: 0.0 - 0.045 NG/ML 0.03           Cut and paste current labs here. DIET:  [] Renal    [] Other     [x] NPO     []  Diabetic      PRIMARY NURSE REPORT: First initial/Last name/Title      Pre Dialysis: RAMSES Augustine RN     Time: 1600      EDUCATION:    [x] Patient [] Other         Knowledge Basis: []None []Minimal [] Substantial   Barriers to learning  []N/A   [] Access Care     [] S&S of infection     [] Fluid Management     []K+     [x]Procedural    []Albumin     [] Medications     [] Tx Options     [] Transplant     [] Diet     [] Other   Teaching Tools:  [x] Explain  [] Demo  [] Handouts [] Video  Patient response:   [] Verbalized understanding  [] Teach back  [] Return demonstration [x] Requires follow up   Inappropriate due to        (Patient is intubated and sedated).      [x] Time Out/Safety Check  [x]Extracorporeal Circuit Tested for integrity       RO/HEMODIALYSIS MACHINE SAFETY CHECKS  Before each treatment:     Machine Number:                   [facility]                                   [] Unit Machine #  with centralized RO                                  [] Portable Machine #1/RO serial # [id number]                                  [] Portable Machine #2/RO serial # [id number]                                  [] Portable Machine #4/RO serial # [id number]                                                                                                       [address] [facility]                                  [] Portable Machine #11/RO serial # [id number]                                   [] Portable Machine #12/RO serial # [id number]                                  [x] Portable Machine #3/RO serial #  [id number]      Alarm Test:  Pass time 5398         Other:         [x] RO/Machine Log Complete      Temp    35.5             Dialysate: pH  7.4 Conductivity: Meter        HD Machine   14.3                  TCD: 13.9  Dialyzer Lot # [id number]            Blood Tubing Lot # [id number]          Saline Lot #  [id number]     CHLORINE TESTING-Before each treatment and every 4 hours    Total Chlorine: [x] less than 0.1 ppm  Time: 1430 4 Hr/2nd Check Time:    (if greater than 0.1 ppm from Primary then every 30 minutes from Secondary)     TREATMENT INITIATION  with Dialysis Precautions:   [x] All Connections Secured                 [x] Saline Line Double Clamped   [x] Venous Parameters Set                  [x] Arterial Parameters Set    [x] Prime Given 250 ml                          [x]Air Foam Detector Engaged      Treatment Initiation Note:ICU patient, stable to begin dialysis treatment. His right femoral catheter was accessed, and his treatment began. During Treatment Notes:  Patient tolerating treatment well, with the addition of albumin to maintain blood pressure. Medication Dose Volume Route Initials Dialyzer Cleared: [x] Good [] Fair  [] Poor    Blood processed:  34.8 L  UF Removed  500 Ml    Post Wt:     kg  POst BP:   151/78       Pulse: 78      Respirations: 13  Temperature:                                    Post Tx Vascular Access:   N/A                                   Catheter: Locking solution: Heparin 1:1000 Art. 1.2 ml  Josh. 1.2 ml                                 Post Assessment:                                    Skin:  [] Warm  [] Dry [] Diaphoretic    [] Flushed  [] Pale [] Cyanotic   [facility] Signatures Title Initials  Time Lungs: [] Clear    [] Course  [] Crackles  [] Wheezing [] Diminished    RN   Cardiac: [] Regular   [] Irregular   [] Monitor  [] N/A  Rhythm:           Edema:  [] None    [] General     [] Facial   [] Pedal    [] UE    [] LE       Pain: []0  []1  []2   []3  []4   []5   []6   []7   []8   []9   []10         Post Treatment Note:     Patient tolerated treatment well with the use of albumin. His blood was returned at the conclusion of his treatment, and his right femoral catheter was locked with Heparin.      POST TREATMENT PRIMARY NURSE HANDOFF REPORT:     First initial/Last name/Title Post Dialysis: Suzanne Rodney RN Time:  6854     Abbreviations: AVG-arterial venous graft, AVF-arterial venous fistula, IJ-Internal Jugular, Subcl-Subclavian, Fem-Femoral, Tx-treatment, AP/HR-apical heart rate, DFR-dialysate flow rate, BFR-blood flow rate, AP-arterial pressure, -venous pressure, UF-ultrafiltrate, TMP-transmembrane pressure, Josh-Venous, Art-Arterial, RO-Reverse Osmosis

## 2019-07-17 NOTE — PROGRESS NOTES
Assume care of patient lying in bed with sitter at bedside. Alert to person and place. Confusion at frequent intervals. No facial or body gestures of pain at present. Bed locked in lowest position. Call light within reach. Monitoring hourly for behavior and needs. 2100 Patient restless and increase anxiety with rolling back and forth in bed.     2255 Morphine 4 mg administered IV for crying and unable to touch lower extremities. 2320 Patient decrease with restlessness and closing eyes at intervals. Less body gestures noted. 07/17/2019  0230 Morphine 4 mg administered IV for crying of pain in lower extremities     0300 Patient remains awake and rocking back and forth rubbing left leg. Unable to touch right lower leg. Sitter remains at bedside. 8720 Morphine administered IV for complaint of bilateral lower extremities. 0720 Patient remains restless and screaming of pain with son at bedside. Sitter remains at bedside. No pain relief with right shin rubbed constantly by patient. 0745 Bedside and Verbal shift change report given to Sharie Angelucci (oncoming nurse) by Raquel Romano RN (offgoing nurse). Report given with SBAR, Kardex, Intake/Output, MAR and Recent Results.

## 2019-07-17 NOTE — PERIOP NOTES
spoke with nursing supervisor. patient has a sitter therefore will not need transfer to stepdown. sitter to come to PACU at this time.

## 2019-07-18 NOTE — PROGRESS NOTES
[facility]  ICU Progress Note      Name: Evelyn Shabazz   : 1955   MRN: [id number]   Date: 2019 11:38 AM     [x]I have reviewed the flowsheet and previous days notes. Events overnight reviewed and discussed with nursing staff. Vital signs and records reviewed. Subjective:  19:    S/p LLE AKA yesterday with VTS  Transferred to ICU yesterday for respiratory observation  Patient became agitated, combative, & hypoxic  Intubated for respiratory distress with hypoxia  ABX adjusted yesterday (Maxipime) in addition to Vanco for rising Leukocytosis. No new Cx's obtained  S/p HD yesterday  Hypoglycemia = D-5 gtt began      ABG this AM with Hypoxia. PCXR with ETTube high = adjusted  Leukocytosis improving this AM  Hct improved this AM with No blood products received yesterday   HD today (in-progress)  Add Protonix IVP for SUP  Continue SQ Heparin for DVT Prophylaxis  Consult Wound Care RN for post-op wound management   Continue NPO today in hopes of extubation tomorrow  Keep sedated & intubated today. Sedation holiday & SBT tomorrow        [x]The patient is unable to give any meaningful history or review of systems because the patient is:  [x]Intubated [x]Sedated   []Unresponsive      [x]The patient is critically ill on      [x]Mechanical ventilation []Pressors   []BiPAP []                 ROS:A comprehensive review of systems was negative except for that written in the HPI.     Medication Review:  · Pressors - None  · Sedation - Fentanyl & Diprivan gtt's  · Antibiotics - Maxipime & Vanco  · Pain - APAP, Fentanyl IVP PRN  · GI/ DVT - Protonix IVP / SQ Heparin        Vital Signs:    Visit Vitals  /66   Pulse 88   Temp 99.9 °F (37.7 °C)   Resp 17   Ht 6' (1.829 m)   Wt 53.6 kg (118 lb 2.7 oz)   SpO2 98%   BMI 16.03 kg/m²       O2 Device: Ventilator   O2 Flow Rate (L/min): 6 l/min   Temp (24hrs), Av.4 °F (36.9 °C), Min:97.1 °F (36.2 °C), Max:99.9 °F (37.7 °C) Intake/Output:   Last shift:      07/18 0701 - 07/18 1900  In: 410.3 [I.V.:410.3]  Out: 100 [Urine:100]  Last 3 shifts: 07/16 1901 - 07/18 0700  In: 2119.3 [I.V.:2119.3]  Out: 1430 [Urine:880]    Intake/Output Summary (Last 24 hours) at 7/18/2019 1138  Last data filed at 7/18/2019 1000  Gross per 24 hour   Intake 2279.55 ml   Output 1130 ml   Net 1149.55 ml       Ventilator Settings:  Ventilator Mode: Assist control, VC+  Respiratory Rate  Back-Up Rate: 12  Insp Time (sec): 0.9 sec  I:E Ratio: 1:4.56  Ventilator Volumes  Vt Set (ml): 450 ml  Vt Exhaled (Machine Breath) (ml): 625 ml  Ve Observed (l/min): 9.23 l/min  Ventilator Pressures  PIP Observed (cm H2O): 11 cm H2O  Plateau Pressure (cm H2O): 17 cm H2O  MAP (cm H2O): 6.7  PEEP/VENT (cm H2O): 5 cm H20  Auto PEEP Observed (cm H2O): (unable to obtain)    Physical Exam:    General: Intubated/sedated. VSS   NAD  HEENT:  Anicteric sclerae; pink palpebral conjunctivae; mucosa moist  Resp:  Symmetrical chest rise, no accessory muscle use. BS = B/L Coarse with expiratory rhonchi. CV:  S1, S2 present; RRR; no m/g/r  GI:  Abdomen soft, non-tender; (+) hypo-active bowel sounds  Extremities:  +2 pulses on B/L UE's & RLE. No edema/ cyanosis/ clubbing noted  Skin:  Warm; no rashes/ lesions noted  Neurologic:  Sedated on diprivan & fentanyl gtt's.     Devices:  + NGT/OGT, Central line ([place]), ETTube, P. IV        DATA:     Current Facility-Administered Medications   Medication Dose Route Frequency    [START ON 7/19/2019] carvedilol (COREG) tablet 6.25 mg  6.25 mg Oral BID WITH MEALS    sodium chloride (NS) flush 5-40 mL  5-40 mL IntraVENous Q8H    sodium chloride (NS) flush 5-40 mL  5-40 mL IntraVENous PRN    0.9% sodium chloride infusion  25 mL/hr IntraVENous CONTINUOUS    0.9% sodium chloride infusion 250 mL  250 mL IntraVENous PRN    0.9% sodium chloride infusion 250 mL  250 mL IntraVENous PRN    0.9% sodium chloride infusion  50 mL/hr IntraVENous DIALYSIS PRN    heparin (porcine) 1,000 unit/mL injection 1,000 Units  1,000 Units InterCATHeter DIALYSIS PRN    [START ON 7/19/2019] VANCOMYCIN INFORMATION NOTE   Other ONCE    sodium chloride (NS) flush 5-40 mL  5-40 mL IntraVENous Q8H    sodium chloride (NS) flush 5-40 mL  5-40 mL IntraVENous PRN    naloxone (NARCAN) injection 0.1 mg  0.1 mg IntraVENous PRN    ondansetron (ZOFRAN) injection 4 mg  4 mg IntraVENous PRN    senna (SENOKOT) tablet 8.6 mg  1 Tab Oral BID    fentaNYL (PF) 900 mcg/30 ml infusion soln  0-200 mcg/hr IntraVENous TITRATE    propofol (DIPRIVAN) infusion  0-50 mcg/kg/min IntraVENous TITRATE    chlorhexidine (PERIDEX) 0.12 % mouthwash 10 mL  10 mL Oral Q12H    albuterol-ipratropium (DUO-NEB) 2.5 MG-0.5 MG/3 ML  3 mL Nebulization Q6H RT    fentaNYL citrate (PF) injection 25-50 mcg  25-50 mcg IntraVENous Q1H PRN    cefepime (MAXIPIME) 0.5 g in 0.9% sodium chloride 100 mL IVPB  0.5 g IntraVENous Q24H    dextrose 10 % infusion  75 mL/hr IntraVENous CONTINUOUS    oxyCODONE-acetaminophen (PERCOCET) 5-325 mg per tablet 1-2 Tab  1-2 Tab Oral Q4H PRN    heparin (porcine) injection 5,000 Units  5,000 Units SubCUTAneous Q8H    VANCOMYCIN INFORMATION NOTE 1 Each  1 Each Other Rx Dosing/Monitoring    therapeutic multivitamin (THERAGRAN) tablet 1 Tab  1 Tab Oral DAILY    sodium chloride (NS) flush 5-10 mL  5-10 mL IntraVENous PRN    acetaminophen (TYLENOL) tablet 650 mg  650 mg Oral Q4H PRN    ondansetron (ZOFRAN) injection 4 mg  4 mg IntraVENous Q4H PRN         Labs: Results:       Chemistry Recent Labs     07/18/19  0130 07/17/19  1425 07/17/19  1130 07/17/19  0000  07/16/19  0455   * 78  --  79  --  67*    132* 132* 130*   < > 128*   K 4.4 5.7*  --  5.7*  --  5.7*    100  --  98*  --  96*   CO2 24 20*  --  18*  --  16*   BUN 43* 76*  --  75*  --  65*   CREA 5.49* 9.38*  --  9.26*  --  8.99*   CA 7.1* 7.3*  --  7.3*  --  7.3*   AGAP 12 12  --  14  --  16   BUCR 8* 8*  --  8* --  7*   AP 53  --   --   --   --   --    TP 5.3*  --   --   --   --   --    ALB 1.9*  --   --  1.8*  --  1.9*   GLOB 3.4  --   --   --   --   --    AGRAT 0.6*  --   --   --   --   --     < > = values in this interval not displayed. CBC w/Diff Recent Labs     07/18/19  0130 07/17/19  1425 07/17/19  0000 07/16/19  0455   WBC 13.7* 19.8* 21.5* 20.0*   RBC 2.44* 2.30* 2.58* 2.39*   HGB 7.0* 6.7* 7.5* 6.9*   HCT 20.5* 19.4* 21.6* 20.1*    327 369 459*   GRANS  --  87* 84* 87*   LYMPH  --  7* 7* 6*   EOS  --  0 0 0      Coagulation Recent Labs     07/18/19  0130 07/17/19  0000   PTP 14.8 15.1   INR 1.2 1.2       Liver Enzymes Recent Labs     07/18/19  0130   TP 5.3*   ALB 1.9*   AP 53   SGOT 284*      ABG Lab Results   Component Value Date/Time    PHI 7.452 (H) 07/18/2019 04:33 AM    PCO2I 33.9 (L) 07/18/2019 04:33 AM    PO2I 65 (L) 07/18/2019 04:33 AM    HCO3I 23.6 07/18/2019 04:33 AM    FIO2I 30 07/18/2019 04:33 AM      Microbiology No results for input(s): CULT in the last 72 hours. Telemetry: [x]Sinus []A-flutter []Paced    []A-fib []Multiple PVCs                  Imaging:    CXR 7/18/2019:  IMPRESSION:     1. High position endotracheal tube tip at the thoracic inlet, 8.3 cm above the  aristeo. Recommend advancing 3 cm. Stable nasogastric tube. 2. Interval increased right upper and lower lung confluent opacity, which may  represent developing atelectasis and/or nonspecific edema given short interval  progression. IMPRESSION:   · Acute Respiratory Failure with Hypoxia/VDRF  · PNA  · LLE Gangrene, S/p L AKA  · [place] / ESRD, on HD  · Anemia  · Stage 3 NSCLC, S/p ChemoRx/XRT  · Malnourished  · Hypoglycemia   · Leukocytosis - resolving   · Hyponatremia - resolving  · Hyperkalemia - resolved  · Hypoalbuminemia       RECOMMENDATIONS:   · Resp: ABG this AM with Hypoxia. PCXR with ETTube high. ETTube adjusted. Titrate FiO2/ supp O2 for SpO2 >90%. Continue Nebs (Duo-neb q-6 hours).   Daily PCXR & ABG.  Will plan on SBT tomorrow in anticipation of extubation   · I/D: Tm 99.9   Tc Afebrile. WBC improving (13.7 K this AM). ABX adjusted yesterday (Maxipime, in addition to Vanco). No new Cx data. ABX Mgmt per MD    · Hem/Onc: Hct / PLT's stable (20.5 & 358 K). Coags WNL's (INR 1.2 this AM). SQ Heparin for DVT Prophylaxis. Continue Daily CBC & Coags  · CVS: Stable. NSR. No gtt's. MAP Goal >/= 65. Hold Coreg for SBP < 120. · Metabolic: Daily BMP; monitor e-lytes; replace PRN  · Renal following: I > O's. Cr 5.49 this AM.  K+ 4.4 this AM.  Na 136 this AM.  HD yesterday with repeat HD today (in-progress). Trend Renal indices; Diuresis, Mccarthy to BSD  · Endocrine: POC Glucose q6  · GI: NPO. Continue OG tube to low-intermittent suction. - BM post-op (last BM 7/15/2019). Will hold on TF's today in anticipation of extubation tomorrow, if fails, will re-eval TF timing. Add Protonix IVP. Zofran PRN for N/V   · Musc/Skin: Will consult Wound Care for S/p LLE AKA. Additional wound management per VTS   · Neuro: Sedated on Diprivan & Fentanyl gtt's. RASS Goal 0 to -1. Plan on Sedation holiday & SBT tomorrow   · VTS Following  · Fluids: D5 @ 75 cc / hr & NS @ 75 cc / hr (total 100 cc / hr)  · Discussed in Interdisciplinary Rounds (Continue ICU Care)      Best Practices/ Safety Bundles:  · Sepsis Bundle per Hospital Protocol  · CAUTI Bundle  · Electrolyte Replacement Bundle  · Glycemic control goal <180; avoid Hypoglycemia  · IHI ICU Bundles:  ·  Central Line Bundle Followed , Mccarthy Bundle Followed and Vent Bundle Followed, Vent Day 1    · [facility] Vent patients:   · VAP bundle, Aim to keep peak plateau pressure 67-89PM H2O  · Aspiration Precautions - HOB >30'  · Daily sedation holiday as indicated  · SBT as tolerated/appropriate  · Titrate FiO2 for SpO2 >94%  · Aggressive Pulmonary Hygeiene  · Stress ulcer prophylaxis. Protonix IVP   · DVT prophylaxis.  SQ Heparin  · Need for Lines, mccarthy assessed. · Restraints need. · Palliative care evaluation.       The patient is: [x] acutely ill Risk of deterioration: [] moderate    [] critically ill  [x] high     [x]See my orders for details    My assessment/plan was discussed with:  [x]Nursing []PT/OT    [x]Respiratory therapy [x]   []Family []         Critical Care Time = [address], [place]  07/18/19  Pulmonary, [facility],[address] [facility]

## 2019-07-18 NOTE — DIABETES MGMT
NUTRITIONAL RE-ASSESSMENT AND GLYCEMIC CONTROL PLAN OF CARE     Jordyn Posada           62 y.o.           7/10/2019                 1. 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 Cellulitis of left lower extremity    3. Elevated lactic acid level    4. Leukocytosis, unspecified type    5. Anemia, unspecified type    6. Hyponatremia      ASSESSMENT:   Pt is underweight related to inadequate caloric intake as evidenced by 73% ideal weight and BMI= 17.6kg/m2. Pt's record indicates he weighed 126 lbs 7 months ago, has gained 4 lbs. since 12/2018. However long term he has lost 20 lbs from his usual weight of 150 lbs. (3/2017). Nutrient deficit as evidenced by pt's report of  poor intake at  meals prior to admission. Altered nutrition related lab values r/t hyponatremia AEB previous Na= 121, now 134. Altered nutrition related lab values AEB Cr 5.49, receiving dialysis. Altered nutrition related lab values AEB albumin 1.9. INTERVENTIONS/PLAN:    Recommend full strength Osmolite 1.2 calorie TF at 20ml/hour. Advance as tolerated to goal rate of 70ml/hour. Tube feeding will provide 2016 calories,  93 g protein/d with 1.3 liters free water/d from TF. SUBJECTIVE/OBJECTIVE:   Information obtained from:Pt is on a ventilator. Diet: npo  Patient Vitals for the past 100 hrs:   % Diet Eaten   07/15/19 1300 20 %   07/15/19 0846 20 %   07/14/19 1800 0 %   07/14/19 1355 10 %   07/14/19 1006 90 %     Medications: [x]                Reviewed   Propofol is providing ~ 464 calories/24 hrs.   Labs:   Lab Results   Component Value Date/Time    Hemoglobin A1c 5.2 04/18/2012 03:50 AM     Lab Results   Component Value Date/Time    Sodium 134 (L) 07/18/2019 11:30 AM    Potassium 4.4 07/18/2019 01:30 AM    Chloride 100 07/18/2019 01:30 AM    CO2 24 07/18/2019 01:30 AM    Anion gap 12 07/18/2019 01:30 AM    Glucose 125 (H) 07/18/2019 01:30 AM    BUN 43 (H) 07/18/2019 01:30 AM    Creatinine 5.49 (H) 07/18/2019 01:30 AM    Calcium 7.1 (L) 07/18/2019 01:30 AM    Magnesium 2.4 07/18/2019 01:30 AM    Phosphorus 7.5 (H) 07/18/2019 01:30 AM    Albumin 1.9 (L) 07/18/2019 01:30 AM       Anthropometrics:       Ideal Wt 178 lbs BMI=17.6 kg/m2  Wt Readings from Last 1 Encounters:   07/18/19 53.6 kg (118 lb 2.7 oz)      Ht Readings from Last 1 Encounters:   07/18/19 6' (1.829 m)   Estimated Nutrition Needs: 2065 Kcals/day, Protein (g): 88 g Fluid (ml): 2000 ml  Based on:   [x]          Actual BW    []          IBW   []            Adjusted BW    Nutrition Diagnoses:       Meets Criteria for Chronic Malnutrition   [x]Moderate Malnutrition, as evidenced by:   [x] Mild muscle wasting, loss of subcutaneous fat   [x] Nutritional intake <75% of recommended intake for >1 month   [x] Weight loss of  5% in 1 month, 7.5% in 3 months, 10% in 6 months, or 20% in 1 year       Nutrition Interventions:     Weight gain 1 lb. per week by 7/28/19. Intake will meet >75% of energy and protein requirements by  7/23. Glucose will be within target range of 70-180mg/dl by   7/21.     Nutrition Monitoring and Evaluation      []     Monitor po intake on meal rounds  [x]     Continue inpatient monitoring and intervention  []     Other:      Nutrition Risk:  [x]   High     []  Moderate    []  Minimal/Uncompromised    Angélica Lorenzo, RD  [facility] [phone]

## 2019-07-18 NOTE — PROGRESS NOTES
Patient received resting comfortably on the ventilator. 7.5ET tube is patent and secure at 22cm at the lip. ABG drawn per MD order, result reported to [facility], NP, who was at the bedside. Order received to decrease ventilator rate to 12, will wean FIO2 as tolerated. Emergency equipment is set-up and available at the bedside. Alarms are set and functioning. Will continue to monitor.

## 2019-07-18 NOTE — PROGRESS NOTES
Attempt for OT session. Pt transferred from tele unit to ICU. Is now intubated and sedated at this time. Not appropriate for participation with therapy at this time. Will d/c current OT orders. Please re-order when medically appropriate for participation with therapy. Thank you.    Elma Christie, OTR/L

## 2019-07-18 NOTE — PROGRESS NOTES
Problem: Pain  Goal: *Control of Pain  Outcome: Progressing Towards Goal     Problem: Patient Education: Go to Patient Education Activity  Goal: Patient/Family Education  Outcome: Progressing Towards Goal     Problem: Falls - Risk of  Goal: *Absence of Falls  Description  Document Balwinder Galdamez Fall Risk and appropriate interventions in the flowsheet. Outcome: Progressing Towards Goal  Note:   Fall Risk Interventions:  Mobility Interventions: Bed/chair exit alarm, Patient to call before getting OOB    Mentation Interventions: Adequate sleep, hydration, pain control, Bed/chair exit alarm, Door open when patient unattended    Medication Interventions: Bed/chair exit alarm    Elimination Interventions: Bed/chair exit alarm, Call light in reach    History of Falls Interventions: Bed/chair exit alarm, Door open when patient unattended         Problem: Patient Education: Go to Patient Education Activity  Goal: Patient/Family Education  Outcome: Progressing Towards Goal     Problem: Impaired Skin Integrity/Pressure Injury Treatment  Goal: *Improvement of Existing Pressure Injury  Outcome: Progressing Towards Goal  Goal: *Prevention of pressure injury  Description  Document Mikie Scale and appropriate interventions in the flowsheet.   Outcome: Progressing Towards Goal  Note:   Pressure Injury Interventions:  Sensory Interventions: Assess changes in LOC, Check visual cues for pain    Moisture Interventions: Absorbent underpads    Activity Interventions: Pressure redistribution bed/mattress(bed type)    Mobility Interventions: Assess need for specialty bed, Float heels    Nutrition Interventions: Document food/fluid/supplement intake    Friction and Shear Interventions: Foam dressings/transparent film/skin sealants, Minimize layers                Problem: Patient Education: Go to Patient Education Activity  Goal: Patient/Family Education  Outcome: Progressing Towards Goal     Problem: Pressure Injury - Risk of  Goal: *Prevention of pressure injury  Description  Document Mikie Scale and appropriate interventions in the flowsheet.   Outcome: Progressing Towards Goal  Note:   Pressure Injury Interventions:  Sensory Interventions: Assess changes in LOC, Check visual cues for pain    Moisture Interventions: Absorbent underpads    Activity Interventions: Pressure redistribution bed/mattress(bed type)    Mobility Interventions: Assess need for specialty bed, Float heels    Nutrition Interventions: Document food/fluid/supplement intake    Friction and Shear Interventions: Foam dressings/transparent film/skin sealants, Minimize layers     Frequent repositioning in the bed, frequent mouth and mccarthy care           Problem: Patient Education: Go to Patient Education Activity  Goal: Patient/Family Education  Outcome: Progressing Towards Goal     Problem: Discharge Planning  Goal: *Discharge to safe environment  Outcome: Progressing Towards Goal     Problem: Nutrition Deficit  Goal: *Optimize nutritional status  Outcome: Progressing Towards Goal     Problem: Ventilator Management  Goal: *Adequate oxygenation and ventilation  Outcome: Progressing Towards Goal  Goal: *Patient maintains clear airway/free of aspiration  Outcome: Progressing Towards Goal  Goal: *Absence of infection signs and symptoms  Outcome: Progressing Towards Goal  Goal: *Normal spontaneous ventilation  Outcome: Progressing Towards Goal     Problem: Patient Education: Go to Patient Education Activity  Goal: Patient/Family Education  Outcome: Progressing Towards Goal     Problem: Non-Violent Restraints  Goal: *No harm/injury to patient while restraints in use  Outcome: Progressing Towards Goal  Goal: *Patient's dignity will be maintained  Outcome: Progressing Towards Goal  Goal: *Patient Specific Goal (EDIT GOAL, INSERT TEXT)  Outcome: Progressing Towards Goal  Goal: Non-violent Restaints:Standard Interventions  Outcome: Progressing Towards Goal  Goal: Non-violent Restraints:Patient Interventions  Outcome: Progressing Towards Goal  Goal: Patient/Family Education  Outcome: Progressing Towards Goal     Problem: Patient Education: Go to Patient Education Activity  Goal: Patient/Family Education  Outcome: Progressing Towards Goal     Problem: Surgical Pathway Day of Surgery  Goal: Off Pathway (Use only if patient is Off Pathway)  Outcome: Progressing Towards Goal  Goal: Activity/Safety  Outcome: Progressing Towards Goal  Goal: Consults, if ordered  Outcome: Progressing Towards Goal  Goal: Nutrition/Diet  Outcome: Progressing Towards Goal  Goal: Medications  Outcome: Progressing Towards Goal  Goal: Respiratory  Outcome: Progressing Towards Goal  Goal: Treatments/Interventions/Procedures  Outcome: Progressing Towards Goal  Goal: Psychosocial  Outcome: Progressing Towards Goal  Goal: *No signs and symptoms of infection or wound complications  Outcome: Progressing Towards Goal  Goal: *Optimal pain control at patient's stated goal  Outcome: Progressing Towards Goal  Goal: *Adequate urinary output (equal to or greater than 30 milliliters/hour)  Description  Ambulatory Surgery patients voiding without difficulty.   Outcome: Progressing Towards Goal  Goal: *Hemodynamically stable  Outcome: Progressing Towards Goal  Goal: *Tolerating diet  Outcome: Progressing Towards Goal  Goal: *Demonstrates progressive activity  Outcome: Progressing Towards Goal     Problem: Surgical Pathway Post-Op Day 1  Goal: Off Pathway (Use only if patient is Off Pathway)  Outcome: Progressing Towards Goal  Goal: Activity/Safety  Outcome: Progressing Towards Goal  Goal: Diagnostic Test/Procedures  Outcome: Progressing Towards Goal  Goal: Nutrition/Diet  Outcome: Progressing Towards Goal  Goal: Discharge Planning  Outcome: Progressing Towards Goal  Goal: Medications  Outcome: Progressing Towards Goal  Goal: Respiratory  Outcome: Progressing Towards Goal  Goal: Treatments/Interventions/Procedures  Outcome: Progressing Towards Goal  Goal: Psychosocial  Outcome: Progressing Towards Goal  Goal: *No signs and symptoms of infection or wound complications  Outcome: Progressing Towards Goal  Goal: *Optimal pain control at patient's stated goal  Outcome: Progressing Towards Goal  Goal: *Adequate urinary output (equal to or greater than 30 milliliters/hour)  Outcome: Progressing Towards Goal  Goal: *Hemodynamically stable  Outcome: Progressing Towards Goal  Goal: *Tolerating diet  Outcome: Progressing Towards Goal  Goal: *Demonstrates progressive activity  Outcome: Progressing Towards Goal  Goal: *Lungs clear or at baseline  Outcome: Progressing Towards Goal

## 2019-07-18 NOTE — PROGRESS NOTES
Physical Exam   Constitutional: He appears cachectic. He is sedated, intubated and restrained. Pulmonary/Chest: He is intubated. Skin:        Bedside shift change report was given to me, Tiff Tenorio RN ( ICU night shift nurse), by Berny Bradley RN   ( ICU day shift nurse). Report included the following information:  SBAR, kardex, consultations, hemodynamic monitoring, intake/output, MAR, lab results, VS trends, cardiac rhythm noted NSR. Skin, neuro, respiratory status, order verification, and critical IV gtt rate verification has been dually noted and verified at the bedside with:    Berny Bradley RN                                       2700 ICU Nurse's Note:    2000: Pt is lightly sedated, eyes closed, responds to painful stimulus by withdrawing his extremities. Pt independently moves BUE, requires the use of restraints to protect airway and essential lines. POC Accucheck noted 140. Pt requires max assist with all ADLs and repositioning. Bed is locked and in lowest position, side rails up x 2. Interventions are ongoing, will continue to monitor. Neurological: as noted in assessment   Cardiovascular: NSR on the monitor. Pulmonary: breath sounds coarse, diminished, saturations maintained at 100% via vent at 30% FiO2,   GI/: Abdomen is soft, flat, non-distended with hypoactive bowel sounds. Pt is NPO. OG tube is in place, connected to low intermittent suction per orders with minimal brown/green colored gastric drainage. Last BM noted 07/15/2019. Mccarthy catheter intact with oliguric, max colored urine output. IVF/Critical gtts: IV maintenance fluid, Fentanyl continuous PCA, Propofol gtt, IV abx  Skin: as noted above    2200: Pt tolerated scheduled HS meds, mccarthy care, mouth care, ET tube care and repositioning. Postop LLE remains unchanged, elevated on pillow. Pt reacts and withdraws his extremities swiftly upon stimulus. 0000: POC Accucheck noted 139.  Pt is afebrile at 97.7, VSS, he is in no acute distress at this time. 0200: Scheduled AM labs were drawn at the bedside. Pt tolerated without incident. 0430: Pt noted to be increasingly restless, spontaneously open eyes and sat straight up in the bed x 2. He appeared increasingly uncomfortable despite VS remaining stable. Fentanyl PCA was increased at this time from 50 mcg/hr to 75 mcg/hr. Pt remains afebrile. Interventions are ongoing. Will continue to monitor. 0445: Pt is much more calm. He is resting quietly in the bed at this time. He continues to respond quickly to environmental stimulus by opening his eyes and withdrawing extremities . RT is securing scheduled ABG at this time and pt is tolerating the procedure without incident. 0530: Portable CXR completed at the bedside. Pt remains much more relaxed and appears to have decreased sensation of pain and discomfort. 0600: AM accucheck noted 84. Pt remains NPO. No noted changes to postop left AKA. Postop stump remains elevated on pillow, drsg remains intact with scant breakthrough drainage noted. Interventions are ongoing. Will continue to monitor.    0710: Bedside change of shift report given to: Adina Grover RN & Stephanie Rg RN

## 2019-07-18 NOTE — PROGRESS NOTES
In Patient Progress note      Admit Date: 7/10/2019          Impression:     1. [place] (ischemic atn due to volume depletion/vanc nephrotoxicity). 2. Acute on chronic hyponatremia   3. Hyperkalemia. 4. Met acidosis. 5. small cell lung cancer. 6. HTN. Controlled. 7. Anemia. May need transfusions. 8. Lt le pad with ischemic wound. 9. Severe anemia. Getting transfused. Patient tolerated short session of dialysis yesterday. Will do another session of 3 hours of HD today for volume and solute. Patient's calcium has been on low side so will use higher calcium bath. Will use minimal UF as volume status looks okay. Respiratory status wise he is on minimal settings    Please call with questions    Yulia Chavez MD FASN  Cell [phone]  Pager: [phone]             Subjective:      Intubated and sedated      Current Facility-Administered Medications:     [START ON 7/19/2019] carvedilol (COREG) tablet 6.25 mg, 6.25 mg, Oral, BID WITH MEALS, [place], [place]    pantoprazole (PROTONIX) 40 mg in sodium chloride 0.9% 10 mL injection, 40 mg, IntraVENous, DAILY, [place], PA, 40 mg at 07/18/19 1205    [START ON 7/20/2019] VANCOMYCIN INFORMATION NOTE, , Other, ONCE, Shahana FIERRO MD    vancomycin (VANCOCIN) 750 mg in 0.9% sodium chloride 250 mL IVPB, 750 mg, IntraVENous, ONCE, Shahana FIERRO MD    sodium chloride (NS) flush 5-40 mL, 5-40 mL, IntraVENous, Q8H, Mary Ojeda MD, 10 mL at 07/18/19 1354    sodium chloride (NS) flush 5-40 mL, 5-40 mL, IntraVENous, PRN, Mary Ojeda MD    0.9% sodium chloride infusion 250 mL, 250 mL, IntraVENous, PRN, Smiley Bowman MD    0.9% sodium chloride infusion 250 mL, 250 mL, IntraVENous, PRN, Smiley Bowman MD, Stopped at 07/17/19 1200    0.9% sodium chloride infusion, 50 mL/hr, IntraVENous, DIALYSIS PRN, Smiley Bowman MD    heparin (porcine) 1,000 unit/mL injection 1,000 Units, 1,000 Units, InterCATHeter, DIALYSIS PRN, MD Ginette Whittaker.Thanh sodium chloride (NS) flush 5-40 mL, 5-40 mL, IntraVENous, Q8H, Isaias Silveira MD, 10 mL at 07/18/19 1354    sodium chloride (NS) flush 5-40 mL, 5-40 mL, IntraVENous, PRN, Isaias Silveira MD    [facility]) injection 0.1 mg, 0.1 mg, IntraVENous, PRN, Isaias Silveira MD    ondansetron [facility]) injection 4 mg, 4 mg, IntraVENous, PRN, Isaias Silveira MD    senna (SENOKOT) tablet 8.6 mg, 1 Tab, Oral, BID, Isaias Silveira MD, 8.6 mg at 07/18/19 1721    fentaNYL (PF) 900 mcg/30 ml infusion soln, 0-200 mcg/hr, IntraVENous, TITRATE, Isaias Silveira MD, Last Rate: 2.5 mL/hr at 07/18/19 1638, 75 mcg/hr at 07/18/19 1638    propofol (DIPRIVAN) infusion, 0-50 mcg/kg/min, IntraVENous, TITRATE, Ogbolu, Briana I, NP, Last Rate: 17.6 mL/hr at 07/18/19 1204, 50 mcg/kg/min at 07/18/19 1204    chlorhexidine (PERIDEX) 0.12 % mouthwash 10 mL, 10 mL, Oral, Q12H, Isaias Silveira MD, 10 mL at 07/18/19 0953    albuterol-ipratropium (DUO-NEB) 2.5 MG-0.5 MG/3 ML, 3 mL, Nebulization, Q6H RT, Isaias Silveira MD, 3 mL at 07/18/19 1332    fentaNYL citrate (PF) injection 25-50 mcg, 25-50 mcg, IntraVENous, Q1H PRN, Ogbolu, Briana I, NP, 50 mcg at 07/18/19 1549    cefepime (MAXIPIME) 0.5 g in 0.9% sodium chloride 100 mL IVPB, 0.5 g, IntraVENous, Q24H, Ogbolu, Briana I, NP    dextrose 10 % infusion, 75 mL/hr, IntraVENous, CONTINUOUS, Ogbolu, Briana I, NP, Last Rate: 75 mL/hr at 07/18/19 0545, 75 mL/hr at 07/18/19 0545    oxyCODONE-acetaminophen (PERCOCET) 5-325 mg per tablet 1-2 Tab, 1-2 Tab, Oral, Q4H PRN, Jamie Campos MD, 2 Tab at 07/16/19 1215    heparin (porcine) injection 5,000 Units, 5,000 Units, SubCUTAneous, Q8H, Jamie Campos MD, 5,000 Units at 07/18/19 1353    VANCOMYCIN INFORMATION NOTE 1 Each, 1 Each, Other, Rx Dosing/Monitoring, Jamie Campos MD    therapeutic multivitamin (THERAGRAN) tablet 1 Tab, 1 Tab, Oral, DAILY, Jamie Campos MD, 1 Tab at 07/18/19 1205    sodium chloride (NS) flush 5-10 mL, 5-10 mL, IntraVENous, PRN, Terresa Castleman, MD, 10 mL at 07/15/19 0503    acetaminophen (TYLENOL) tablet 650 mg, 650 mg, Oral, Q4H PRN, Terresa Castleman, MD, 650 mg at 07/11/19 2243    ondansetron (ZOFRAN) injection 4 mg, 4 mg, IntraVENous, Q4H PRN, Terresa Castleman, MD, 4 mg at 07/17/19 1113        Objective:     Visit Vitals  /60   Pulse 93   Temp 99.6 °F (37.6 °C)   Resp 17   Ht 6' (1.829 m)   Wt 53.6 kg (118 lb 2.7 oz)   SpO2 100%   BMI 16.03 kg/m²         Intake/Output Summary (Last 24 hours) at 7/18/2019 1726  Last data filed at 7/18/2019 1700  Gross per 24 hour   Intake 2846.43 ml   Output 1420 ml   Net 1426.43 ml       Physical Exam:     General: NAD, alert and oriented. Neck: No jvd. LUNGS: diffusely diminished air entry, bl exp rhonchi. No crackles. CVS EXM: S1, S2  RRR. Abdomen: soft, non tender. Lower Extremities:  no edema. Lt foot dressed.        Data Review:    Recent Labs     07/18/19  0130   WBC 13.7*   RBC 2.44*   HCT 20.5*   MCV 84.0   MCH 28.7   MCHC 34.1   RDW 16.1*     Recent Labs     07/18/19  1130 07/18/19  0130 07/17/19  1425 07/17/19  1130 07/17/19  0000 07/16/19  1015 07/16/19  0455   BUN  --  43* 76*  --  75*  --  65*   CREA  --  5.49* 9.38*  --  9.26*  --  8.99*   CA  --  7.1* 7.3*  --  7.3*  --  7.3*   ALB  --  1.9*  --   --  1.8*  --  1.9*   K  --  4.4 5.7*  --  5.7*  --  5.7*   * 136 132* 132* 130* 128* 128*   CL  --  100 100  --  98*  --  96*   CO2  --  24 20*  --  18*  --  16*   PHOS  --  7.5*  --   --  10.3*  --  10.6*   GLU  --  125* 78  --  79  --  67*       Diego Spicer MD

## 2019-07-18 NOTE — PROGRESS NOTES
0126 Received patient on vent support: ACVC+, rate 12, , PEEP 5, FiO2 0.30. HR 86, SpO2 97, RR 15, /65. Size 7.5mm ETT is patent and secure at 24cm at the lips. BS crackles in lower lobes. Ambu bag/mask at bedside. 1055 Advanced ETT 2.5cm, to 26 at the lips, per DIANDRA Moran.

## 2019-07-18 NOTE — OP NOTES
[facility]  OPERATIVE REPORT    Name:  Ayala Alba  MR#:   [id number]  :  1955  ACCOUNT #:  [de-identified]  DATE OF SERVICE:  2019    PREOPERATIVE DIAGNOSES:  Peripheral vascular disease with gangrene of the left lower extremity and acute renal failure. POSTOPERATIVE DIAGNOSES:  Peripheral vascular disease with gangrene of the left lower extremity and acute renal failure. PROCEDURES PERFORMED:  1. Left above-knee amputation. 2.  Placement of the non-tunneled temporary hemodialysis catheter via the right femoral vein using ultrasound guidance and also venogram.    SURGEON:  CHANTEL Lopez MD    ASSISTANT:  OR surgical tech. ANESTHESIA:  General.    COMPLICATIONS:  None. SPECIMENS REMOVED:  Left lower extremity. IMPLANTS:  Non-tunneled hemodialysis catheter with tip in the IVC ready for immediate use. This was a 20 cm catheter via the right femoral vein. DISPOSITION:  Stable to the PACU.    ESTIMATED BLOOD LOSS:  50 mL. BACKGROUND AND INDICATIONS:  This 25-year-old male has nonviable, nonsalvageable gangrene of left lower extremity and also acute renal failure. Because of mental status changes, consent was obtained from his son. The risks, benefits, alternatives, indications, and limitations of the above-noted procedures were discussed in detail. The patient's son understood and wished to proceed. PROCEDURE:  After bringing the patient to the operating room, he was placed on the OR table. After induction of anesthesia, the bilateral groins and left lower extremity were then cleaned, prepped and draped in usual manner. He was already on therapeutic antibiotics. All time-outs were then taken. Attention was turned to the left thigh. Skin was marked for fishmouth-type incision. A #10 blade scalpel was used to incise the skin and subcutaneous tissues. Cautery was used to divide muscular fascia and muscular bundles.   Cautery was used to take down muscular attachments to the femur, which was then cleared using a periosteal elevator. The femoral vein was identified within the mid thigh. It was ligated between ties with heavy silk, and proximally ligated using a suture ligature of 2-0 silk. Femur was transected using a power oscillating saw. The posterior muscle groups were divided using an amputation knife and specimen was handed off the field. Bleeding from the raw surface was controlled using cautery as well as ties of silk for perforating arteries and venous tributaries. The superficial femoral artery and vein were identified and ligated high in the thigh using a free tie of heavy silk as well as suture ligature of 2-0 Prolene with good hemostatic effect. We used the power oscillating saw to bevel the transected end of femur about 20 degrees anteriorly. It was then smoothed using a bone rasp, irrigated, and dried. The femur was irrigated and dried. Hemostasis was assured and counts were confirmed to be correct. Fascia was re-approximated using interrupted 2-0 Vicryls. The skin was closed using interrupted vertical mattresses of 2-0 nylon along with skin staples, and routine dressings were placed. Attention was turned to the bilateral groins. The position of the right femoral head was marked upon the skin using fluoroscopy. Using sterile ultrasound guidance and micropuncture technique, the right common femoral vein was cannulated in retrograde fashion. Needle entry was at the level of the mid femoral head and micro sheath was placed, injecting of contrast confirmed intravenous positioning and delineated the position of the caval bifurcation. An 0.035-inch J tip wire was then advanced under fluoroscopy. Ultrasound confirmed good positioning. Tract was dilated successfully with standard dilators within the catheter kit. 20 cm non-tunneled hemodialysis (Trialysis) catheter was then placed.   Fluoroscopy confirmed catheter tip at the junction of the inferior vena cava and the right common iliac vein. All ports aspirated and flushed easily. Final dwell of heparin 1000 units/mL were infused into all 3 lumens including the power port. The catheter was secured to the skin using 2-0 nylon sutures and routine dressings were placed. At this point, the patient was awakened from anesthesia and transferred to the recovery room in satisfactory condition. All counts were confirmed to be correct x2. The catheter was ready for immediate use.       Shayy Elder MD      [id number]  D:  07/17/2019 10:45  T:  07/17/2019 14:55  JOB #:  [id number]

## 2019-07-18 NOTE — PROGRESS NOTES
Problem: Ventilator Management  Goal: *Adequate oxygenation and ventilation  Outcome: Progressing Towards Goal  Goal: *Patient maintains clear airway/free of aspiration  Outcome: Progressing Towards Goal  Goal: *Absence of infection signs and symptoms  Outcome: Progressing Towards Goal  Goal: *Normal spontaneous ventilation  Outcome: Progressing Towards Goal   VENTILATOR CARE PLAN    Problem: Ventilator Management  Goal: *Adequate oxygenation/ ventilation/ and extubation      Patient:        Julia Ramirez     62 y.o.   male     7/18/2019  2:21 PM  Patient Active Problem List   Diagnosis Code    Gangrene of foot ([facility] I96    Cellulitis of left lower extremity L03. 80    Malnourished ([facility] [address] E46    Peripheral vascular disease ([address] I73.9    Hyponatremia E87.1    Acute kidney injury superimposed on chronic kidney disease (HCC) N17.9, N18.9    Dehydration E86.0    Sepsis ([address] A41.9    Gangrene ([address] I96    Anemia D64.9    Acute respiratory failure (HCC) J96.00       Sepsis ([address] [A41.9]  Hyponatremia [E87.1]  Gangrene ([address] [place]  Hyponatremia [E87.1]    Reason patient intubated: Respiratory insufficiency, airway protection    Ventilator day: 2    Ventilator settings: ACVC+, rate 12, , PEEP 5, FiO2 0.30    ETT Size/Placement: 7.5mm ETT, 26cm at the lips       ABG:  Date:7/18/2019  Lab Results   Component Value Date/Time    PHI 7.452 (H) 07/18/2019 04:33 AM    PCO2I 33.9 (L) 07/18/2019 04:33 AM    PO2I 65 (L) 07/18/2019 04:33 AM    HCO3I 23.6 07/18/2019 04:33 AM    FIO2I 30 07/18/2019 04:33 AM       Chest X-ray:  Date:7/18/2019  Results from Hospital Encounter encounter on 07/10/19   XR CHEST PORT    Narrative EXAM: XR CHEST PORT    CLINICAL INDICATION/HISTORY: Respiratory distress  -Additional: Intubated    COMPARISON: Prior chest x-rays, the most recent from 7/17/2019    TECHNIQUE: Frontal view of the chest    _______________    FINDINGS:  SUPPORT LINES AND TUBES:    > High position of the endotracheal tube tip, at the thoracic inlet,  approximately 8.3 cm above the aristeo.     > Stable nasogastric tube. HEART AND MEDIASTINUM: Stable midline cardiac silhouette. Mild interval  increased hilar prominence    LUNGS AND PLEURAL SPACES: Right upper thoracic reduce the markers with increased  adjacent patchy reticular and alveolar density to prior exam. Increasing right  lower lung confluent reticular and alveolar opacity in the short interval.  Unchanged left basilar opacity obscuring left diaphragmatic margin. Minimal  blunted bilateral costophrenic angles, potentially scarring and/or trace  effusions. No pneumothorax. BONY THORAX AND SOFT TISSUES: No acute or destructive osseous abnormality. _______________      Impression IMPRESSION:    1. High position endotracheal tube tip at the thoracic inlet, 8.3 cm above the  aristeo. Recommend advancing 3 cm. Stable nasogastric tube. 2. Interval increased right upper and lower lung confluent opacity, which may  represent developing atelectasis and/or nonspecific edema given short interval  progression.        Lab Test:  Date:7/18/2019  WBC:   Lab Results   Component Value Date/Time    WBC 13.7 (H) 07/18/2019 01:30 AM   HGB:   Lab Results   Component Value Date/Time    HGB 7.0 (L) 07/18/2019 01:30 AM    PLTS:   Lab Results   Component Value Date/Time    PLATELET 131 81/61/0619 01:30 AM       SaO2%/flow: @LASTSAO2(1)@    Vital Signs:   Patient Vitals for the past 8 hrs:   Temp Pulse Resp BP SpO2   07/18/19 1415  95 14 138/66    07/18/19 1400  97 15 136/81    07/18/19 1345  95 17 134/77    07/18/19 1330  92 13 139/68    07/18/19 1315  91 15 126/72    07/18/19 1300  89 13 124/68    07/18/19 1245  91 16 131/73    07/18/19 1230  92 16 134/54    07/18/19 1215  91 22 130/71    07/18/19 1200 98.6 °F (37 °C) 89 20 125/68    07/18/19 1145 98.6 °F (37 °C) 91 19     07/18/19 1130  88 12 120/70    07/18/19 1124  88 17  98 %   07/18/19 1100  88 16 117/63    07/18/19 1030  86 13 109/63    07/18/19 1000  86 13 115/66 94 %   07/18/19 0930  84 12 112/65    07/18/19 0928  86 16  97 %   07/18/19 0900  87 16 115/71    07/18/19 0830  85 9 109/62    07/18/19 0800 99.9 °F (37.7 °C) 84 13 108/64    07/18/19 0730  85 8 112/64 100 %   07/18/19 0700  81 11 112/66 100 %   07/18/19 0630  81 12 113/67 100 %       Wean Screen Pass (Yes or No): No    Wean Screen Reason for Failure: Patient sedated    Duration of Weaning Trial: N/A    Additional Comments: Dialysis today      PLAN OF CARE: Vent support, bronchodilators Q6, pulmonary hygiene, AM ABG     GOAL: Diurese today, wean tomorrow, as tolerated, to successfully extubate, oxygenation, ventilation, maintain airway    OUTCOME:Patient remains sedated and intubated, on mechanical ventilation

## 2019-07-18 NOTE — PROGRESS NOTES
Physical Exam   Skin:             0700:  Assumed care of patient at this time. Care and charting performed in tandem with COLEEN Goss. Assessment as charted. 1050: IDRs complete. Plan of care discussed. Plan for day is to complete dialysis and remain intubated and sedated. Will readdress SAT/SBT tomorrow 7/19.     1200: Son \"Bharathi\" updated via telephone.

## 2019-07-18 NOTE — PROGRESS NOTES
0730 Bedside and Verbal shift change report given to Destiny Costa and Tanner Crow (oncoming nurse) by Jaye Serrano (offgoing nurse). Report included the following information SBAR and Procedure Summary. 0900 Bernadette RN off floor for inservice. 1300 Returned from inservice. Pt in stable condition, with no acute signs of distress. 1630 Pt agitated and attempting to pull at restraints. Therapeutic interventions implemented. 1900 Bedside and Verbal shift change report given to Amirah Bowers  (oncoming nurse) by Serafin Castaneda (offgoing nurse). Report included the following information SBAR and Procedure Summary.

## 2019-07-18 NOTE — DIALYSIS
ACUTE HEMODIALYSIS FLOW SHEET    HEMODIALYSIS ORDERS: Physician: Dr. Dorcas Acosta: will   Duration: 3 hr  BFR: 300   DFR: 600   Dialysate:  Temp 36.5 K+   3    Ca+  3 Na 140 Bicarb 35   Weight:  53.6 kg    Patient Chart []     Unable to Obtain []   Dry weight/UF Goal: 500 ml Access Right femoral catheter  Needle Gauge NA    Heparin []  Bolus      Units    [] Hourly       Units    [x]None      Catheter locking solution Heparin   Pre BP:   120/70    Pulse:     88      Respirations: 22 Temperature:   98.6 Ax  Tx:        NS  250 ml/Bolus  Other        [] N/A   Labs: Pre        Post:        [x] N/A   Additional Orders(medications, blood products, hypotension management):       [x] N/A     [x] [facility] Consent Verified     CATHETER ACCESS: []N/A   [x]Right   []Left   [x]IJ     []Fem   []Chest wall   [] First use X-ray verified     [x]Tunnel                [] Non Tunneled   [x]No S/S infection  []Redness  []Drainage []Cultured []Swelling []Pain   [x]Medical Aseptic Prep Utilized   []Dressing Changed  [] Biopatch  Date:       []Clotted   []Patent   Flows: [x]Good  []Poor  []Reversed   If access problem,  notified: []Yes    [x]N/A  Date:           GRAFT/FISTULA ACCESS:  [x]N/A     []Right     []Left     []UE     []LE   []AVG   []AVF        []Buttonhole    []Medical Aseptic Prep Utilized   []No S/S infection  []Redness  []Drainage []Cultured []Swelling []Pain    Bruit:   [] Strong    [] Weak       Thrill :   [] Strong    [] Weak       Needle Gauge: NA   Length: NA   If access problem,  notified: []Yes     [x]N/A  Date:        Please describe access if present and not used:NA       GENERAL ASSESSMENT:    LUNGS:  Rate 16 SaO2%       96%  [] N/A    [x] Clear  [] Coarse  [] Crackles  [] Wheezing        [] Diminished     Location : []RLL   []LLL    []RUL  []HAVEN   Cough: []Productive  []Dry  []N/A   Respirations:  []Easy  []Labored   Therapy:  []RA  []NC  l/min    Mask: []NRB []Venti       O2% [x]Ventilator  [x]Intubated  [] Trach  [] BiPaP   CARDIAC: [x]Regular      [] Irregular   [] Pericardial Rub  [] JVD        []  Monitored  [] Bedside  [] Remotely monitored [] N/A  Rhythm:    EDEMA: [] None  [x]Generalized  [] Pitting [] 1    [] 2    [] 3    [] 4                 [] Facial  [] Pedal  []  UE  [] LE   SKIN:   [x] Warm  [] Hot     [] Cold   [] Dry (Left AKA)    [] Pale   [] Diaphoretic                  [] Flushed  [] Jaundiced  [] Cyanotic  [] Rash  [] Weeping   LOC:    [] Alert      []Oriented:    [] Person     [] Place  []Time               [] Confused  [] Lethargic  [x] Medicated  [] Non-responsive     GI / ABDOMEN:  [] Flat    [] Distended    [x] Soft    [] Firm   []  Obese                             [] Diarrhea  [] Bowel Sounds  [] Nausea  [] Vomiting       / URINE ASSESSMENT:[] Voiding   [] Oliguria  [] Anuria   [x]  Galvez     [] Incontinent    []  Incontinent Brief      []  Bathroom Privileges     PAIN: [x] 0 []1  []2   []3   []4   []5   []6   []7   []8   []9   []10            Scale 0-10  Action/Follow Up:    MOBILITY:  [] Amb    [] Amb/Assist    [x] Bed (Patient is in soft wrist restraints). [] Wheelchair  [] [facility] Details on Attached [address] [facility] [address]: [address]     [] 1st Time Acute  [] Stat  [x] Routine  [] Urgent     [] Acute Room  []  Bedside  [x] ICU/CCU  [] ER   Isolation Precautions:  [x] Dialysis   [] Airborne   [] Contact    [] Reverse   Special Considerations:         [] Blood Consent Verified [x]N/A     ALLERGIES: PCN  [] NKA          Code Status:  [x] Full Code  [] DNR  [] Other           HBsAg ONLY: Date Drawn 7/11/2019         [x]Negative []Positive []Unknown  2nd RN check :  Zarina Park RN   HBsAb: Date 7/11/2019    [x] Susceptible   [] Aspwzy41 []Not Drawn  [] Drawn     Current Labs:    Date of Labs: 7/18/2019          Today [x]    Results for Sarabjit Whitt (MRN [id number]) as of 7/18/2019 12:20   Ref.  Range 7/18/2019 01:30   Ionized Calcium Latest Ref Range: 1.12 - 1.32 MMOL/L 0.96 (L)   WBC Latest Ref Range: 4.6 - 13.2 K/uL 13.7 (H)   RBC Latest Ref Range: 4.70 - 5.50 M/uL 2.44 (L)   HGB Latest Ref Range: 13.0 - 16.0 g/dL 7.0 (L)   HCT Latest Ref Range: 36.0 - 48.0 % 20.5 (L)   MCV Latest Ref Range: 74.0 - 97.0 FL 84.0   MCH Latest Ref Range: 24.0 - 34.0 PG 28.7   MCHC Latest Ref Range: 31.0 - 37.0 g/dL 34.1   RDW Latest Ref Range: 11.6 - 14.5 % 16.1 (H)   PLATELET Latest Ref Range: 135 - 420 K/uL 358   MPV Latest Ref Range: 9.2 - 11.8 FL 7.7 (L)   INR Latest Ref Range: 0.8 - 1.2   1.2   Prothrombin time Latest Ref Range: 11.5 - 15.2 sec 14.8   Sodium Latest Ref Range: 136 - 145 mmol/L 136   Potassium Latest Ref Range: 3.5 - 5.5 mmol/L 4.4   Chloride Latest Ref Range: 100 - 111 mmol/L 100   CO2 Latest Ref Range: 21 - 32 mmol/L 24   Anion gap Latest Ref Range: 3.0 - 18 mmol/L 12   Glucose Latest Ref Range: 74 - 99 mg/dL 125 (H)   BUN Latest Ref Range: 7.0 - 18 MG/DL 43 (H)   Creatinine Latest Ref Range: 0.6 - 1.3 MG/DL 5.49 (H)   BUN/Creatinine ratio Latest Ref Range: 12 - 20   8 (L)   Calcium Latest Ref Range: 8.5 - 10.1 MG/DL 7.1 (L)   Phosphorus Latest Ref Range: 2.5 - 4.9 MG/DL 7.5 (H)   Magnesium Latest Ref Range: 1.6 - 2.6 mg/dL 2.4   GFR est non-AA Latest Ref Range: >60 ml/min/1.73m2 11 (L)   GFR est AA Latest Ref Range: >60 ml/min/1.73m2 13 (L)   Bilirubin, total Latest Ref Range: 0.2 - 1.0 MG/DL 0.4   Protein, total Latest Ref Range: 6.4 - 8.2 g/dL 5.3 (L)   Albumin Latest Ref Range: 3.4 - 5.0 g/dL 1.9 (L)   Globulin Latest Ref Range: 2.0 - 4.0 g/dL 3.4   A-G Ratio Latest Ref Range: 0.8 - 1.7   0.6 (L)   ALT (SGPT) Latest Ref Range: 16 - 61 U/L 75 (H)   AST Latest Ref Range: 10 - 38 U/L 284 (H)   Alk. phosphatase Latest Ref Range: 45 - 117 U/L 53           Cut and paste current labs here.                                                                                    DIET:  [] Renal    [] Other     [x] NPO []  Diabetic      PRIMARY NURSE REPORT: First initial/Last name/Title      Pre Dialysis: Tanner Crow RN     Time: 36      EDUCATION:    [x] Patient [] Other         Knowledge Basis: []None []Minimal [] Substantial   Barriers to learning  []N/A   [] Access Care     [] S&S of infection     [] Fluid Management     []K+     [x]Procedural    []Albumin     [] Medications     [] Tx Options     [] Transplant     [] Diet     [] Other   Teaching Tools:  [x] Explain  [] Demo  [] Handouts [] Video  Patient response:   [] Verbalized understanding  [] Teach back  [] Return demonstration [x] Requires follow up   Inappropriate due to       Patient is intubated and sedated.      [x] Time Out/Safety Check  [x]Extracorporeal Circuit Tested for integrity       RO/HEMODIALYSIS MACHINE SAFETY CHECKS  Before each treatment:     Machine Number:                   [facility]                                  [] Unit Machine #  with centralized RO                                  [] Portable Machine #1/RO serial # [id number]                                  [] Portable Machine #2/RO serial # [id number]                                  [] Portable Machine #4/RO serial # [id number]                                                                                                       [facility]                                  [x] Portable Machine #1/RO serial # [id number]                                   [] Portable Machine #12/RO serial # [id number]                                  [] Portable Machine #13/RO serial #  [id number]      Alarm Test:  Pass time 0380         Other:         [x] RO/Machine Log Complete      Temp    36.5             Dialysate: pH  7.4 Conductivity: Meter        HD Machine   14.4                  TCD: 14  Dialyzer Lot # [id number]            Blood Tubing Lot # [id number]          Saline Lot #  -[id number]     CHLORINE TESTING-Before each treatment and every 4 hours    Total Chlorine: [] less than 0.1 ppm  Time: 1115 4 Hr/2nd Check Time:    (if greater than 0.1 ppm from Primary then every 30 minutes from Secondary)     TREATMENT INITIATION  with Dialysis Precautions:   [x] All Connections Secured                 [x] Saline Line Double Clamped   [x] Venous Parameters Set                  [x] Arterial Parameters Set    [x] Prime Given 250 ML                          [x]Air Foam Detector Engaged      Treatment Initiation Note:Patient still admitted to the ICU, stable to begin dialysis. His right femoral catheter was accessed, and his treatment began. He is in soft wrist restraints. During Treatment Notes:  Patient remains intubated and sedated. He is tolerating dialysis treatment without albumin. Medication Dose Volume Route Initials Dialyzer Cleared: [x] Good [] Fair  [] Poor    Blood processed:  52 L  UF Removed  500 Ml    Post Wt:     kg  POst BP:   135/80       Pulse: 98      Respirations: 19  Temperature: 98.9 Ax                                   Post Tx Vascular Access:   N/A                                   Catheter: Locking solution: Heparin 1:1000 Art. 1.2 ml  Josh. 1.2 ml                                   Post Assessment:                                    Skin:  [x] Warm  [x] Dry (New Left AKA) [] Diaphoretic    [] Flushed  [] Pale [] Cyanotic   [facility] Signatures Title Initials  Time Lungs: [x] Clear    [] Course  [] Crackles  [] Wheezing [] Diminished   Yara Dias RN   Cardiac: [x] Regular   [] Irregular   [] Monitor  [] N/A  Rhythm:           Edema:  [] None    [x] General     [] Facial   [] Pedal    [] UE    [] LE       Pain: []0  []1  []2   []3  []4   []5   []6   []7   []8   []9   []10 (Patient is intubated and sedated)         Post Treatment Note:     Patient tolerated treatment well without albumin. His blood was returned at the conclusion of his treatment. His right femoral catheter was locked with Heparin. He remains admitted to the ICU.      POST TREATMENT PRIMARY NURSE HANDOFF REPORT: First initial/Last name/Title         Post Dialysis: VARUN Mendez RN Time:  1500     Abbreviations: AVG-arterial venous graft, AVF-arterial venous fistula, IJ-Internal Jugular, Subcl-Subclavian, Fem-Femoral, Tx-treatment, AP/HR-apical heart rate, DFR-dialysate flow rate, BFR-blood flow rate, AP-arterial pressure, -venous pressure, UF-ultrafiltrate, TMP-transmembrane pressure, Josh-Venous, Art-Arterial, RO-Reverse Osmosis

## 2019-07-18 NOTE — PROGRESS NOTES
Internal Medicine Progress Note    Patient's Name: Dereck Asa  Admit Date: 7/10/2019  Length of Stay: 8      Assessment/Plan     Principal Problem:    Sepsis ([address] (7/10/2019)    Active Problems:    Peripheral vascular disease ([address] (6/29/2019)      Gangrene ([address] (7/10/2019)      Acute kidney injury superimposed on chronic kidney disease ([address] (7/10/2019)      Acute respiratory failure ([address] (7/17/2019)      Malnourished ([address] (6/29/2019)      Hyponatremia (6/29/2019)      Dehydration (7/10/2019)      Anemia (7/16/2019)      Sepsis  - gangrenous first and third toe, s/p L AKA  - Recent wound culture from previous admission susceptible to rocephin and vanc, continue abx, renally dose  - repeat wound culture - Stenotrophomonas maltophilia, enterococcus faecalis, coag neg staph     Gangrene/PVD  - Vasc consulted - appreciate  - podiatry consulted - appreciate   - L AKA done 7/17  - IV abx      [place]  - strict I&Os, mccarthy  - HD catheter placed  - nephrology following - recommendations per them  - refused renal US  - dialysis started 7/17    Acute resp failure  - intubated 7/17  - PCCM consulted - vent mgmt per them    Malnourished  - Nutritional supplements with all meals    Hyponatremia  - acute on chronic, stable  - Nephrology consult - appreciate  - improving    Anemia  - PRBCs transfused as needed for hgb <7  - monitor H&H    - Cont acceptable home medications for chronic conditions   - DVT protocol    I have personally reviewed all pertinent labs and films that have officially resulted over the last 24 hours. I have personally checked for all pending labs that are awaiting final results. Interval History   \"Vini Peña is a 62 y.o. male with a PMHx listed below of who presented to the ED with complaints of worsening LLE pain. Patient was admitted on 6/30/19 and discharged three days ago for gangrenous toes.   Vascular surgery and podiatry were consulted then and he was recommended for multilevel revascularization, he was to follow up as out patient and said that his son could transport him. Per patient now, son was unable to transport patient to f/u appointment, so he returned due to persistent symptoms.       In the ED vascular surgery consulted, podiatry consulted. He presents with worsening creatinine and sodium. I spoke with Nephrology and they recommend fluids. His only complaint is pain, no fevers, chills, weakness, AMS. Sodium 121, will be admitted for further eval. He doesn't know if he has been taking his lasix, but says hes been taking his abx\"    Per podiatry - no need for urgent amputation, wait until revascularization. Nephrology consulted - Acute on chronic hyponatremia improved with gentle IVF. Oncology consulted - bone scan ordered to complete restaging - No definite sonographic evidence of osseous metastatic disease. Repeat wound culture 7/11 - Stenotrophomonas maltophilia, enterococcus faecalis, coag neg staph. Patient continually refused to complete MRI. Patient's renal function declined rapidly, patient started dialysis 7/17. PRBCs transfused as needed for hgb <7. L AKA 7/17. Patient developed acute resp failure postoperatively and was intubated. Aztreonam started d/t thick yellow secretions seen during intubation.     Subjective     Pt s/e @ bedside. Sedated and intubated.  NAD    Objective     Visit Vitals  /71   Pulse 91   Temp 98.6 °F (37 °C) (Axillary)   Resp 22   Ht 6' (1.829 m)   Wt 53.6 kg (118 lb 2.7 oz)   SpO2 98%   BMI 16.03 kg/m²       Physical Exam:  General Appearance: NAD, sedated and intubated  HENT: normocephalic/atraumatic, moist mucus membranes  Neck: No JVD, supple  Lungs: CTA with normal respiratory effort  CV: RRR, no m/r/g  Abdomen: soft, non-tender, normal bowel sounds  Extremities: no cyanosis, no peripheral edema, L AKA  Neuro: No new focal deficits    Intake and Output:  Current Shift:  07/18 0701 - 07/18 1900  In: 650.5 [I.V.:650.5]  Out: 100 [Urine:100]  Last three shifts:  07/16 1901 - 07/18 0700  In: 2119.3 [I.V.:2119.3]  Out: 1430 [Urine:880]    Lab/Data Reviewed:  BMP:   Lab Results   Component Value Date/Time     07/18/2019 01:30 AM    K 4.4 07/18/2019 01:30 AM     07/18/2019 01:30 AM    CO2 24 07/18/2019 01:30 AM    AGAP 12 07/18/2019 01:30 AM     (H) 07/18/2019 01:30 AM    BUN 43 (H) 07/18/2019 01:30 AM    CREA 5.49 (H) 07/18/2019 01:30 AM    GFRAA 13 (L) 07/18/2019 01:30 AM    GFRNA 11 (L) 07/18/2019 01:30 AM     CBC:   Lab Results   Component Value Date/Time    WBC 13.7 (H) 07/18/2019 01:30 AM    HGB 7.0 (L) 07/18/2019 01:30 AM    HCT 20.5 (L) 07/18/2019 01:30 AM     07/18/2019 01:30 AM         Imaging Reviewed:  Xr Chest Sngl V    Result Date: 7/17/2019  EXAM: CHEST RADIOGRAPH, SINGLE VIEW CLINICAL INDICATION/HISTORY: Short of breath. Suspect fluid overload COMPARISON: Single view chest 7/10/2019 TECHNIQUE: Portable frontal view of the chest was obtained. _______________ FINDINGS: SUPPORT DEVICES: None. HEART AND MEDIASTINUM: There is central vascular congestion compared to recent chest film although heart size remains normal. LUNGS AND PLEURAL SPACES: New opacification inferior left hemithorax with blunting left costophrenic angle with similar but less prominent changes on the right. Linear density peripheral right upper lobe with several metallic densities consistent with fiducial markers is stable. BONY THORAX AND SOFT TISSUES: No acute osseous abnormality. _______________     IMPRESSION: 1. New bilateral pleural effusions with probable lower lobe areas of atelectasis, left greater than right.  Mild central vascular congestion although heart size remains normal.    Xr Chest Port    Result Date: 7/18/2019  EXAM: XR CHEST PORT CLINICAL INDICATION/HISTORY: Respiratory distress -Additional: Intubated COMPARISON: Prior chest x-rays, the most recent from 7/17/2019 TECHNIQUE: Frontal view of the chest _______________ FINDINGS: SUPPORT LINES AND TUBES:   > High position of the endotracheal tube tip, at the thoracic inlet, approximately 8.3 cm above the aristeo.   > Stable nasogastric tube. HEART AND MEDIASTINUM: Stable midline cardiac silhouette. Mild interval increased hilar prominence LUNGS AND PLEURAL SPACES: Right upper thoracic reduce the markers with increased adjacent patchy reticular and alveolar density to prior exam. Increasing right lower lung confluent reticular and alveolar opacity in the short interval. Unchanged left basilar opacity obscuring left diaphragmatic margin. Minimal blunted bilateral costophrenic angles, potentially scarring and/or trace effusions. No pneumothorax. BONY THORAX AND SOFT TISSUES: No acute or destructive osseous abnormality. _______________     IMPRESSION: 1. High position endotracheal tube tip at the thoracic inlet, 8.3 cm above the aristeo. Recommend advancing 3 cm. Stable nasogastric tube. 2. Interval increased right upper and lower lung confluent opacity, which may represent developing atelectasis and/or nonspecific edema given short interval progression. Xr Chest Port    Result Date: 7/17/2019  EXAM: CHEST RADIOGRAPH, SINGLE VIEW CLINICAL INDICATION/HISTORY: Respiratory failure. Intubated COMPARISON: AP portable chest film done earlier today and on 7/10/2019 TECHNIQUE: Portable frontal view of the chest was obtained. _______________ FINDINGS: SUPPORT DEVICES: Endotracheal tube is now present in good position well above the aristeo. NG tube is also present extending below the diaphragm although the tip is not included on this exam. HEART AND MEDIASTINUM: Heart size remains normal. Pulmonary vascularity appears less engorged possibly related to better inspiration on this exam.  Aorta is normal in caliber with mild calcification. LUNGS AND PLEURAL SPACES: The interstitial changes in the lung bases are less prominent.  Blunting of the costophrenic angles also appears less prominent with minimal blunting remaining. No definite focal consolidation. No pneumothorax. BONY THORAX AND SOFT TISSUES: No acute osseous abnormality. _______________     IMPRESSION: 1. Endotracheal tube and NG tube good position. 2. Better inspiration with only possible tiny effusions suspected on this study and resolved basilar interstitial changes which were seen on recent exam done earlier this afternoon. Xr Abd Port  1 V    Result Date: 7/17/2019  EXAM: KUB INDICATION: NG tube placement COMPARISON: None. _______________ FINDINGS: Portable supine abdominal film was performed. NG tube is present with tip and sidehole extending well below the diaphragm. Mild blunting bilateral costophrenic angles consistent with small effusions. This appears smaller than on chest film done earlier this afternoon. Nonobstructive bowel gas pattern. _______________     IMPRESSION: 1. NG tube good position. Nonobstructive bowel gas pattern. 2. Findings suspicious for small bilateral effusions which appear smaller than on chest film done earlier this afternoon.       Medications Reviewed:  Current Facility-Administered Medications   Medication Dose Route Frequency    [START ON 7/19/2019] carvedilol (COREG) tablet 6.25 mg  6.25 mg Oral BID WITH MEALS    pantoprazole (PROTONIX) 40 mg in sodium chloride 0.9% 10 mL injection  40 mg IntraVENous DAILY    [START ON 7/20/2019] VANCOMYCIN INFORMATION NOTE   Other ONCE    vancomycin (VANCOCIN) 750 mg in 0.9% sodium chloride 250 mL IVPB  750 mg IntraVENous ONCE    sodium chloride (NS) flush 5-40 mL  5-40 mL IntraVENous Q8H    sodium chloride (NS) flush 5-40 mL  5-40 mL IntraVENous PRN    0.9% sodium chloride infusion  25 mL/hr IntraVENous CONTINUOUS    0.9% sodium chloride infusion 250 mL  250 mL IntraVENous PRN    0.9% sodium chloride infusion 250 mL  250 mL IntraVENous PRN    0.9% sodium chloride infusion  50 mL/hr IntraVENous DIALYSIS PRN    heparin (porcine) 1,000 unit/mL injection 1,000 Units  1,000 Units InterCATHeter DIALYSIS PRN    sodium chloride (NS) flush 5-40 mL  5-40 mL IntraVENous Q8H    sodium chloride (NS) flush 5-40 mL  5-40 mL IntraVENous PRN    naloxone (NARCAN) injection 0.1 mg  0.1 mg IntraVENous PRN    ondansetron (ZOFRAN) injection 4 mg  4 mg IntraVENous PRN    senna (SENOKOT) tablet 8.6 mg  1 Tab Oral BID    fentaNYL (PF) 900 mcg/30 ml infusion soln  0-200 mcg/hr IntraVENous TITRATE    propofol (DIPRIVAN) infusion  0-50 mcg/kg/min IntraVENous TITRATE    chlorhexidine (PERIDEX) 0.12 % mouthwash 10 mL  10 mL Oral Q12H    albuterol-ipratropium (DUO-NEB) 2.5 MG-0.5 MG/3 ML  3 mL Nebulization Q6H RT    fentaNYL citrate (PF) injection 25-50 mcg  25-50 mcg IntraVENous Q1H PRN    cefepime (MAXIPIME) 0.5 g in 0.9% sodium chloride 100 mL IVPB  0.5 g IntraVENous Q24H    dextrose 10 % infusion  75 mL/hr IntraVENous CONTINUOUS    oxyCODONE-acetaminophen (PERCOCET) 5-325 mg per tablet 1-2 Tab  1-2 Tab Oral Q4H PRN    heparin (porcine) injection 5,000 Units  5,000 Units SubCUTAneous Q8H    VANCOMYCIN INFORMATION NOTE 1 Each  1 Each Other Rx Dosing/Monitoring    therapeutic multivitamin (THERAGRAN) tablet 1 Tab  1 Tab Oral DAILY    sodium chloride (NS) flush 5-10 mL  5-10 mL IntraVENous PRN    acetaminophen (TYLENOL) tablet 650 mg  650 mg Oral Q4H PRN    ondansetron (ZOFRAN) injection 4 mg  4 mg IntraVENous Q4H PRN       Bernardo Grant PA-C  [address] [place] [address]  Hospitalist Division  Office:  [phone]  Pager: [phone]

## 2019-07-19 NOTE — PROGRESS NOTES
0700: Bedside and Verbal shift change report given to Mele Austin (oncoming nurse) by Rehana Angel  (offgoing nurse). Report included the following information SBAR, Kardex, Procedure Summary, Intake/Output, MAR, Accordion, Recent Results and Quality Measures. Pt opens eyes to voice, all rates of drips were verified. No physical symptoms of pain. 0800: Dr. Harlan Lee at bedside for dressing change. Dressing to be changed as needed with mepilex and sock. 0830: wound care at bedside, assessed the site will return tomorrw to change dressing     1000: pt discussed in IDR rounds, plan is to start tube feeds and lower propofol slowly. Dr aware of thick m,ucous secretions and that the pt failed his sedation vacation due to agitation. Will reevaluate extubation and SBT tomorrow. 1100: nephrology at bedside, plan is no dialysis today, plan for dialysis tomorrow. Orders put in to double concentrate and minimize excess fluid intake. 1600: reassessment completed, no changes noted. 1800: pts son called and was updated on pt     1900: Bedside and Verbal shift change report given to Bernadine Carlin (oncoming nurse) by Mele Austin (offgoing nurse). Report included the following information SBAR, Kardex, Procedure Summary, Intake/Output, Accordion, Recent Results and Quality Measures.

## 2019-07-19 NOTE — PROGRESS NOTES
CDMP:     Underweight with  Moderate Chronic Malnutrition  Anemia 2/2 acute on chronic renal disease

## 2019-07-19 NOTE — CDMP QUERY
Pt admitted with sepsis, PVD, [place] and has anemia documented. Please further specify type and acuity of anemia in the medical record.       Anemia due to acute blood loss   Anemia due to chronic blood loss   Anemia due to iron deficiency   Anemia due to postoperative blood loss  (please specify if expected or complication of the surgery)   Anemia due to chronic disease, please specify disease   Dilutional anemia   Other, please specify   Clinically unable to determine    The medical record reflects the following:     Risk Factors:  61 yo male admitted with sepsis, PVD with gangrene,  [place] with ATN, s/p left AKA       Clinical Indicators: h/h   7/16  6.9/20.1                                    H/h  7/17  7.5/21.6                                    H/h  7/17  6.7/19.4                                    H/h  7/18  7.0/20.5                                    H/h  7/19  6.5/19.4    Per NP Reprogle note 7/16 Anemia  PRBCs transfused as needed for hgb <7  - monitor H&H         Treatment: Transfuse RBC's, serial labs      Thank you,  Delfino Magana RN [facility]   [phone]

## 2019-07-19 NOTE — PROGRESS NOTES
Internal Medicine Progress Note    Patient's Name: Millicent Lawson  Admit Date: 7/10/2019  Length of Stay: 9      Assessment/Plan     Principal Problem:    Sepsis ([address] (7/10/2019)    Active Problems:    Gangrene ([address] (7/10/2019)      Malnourished ([address] (6/29/2019)      Peripheral vascular disease ([address] (6/29/2019)      Hyponatremia (6/29/2019)      Acute kidney injury superimposed on chronic kidney disease ([address] (7/10/2019)      Dehydration (7/10/2019)      Anemia (7/16/2019)      Acute respiratory failure ([address] (7/17/2019)      Sepsis  - gangrenous first and third toe, s/p L AKA  - Recent wound culture from previous admission susceptible to rocephin and vanc, continue abx, renally dose  - repeat wound culture - Stenotrophomonas maltophilia, enterococcus faecalis, coag neg staph  -d/c abx tomorrow    Gangrene/PVD  - Vasc consulted - appreciate  - podiatry consulted - appreciate   - L AKA done 7/17  - IV abx   -Restart ASA and plavix today  -d/c abx tomorrow     [place]  - strict I&Os, mccarthy  - HD catheter placed  - nephrology following - recommendations per them  - refused renal US  - dialysis started 7/17    Acute resp failure  - intubated 7/17  - PCCM consulted - vent mgmt per them    Malnourished  - Nutritional supplements with all meals    Hyponatremia  - acute on chronic, stable  - Nephrology consult - appreciate  - improving    Anemia  - PRBCs transfused as needed for hgb <7  - monitor H&H    - Cont acceptable home medications for chronic conditions   - DVT protocol    I have personally reviewed all pertinent labs and films that have officially resulted over the last 24 hours. I have personally checked for all pending labs that are awaiting final results. Interval History   \"Vini Lucero is a 62 y.o. male with a PMHx listed below of who presented to the ED with complaints of worsening LLE pain. Patient was admitted on 6/30/19 and discharged three days ago for gangrenous toes.   Vascular surgery and podiatry were consulted then and he was recommended for multilevel revascularization, he was to follow up as out patient and said that his son could transport him. Per patient now, son was unable to transport patient to f/u appointment, so he returned due to persistent symptoms.       In the ED vascular surgery consulted, podiatry consulted. He presents with worsening creatinine and sodium. I spoke with Nephrology and they recommend fluids. His only complaint is pain, no fevers, chills, weakness, AMS. Sodium 121, will be admitted for further eval. He doesn't know if he has been taking his lasix, but says hes been taking his abx\"    Per podiatry - no need for urgent amputation, wait until revascularization. Nephrology consulted - Acute on chronic hyponatremia improved with gentle IVF. Oncology consulted - bone scan ordered to complete restaging - No definite sonographic evidence of osseous metastatic disease. Repeat wound culture 7/11 - Stenotrophomonas maltophilia, enterococcus faecalis, coag neg staph. Patient continually refused to complete MRI. Patient's renal function declined rapidly, patient started dialysis 7/17. PRBCs transfused as needed for hgb <7. L AKA 7/17. Patient developed acute resp failure postoperatively and was intubated. Aztreonam started d/t thick yellow secretions seen during intubation.     Subjective     Pt s/e @ bedside. Sedated and intubated. NAD. Nurse states thick mucous from suction tube    Objective     Visit Vitals  /81   Pulse 88   Temp 98.5 °F (36.9 °C)   Resp 13   Ht 6' (1.829 m)   Wt 62.7 kg (138 lb 3.7 oz)   SpO2 100%   BMI 18.75 kg/m²       Physical Exam:  General Appearance: NAD, sedated and intubated  HENT: normocephalic/atraumatic, moist mucus membranes  Neck: No JVD, supple  Lungs: course breath sounds bilat normal respiratory effort  CV: RRR, no m/r/g  Abdomen: soft, non-tender, normal bowel sounds  Extremities: no cyanosis, no peripheral edema, L AKA  Neuro:  No new focal deficits    Intake and Output:  Current Shift:  07/19 0701 - 07/19 1900  In: 731.2 [I.V.:383.6]  Out: 125 [Urine:125]  Last three shifts:  07/17 1901 - 07/19 0700  In: 4741.1 [I.V.:4711.1]  Out: 1080 [Urine:560]    Lab/Data Reviewed:  BMP:   Lab Results   Component Value Date/Time     07/19/2019 01:45 AM    K 3.8 07/19/2019 01:45 AM     07/19/2019 01:45 AM    CO2 27 07/19/2019 01:45 AM    AGAP 8 07/19/2019 01:45 AM     (H) 07/19/2019 01:45 AM    BUN 18 07/19/2019 01:45 AM    CREA 3.26 (H) 07/19/2019 01:45 AM    GFRAA 23 (L) 07/19/2019 01:45 AM    GFRNA 19 (L) 07/19/2019 01:45 AM     CBC:   Lab Results   Component Value Date/Time    WBC 12.5 07/19/2019 01:45 AM    HGB 6.5 (L) 07/19/2019 01:45 AM    HCT 19.4 (L) 07/19/2019 01:45 AM     07/19/2019 01:45 AM         Imaging Reviewed:  Bonifacio Gomes Chest Port    Result Date: 7/19/2019  EXAM: Portable Chest CLINICAL INDICATION: Respiratory distress -Additional: Intubated COMPARISON: 07/18/19 TECHNIQUE: AP portable view at 0506 ______________ FINDINGS: HEART AND MEDIASTINUM: The heart size is stable LUNGS AND AIRWAYS: Fiducial markers are again seen in the upper right chest. Area of infiltrate appears present at both lung bases. There is increased at the left lung base on the recent study PLEURA: No pleural effusion or pneumothorax. BONES: Chronic spondylosis is present OTHER: The endotracheal catheter tip is about 7 cm above the aristeo. The NG tube enters the stomach. Cardiac monitor leads overlie the chest ______________     IMPRESSION: Infiltrative changes at the lung bases which have increased on the left.  Tubes as discussed      Medications Reviewed:  Current Facility-Administered Medications   Medication Dose Route Frequency    ELECTROLYTE REPLACEMENT PROTOCOL - Potassium Renal Dosing  1 Each Other PRN    ELECTROLYTE REPLACEMENT PROTOCOL - Magnesium   1 Each Other PRN    ELECTROLYTE REPLACEMENT PROTOCOL  - Phosphorus Renal Dosing  1 Each Other PRN    ELECTROLYTE REPLACEMENT PROTOCOL - Calcium   1 Each Other PRN    0.9% sodium chloride infusion 250 mL  250 mL IntraVENous PRN    carvedilol (COREG) tablet 6.25 mg  6.25 mg Oral BID WITH MEALS    pantoprazole (PROTONIX) 40 mg in sodium chloride 0.9% 10 mL injection  40 mg IntraVENous DAILY    [START ON 7/20/2019] VANCOMYCIN INFORMATION NOTE   Other ONCE    sodium chloride (NS) flush 5-40 mL  5-40 mL IntraVENous Q8H    sodium chloride (NS) flush 5-40 mL  5-40 mL IntraVENous PRN    0.9% sodium chloride infusion 250 mL  250 mL IntraVENous PRN    0.9% sodium chloride infusion 250 mL  250 mL IntraVENous PRN    0.9% sodium chloride infusion  50 mL/hr IntraVENous DIALYSIS PRN    heparin (porcine) 1,000 unit/mL injection 1,000 Units  1,000 Units InterCATHeter DIALYSIS PRN    sodium chloride (NS) flush 5-40 mL  5-40 mL IntraVENous Q8H    sodium chloride (NS) flush 5-40 mL  5-40 mL IntraVENous PRN    naloxone (NARCAN) injection 0.1 mg  0.1 mg IntraVENous PRN    ondansetron (ZOFRAN) injection 4 mg  4 mg IntraVENous PRN    senna (SENOKOT) tablet 8.6 mg  1 Tab Oral BID    fentaNYL (PF) 900 mcg/30 ml infusion soln  0-200 mcg/hr IntraVENous TITRATE    propofol (DIPRIVAN) infusion  0-50 mcg/kg/min IntraVENous TITRATE    chlorhexidine (PERIDEX) 0.12 % mouthwash 10 mL  10 mL Oral Q12H    albuterol-ipratropium (DUO-NEB) 2.5 MG-0.5 MG/3 ML  3 mL Nebulization Q6H RT    fentaNYL citrate (PF) injection 25-50 mcg  25-50 mcg IntraVENous Q1H PRN    cefepime (MAXIPIME) 0.5 g in 0.9% sodium chloride 100 mL IVPB  0.5 g IntraVENous Q24H    dextrose 10 % infusion  25 mL/hr IntraVENous CONTINUOUS    oxyCODONE-acetaminophen (PERCOCET) 5-325 mg per tablet 1-2 Tab  1-2 Tab Oral Q4H PRN    heparin (porcine) injection 5,000 Units  5,000 Units SubCUTAneous Q8H    VANCOMYCIN INFORMATION NOTE 1 Each  1 Each Other Rx Dosing/Monitoring    therapeutic multivitamin (THERAGRAN) tablet 1 Tab  1 Tab Oral DAILY    sodium chloride (NS) flush 5-10 mL  5-10 mL IntraVENous PRN    acetaminophen (TYLENOL) tablet 650 mg  650 mg Oral Q4H PRN    ondansetron (ZOFRAN) injection 4 mg  4 mg IntraVENous Q4H PRN     STEPHANIE Espinoza-[facility] [address]  Pager: [phone]  Office: [phone]

## 2019-07-19 NOTE — PROGRESS NOTES
[facility]  [address], [address] [facility]   Dr. Rosana Ingram, Dr. Roz Dumont, & Dr. Cedric Morrison  [phone] FAX# [phone]    Progress Note    Patient: Gianni Pillai MRN: [id number]  SSN: [id number]    YOB: 1955  Age: 62 y.o. Sex: male      Admit Date: 7/10/2019    LOS: 9 days     Subjective:     POD#2 L AKA and RCFV Temporary HD catheter insertion. Currently intubated and sedated. Currently on Maxipime and Vancomycin. Objective:     Vitals:    07/19/19 0935 07/19/19 0950 07/19/19 1000 07/19/19 1020   BP: 141/75 131/73 131/73 125/78   Pulse: 91 89 91 88   Resp: 12 12 14 11   Temp: 98.5 °F (36.9 °C) 98.3 °F (36.8 °C)  98 °F (36.7 °C)   TempSrc:       SpO2: 100% 100%  100%   Weight:       Height:          Intake and Output:  Current Shift: 07/19 0701 - 07/19 1900  In: 414.4 [I.V.:287.7]  Out: 35 [Urine:35]  Last three shifts: 07/17 1901 - 07/19 0700  In: 4741.1 [I.V.:4711.1]  Out: 1080 [Urine:560]    Physical Exam:   GENERAL: Intubated, sedated, NAD, thin appearing  EYE: PERRL, mucous membranes moist, non-icteric  NECK: supple, symmetric. No JVD   LUNG: clear to auscultation bilaterally  HEART: regular rate and rhythm  ABDOMEN: soft, non-tender, non-distended  EXTREMITIES: L AKA: c/d/i with staples. No drainage or edema seen. Soft and supple. Flaps viable. RLE toes cool to touch, no ulcerations. nonpalpable BLE Femoral and RLE DP/PT pulses. No edema RLE. Palpable 2+ BUE radial pulses.    NEUROLOGIC: Deferred     Lab/Data Review:  BMP:   Lab Results   Component Value Date/Time     07/19/2019 01:45 AM    K 3.8 07/19/2019 01:45 AM     07/19/2019 01:45 AM    CO2 27 07/19/2019 01:45 AM    AGAP 8 07/19/2019 01:45 AM     (H) 07/19/2019 01:45 AM    BUN 18 07/19/2019 01:45 AM    CREA 3.26 (H) 07/19/2019 01:45 AM    GFRAA 23 (L) 07/19/2019 01:45 AM    GFRNA 19 (L) 07/19/2019 01:45 AM     CBC:   Lab Results   Component Value Date/Time    WBC 12.5 07/19/2019 01:45 AM    HGB 6.5 (L) 07/19/2019 01:45 AM    HCT 19.4 (L) 07/19/2019 01:45 AM     07/19/2019 01:45 AM      CT CHEST WO 7/15/19: . Mild interstitial edema is present in both lower lungs. Small to moderate  bilateral pleural effusions with adjacent atelectasis. Small pericardial  effusion is present.     2. Minimal soft tissue prominence is present in the right upper lobe at the site  of the prior malignancy which has the appearance of soft tissue scarring. No  focal nodular regions to suggest recurrent disease, however prior studies are  not available for review.     3. Doubtful thickening and surrounding edema within the visualized portions of  the proximal stomach, which could be suggestive of mild gastritis.     4. Layering secretions are present in the distal trachea in the right main  bronchus, concerning for small amount of aspiration.     NUC MED SCAN [facility] 7/12/19: 1. No definite sonographic evidence of osseous metastatic disease.     CULTURE BLOOD 7/10/19: No growth    CTA ABD ART R/O 6/30/19: 1. ABDOMINAL AORTA:  Mild to moderate atherosclerosis without significant  stenosis.     2. RIGHT LOWER EXTREMITY:  -HAYDEE:  Mild origin stenosis. -EIA:  Multilevel stenosis, most severe at the origin with moderate stenosis. -CFA:  Marked tight focal stenosis involving the mid aspect. Moderate short  segment stenosis involving the distal aspect. -SFA:  Multilevel stenosis with multilevel tight stenosis or occlusions within  the proximal and mid aspects.  -Popliteal artery:  Multilevel stenosis with long segment occlusion involving  the mid to distal aspect.  -Tibioperoneal arteries:  Extensive atherosclerosis with suspected occlusions  involving the origins and proximal aspects.     3. LEFT LOWER EXTREMITY:  -HAYDEE:  Suggestion of mild focal origin stenosis. -EIA:  Occluded. -CFA:  Proximal aspect occluded. No significant stenosis involving the mid to  distal aspect.   -SFA:  Multilevel stenosis throughout its entire length with short segment  occlusion involving the distal aspect. -Popliteal artery:  Multiple short segment occlusions involving its entire  length.  -Tibioperoneal arteries:  Extensive atherosclerosis with suspected occlusion of  the proximal trifurcation vessels.     4. Other vascular findings:    -Moderate to marked left renal artery origin stenosis.     5. Non-vascular findings:  -Assessment limited secondary to respiratory motion. Moderately distended  bladder.     XR LT FOOT 6/29/19:    1.  No acute fracture, destructive osseous changes or plain film findings to  suggest osteomyelitis.     BLE KELLI 6/29/19: Critical multilevel disease including inflow disease bilaterally.     CBC 6/29/19: WBC 7.2, HGB 9.7, HCT 28.1,      PET SCAN [facility] 4/15/19: Activity at parenchymal band right upper lobe probably treatment effect after radiation therapy. Attention should be paid to this region on subsequent examinations. Resolution of malignant activity posteriorly at the right hilum compatible with excellent response to therapy. Increased activity at a superior mediastinal lymph node, just above that of blood pool, indeterminate. Could be new metastasis. Could consider follow-up with diagnostic CT at short interval.  Activity right side prostate gland. Consider correlation with digital rectal exam and PSA.   Emphysema    Assessment:     Principal Problem:    Sepsis ([address] (7/10/2019)    Active Problems:    Malnourished ([address] (6/29/2019)      Peripheral vascular disease ([address] (6/29/2019)      Hyponatremia (6/29/2019)      Acute kidney injury superimposed on chronic kidney disease ([address] (7/10/2019)      Dehydration (7/10/2019)      Gangrene ([address] (7/10/2019)      Anemia (7/16/2019)      Acute respiratory failure (HCC) (7/17/2019)      POD#2 L AKA and RCFV Temporary HD catheter placement   Severe BLE PVD with worsening multiple LLE gangrene  Worsening BROOKLYN in need of HD  Acute on Chronic anemia  AMS  Leukocytosis   Hyponatremia - ? SAIDH (malignancy vs. Idiopathic)  HTN  H/o RUL Non-small cell cancer s/p Radiation with cyberknife 3/2018 possible mediastinal mets?   COPD  Malnutrition  Tobacco Dependence  ETOH abuse  Plan:     Continue to use RCFV temporary HD catheter for HD  Meriplex to L AKA incision - keep c/d/i   Elevate LLE stump on a pillow  Ok to discontinue abx per vascular standpoint after today  Transfusion per Hospitalist   Ok to restart Plavix and ASA per vascular standpoint  Would consider Palliative consult for goals of care  Will follow along peripherally for now    Signed By: DIANDRA Ornelas     July 19, 2019

## 2019-07-19 NOTE — PROGRESS NOTES
Problem: Pain  Goal: *Control of Pain  Outcome: Progressing Towards Goal     Problem: Patient Education: Go to Patient Education Activity  Goal: Patient/Family Education  Outcome: Progressing Towards Goal     Problem: Falls - Risk of  Goal: *Absence of Falls  Description  Document Mable Jaimes Fall Risk and appropriate interventions in the flowsheet. Outcome: Progressing Towards Goal  Note:   Fall Risk Interventions:  Mobility Interventions: Assess mobility with egress test, PT Consult for assist device competence    Mentation Interventions: Adequate sleep, hydration, pain control, Bed/chair exit alarm, Door open when patient unattended, More frequent rounding, Reorient patient, Room close to nurse's station    Medication Interventions: Bed/chair exit alarm    Elimination Interventions: Bed/chair exit alarm, Call light in reach, Toileting schedule/hourly rounds    History of Falls Interventions: Evaluate medications/consider consulting pharmacy, Room close to nurse's station         Problem: Patient Education: Go to Patient Education Activity  Goal: Patient/Family Education  Outcome: Progressing Towards Goal     Problem: Impaired Skin Integrity/Pressure Injury Treatment  Goal: *Improvement of Existing Pressure Injury  Outcome: Progressing Towards Goal  Goal: *Prevention of pressure injury  Description  Document Mikie Scale and appropriate interventions in the flowsheet. Outcome: Progressing Towards Goal  Note:   Pressure Injury Interventions:  Sensory Interventions: Assess changes in LOC, Assess need for specialty bed, Check visual cues for pain, Keep linens dry and wrinkle-free, Pressure redistribution bed/mattress (bed type)    Moisture Interventions: Absorbent underpads    Activity Interventions: Pressure redistribution bed/mattress(bed type)    Mobility Interventions: Assess need for specialty bed, Float heels, HOB 30 degrees or less, Pressure redistribution bed/mattress (bed type), Turn and reposition approx. every two hours(pillow and wedges)    Nutrition Interventions: Document food/fluid/supplement intake    Friction and Shear Interventions: Apply protective barrier, creams and emollients, Feet elevated on foot rest, Foam dressings/transparent film/skin sealants, HOB 30 degrees or less, Lift sheet, Lift team/patient mobility team, Minimize layers                Problem: Patient Education: Go to Patient Education Activity  Goal: Patient/Family Education  Outcome: Progressing Towards Goal     Problem: Pressure Injury - Risk of  Goal: *Prevention of pressure injury  Description  Document Mikie Scale and appropriate interventions in the flowsheet. Outcome: Progressing Towards Goal  Note:   Pressure Injury Interventions:  Sensory Interventions: Assess changes in LOC, Assess need for specialty bed, Check visual cues for pain, Keep linens dry and wrinkle-free, Pressure redistribution bed/mattress (bed type)    Moisture Interventions: Absorbent underpads    Activity Interventions: Pressure redistribution bed/mattress(bed type)    Mobility Interventions: Assess need for specialty bed, Float heels, HOB 30 degrees or less, Pressure redistribution bed/mattress (bed type), Turn and reposition approx.  every two hours(pillow and wedges)    Nutrition Interventions: Document food/fluid/supplement intake    Friction and Shear Interventions: Apply protective barrier, creams and emollients, Feet elevated on foot rest, Foam dressings/transparent film/skin sealants, HOB 30 degrees or less, Lift sheet, Lift team/patient mobility team, Minimize layers                Problem: Patient Education: Go to Patient Education Activity  Goal: Patient/Family Education  Outcome: Progressing Towards Goal     Problem: Discharge Planning  Goal: *Discharge to safe environment  Outcome: Progressing Towards Goal     Problem: Nutrition Deficit  Goal: *Optimize nutritional status  Outcome: Progressing Towards Goal     Problem: Patient Education: Go to Patient Education Activity  Goal: Patient/Family Education  Outcome: Progressing Towards Goal     Problem: Patient Education: Go to Patient Education Activity  Goal: Patient/Family Education  Outcome: Progressing Towards Goal     Problem: Ventilator Management  Goal: *Adequate oxygenation and ventilation  Outcome: Progressing Towards Goal  Goal: *Patient maintains clear airway/free of aspiration  Outcome: Progressing Towards Goal  Goal: *Absence of infection signs and symptoms  Outcome: Progressing Towards Goal  Goal: *Normal spontaneous ventilation  Outcome: Progressing Towards Goal     Problem: Patient Education: Go to Patient Education Activity  Goal: Patient/Family Education  Outcome: Progressing Towards Goal     Problem: Non-Violent Restraints  Goal: *Removal from restraints as soon as assessed to be safe  Outcome: Progressing Towards Goal  Goal: *No harm/injury to patient while restraints in use  Outcome: Progressing Towards Goal  Goal: *Patient's dignity will be maintained  Outcome: Progressing Towards Goal  Goal: *Patient Specific Goal (EDIT GOAL, INSERT TEXT)  Outcome: Progressing Towards Goal  Goal: Non-violent Restaints:Standard Interventions  Outcome: Progressing Towards Goal  Goal: Non-violent Restraints:Patient Interventions  Outcome: Progressing Towards Goal  Goal: Patient/Family Education  Outcome: Progressing Towards Goal     Problem: Patient Education: Go to Patient Education Activity  Goal: Patient/Family Education  Outcome: Progressing Towards Goal     Problem: Patient Education: Go to Patient Education Activity  Goal: Patient/Family Education  Outcome: Progressing Towards Goal     Problem: Surgical Pathway Day of Surgery  Goal: *Adequate urinary output (equal to or greater than 30 milliliters/hour)  Description  Ambulatory Surgery patients voiding without difficulty.   Outcome: Progressing Towards Goal     Problem: Surgical Pathway Post-Op Day 1  Goal: Off Pathway (Use only if patient is Off Pathway)  Outcome: Progressing Towards Goal  Goal: Activity/Safety  Outcome: Progressing Towards Goal  Goal: Diagnostic Test/Procedures  Outcome: Progressing Towards Goal  Goal: Nutrition/Diet  Outcome: Progressing Towards Goal  Goal: Discharge Planning  Outcome: Progressing Towards Goal  Goal: Medications  Outcome: Progressing Towards Goal  Goal: Respiratory  Outcome: Progressing Towards Goal  Goal: Treatments/Interventions/Procedures  Outcome: Progressing Towards Goal  Goal: Psychosocial  Outcome: Progressing Towards Goal  Goal: *No signs and symptoms of infection or wound complications  Outcome: Progressing Towards Goal  Goal: *Optimal pain control at patient's stated goal  Outcome: Progressing Towards Goal  Goal: *Adequate urinary output (equal to or greater than 30 milliliters/hour)  Outcome: Progressing Towards Goal  Goal: *Hemodynamically stable  Outcome: Progressing Towards Goal  Goal: *Tolerating diet  Outcome: Progressing Towards Goal  Goal: *Demonstrates progressive activity  Outcome: Progressing Towards Goal  Goal: *Lungs clear or at baseline  Outcome: Progressing Towards Goal

## 2019-07-19 NOTE — PROGRESS NOTES
[facility]  ICU Progress Note      Name: Aravind Pereyra   : 1955   MRN: [id number]   Date: 2019 11:38 AM     [x]I have reviewed the flowsheet and previous days notes. Events overnight reviewed and discussed with nursing staff. Vital signs and records reviewed. Subjective:  19:    HD yesterday  ETTube adjusted yesterday  Wound Care consulted yesterday (awaiting recommendations)     Hct down this AM = PRBC x 1 infusing  WBC down daily. Continue ABX as-is for Left Lung infiltrate (?PNA)  LFT's improved this AM  Hypoglycemia resolved. Will cut D10 gtt down now & possibly D/c today  Failed Sedation holiday & SBT this AM (2/2 agitation)  Will begin TF's today, nutritionist following  VTS Following per LLE AKA Mgmt (POD # 2). RLE with absent pulses below knee. VTS Aware        [x]The patient is unable to give any meaningful history or review of systems because the patient is:  [x]Intubated [x]Sedated   []Unresponsive      [x]The patient is critically ill on      [x]Mechanical ventilation []Pressors   []BiPAP []                 ROS:A comprehensive review of systems was negative except for that written in the HPI.     Medication Review:  · Pressors - None  · Sedation - Fentanyl & Diprivan gtt's  · Antibiotics - Maxipime & Vanco  · Pain - APAP, Percocet, Fentanyl IVP PRN  · GI/ DVT - Protonix IVP / SQ Heparin        Vital Signs:    Visit Vitals  /76   Pulse 87   Temp 98.4 °F (36.9 °C)   Resp 13   Ht 6' (1.829 m)   Wt 62.7 kg (138 lb 3.7 oz)   SpO2 100%   BMI 18.75 kg/m²       O2 Device: Ventilator, Endotracheal tube   O2 Flow Rate (L/min): 6 l/min   Temp (24hrs), Av.7 °F (37.1 °C), Min:97.7 °F (36.5 °C), Max:100.1 °F (37.8 °C)       Intake/Output:   Last shift:       0701 -  1900  In: 984 [I.V.:383.6]  Out: 35 [Urine:35]  Last 3 shifts: 1901 -  0700  In: 4741.1 [I.V.:4711.1]  Out: 1080 [Urine:560]    Intake/Output Summary (Last 24 hours) at 7/19/2019 1127  Last data filed at 7/19/2019 1100  Gross per 24 hour   Intake 3191.51 ml   Output 735 ml   Net 2456.51 ml       Ventilator Settings:  Ventilator Mode: Assist control, VC+  Respiratory Rate  Back-Up Rate: 12  Insp Time (sec): 0.9 sec  I:E Ratio: 1:4.6  Ventilator Volumes  Vt Set (ml): 450 ml  Vt Exhaled (Machine Breath) (ml): 632 ml  Ve Observed (l/min): 7.86 l/min  Ventilator Pressures  PIP Observed (cm H2O): 19 cm H2O  Plateau Pressure (cm H2O): 15 cm H2O  MAP (cm H2O): 9.6  PEEP/VENT (cm H2O): 5 cm H20  Auto PEEP Observed (cm H2O): (unable to obtain)    Physical Exam:    General: Intubated/sedated. VSS   NAD  HEENT:  Anicteric sclerae; pink palpebral conjunctivae; mucosa moist  Resp:  Symmetrical chest rise, no accessory muscle use. BS = B/L Clear, decreased bases. CV:  S1, S2 present; RRR; no m/g/r  GI:  Abdomen soft, non-tender; (+) hypo-active bowel sounds  Extremities:  +2 pulses on B/L UE's & RLE (groin & popliteal). Absent RLE pulses (DP/PT).  +1 B/L UE Edema. No cyanosis/ clubbing noted  Skin:  Warm; no rashes/ lesions noted  Neurologic:  Sedated on diprivan & fentanyl gtt's.     Devices:  + NGT/OGT, Central line ([place]), ETTube, P. IV        DATA:     Current Facility-Administered Medications   Medication Dose Route Frequency    ELECTROLYTE REPLACEMENT PROTOCOL - Potassium Renal Dosing  1 Each Other PRN    ELECTROLYTE REPLACEMENT PROTOCOL - Magnesium   1 Each Other PRN    ELECTROLYTE REPLACEMENT PROTOCOL  - Phosphorus Renal Dosing  1 Each Other PRN    ELECTROLYTE REPLACEMENT PROTOCOL - Calcium   1 Each Other PRN    0.9% sodium chloride infusion 250 mL  250 mL IntraVENous PRN    carvedilol (COREG) tablet 6.25 mg  6.25 mg Oral BID WITH MEALS    pantoprazole (PROTONIX) 40 mg in sodium chloride 0.9% 10 mL injection  40 mg IntraVENous DAILY    [START ON 7/20/2019] VANCOMYCIN INFORMATION NOTE   Other ONCE    sodium chloride (NS) flush 5-40 mL  5-40 mL IntraVENous Q8H    sodium chloride (NS) flush 5-40 mL  5-40 mL IntraVENous PRN    0.9% sodium chloride infusion 250 mL  250 mL IntraVENous PRN    0.9% sodium chloride infusion 250 mL  250 mL IntraVENous PRN    0.9% sodium chloride infusion  50 mL/hr IntraVENous DIALYSIS PRN    heparin (porcine) 1,000 unit/mL injection 1,000 Units  1,000 Units InterCATHeter DIALYSIS PRN    sodium chloride (NS) flush 5-40 mL  5-40 mL IntraVENous Q8H    sodium chloride (NS) flush 5-40 mL  5-40 mL IntraVENous PRN    naloxone (NARCAN) injection 0.1 mg  0.1 mg IntraVENous PRN    ondansetron (ZOFRAN) injection 4 mg  4 mg IntraVENous PRN    senna (SENOKOT) tablet 8.6 mg  1 Tab Oral BID    fentaNYL (PF) 900 mcg/30 ml infusion soln  0-200 mcg/hr IntraVENous TITRATE    propofol (DIPRIVAN) infusion  0-50 mcg/kg/min IntraVENous TITRATE    chlorhexidine (PERIDEX) 0.12 % mouthwash 10 mL  10 mL Oral Q12H    albuterol-ipratropium (DUO-NEB) 2.5 MG-0.5 MG/3 ML  3 mL Nebulization Q6H RT    fentaNYL citrate (PF) injection 25-50 mcg  25-50 mcg IntraVENous Q1H PRN    cefepime (MAXIPIME) 0.5 g in 0.9% sodium chloride 100 mL IVPB  0.5 g IntraVENous Q24H    dextrose 10 % infusion  75 mL/hr IntraVENous CONTINUOUS    oxyCODONE-acetaminophen (PERCOCET) 5-325 mg per tablet 1-2 Tab  1-2 Tab Oral Q4H PRN    heparin (porcine) injection 5,000 Units  5,000 Units SubCUTAneous Q8H    VANCOMYCIN INFORMATION NOTE 1 Each  1 Each Other Rx Dosing/Monitoring    therapeutic multivitamin (THERAGRAN) tablet 1 Tab  1 Tab Oral DAILY    sodium chloride (NS) flush 5-10 mL  5-10 mL IntraVENous PRN    acetaminophen (TYLENOL) tablet 650 mg  650 mg Oral Q4H PRN    ondansetron (ZOFRAN) injection 4 mg  4 mg IntraVENous Q4H PRN         Labs: Results:       Chemistry Recent Labs     07/19/19  0145 07/18/19  1130 07/18/19  0130 07/17/19  1425  07/17/19  0000   *  --  125* 78  --  79    134* 136 132*   < > 130*   K 3.8  --  4.4 5.7*  --  5.7*     --  100 100  --  98*   CO2 27 --  24 20*  --  18*   BUN 18  --  43* 76*  --  75*   CREA 3.26*  --  5.49* 9.38*  --  9.26*   CA 7.5*  --  7.1* 7.3*  --  7.3*   AGAP 8  --  12 12  --  14   BUCR 6*  --  8* 8*  --  8*   AP 64  --  53  --   --   --    TP 5.1*  --  5.3*  --   --   --    ALB 1.5*  --  1.9*  --   --  1.8*   GLOB 3.6  --  3.4  --   --   --    AGRAT 0.4*  --  0.6*  --   --   --     < > = values in this interval not displayed. CBC w/Diff Recent Labs     07/19/19  0145 07/18/19  0130 07/17/19  1425 07/17/19  0000   WBC 12.5 13.7* 19.8* 21.5*   RBC 2.27* 2.44* 2.30* 2.58*   HGB 6.5* 7.0* 6.7* 7.5*   HCT 19.4* 20.5* 19.4* 21.6*    358 327 369   GRANS  --   --  87* 84*   LYMPH  --   --  7* 7*   EOS  --   --  0 0      Coagulation Recent Labs     07/19/19  0145 07/18/19  0130   PTP 14.8 14.8   INR 1.2 1.2       Liver Enzymes Recent Labs     07/19/19 0145   TP 5.1*   ALB 1.5*   AP 64   SGOT 260*      ABG Lab Results   Component Value Date/Time    PHI 7.439 07/19/2019 03:45 AM    PCO2I 35.2 07/19/2019 03:45 AM    PO2I 77 (L) 07/19/2019 03:45 AM    HCO3I 23.9 07/19/2019 03:45 AM    FIO2I 0.30 07/19/2019 03:45 AM      Microbiology No results for input(s): CULT in the last 72 hours. Telemetry: [x]Sinus []A-flutter []Paced    []A-fib []Multiple PVCs                  Imaging:    CXR 7/19/2019:  IMPRESSION:     Infiltrative changes at the lung bases which have increased on the left. Tubes  as discussed        IMPRESSION:   · Acute Respiratory Failure with Hypoxia/VDRF  · PNA  · LLE Gangrene, S/p L AKA  · BROOKLYN / ESRD, on HD  · Anemia  · Stage 3 NSCLC, S/p ChemoRx/XRT  · Malnourished  · Hypoglycemia   · Leukocytosis - resolving   · Hyponatremia - resolving  · Hyperkalemia - resolved  · Hypoalbuminemia       RECOMMENDATIONS:   · Resp: ABG this AM acceptable (7.44/35/77/24/96%) on Fi02 30%, PEEP 5. Failed SBT this AM (2/2 agitation). Titrate FiO2/ supp O2 for SpO2 >90%. Continue Nebs (Duo-neb q-6 hours). Daily PCXR & ABG.   Daily SBT, as MS gayle  · I/D: Tm 100.1   Tc Afebrile. WBC improving (12.5 K this AM). ContinueABX as-is (Maxipime / Vanco). No new Cx data. ABX Mgmt per MD    · Hem/Onc: Hct down this AM (19.4). PRBC x 1 infusing. PLT's stable (289 K). Coags WNL's (INR 1.2 this AM). SQ Heparin for DVT Prophylaxis. Continue Daily CBC & Coags  · CVS: Stable. NSR. No gtt's. MAP Goal >/= 65. Hold Coreg for SBP < 120. · Metabolic: Daily BMP; monitor e-lytes; replace PRN  · Renal following: I > O's despite HD yesterday. Cr 3.26 this AM.  K+ 3.8 this AM.  Na 137 this AM.  Repeat HD planned for tomorrow (Sat). Trend Renal indices; Diuresis, Galvez to BSD  · Endocrine: POC Glucose q6  · Hypoglycemia: Resolved on D10 gtt. Will cut down now & possibly D/c later today (with addition of TF's)  · GI: NPO. Will begin TF's today via NG/OG tube, advance per Nutritionist recommendations.   - BM post-op (last BM 7/15/2019). Protonix IVP. Zofran PRN for N/V   · Musc/Skin: Wound RN consulted yesterday, awaiting recommendations for S/p LLE AKA. Additional wound management per VTS   · Neuro: Sedated on Diprivan & Fentanyl gtt's. RASS Goal 0 to -1. Agitated this AM with sedation holiday. Attempt to wean down Diprivan gtt slowly as MS gayle today. Keep Fentanyl gtt as-is  · VTS Following per LLE AKA Mgmt (POD # 2). RLE with absent pulses below knee, VTS aware.   · Fluids: D10 @ 25 cc / hr   · Discussed in Interdisciplinary Rounds (Continue ICU Care)      Best Practices/ Safety Bundles:  · Sepsis Bundle per Hospital Protocol  · CAUTI Bundle  · Electrolyte Replacement Bundle  · Glycemic control goal <180; avoid Hypoglycemia  · IHI ICU Bundles:  ·  Central Line Bundle Followed , Galvez Bundle Followed and Vent Bundle Followed, Vent Day 2    · [facility]   · VAP bundle, Aim to keep peak plateau pressure 13-95WM H2O  · Aspiration Precautions - HOB >30'  · Daily sedation holiday as indicated  · SBT as tolerated/appropriate  · Titrate FiO2 for SpO2 >94%  · Aggressive Pulmonary Hygeiene  · Stress ulcer prophylaxis. Protonix IVP   · DVT prophylaxis. SQ Heparin  · Need for Lines, mccarthy assessed. · Restraints need. · Palliative care evaluation.       The patient is: [x] acutely ill Risk of deterioration: [] moderate    [] critically ill  [x] high     [x]See my orders for details    My assessment/plan was discussed with:  [x]Nursing []PT/OT    [x]Respiratory therapy []   []Family []         Critical Care Time = [address] [place], [place]  07/19/19  Pulmonary, [address] [place] [address],[address] [facility]

## 2019-07-19 NOTE — DIABETES MGMT
NUTRITIONAL RE-ASSESSMENT AND GLYCEMIC CONTROL PLAN OF CARE     Marco Antonio Carlisle           62 y.o.           7/10/2019                 1. 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 Cellulitis of left lower extremity    3. Elevated lactic acid level    4. Leukocytosis, unspecified type    5. Anemia, unspecified type    6. Hyponatremia      ASSESSMENT:   Pt is underweight related to inadequate caloric intake as evidenced by 73% ideal weight and BMI= 17.6kg/m2. Pt's record indicates he weighed 126 lbs 7 months ago, has gained 4 lbs. since 12/2018. However long term he has lost 20 lbs from his usual weight of 150 lbs. (3/2017). Nutrient deficit as evidenced by NPO status,  pt's report of  poor intake at  meals prior to admission and family report of drinking alcohol all day. Altered nutrition related lab values AEB Cr 3.2, receiving dialysis. Altered nutrition related lab values AEB albumin 1.5. INTERVENTIONS/PLAN:     Recommend full strength Nepro TF at 20 ml/hour. Advance as tolerated to goal rate of 40 ml/hour. Tube feeding will provide 1728 calories,  76 g protein/d with 680 milliliters free water/d from TF. Pt is also receiving ~464  calories/day from propofol, to be decreased today. SUBJECTIVE/OBJECTIVE:   Information obtained from:Pt is on a ventilator.   Diet: npo  Patient Vitals for the past 100 hrs:   % Diet Eaten   07/15/19 1300 20 %   07/15/19 0846 20 %     Medications: [x]                Reviewed     Labs:   Lab Results   Component Value Date/Time    Hemoglobin A1c 5.2 04/18/2012 03:50 AM     Lab Results   Component Value Date/Time    Sodium 137 07/19/2019 01:45 AM    Potassium 3.8 07/19/2019 01:45 AM    Chloride 102 07/19/2019 01:45 AM    CO2 27 07/19/2019 01:45 AM    Anion gap 8 07/19/2019 01:45 AM    Glucose 117 (H) 07/19/2019 01:45 AM    BUN 18 07/19/2019 01:45 AM    Creatinine 3.26 (H) 07/19/2019 01:45 AM    Calcium 7.5 (L) 07/19/2019 01:45 AM    Magnesium 1.9 07/19/2019 01:45 AM    Phosphorus 3.7 07/19/2019 01:45 AM    Albumin 1.5 (L) 07/19/2019 01:45 AM       Anthropometrics:       Ideal Wt 178 lbs BMI=17.6 kg/m2  Wt Readings from Last 1 Encounters:   07/19/19 62.7 kg (138 lb 3.7 oz)      Ht Readings from Last 1 Encounters:   07/18/19 6' (1.829 m)   Estimated Nutrition Needs: 2065 Kcals/day, Protein (g): 88 g Fluid (ml): 2000 ml  Based on:   [x]          Actual BW    []          IBW   []            Adjusted BW    Nutrition Diagnoses:       Meets Criteria for Chronic Malnutrition   [x]Moderate Malnutrition, as evidenced by:   [x] Mild muscle wasting, loss of subcutaneous fat   [x] Nutritional intake <75% of recommended intake for >1 month   [x] Weight loss of  5% in 1 month, 7.5% in 3 months, 10% in 6 months, or 20% in 1 year       Nutrition Interventions:     Weight gain 1 lb. per week by 7/29/19. Intake will meet >75% of energy and protein requirements by  7/24. Glucose will be within target range of 70-180mg/dl by   7/22.     Nutrition Monitoring and Evaluation      []     Monitor po intake on meal rounds  [x]     Continue inpatient monitoring and intervention  []     Other:      Nutrition Risk:  [x]   High     []  Moderate    []  Minimal/Uncompromised    Avery Briggs, RD  pgr [phone]

## 2019-07-19 NOTE — PROGRESS NOTES
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 Dialyzed 7/17 and 7/18. Next dialysis tomorrow. Minimize intake, volume overloaded at this point. Expect eventual resolution of [place] provided that overall condition improves. · Hyperkalemia. Corrected with dialysis. Monalisa Chars · Met acidosis. Corrected with dialysis. · Known non small cell lung cancer. Restaging in progress. Oncology recommendations noted. 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 · Severe anemia. Getting transfused. Subjective: Intubated, sedated. Not on pressors. Patient Active Problem List   Diagnosis Code    Gangrene of foot ([facility] I96    Cellulitis of left lower extremity L03. 80    Malnourished ([facility]) E46    Peripheral vascular disease ([facility] I73.9    Hyponatremia E87.1    Acute kidney injury superimposed on chronic kidney disease ([facility]) N17.9, N18.9    Dehydration E86.0    Sepsis ([facility]) A41.9    Gangrene ([facility]) I96    Anemia D64.9    Acute respiratory failure ([facility]) J96.00       Current Facility-Administered Medications   Medication Dose Route Frequency Provider Last Rate Last Dose    ELECTROLYTE REPLACEMENT PROTOCOL - Potassium Renal Dosing  1 Each Other PRN Jami Henao MD        ELECTROLYTE REPLACEMENT PROTOCOL - Magnesium   1 Each Other PRN Jami Henao MD        ELECTROLYTE REPLACEMENT PROTOCOL  - Phosphorus Renal Dosing  1 Each Other PRN Jami Henao MD        ELECTROLYTE REPLACEMENT PROTOCOL - Calcium   1 Each Other PRN Jami Henao MD        0.9% sodium chloride infusion 250 mL  250 mL IntraVENous PRN Jami Henao MD        carvedilol (COREG) tablet 6.25 mg  6.25 mg Oral BID WITH MEALS [place], PA   6.25 mg at 07/19/19 0831    pantoprazole (PROTONIX) 40 mg in sodium chloride 0.9% 10 mL injection  40 mg IntraVENous DAILY Taste Guru [facility], [facility]   40 mg at 07/19/19 0831    [START ON 7/20/2019] VANCOMYCIN INFORMATION NOTE   Other Cristiana Moreno MD        sodium chloride (NS) flush 5-40 mL  5-40 mL IntraVENous Q8H Anabella Cohe MD   10 mL at 07/19/19 0604    sodium chloride (NS) flush 5-40 mL  5-40 mL IntraVENous PRN Anabella Choe MD        0.9% sodium chloride infusion 250 mL  250 mL IntraVENous PRN Governor Robbie MD        0.9% sodium chloride infusion 250 mL  250 mL IntraVENous PRN Governor Robbie MD   Stopped at 07/17/19 1200    0.9% sodium chloride infusion  50 mL/hr IntraVENous DIALYSIS PRN Governor Robbie MD        heparin (porcine) 1,000 unit/mL injection 1,000 Units  1,000 Units InterCATHeter DIALYSIS PRN Governor Robbie MD        sodium chloride (NS) flush 5-40 mL  5-40 mL IntraVENous Q8H Jean Singh MD   10 mL at 07/19/19 0604    sodium chloride (NS) flush 5-40 mL  5-40 mL IntraVENous PRN Jean Singh MD        naloxone [facility]) injection 0.1 mg  0.1 mg IntraVENous PRN Jean Singh MD        ondansetron [facility]) injection 4 mg  4 mg IntraVENous PRN Jean Singh MD        senna (SENOKOT) tablet 8.6 mg  1 Tab Oral BID Jean Singh MD   8.6 mg at 07/19/19 0831    fentaNYL (PF) 900 mcg/30 ml infusion soln  0-200 mcg/hr IntraVENous TITRATE Jean Singh MD 3.3 mL/hr at 07/19/19 0727 100 mcg/hr at 07/19/19 0727    propofol (DIPRIVAN) infusion  0-50 mcg/kg/min IntraVENous TITRATE Briana Molina NP 17.6 mL/hr at 07/19/19 0846 50 mcg/kg/min at 07/19/19 0846    chlorhexidine (PERIDEX) 0.12 % mouthwash 10 mL  10 mL Oral Q12H Jean Singh MD   10 mL at 07/19/19 0831    albuterol-ipratropium (DUO-NEB) 2.5 MG-0.5 MG/3 ML  3 mL Nebulization Q6H RT Jean Singh MD   3 mL at 07/19/19 0750    fentaNYL citrate (PF) injection 25-50 mcg  25-50 mcg IntraVENous Q1H PRN Briana Molina NP   50 mcg at 07/18/19 154    cefepime (MAXIPIME) 0.5 g in 0.9% sodium chloride 100 mL IVPB  0.5 g IntraVENous Q24H Briana Molina NP   0.5 g at 07/18/19 2123    dextrose 10 % infusion  75 mL/hr IntraVENous CONTINUOUS Briana Molina NP 75 mL/hr at 07/19/19 0857 75 mL/hr at 07/19/19 0857    oxyCODONE-acetaminophen (PERCOCET) 5-325 mg per tablet 1-2 Tab  1-2 Tab Oral Q4H PRN Lita Winters MD   2 Tab at 07/16/19 1215    heparin (porcine) injection 5,000 Units  5,000 Units SubCUTAneous Q8H Lita Winters MD   5,000 Units at 07/19/19 0603    VANCOMYCIN INFORMATION NOTE 1 Each  1 Each Other Rx Dosing/Monitoring Lita Winters MD        therapeutic multivitamin [facility]) tablet 1 Tab  1 Tab Oral DAILY Lita Winters MD   1 Tab at 07/19/19 0831    sodium chloride (NS) flush 5-10 mL  5-10 mL IntraVENous PRN Lita Winters MD   10 mL at 07/15/19 0503    acetaminophen (TYLENOL) tablet 650 mg  650 mg Oral Q4H PRN Lita Winters MD   650 mg at 07/11/19 2243    ondansetron (ZOFRAN) injection 4 mg  4 mg IntraVENous Q4H PRN Lita Winters MD   4 mg at 07/17/19 1113       Objective  Vitals:    07/19/19 0935 07/19/19 0950 07/19/19 1000 07/19/19 1020   BP: 141/75 131/73 131/73 125/78   Pulse: 91 89 91 88   Resp: 12 12 14 11   Temp: 98.5 °F (36.9 °C) 98.3 °F (36.8 °C)  98 °F (36.7 °C)   TempSrc:       SpO2: 100% 100%  100%   Weight:       Height:             Intake/Output Summary (Last 24 hours) at 7/19/2019 1048  Last data filed at 7/19/2019 1000  Gross per 24 hour   Intake 3099.01 ml   Output 735 ml   Net 2364.01 ml           Admission weight: Weight: 59 kg (130 lb) (07/10/19 1511)  Last Weight Metrics:  Weight Loss Metrics 7/19/2019 7/3/2019 4/29/2017 3/11/2017   Today's Wt 138 lb 3.7 oz 130 lb 150 lb 150 lb   BMI 18.75 kg/m2 17.63 kg/m2 20.34 kg/m2 20.34 kg/m2             Physical Assessment:     General: intubated, sedated. ET in place. Neck: No jvd. LUNGS: diffusely diminished air entry, bl exp rhonchi. No crackles. CVS EXM: S1, S2  RRR. Abdomen: soft, non tender. Lower Extremities:  1+ bl le  edema. Lt aka stump dressings intact. Rt femoral temporary dialysis catheter. Lab    CBC w/Diff Recent Labs     07/19/19  0145 07/18/19  0130 07/17/19  1425 07/17/19  0000   WBC 12.5 13.7* 19.8* 21.5*   RBC 2.27* 2.44* 2.30* 2.58*   HGB 6.5* 7.0* 6.7* 7.5*   HCT 19.4* 20.5* 19.4* 21.6*    358 327 369   GRANS  --   --  87* 84*   LYMPH  --   --  7* 7*   EOS  --   --  0 0        Chemistry Recent Labs     07/19/19  0145 07/18/19  1130 07/18/19 0130 07/17/19  1425  07/17/19  0000   *  --  125* 78  --  79    134* 136 132*   < > 130*   K 3.8  --  4.4 5.7*  --  5.7*     --  100 100  --  98*   CO2 27  --  24 20*  --  18*   BUN 18  --  43* 76*  --  75*   CREA 3.26*  --  5.49* 9.38*  --  9.26*   CA 7.5*  --  7.1* 7.3*  --  7.3*   AGAP 8  --  12 12  --  14   BUCR 6*  --  8* 8*  --  8*   AP 64  --  53  --   --   --    TP 5.1*  --  5.3*  --   --   --    ALB 1.5*  --  1.9*  --   --  1.8*   GLOB 3.6  --  3.4  --   --   --    AGRAT 0.4*  --  0.6*  --   --   --    PHOS 3.7  --  7.5*  --   --  10.3*    < > = values in this interval not displayed. No results found for: IRON, FE, TIBC, IBCT, PSAT, FERR   Lab Results   Component Value Date/Time    Calcium 7.5 (L) 07/19/2019 01:45 AM    Phosphorus 3.7 07/19/2019 01:45 AM        Rita Faulkner M.D.   [facility]  Phone

## 2019-07-19 NOTE — PROGRESS NOTES
2005- assumed care of pt lightly sedated on vent. Gtt check done with off going RN. 2015- assessment done. Oral care and mccarthy care done. 2200- 2 new IVs started in lt arm. Pt resting comfortably. Pt repositioned   2330- pt given bath with cool bath wipes to help with slightly elevated temp. 0010- reassessment done. Oral care and mccarthy care done. Pt repositioned. 0400- reassessment done, mouth care and pericare done. Pt repositioned. 3080- SAT started, propofol decreased to 25 mcg/kg/min. 6982- propofol increased back to 50 mcg/kg/min d/t pt highly agitated. 0730 Bedside and Verbal shift change report given to [address] and Harrison Nayak (oncoming nurse) by Hakeem Mireles RN   (offgoing nurse). Report included the following information SBAR, Kardex, Intake/Output, MAR, Recent Results and Cardiac Rhythm NSR.

## 2019-07-19 NOTE — PROGRESS NOTES
Problem: Pain  Goal: *Control of Pain  Outcome: Progressing Towards Goal     Problem: Falls - Risk of  Goal: *Absence of Falls  Description  Document Candida Lightning Fall Risk and appropriate interventions in the flowsheet. Outcome: Progressing Towards Goal  Note:   Fall Risk Interventions:  Mobility Interventions: Assess mobility with egress test, PT Consult for assist device competence    Mentation Interventions: Adequate sleep, hydration, pain control, Bed/chair exit alarm, Door open when patient unattended, More frequent rounding, Reorient patient, Room close to nurse's station    Medication Interventions: Bed/chair exit alarm    Elimination Interventions: Bed/chair exit alarm, Call light in reach, Toileting schedule/hourly rounds    History of Falls Interventions: Evaluate medications/consider consulting pharmacy, Room close to nurse's station         Problem: Impaired Skin Integrity/Pressure Injury Treatment  Goal: *Improvement of Existing Pressure Injury  Outcome: Progressing Towards Goal  Goal: *Prevention of pressure injury  Description  Document Mikie Scale and appropriate interventions in the flowsheet. Outcome: Progressing Towards Goal  Note:   Pressure Injury Interventions:  Sensory Interventions: Assess changes in LOC, Assess need for specialty bed, Check visual cues for pain, Keep linens dry and wrinkle-free, Pressure redistribution bed/mattress (bed type)    Moisture Interventions: Absorbent underpads    Activity Interventions: Pressure redistribution bed/mattress(bed type)    Mobility Interventions: Assess need for specialty bed, Float heels, HOB 30 degrees or less, Pressure redistribution bed/mattress (bed type), Turn and reposition approx.  every two hours(pillow and wedges)    Nutrition Interventions: Document food/fluid/supplement intake    Friction and Shear Interventions: Apply protective barrier, creams and emollients, Feet elevated on foot rest, Foam dressings/transparent film/skin sealants, HOB 30 degrees or less, Lift sheet, Lift team/patient mobility team, Minimize layers                Problem: Pressure Injury - Risk of  Goal: *Prevention of pressure injury  Description  Document Mikie Scale and appropriate interventions in the flowsheet. Outcome: Progressing Towards Goal  Note:   Pressure Injury Interventions:  Sensory Interventions: Assess changes in LOC, Assess need for specialty bed, Check visual cues for pain, Keep linens dry and wrinkle-free, Pressure redistribution bed/mattress (bed type)    Moisture Interventions: Absorbent underpads    Activity Interventions: Pressure redistribution bed/mattress(bed type)    Mobility Interventions: Assess need for specialty bed, Float heels, HOB 30 degrees or less, Pressure redistribution bed/mattress (bed type), Turn and reposition approx.  every two hours(pillow and wedges)    Nutrition Interventions: Document food/fluid/supplement intake    Friction and Shear Interventions: Apply protective barrier, creams and emollients, Feet elevated on foot rest, Foam dressings/transparent film/skin sealants, HOB 30 degrees or less, Lift sheet, Lift team/patient mobility team, Minimize layers                Problem: Discharge Planning  Goal: *Discharge to safe environment  Outcome: Progressing Towards Goal     Problem: Nutrition Deficit  Goal: *Optimize nutritional status  Outcome: Not Progressing Towards Goal  Note:   Pt remain NPO     Problem: Ventilator Management  Goal: *Adequate oxygenation and ventilation  Outcome: Progressing Towards Goal  Goal: *Patient maintains clear airway/free of aspiration  Outcome: Progressing Towards Goal  Goal: *Absence of infection signs and symptoms  Outcome: Progressing Towards Goal  Goal: *Normal spontaneous ventilation  Outcome: Progressing Towards Goal     Problem: Non-Violent Restraints  Goal: *Removal from restraints as soon as assessed to be safe  Outcome: Progressing Towards Goal  Goal: *No harm/injury to patient while restraints in use  Outcome: Progressing Towards Goal  Goal: *Patient's dignity will be maintained  Outcome: Progressing Towards Goal  Goal: Non-violent Restaints:Standard Interventions  Outcome: Progressing Towards Goal  Goal: Non-violent Restraints:Patient Interventions  Outcome: Progressing Towards Goal  Goal: Patient/Family Education  Outcome: Progressing Towards Goal     Problem: Surgical Pathway Day of Surgery  Goal: Off Pathway (Use only if patient is Off Pathway)  Outcome: Resolved/Met  Goal: Activity/Safety  Outcome: Resolved/Met  Goal: Consults, if ordered  Outcome: Resolved/Met  Goal: Nutrition/Diet  Outcome: Resolved/Met  Goal: Medications  Outcome: Resolved/Met  Goal: Respiratory  Outcome: Resolved/Met  Goal: Treatments/Interventions/Procedures  Outcome: Resolved/Met  Goal: Psychosocial  Outcome: Resolved/Met  Goal: *No signs and symptoms of infection or wound complications  Outcome: Resolved/Met  Goal: *Optimal pain control at patient's stated goal  Outcome: Resolved/Met  Goal: *Adequate urinary output (equal to or greater than 30 milliliters/hour)  Description  Ambulatory Surgery patients voiding without difficulty.   Outcome: Resolved/Not Met  Goal: *Hemodynamically stable  Outcome: Resolved/Met  Goal: *Tolerating diet  Outcome: Resolved/Met  Goal: *Demonstrates progressive activity  Outcome: Resolved/Met

## 2019-07-19 NOTE — MANAGEMENT PLAN
Discharge/Transition Planning     Problem: Discharge Planning  Goal: *Discharge to safe environment  Outcome: Progressing Towards Goal   Plan : snf    Plan SNF at this time but contingent on pt progress.  [facility] completed and submitted in [id number] [address] RN BSN  Outcomes Manager  Pager # [phone]

## 2019-07-19 NOTE — PROGRESS NOTES
Vascular Surgery Progress Note    Admit Date: 7/10/2019  POD 2 Days Post-Op    Procedure/Surgery:  Procedure(s):  Left Above Knee Amputation and placement of temporary hemodialysis catheter ([place]) with venogram      Subjective:   Pt seen ~1800 19 - POD#1 - pt continues to be intubated post op after L AKA yesterday. Dialysis yesterday and today with ~500 ml removed both treatments. Continues to be sedated. Objective:     Visit Vitals  /68   Pulse 88   Temp 100.1 °F (37.8 °C)   Resp 15   Ht 6' (1.829 m)   Wt 53.6 kg (118 lb 2.7 oz)   SpO2 98%   BMI 16.03 kg/m²       Temp (24hrs), Av.1 °F (37.3 °C), Min:98.1 °F (36.7 °C), Max:100.1 °F (37.8 °C)      Physical Exam:  GEN: sedated / intubated   HEENT:PERRL EOMI, non icteric, moist membranes. LUNG: coarse breath sounds  HEART: regular   ABD: soft, flat. EXT: L AKA dressing in tact small amount serosang drainage. RLE cool  No ulcers seen. PULSES: RLE non palpable pulses. Allayne Lali NEURO: sedated .      Labs:   Recent Results (from the past 24 hour(s))   CBC W/O DIFF    Collection Time: 19  1:30 AM   Result Value Ref Range    WBC 13.7 (H) 4.6 - 13.2 K/uL    RBC 2.44 (L) 4.70 - 5.50 M/uL    HGB 7.0 (L) 13.0 - 16.0 g/dL    HCT 20.5 (L) 36.0 - 48.0 %    MCV 84.0 74.0 - 97.0 FL    MCH 28.7 24.0 - 34.0 PG    MCHC 34.1 31.0 - 37.0 g/dL    RDW 16.1 (H) 11.6 - 14.5 %    PLATELET 956 073 - 626 K/uL    MPV 7.7 (L) 9.2 - 11.8 FL   MAGNESIUM    Collection Time: 19  1:30 AM   Result Value Ref Range    Magnesium 2.4 1.6 - 2.6 mg/dL   PHOSPHORUS    Collection Time: 19  1:30 AM   Result Value Ref Range    Phosphorus 7.5 (H) 2.5 - 4.9 MG/DL   CALCIUM, IONIZED    Collection Time: 19  1:30 AM   Result Value Ref Range    Ionized Calcium 0.96 (L) 1.12 - 8.76 MMOL/L   METABOLIC PANEL, COMPREHENSIVE    Collection Time: 19  1:30 AM   Result Value Ref Range    Sodium 136 136 - 145 mmol/L    Potassium 4.4 3.5 - 5.5 mmol/L    Chloride 100 100 - 111 mmol/L CO2 24 21 - 32 mmol/L    Anion gap 12 3.0 - 18 mmol/L    Glucose 125 (H) 74 - 99 mg/dL    BUN 43 (H) 7.0 - 18 MG/DL    Creatinine 5.49 (H) 0.6 - 1.3 MG/DL    BUN/Creatinine ratio 8 (L) 12 - 20      GFR est AA 13 (L) >60 ml/min/1.73m2    GFR est non-AA 11 (L) >60 ml/min/1.73m2    Calcium 7.1 (L) 8.5 - 10.1 MG/DL    Bilirubin, total 0.4 0.2 - 1.0 MG/DL    ALT (SGPT) 75 (H) 16 - 61 U/L    AST (SGOT) 284 (H) 10 - 38 U/L    Alk. phosphatase 53 45 - 117 U/L    Protein, total 5.3 (L) 6.4 - 8.2 g/dL    Albumin 1.9 (L) 3.4 - 5.0 g/dL    Globulin 3.4 2.0 - 4.0 g/dL    A-G Ratio 0.6 (L) 0.8 - 1.7     PROTHROMBIN TIME + INR    Collection Time: 07/18/19  1:30 AM   Result Value Ref Range    Prothrombin time 14.8 11.5 - 15.2 sec    INR 1.2 0.8 - 1.2     POC G3    Collection Time: 07/18/19  4:33 AM   Result Value Ref Range    Device: VENT      FIO2 (POC) 30 %    pH (POC) 7.452 (H) 7.35 - 7.45      pCO2 (POC) 33.9 (L) 35.0 - 45.0 MMHG    pO2 (POC) 65 (L) 80 - 100 MMHG    HCO3 (POC) 23.6 22 - 26 MMOL/L    sO2 (POC) 94 92 - 97 %    Base excess (POC) 0 mmol/L    Mode ASSIST CONTROL      Tidal volume 450 ml    Set Rate 12 bpm    PEEP/CPAP (POC) 5 cmH2O    PIP (POC) 16      Allens test (POC) N/A      Inspiratory Time 0.9 sec    Total resp.  rate 14      Site RIGHT BRACHIAL      Specimen type (POC) ARTERIAL      Performed by Omar Gonzales     Volume control plus YES     GLUCOSE, POC    Collection Time: 07/18/19  5:44 AM   Result Value Ref Range    Glucose (POC) 84 70 - 110 mg/dL   SODIUM    Collection Time: 07/18/19 11:30 AM   Result Value Ref Range    Sodium 134 (L) 136 - 145 mmol/L   GLUCOSE, POC    Collection Time: 07/18/19 12:08 PM   Result Value Ref Range    Glucose (POC) 130 (H) 70 - 110 mg/dL   CALCIUM, IONIZED    Collection Time: 07/18/19  2:00 PM   Result Value Ref Range    Ionized Calcium 1.16 1.12 - 1.32 MMOL/L   GLUCOSE, POC    Collection Time: 07/18/19  7:52 PM   Result Value Ref Range    Glucose (POC) 124 (H) 70 - 110 mg/dL           Assessment:     Principal Problem:    Sepsis ([address] (7/10/2019)    Active Problems:    Malnourished ([address] (6/29/2019)      Peripheral vascular disease ([address] (6/29/2019)      Hyponatremia (6/29/2019)      Acute kidney injury superimposed on chronic kidney disease ([address] (7/10/2019)      Dehydration (7/10/2019)      Gangrene ([address] (7/10/2019)      Anemia (7/16/2019)      Acute respiratory failure ([address] (7/17/2019)        Plan/Recommendations/Medical Decision Making:   POD#1  L AKA for ischemia LLE with non salvageable L foot ulcers. - change dressing in am.  Acute / chronic renal failure - yonatan yesterday, on going HD. Brianne Gonzalez.  Desiree Gates, [facility] [facility]  cell - [phone]  July 19, 2019  12:24 AM

## 2019-07-19 NOTE — MANAGEMENT PLAN
Discharge/Transition Planning    Problem: Discharge Planning  Goal: *Discharge to safe environment  Outcome: Progressing Towards Goal   Plan : snf    Care Management following and chart reviewed. Pt will need SNF when medically stable. Updated notes. Appears he had applied for Medicaid, but unsure of status of this. Started [facility] on pt for SNF. Suspect pt may end up being a long term care as multiple co-morbidities and history of non compliance    Care Management Interventions  PCP Verified by CM: Yes  Last Visit to PCP: 06/11/19  Mode of Transport at Discharge: Self(pt states he is not sure how he will get home.   Son works)  Transition of [address] Discharge Planning  Current Support Network: Own Home(son lives with him)  Confirm Follow Up Transport: Self(pt unsure of tranp at d/c)  Plan discussed with Pt/Family/Caregiver: Yes  Discharge Location  Discharge Placement: Skilled nursing facility

## 2019-07-20 PROBLEM — G93.41 SEPTIC ENCEPHALOPATHY: Status: ACTIVE | Noted: 2019-01-01

## 2019-07-20 PROBLEM — R74.01 TRANSAMINITIS: Status: ACTIVE | Noted: 2019-01-01

## 2019-07-20 NOTE — PROGRESS NOTES
Problem: Pain  Goal: *Control of Pain  Outcome: Progressing Towards Goal     Problem: Patient Education: Go to Patient Education Activity  Goal: Patient/Family Education  Outcome: Progressing Towards Goal     Problem: Falls - Risk of  Goal: *Absence of Falls  Description  Document Candida Lightning Fall Risk and appropriate interventions in the flowsheet. Outcome: Progressing Towards Goal  Note:   Fall Risk Interventions:  Mobility Interventions: Assess mobility with egress test    Mentation Interventions: Adequate sleep, hydration, pain control, Door open when patient unattended, Room close to nurse's station    Medication Interventions: Bed/chair exit alarm    Elimination Interventions: Bed/chair exit alarm, Call light in reach    History of Falls Interventions: Door open when patient unattended, Room close to nurse's station         Problem: Patient Education: Go to Patient Education Activity  Goal: Patient/Family Education  Outcome: Progressing Towards Goal     Problem: Impaired Skin Integrity/Pressure Injury Treatment  Goal: *Improvement of Existing Pressure Injury  Outcome: Progressing Towards Goal  Goal: *Prevention of pressure injury  Description  Document Mikie Scale and appropriate interventions in the flowsheet.   Outcome: Progressing Towards Goal  Note:   Pressure Injury Interventions:  Sensory Interventions: Assess need for specialty bed    Moisture Interventions: Absorbent underpads    Activity Interventions: Pressure redistribution bed/mattress(bed type)    Mobility Interventions: Assess need for specialty bed    Nutrition Interventions: Document food/fluid/supplement intake    Friction and Shear Interventions: Apply protective barrier, creams and emollients                Problem: Patient Education: Go to Patient Education Activity  Goal: Patient/Family Education  Outcome: Progressing Towards Goal     Problem: Pressure Injury - Risk of  Goal: *Prevention of pressure injury  Description  Document Mikie Scale and appropriate interventions in the flowsheet.   Outcome: Progressing Towards Goal  Note:   Pressure Injury Interventions:  Sensory Interventions: Assess need for specialty bed    Moisture Interventions: Absorbent underpads    Activity Interventions: Pressure redistribution bed/mattress(bed type)    Mobility Interventions: Assess need for specialty bed    Nutrition Interventions: Document food/fluid/supplement intake    Friction and Shear Interventions: Apply protective barrier, creams and emollients                Problem: Patient Education: Go to Patient Education Activity  Goal: Patient/Family Education  Outcome: Progressing Towards Goal     Problem: Discharge Planning  Goal: *Discharge to safe environment  Outcome: Progressing Towards Goal     Problem: Nutrition Deficit  Goal: *Optimize nutritional status  Outcome: Progressing Towards Goal  Goal: *Blood glucose 80 to 180 mg/dl  Outcome: Progressing Towards Goal  Goal: *Tolerates nutrition therapy  Outcome: Progressing Towards Goal     Problem: Patient Education: Go to Patient Education Activity  Goal: Patient/Family Education  Outcome: Progressing Towards Goal     Problem: Patient Education: Go to Patient Education Activity  Goal: Patient/Family Education  Outcome: Progressing Towards Goal     Problem: Ventilator Management  Goal: *Adequate oxygenation and ventilation  Outcome: Progressing Towards Goal  Goal: *Patient maintains clear airway/free of aspiration  Outcome: Progressing Towards Goal  Goal: *Absence of infection signs and symptoms  Outcome: Progressing Towards Goal  Goal: *Normal spontaneous ventilation  Outcome: Progressing Towards Goal     Problem: Patient Education: Go to Patient Education Activity  Goal: Patient/Family Education  Outcome: Progressing Towards Goal     Problem: Non-Violent Restraints  Goal: *Removal from restraints as soon as assessed to be safe  Outcome: Progressing Towards Goal  Goal: *No harm/injury to patient while restraints in use  Outcome: Progressing Towards Goal  Goal: *Patient's dignity will be maintained  Outcome: Progressing Towards Goal  Goal: *Patient Specific Goal (EDIT GOAL, INSERT TEXT)  Outcome: Progressing Towards Goal  Goal: Non-violent Restaints:Standard Interventions  Outcome: Progressing Towards Goal  Goal: Non-violent Restraints:Patient Interventions  Outcome: Progressing Towards Goal  Goal: Patient/Family Education  Outcome: Progressing Towards Goal     Problem: Patient Education: Go to Patient Education Activity  Goal: Patient/Family Education  Outcome: Progressing Towards Goal     Problem: Patient Education: Go to Patient Education Activity  Goal: Patient/Family Education  Outcome: Progressing Towards Goal     Problem: Surgical Pathway Day of Surgery  Goal: *Adequate urinary output (equal to or greater than 30 milliliters/hour)  Description  Ambulatory Surgery patients voiding without difficulty.   Outcome: Progressing Towards Goal     Problem: Surgical Pathway Post-Op Day 1  Goal: Off Pathway (Use only if patient is Off Pathway)  Outcome: Progressing Towards Goal  Goal: Activity/Safety  Outcome: Progressing Towards Goal  Goal: Diagnostic Test/Procedures  Outcome: Progressing Towards Goal  Goal: Nutrition/Diet  Outcome: Progressing Towards Goal  Goal: Discharge Planning  Outcome: Progressing Towards Goal  Goal: Medications  Outcome: Progressing Towards Goal  Goal: Respiratory  Outcome: Progressing Towards Goal  Goal: Treatments/Interventions/Procedures  Outcome: Progressing Towards Goal  Goal: Psychosocial  Outcome: Progressing Towards Goal  Goal: *No signs and symptoms of infection or wound complications  Outcome: Progressing Towards Goal  Goal: *Optimal pain control at patient's stated goal  Outcome: Progressing Towards Goal  Goal: *Adequate urinary output (equal to or greater than 30 milliliters/hour)  Outcome: Progressing Towards Goal  Goal: *Hemodynamically stable  Outcome: Progressing Towards Goal  Goal: *Tolerating diet  Outcome: Progressing Towards Goal  Goal: *Demonstrates progressive activity  Outcome: Progressing Towards Goal  Goal: *Lungs clear or at baseline  Outcome: Progressing Towards Goal     Problem: Nutrition Deficit  Goal: *Optimize nutritional status  Outcome: Progressing Towards Goal

## 2019-07-20 NOTE — PROGRESS NOTES
Internal Medicine Progress Note    Patient's Name: Kevin Ferris  Admit Date: 7/10/2019  Length of Stay: 10      Assessment/Plan     Principal Problem:    Sepsis ([address] (7/10/2019)    Active Problems:    Gangrene ([address] (7/10/2019)      Acute kidney injury superimposed on chronic kidney disease ([address] (7/10/2019)      Malnourished ([address] (6/29/2019)      Peripheral vascular disease ([address] (6/29/2019)      Hyponatremia (6/29/2019)      Dehydration (7/10/2019)      Anemia (7/16/2019)      Acute respiratory failure ([address] (7/17/2019)      Septic encephalopathy (7/20/2019)      Transaminitis (7/20/2019)        Pt/OT rec SNF    Sepsis/septic encephalopathy  - gangrenous first and third toe, s/p L AKA  - Recent wound culture from previous admission susceptible to rocephin and vanc, continue abx, renally dose  - repeat wound culture - Stenotrophomonas maltophilia, enterococcus faecalis, coag neg staph    Gangrene/PVD  - Vasc consulted - appreciate  - podiatry consulted - appreciate   - L AKA done 7/17  - IV abx   -continue ASA and plavix      [place]  - strict I&Os, mccarthy  - HD catheter placed  - nephrology following - recommendations per them  - refused renal US  - dialysis started 7/17    Acute resp failure  - intubated 7/17  - PCCM consulted - vent mgmt per them    Malnourished  - Nutritional supplements with all meals    Hyponatremia  - acute on chronic, stable  - Nephrology consult - appreciate  - improving    Anemia  - PRBCs transfused as needed for hgb <7  - monitor H&H    - Cont acceptable home medications for chronic conditions   - DVT protocol    I have personally reviewed all pertinent labs and films that have officially resulted over the last 24 hours. I have personally checked for all pending labs that are awaiting final results. Interval History   \"Vini Coffman is a 62 y.o. male with a PMHx listed below of who presented to the ED with complaints of worsening LLE pain.   Patient was admitted on 6/30/19 and discharged three days ago for gangrenous toes. Vascular surgery and podiatry were consulted then and he was recommended for multilevel revascularization, he was to follow up as out patient and said that his son could transport him. Per patient now, son was unable to transport patient to f/u appointment, so he returned due to persistent symptoms.       In the ED vascular surgery consulted, podiatry consulted. He presents with worsening creatinine and sodium. I spoke with Nephrology and they recommend fluids. His only complaint is pain, no fevers, chills, weakness, AMS. Sodium 121, will be admitted for further eval. He doesn't know if he has been taking his lasix, but says hes been taking his abx\"    Per podiatry - no need for urgent amputation, wait until revascularization. Nephrology consulted - Acute on chronic hyponatremia improved with gentle IVF. Oncology consulted - bone scan ordered to complete restaging - No definite sonographic evidence of osseous metastatic disease. Repeat wound culture 7/11 - Stenotrophomonas maltophilia, enterococcus faecalis, coag neg staph. Patient continually refused to complete MRI. Patient's renal function declined rapidly, patient started dialysis 7/17. PRBCs transfused as needed for hgb <7. L AKA 7/17. Patient developed acute resp failure postoperatively and was intubated. Aztreonam started d/t thick yellow secretions seen during intubation.     Subjective     Pt s/e @ bedside. Sedated and intubated. NAD.   Agitation noted from chart review     Objective     Visit Vitals  /65   Pulse 91   Temp 98.6 °F (37 °C)   Resp 22   Ht 6' (1.829 m)   Wt 62.7 kg (138 lb 3.7 oz)   SpO2 100%   BMI 18.75 kg/m²       Physical Exam:  General Appearance: NAD, sedated and intubated  HENT: normocephalic/atraumatic, moist mucus membranes  Neck: No JVD, supple  Lungs: course breath sounds bilat normal respiratory effort  CV: RRR, no m/r/g  Abdomen: soft, non-tender, normal bowel sounds  Extremities: no cyanosis, no peripheral edema, L AKA  Neuro: No new focal deficits    Intake and Output:  Current Shift:  07/20 0701 - 07/20 1900  In: 589.6 [I.V.:369.6]  Out: -   Last three shifts:  07/18 1901 - 07/20 0700  In: 3771.8 [I.V.:2734.2]  Out: 700 [Urine:700]    Lab/Data Reviewed:  BMP:   Lab Results   Component Value Date/Time     (L) 07/20/2019 03:25 AM    K 4.0 07/20/2019 03:25 AM    CL 99 (L) 07/20/2019 03:25 AM    CO2 26 07/20/2019 03:25 AM    AGAP 7 07/20/2019 03:25 AM     (H) 07/20/2019 03:25 AM    BUN 27 (H) 07/20/2019 03:25 AM    CREA 4.17 (H) 07/20/2019 03:25 AM    GFRAA 18 (L) 07/20/2019 03:25 AM    GFRNA 15 (L) 07/20/2019 03:25 AM     CBC:   Lab Results   Component Value Date/Time    WBC 11.6 07/20/2019 03:25 AM    HGB 7.5 (L) 07/20/2019 03:25 AM    HCT 23.3 (L) 07/20/2019 03:25 AM     07/20/2019 03:25 AM         Imaging Reviewed:  No results found.     Medications Reviewed:  Current Facility-Administered Medications   Medication Dose Route Frequency    [START ON 7/21/2019] VANCOMYCIN INFORMATION NOTE   Other ONCE    QUEtiapine (SEROquel) tablet 50 mg  50 mg Oral BID    ELECTROLYTE REPLACEMENT PROTOCOL - Potassium Renal Dosing  1 Each Other PRN    ELECTROLYTE REPLACEMENT PROTOCOL - Magnesium   1 Each Other PRN    ELECTROLYTE REPLACEMENT PROTOCOL  - Phosphorus Renal Dosing  1 Each Other PRN    ELECTROLYTE REPLACEMENT PROTOCOL - Calcium   1 Each Other PRN    0.9% sodium chloride infusion 250 mL  250 mL IntraVENous PRN    clopidogrel (PLAVIX) tablet 75 mg  75 mg Oral DAILY    aspirin tablet 325 mg  325 mg Oral DAILY    carvedilol (COREG) tablet 6.25 mg  6.25 mg Oral BID WITH MEALS    pantoprazole (PROTONIX) 40 mg in sodium chloride 0.9% 10 mL injection  40 mg IntraVENous DAILY    sodium chloride (NS) flush 5-40 mL  5-40 mL IntraVENous Q8H    sodium chloride (NS) flush 5-40 mL  5-40 mL IntraVENous PRN    0.9% sodium chloride infusion 250 mL  250 mL IntraVENous PRN    0.9% sodium chloride infusion 250 mL  250 mL IntraVENous PRN    0.9% sodium chloride infusion  50 mL/hr IntraVENous DIALYSIS PRN    heparin (porcine) 1,000 unit/mL injection 1,000 Units  1,000 Units InterCATHeter DIALYSIS PRN    sodium chloride (NS) flush 5-40 mL  5-40 mL IntraVENous Q8H    sodium chloride (NS) flush 5-40 mL  5-40 mL IntraVENous PRN    naloxone (NARCAN) injection 0.1 mg  0.1 mg IntraVENous PRN    ondansetron (ZOFRAN) injection 4 mg  4 mg IntraVENous PRN    senna (SENOKOT) tablet 8.6 mg  1 Tab Oral BID    fentaNYL (PF) 900 mcg/30 ml infusion soln  0-200 mcg/hr IntraVENous TITRATE    propofol (DIPRIVAN) infusion  0-50 mcg/kg/min IntraVENous TITRATE    chlorhexidine (PERIDEX) 0.12 % mouthwash 10 mL  10 mL Oral Q12H    albuterol-ipratropium (DUO-NEB) 2.5 MG-0.5 MG/3 ML  3 mL Nebulization Q6H RT    fentaNYL citrate (PF) injection 25-50 mcg  25-50 mcg IntraVENous Q1H PRN    cefepime (MAXIPIME) 0.5 g in 0.9% sodium chloride 100 mL IVPB  0.5 g IntraVENous Q24H    oxyCODONE-acetaminophen (PERCOCET) 5-325 mg per tablet 1-2 Tab  1-2 Tab Oral Q4H PRN    heparin (porcine) injection 5,000 Units  5,000 Units SubCUTAneous Q8H    VANCOMYCIN INFORMATION NOTE 1 Each  1 Each Other Rx Dosing/Monitoring    therapeutic multivitamin (THERAGRAN) tablet 1 Tab  1 Tab Oral DAILY    sodium chloride (NS) flush 5-10 mL  5-10 mL IntraVENous PRN    acetaminophen (TYLENOL) tablet 650 mg  650 mg Oral Q4H PRN    ondansetron (ZOFRAN) injection 4 mg  4 mg IntraVENous Q4H PRN     [facility]  Hospitalist Division  Pager: [phone]  Office: [phone]

## 2019-07-20 NOTE — PROGRESS NOTES
0800 Bedside and Verbal shift change report given to Corine Sin   (oncoming nurse) by Johana Frias  (offgoing nurse). Report included the following information SBAR, Kardex, ED Summary, OR Summary, Procedure Summary, Intake/Output, MAR, Recent Results and Med Rec Status. 1000 IDR done. Plan is to add Seroquel for behavior management to aide in attempts to wean the vent and wake the pt. Up tomorrow . 1200 reassessment done. No new changes     1600 Reassessment. Pt. Received dialysis and tolerated the same well. 1900 Bedside and Verbal shift change report given to [address] [place] [address]  (oncoming nurse) by Corine Sin   (offgoing nurse). Report included the following information SBAR, Kardex, ED Summary, OR Summary, Procedure Summary, Intake/Output, MAR, Recent Results and Med Rec Status.

## 2019-07-20 NOTE — PROGRESS NOTES
Problem: Ventilator Management  Goal: *Adequate oxygenation and ventilation  Outcome: Progressing Towards Goal  Goal: *Patient maintains clear airway/free of aspiration  Outcome: Progressing Towards Goal   VENTILATOR CARE PLAN    Problem: Ventilator Management  Goal: *Adequate oxygenation/ ventilation/ and extubation      Patient:        Aron Olea     62 y.o.   male     7/20/2019  9:02 AM  Patient Active Problem List   Diagnosis Code    Gangrene of foot ([address] I96    Cellulitis of left lower extremity L03. 80    Malnourished ([address] E46    Peripheral vascular disease ([address] I73.9    Hyponatremia E87.1    Acute kidney injury superimposed on chronic kidney disease ([address] N17.9, N18.9    Dehydration E86.0    Sepsis ([address] A41.9    Gangrene ([address] I96    Anemia D64.9    Acute respiratory failure ([address] J96.00    Septic encephalopathy G93.41    Transaminitis R74.0       Sepsis ([address] [A41.9]  Hyponatremia [E87.1]  Gangrene ([address] [employer]  Hyponatremia [E87.1]    Reason patient intubated:   ADJITATED, AWAKE CONFUSED NEEDS DIALYSIS     Ventilator day: 4    Ventilator settings: AC VC + 12 450 5 30%    ETT Size/Placement: 7.5 24 @ LIP       ABG:  Date:7/20/2019  Lab Results   Component Value Date/Time    PHI 7.422 07/20/2019 04:38 AM    PCO2I 34.3 (L) 07/20/2019 04:38 AM    PO2I 66 (L) 07/20/2019 04:38 AM    HCO3I 22.4 07/20/2019 04:38 AM    FIO2I 30 07/20/2019 04:38 AM       Chest X-ray:  Date:7/20/2019  Results from Hospital Encounter encounter on 07/10/19   XR CHEST PORT    Narrative EXAM: Portable Chest    CLINICAL INDICATION: Respiratory distress  -Additional: Intubated    COMPARISON: 07/18/19    TECHNIQUE: AP portable view at 0506    ______________    FINDINGS:    HEART AND MEDIASTINUM: The heart size is stable    LUNGS AND AIRWAYS: Fiducial markers are again seen in the upper right chest.  Area of infiltrate appears present at both lung bases.  There is increased at the  left lung base on the recent study    PLEURA: No pleural effusion or pneumothorax. BONES: Chronic spondylosis is present    OTHER: The endotracheal catheter tip is about 7 cm above the aristeo. The NG tube  enters the stomach. Cardiac monitor leads overlie the chest    ______________      Impression IMPRESSION:    Infiltrative changes at the lung bases which have increased on the left.  Tubes  as discussed       Lab Test:  Date:7/20/2019  WBC:   Lab Results   Component Value Date/Time    WBC 11.6 07/20/2019 03:25 AM   HGB:   Lab Results   Component Value Date/Time    HGB 7.5 (L) 07/20/2019 03:25 AM    PLTS:   Lab Results   Component Value Date/Time    PLATELET 865 69/98/1994 03:25 AM       SaO2%/flow: @CZMJHIV3(1)@    Vital Signs:   Patient Vitals for the past 8 hrs:   Temp Pulse Resp BP SpO2   07/20/19 0800 -- 96 18 122/65 97 %   07/20/19 0731 -- 95 24 -- 98 %   07/20/19 0700 -- 95 19 137/74 98 %   07/20/19 0600 -- 91 15 132/71 98 %   07/20/19 0500 -- 87 17 105/66 95 %   07/20/19 0400 98.9 °F (37.2 °C) 93 15 125/74 100 %   07/20/19 0355 -- 93 16 -- 98 %   07/20/19 0300 -- 91 16 118/70 96 %   07/20/19 0200 -- 90 15 114/70 97 %   07/20/19 0105 -- 85 16 -- 97 %       Wean Screen Pass (Yes or No): NO  Wean Screen Reason for Failure: SEDATION  Duration of Weaning Trial:  Additional Comments:        PLAN OF CARE: WEAN AS TOLERATED       GOAL: DIALYSIS AND WEAN AS TOLERATED      OUTCOME: PATIENT ON VENT

## 2019-07-20 NOTE — PROGRESS NOTES
Physical Exam   Constitutional: He appears cachectic. He is sedated, intubated and restrained. Pulmonary/Chest: He is intubated. Skin:        Bedside shift change report was given to me, Meghan Martinez RN ( ICU night shift nurse), by Janell Narayan RN   ( ICU day shift nurse). Report included the following information:  SBAR, kardex, consultations, hemodynamic monitoring, intake/output, MAR, lab results, VS trends, cardiac rhythm noted NSR. Skin, neuro, respiratory status, order verification, and critical IV gtt rate verification has been dually noted and verified at the bedside with: Janell Narayan RN                                       0044 ICU Nurse's Note:    2000: Pt is drowsy, RASS score -1,eyes closed at this moment but pt opens them spontaneously. He is not interactive in plan of care and is unable to follow directions. Pt continues to respond to painful stimulus by withdrawing his extremities. Pt independently moves BUE, requires the use of restraints to protect airway and essential lines. Pt requires max assist with all ADLs and repositioning. Bed is locked and in lowest position, side rails up x 3. Interventions are ongoing, will continue to monitor. Neurological: as noted in assessment   Cardiovascular: NSR on the monitor. Pulmonary: breath sounds coarse, diminished, saturations maintained at 97% via vent at 30% FiO2,   GI/: Abdomen is soft, flat, non-distended with hypoactive bowel sounds. Pt is NPO. OG tube is in place @ 62 cm, infusing Nepro tube feedings at goal rate of 40 ml/hr. Placement verified and < 10 ml of tan gastric residual was noted. Last BM noted 07/15/2019. Mccarthy catheter remains intact with oliguric, max colored urine output. IVF/Critical gtts: Fentanyl continuous PCA, Propofol gtt, IV abx  Skin: as noted above    2200: Pt tolerated scheduled HS meds, mccarthy care, mouth care, ET tube care and repositioning. Postop LLE remains unchanged, elevated on pillow.  Pt continues to elicit responsiveness and withdraws upon application of painful stimulus. 0000: POC Accucheck noted Pt is afebrile at  VSS, he is in no acute distress at this time. 0200: Scheduled AM labs were drawn at the bedside. Pt tolerated without incident. 0530: Portable CXR completed at the bedside. Pt remains much more relaxed and appears to have decreased sensation of pain and discomfort. 0600: AM accucheck noted 98, no Humulog SSI indicated. Pt remains NPO. No noted changes to postop left AKA. Postop stump remains elevated on pillow, drsg remains intact with scant breakthrough drainage noted. Interventions are ongoing. Will continue to monitor.    0710: Bedside change of shift report given to: Deepak Clay RN

## 2019-07-20 NOTE — DIALYSIS
ACUTE HEMODIALYSIS FLOW SHEET    HEMODIALYSIS ORDERS: Physician: Dr. Angelic Del Castilloiling:   Thomas       Duration4  hr  BFR:300    DFR: 600   Dialysate:  Temp 37   K+  3   Ca+ 3   Na 138  Bicarb 35   Weight:72.9   kg    Bed Scale [x]     Unable to Obtain []      Dry weight/UF Goal:3000  Access   Needle Gauge     Heparin []  Bolus      Units    [] Hourly       Units    [x]None     Catheter locking solution    Pre BP:124/77       Pulse:  99        Temperature:99.8     Respirations:18  Tx: NS       ml/Bolus  Other        [] N/A   Labs: Pre        Post:        [x] N/A   Additional Orders(medications, blood products, hypotension management):       [x] N/A     [x] Time Out/Safety Check  [x] [facility] Consent Verified     CATHETER ACCESS: []N/A   [x]Right   []Left   []IJ     [x]Fem   [] First use X-ray verified     []Tunnel                [x] Non Tunneled   [x]No S/S infection  []Redness  []Drainage []Cultured []Swelling []Pain   [x]Medical Aseptic Prep Utilized   []Dressing Changed  [x] Biopatch  Date:   7/18/19    []Clotted   [x]Patent   Flows: [x]Good  []Poor  []Reversed   If access problem,  notified: []Yes    [x]N/A  Date:           GRAFT/FISTULA ACCESS:  [x]N/A     []Right     []Left     []UE     []LE   []AVG   []AVF        []Buttonhole    []Medical Aseptic Prep Utilized   []No S/S infection  []Redness  []Drainage []Cultured []Swelling []Pain    Bruit:   [] Strong    [] Weak       Thrill :   [] Strong    [] Weak       Needle Gauge:    Length:     If access problem,  notified: []Yes     [x]N/A  Date:        Please describe access if present and not used:       GENERAL ASSESSMENT:    LUNGS:  Rate  SaO2%  100      [] N/A    [] Clear  [x] Coarse  [] Crackles  [] Wheezing        [] Diminished     Location : []RLL   []LLL    []RUL  []HAVEN   Cough: []Productive  []Dry  [x]N/A   Respirations:  [x]Easy  []Labored   Therapy:  []RA  []NC  l/min    Mask: []NRB []Venti       O2%                  [x]Ventilator [x]Intubated  [] Trach  [] BiPaP   CARDIAC: [x]Regular      [] Irregular   [] Pericardial Rub  [] JVD        [x]  Monitored  [x] Bedside  [] Remotely monitored [] N/A  Rhythm: SR   EDEMA: [] None  [x]Generalized  [] Pitting [] 1    [] 2    [] 3    [] 4                 [] Facial  [] Pedal  []  UE  [] LE   SKIN:   [x] Warm  [] Hot     [] Cold   [x] Dry     [] Pale   [] Diaphoretic                  [] Flushed  [] Jaundiced  [] Cyanotic  [] Rash  [] Weeping   LOC:    [] Alert      []Oriented:    [] Person     [] Place  []Time               [] Confused  [] Lethargic  [x] Medicated  [x] Non-responsive     GI / ABDOMEN:  [] Flat    [] Distended    [x] Soft    [] Firm   []  Obese                             [] Diarrhea  [x] Bowel Sounds  [] Nausea  [] Vomiting       / URINE ASSESSMENT:[] Voiding   [x] Oliguria  [] Anuria   [x]  Galvez     [] Incontinent    []  Incontinent Brief      []  Bathroom Privileges     PAIN: [x] 0 []1  []2   []3   []4   []5   []6   []7   []8   []9   []10            Scale 0-10  Action/Follow Up:    MOBILITY:  [] Amb    [] Amb/Assist    [x] Bed    [] Wheelchair  [] Stretcher      All Vitals and Treatment Details on Attached [facility] [place]:  [facility]         Room # 1976     [] 1st Time Acute  [] Stat  [x] Routine  [] Urgent     [] Acute Room  []  Bedside  [x] ICU/CCU  [] ER   Isolation Precautions:  [x] Dialysis   [] Airborne   [] Contact    [] Reverse   Special Considerations:         [] Blood Consent Verified [x]N/A     ALLERGIES:   [] NKA    PCN      Code Status:  [x] Full Code  [] DNR  [] Other           HBsAg ONLY: Date Drawn 7/11/19         [x]Negative []Positive []Unknown   HBsAb: Date 7/11/19     [x] Susceptible   [] [id number] []Not Drawn  [] Drawn     Current Labs:    Date of Labs: Today [x]       Results for Lakeshia Bedoya (MRN [id number]) as of 7/20/2019 13:53   Ref.  Range 7/20/2019 03:25   Ionized Calcium Latest Ref Range: 1.12 - 1.32 MMOL/L 1.09 (L)   WBC Latest Ref Range: 4.6 - 13.2 K/uL 11.6   RBC Latest Ref Range: 4.70 - 5.50 M/uL 2.64 (L)   HGB Latest Ref Range: 13.0 - 16.0 g/dL 7.5 (L)   HCT Latest Ref Range: 36.0 - 48.0 % 23.3 (L)   MCV Latest Ref Range: 74.0 - 97.0 FL 88.3   MCH Latest Ref Range: 24.0 - 34.0 PG 28.4   MCHC Latest Ref Range: 31.0 - 37.0 g/dL 32.2   RDW Latest Ref Range: 11.6 - 14.5 % 16.1 (H)   PLATELET Latest Ref Range: 135 - 420 K/uL 223   MPV Latest Ref Range: 9.2 - 11.8 FL 7.7 (L)   INR Latest Ref Range: 0.8 - 1.2   1.1   Prothrombin time Latest Ref Range: 11.5 - 15.2 sec 14.2   Sodium Latest Ref Range: 136 - 145 mmol/L 132 (L)   Potassium Latest Ref Range: 3.5 - 5.5 mmol/L 4.0   Chloride Latest Ref Range: 100 - 111 mmol/L 99 (L)   CO2 Latest Ref Range: 21 - 32 mmol/L 26   Anion gap Latest Ref Range: 3.0 - 18 mmol/L 7   Glucose Latest Ref Range: 74 - 99 mg/dL 110 (H)   BUN Latest Ref Range: 7.0 - 18 MG/DL 27 (H)   Creatinine Latest Ref Range: 0.6 - 1.3 MG/DL 4.17 (H)   BUN/Creatinine ratio Latest Ref Range: 12 - 20   6 (L)   Calcium Latest Ref Range: 8.5 - 10.1 MG/DL 7.3 (L)   Magnesium Latest Ref Range: 1.6 - 2.6 mg/dL 1.9   GFR est non-AA Latest Ref Range: >60 ml/min/1.73m2 15 (L)   GFR est AA Latest Ref Range: >60 ml/min/1.73m2 18 (L)   Bilirubin, total Latest Ref Range: 0.2 - 1.0 MG/DL 0.5   Protein, total Latest Ref Range: 6.4 - 8.2 g/dL 4.8 (L)   Albumin Latest Ref Range: 3.4 - 5.0 g/dL 1.4 (L)   Globulin Latest Ref Range: 2.0 - 4.0 g/dL 3.4   A-G Ratio Latest Ref Range: 0.8 - 1.7   0.4 (L)   ALT (SGPT) Latest Ref Range: 16 - 61 U/L 83 (H)   AST Latest Ref Range: 10 - 38 U/L 412 (H)   Alk. phosphatase Latest Ref Range: 45 - 117 U/L 100   Results for Day cMkeon (MRN [id number]) as of 7/20/2019 13:53   Ref. Range 7/11/2019 12:40   Hepatitis B surface Ag Latest Ref Range: <1.00 Index <0.10   Hep B surface Ag Interp. Latest Ref Range: NEG   NEGATIVE   Hepatitis C virus Ab Latest Ref Range: <0.80 Index 0.12   Hep C  virus Ab Interp.  Latest Ref Range: NEG   NEGATIVE   Hep C  virus Ab comment Latest Units:   Pend    Cut and paste current labs here.                                                                                                                                   DIET:  [] Renal    [x] Other     [] NPO     []  Diabetic      PRIMARY NURSE REPORT: First initial/Last name/Title      Pre Dialysis: Kathy Mckeon RN  Time: 9746      EDUCATION:    [x] Patient [] Other         Knowledge Basis: [x]None []Minimal [] Substantial   Barriers to learning  Pt sedated on vent   []N/A   [] Access Care     [] S&S of infection     [] Fluid Management     []K+     [x]Procedural    []Albumin     [] Medications     [] Tx Options     [] Transplant     [] Diet     [] Other   Teaching Tools:  [x] Explain  [] Demo  [] Handouts [] Video  Patient response:   [] Verbalized understanding  [] Teach back  [] Return demonstration [x] Requires follow up   Inappropriate due to     Pt, sedated on vent        RO/HEMODIALYSIS MACHINE SAFETY CHECKS  Before each treatment:     Machine Number:                   [facility]                                   [] Unit Machine #  with centralized RO                                  [] Portable Machine #1/RO serial # [id number]                                  [] Portable Machine #2/RO serial # [id number]                                  [] Portable Machine #3/RO serial # [id number]                                                                                                       [facility]                                  [] Portable Machine #11/RO serial # [id number]                                   [x] Portable Machine #12/RO serial # [id number]                                  [] Portable Machine #13/RO serial #  [id number]      Alarm Test:  Pass time 1230         Other:         [x] RO/Machine Log Complete      Temp     37           [x]Extracorporeal Circuit Tested for integrity   Dialysate: pH 7.2  Conductivity: Meter   14 HD Machine  14.1                   TCD: 13.9Dialyzer Lot #  [id number]           Blood Tubing Lot #[id number]           Saline Lot #  [id number]     CHLORINE TESTING-Before each treatment and every 4 hours    Total Chlorine: [x] less than 0.1 ppm  Time:1230  4 Hr/2nd Check Time:    (if greater than 0.1 ppm from Primary then every 30 minutes from Secondary)     TREATMENT INITIATION  with Dialysis Precautions:   [x] All Connections Secured                 [x] Saline Line Double Clamped   [x] Venous Parameters Set                  [x] Arterial Parameters Set    [x] Prime Given   ml   250            [x]Air Foam Detector Engaged      Treatment Initiation Note: Started treatment using right fem HD catheter     Medication Dose Volume Route Initials Dialyzer Cleared: [] Good [x] Fair  [] Poor    Blood processed: 69.9  L  UF Removed 3000   Ml    Post Wt:  69.9   kg  POst BP: 164/92         Pulse:  92     Respirations:24   Temperature: 98.6                                   Post Tx Vascular Access: AVF/AVG: Bleeding stopped Art  min. Josh. Min   N/A                                   Catheter: Locking solution: Heparin 1:1000 Art. 1.4   Josh.1.4   N/A                                 Post Assessment:                                    Skin:  [x] Warm  [x] Dry [] Diaphoretic    [] Flushed  [] Pale [] Cyanotic   [facility] Signatures Title Initials  Time Lungs: [] Clear    [x] Course  [] Crackles  [] Wheezing [] Diminished       Cardiac: [x] Regular   [] Irregular   [] Monitor  [] N/A  Rhythm: SR          Edema:  [] None    [x] General     [] Facial   [] Pedal    [] UE    [] LE       Pain: [x]0  []1  []2   []3  []4   []5   []6   []7   []8   []9   []10         Post Treatment Note: Pt.  Tolerated treatment     POST TREATMENT PRIMARY NURSE HANDOFF REPORT:     First initial/Last name/Title         Post Dialysis:EMY Cox RN  Time:  1700     Abbreviations: AVG-arterial venous graft, AVF-arterial venous fistula, IJ-Internal Jugular, Subcl-Subclavian, Fem-Femoral, Tx-treatment, AP/HR-apical heart rate, DFR-dialysate flow rate, BFR-blood flow rate, AP-arterial pressure, -venous pressure, UF-ultrafiltrate, TMP-transmembrane pressure, Josh-Venous, Art-Arterial, RO-Reverse Osmosis

## 2019-07-20 NOTE — PROGRESS NOTES
Problem: Pain  Goal: *Control of Pain  Outcome: Progressing Towards Goal     Problem: Patient Education: Go to Patient Education Activity  Goal: Patient/Family Education  Outcome: Progressing Towards Goal     Problem: Falls - Risk of  Goal: *Absence of Falls  Description  Document Katerynaavani Ness Fall Risk and appropriate interventions in the flowsheet. Outcome: Progressing Towards Goal  Note:   Fall Risk Interventions: side rails up, exit alarm activated  Mobility Interventions: Assess mobility with egress test, Bed/chair exit alarm    Mentation Interventions: Adequate sleep, hydration, pain control, Bed/chair exit alarm, Door open when patient unattended    Medication Interventions: Bed/chair exit alarm    Elimination Interventions: Bed/chair exit alarm, Call light in reach, Toileting schedule/hourly rounds    History of Falls Interventions: Door open when patient unattended, Evaluate medications/consider consulting pharmacy, Bed/chair exit alarm         Problem: Patient Education: Go to Patient Education Activity  Goal: Patient/Family Education  Outcome: Progressing Towards Goal     Problem: Impaired Skin Integrity/Pressure Injury Treatment  Goal: *Improvement of Existing Pressure Injury  Outcome: Progressing Towards Goal  Goal: *Prevention of pressure injury  Description  Document Mikie Scale and appropriate interventions in the flowsheet.   Outcome: Progressing Towards Goal  Note:   Pressure Injury Interventions: frequent reposition and use of turn assist feature on specialty bed  Sensory Interventions: Assess changes in LOC, Assess need for specialty bed, Avoid rigorous massage over bony prominences, Check visual cues for pain, Minimize linen layers    Moisture Interventions: Absorbent underpads, Check for incontinence Q2 hours and as needed, Internal/External urinary devices    Activity Interventions: Pressure redistribution bed/mattress(bed type)    Mobility Interventions: Assess need for specialty bed, Turn and reposition approx. every two hours(pillow and wedges)    Nutrition Interventions: Document food/fluid/supplement intake    Friction and Shear Interventions: Apply protective barrier, creams and emollients, HOB 30 degrees or less                Problem: Patient Education: Go to Patient Education Activity  Goal: Patient/Family Education  Outcome: Progressing Towards Goal     Problem: Pressure Injury - Risk of  Goal: *Prevention of pressure injury  Description  Document Mikie Scale and appropriate interventions in the flowsheet. Outcome: Progressing Towards Goal  Note:   Pressure Injury Interventions: frequent repositioning  Sensory Interventions: Assess changes in LOC, Assess need for specialty bed, Avoid rigorous massage over bony prominences, Check visual cues for pain, Minimize linen layers    Moisture Interventions: Absorbent underpads, Check for incontinence Q2 hours and as needed, Internal/External urinary devices    Activity Interventions: Pressure redistribution bed/mattress(bed type)    Mobility Interventions: Assess need for specialty bed, Turn and reposition approx.  every two hours(pillow and wedges)    Nutrition Interventions: Document food/fluid/supplement intake    Friction and Shear Interventions: Apply protective barrier, creams and emollients, HOB 30 degrees or less                Problem: Patient Education: Go to Patient Education Activity  Goal: Patient/Family Education  Outcome: Progressing Towards Goal     Problem: Discharge Planning  Goal: *Discharge to safe environment  Outcome: Progressing Towards Goal     Problem: Nutrition Deficit  Goal: *Optimize nutritional status  Outcome: Progressing Towards Goal  Goal: *Blood glucose 80 to 180 mg/dl  Outcome: Progressing Towards Goal  Goal: *Tolerates nutrition therapy  Outcome: Progressing Towards Goal     Problem: Patient Education: Go to Patient Education Activity  Goal: Patient/Family Education  Outcome: Progressing Towards Goal     Problem: Patient Education: Go to Patient Education Activity  Goal: Patient/Family Education  Outcome: Progressing Towards Goal     Problem: Ventilator Management  Goal: *Adequate oxygenation and ventilation  Outcome: Progressing Towards Goal  Goal: *Patient maintains clear airway/free of aspiration  Outcome: Progressing Towards Goal  Goal: *Absence of infection signs and symptoms  Outcome: Progressing Towards Goal  Goal: *Normal spontaneous ventilation  Outcome: Progressing Towards Goal     Problem: Patient Education: Go to Patient Education Activity  Goal: Patient/Family Education  Outcome: Progressing Towards Goal     Problem: Non-Violent Restraints  Goal: *Removal from restraints as soon as assessed to be safe  Outcome: Progressing Towards Goal  Goal: *No harm/injury to patient while restraints in use  Outcome: Progressing Towards Goal  Goal: *Patient's dignity will be maintained  Outcome: Progressing Towards Goal  Goal: *Patient Specific Goal (EDIT GOAL, INSERT TEXT)  Outcome: Progressing Towards Goal  Goal: Non-violent Restaints:Standard Interventions  Outcome: Progressing Towards Goal  Goal: Non-violent Restraints:Patient Interventions  Outcome: Progressing Towards Goal  Goal: Patient/Family Education  Outcome: Progressing Towards Goal     Problem: Patient Education: Go to Patient Education Activity  Goal: Patient/Family Education  Outcome: Progressing Towards Goal     Problem: Patient Education: Go to Patient Education Activity  Goal: Patient/Family Education  Outcome: Progressing Towards Goal     Problem: Surgical Pathway Day of Surgery  Goal: *Adequate urinary output (equal to or greater than 30 milliliters/hour)  Description  Ambulatory Surgery patients voiding without difficulty.   Outcome: Progressing Towards Goal     Problem: Surgical Pathway Post-Op Day 1  Goal: Off Pathway (Use only if patient is Off Pathway)  Outcome: Progressing Towards Goal  Goal: Activity/Safety  Outcome: Progressing Towards Goal  Goal: Diagnostic Test/Procedures  Outcome: Progressing Towards Goal  Goal: Nutrition/Diet  Outcome: Progressing Towards Goal  Goal: Discharge Planning  Outcome: Progressing Towards Goal  Goal: Medications  Outcome: Progressing Towards Goal  Goal: Respiratory  Outcome: Progressing Towards Goal  Goal: Treatments/Interventions/Procedures  Outcome: Progressing Towards Goal  Goal: Psychosocial  Outcome: Progressing Towards Goal  Goal: *No signs and symptoms of infection or wound complications  Outcome: Progressing Towards Goal  Goal: *Optimal pain control at patient's stated goal  Outcome: Progressing Towards Goal  Goal: *Adequate urinary output (equal to or greater than 30 milliliters/hour)  Outcome: Progressing Towards Goal  Goal: *Hemodynamically stable  Outcome: Progressing Towards Goal  Goal: *Tolerating diet  Outcome: Progressing Towards Goal  Goal: *Demonstrates progressive activity  Outcome: Progressing Towards Goal  Goal: *Lungs clear or at baseline  Outcome: Progressing Towards Goal     Problem: Nutrition Deficit  Goal: *Optimize nutritional status  Outcome: Progressing Towards Goal

## 2019-07-20 NOTE — PROGRESS NOTES
2000.received pt on vent support with head elevated sxn for moderate amount of yellow/white secretions,ETT moved to center of mouth patent and secured,BVM at bedside pt resting with eyes closed no distress noted at this time will monitor though the shift.

## 2019-07-20 NOTE — PROGRESS NOTES
PCCM Progress Note                                              I have reviewed the flowsheet and previous days notes. Events, vitals, medications and notes from last 24 hours reviewed. Care plan discussed with staff and on multidisciplinary rounds. Subjective:  7/20/2019     Pt is sedated on the vent. Per RN has episodes of agitation and trying to get out of bed.      Impression and Plan  Patient Active Problem List   Diagnosis Code    Gangrene of foot ([address] I96    Cellulitis of left lower extremity L03. 80    Malnourished ([address] E46    Peripheral vascular disease ([address] I73.9    Hyponatremia E87.1    Acute kidney injury superimposed on chronic kidney disease ([address] N17.9, N18.9    Dehydration E86.0    Sepsis ([address] A41.9    Gangrene ([address] I96    Anemia D64.9    Acute respiratory failure (HCC) J96.00    Septic encephalopathy G93.41    Transaminitis R74.0     Acute resp failure with Hypoxia/VDRF - Pt is doing well on the vent. ABG looks ok. Pt is not a candidate for extubation due to mental status issues  Pneumonia - WBC count is coming down. Pt is on cefepime and Vanco. Cont with abx for total 7-8days  LLE Gangrene s/p Left AKA - POD #3. Pain control, follow up with vascular surgery  PVD - Pt's Rt foot is also cold. Vascular surgery is aware. Pt is on ASA and Plavix. Will defer mgt to Vascular surgery  [place]/ESRD -  HD per nephrology  Anemia - No overt bleeding. F/up CBC  COPD - Cont duonebs  DVT and GI proph - Pt is on heparin and protonix  Agitation/Delirium - Pt is sedated with propofol drip and Fentanyl drip. Also on Seroquel. Per pt's son, pt drinks all day long so has hx of alcohol abuse. Stage III NSCLC, s/p chemoRx/XRT, completed in Dec 2018. His restaging bone scan (7/12/19) and CT of chest  (7/15/19) have no evidence of metastatic disease.  He appears to be in a clinical CR/VGPR wrt his NSCLC. OTHER:  Glycemic Control. Glucose stabilizer per ICU protocol when on insulin drip.  Maintain blood glucose 140-180. Replace electrolytes per ICU electrolyte replacement protocol  Ventilator bundle & Sedation protocol followed. Daily morning sedation holiday, assessment for readiness for SBT & weaning from ventilator; and then re-titrate if required. Aim to keep peak plateau pressure 81-80ZL H2O in ARDS patient. Mary Jane tube to suction at 20-30 cm H2O, Maintain Mary Jane tube with 5-10ml air every 4 hours. Chlorhexidine mouth washes and routine oral care every 4 hours. Stress ulcer and DVT prophylaxis. HOB >=30 degree elevation all the time. HOB >=30 degree elevation all the time. Aggressive pulmonary toileting. Incentive spirometry when appropriate. Aspiration precautions. Sepsis bundle and protocol followed. Deescalate antibiotic when appropriate. Vasopressor when appropriate with MAP goal >65 mmHg. Central Line bundle followed, remove when not needed. Large bore IV line or CVP when appropriate. Mccarthy bundle followed, remove mccarthy catheter when not critically ill. PT/OT eval and treat. OOB/IS when appropriate. Quality Care: Stress ulcer prophylaxis, DVT prophylaxis, HOB elevated, Infection control all reviewed and addressed. Events and notes from last 24 hours reviewed. Care plan discussed with nursing. D/w patient and family above medical problems and answered all questions to their satisfaction. CC TIME: >35 min     Medication Reviewed:     Allergies   Allergen Reactions    Pcn [Penicillins] Unknown (comments)     Patients states that he was allergic to PCN as a child because he did not like receiving PCN injections, not because he developed a reaction to PCN administration       Past Medical History:   Diagnosis Date    Anemia     Chronic kidney disease     Depression     Emphysema (subcutaneous) (surgical) resulting from a procedure     Hypertension     Hyponatremia     Non-small cell lung cancer ([facility] 2018    rt upper lung    Peripheral vascular disease ([facility]     Seizure ([facility]     Seizures ([facility]     Sepsis ([facility]       History reviewed. No pertinent surgical history. Social History     Tobacco Use    Smoking status: Current Every Day Smoker    Smokeless tobacco: Never Used   Substance Use Topics    Alcohol use: Yes      History reviewed. No pertinent family history. Prior to Admission medications    Medication Sig Start Date End Date Taking? Authorizing Provider   amoxicillin (AMOXIL) 500 mg capsule Take 1 Cap by mouth every eight (8) hours. 7/7/19   Becca Troy PA-C   ciprofloxacin HCl (CIPRO) 750 mg tablet Take 1 Tab by mouth every twelve (12) hours. 7/7/19   Phyliss STEPHANIE Veliz   atorvastatin (LIPITOR) 40 mg tablet Take 1 Tab by mouth nightly. 7/6/19   Phyliss STEPHANIE Veliz   carvedilol (COREG) 3.125 mg tablet Take 1 Tab by mouth two (2) times daily (with meals). 7/6/19   Phyliss STEPHANIE Veliz   furosemide (LASIX) 20 mg tablet Take 1 Tab by mouth every Monday, Wednesday, Friday. 7/8/19   Phyliss STEPHANIE Veliz   clopidogrel (PLAVIX) 75 mg tab Take 75 mg by mouth daily.     Provider, Historical     Current Facility-Administered Medications   Medication Dose Route Frequency    [START ON 7/21/2019] VANCOMYCIN INFORMATION NOTE   Other ONCE    QUEtiapine (SEROquel) tablet 50 mg  50 mg Oral BID    clopidogrel (PLAVIX) tablet 75 mg  75 mg Oral DAILY    aspirin tablet 325 mg  325 mg Oral DAILY    carvedilol (COREG) tablet 6.25 mg  6.25 mg Oral BID WITH MEALS    pantoprazole (PROTONIX) 40 mg in sodium chloride 0.9% 10 mL injection  40 mg IntraVENous DAILY    sodium chloride (NS) flush 5-40 mL  5-40 mL IntraVENous Q8H    sodium chloride (NS) flush 5-40 mL  5-40 mL IntraVENous Q8H    senna (SENOKOT) tablet 8.6 mg  1 Tab Oral BID    fentaNYL (PF) 900 mcg/30 ml infusion soln  0-200 mcg/hr IntraVENous TITRATE    propofol (DIPRIVAN) infusion  0-50 mcg/kg/min IntraVENous TITRATE    chlorhexidine (PERIDEX) 0.12 % mouthwash 10 mL  10 mL Oral Q12H    albuterol-ipratropium (DUO-NEB) 2.5 MG-0.5 MG/3 ML  3 mL Nebulization Q6H RT    cefepime (MAXIPIME) 0.5 g in 0.9% sodium chloride 100 mL IVPB  0.5 g IntraVENous Q24H    heparin (porcine) injection 5,000 Units  5,000 Units SubCUTAneous Q8H    VANCOMYCIN INFORMATION NOTE 1 Each  1 Each Other Rx Dosing/Monitoring    therapeutic multivitamin (THERAGRAN) tablet 1 Tab  1 Tab Oral DAILY       Lines: All central lines examined by me. No signs of erythema, induration, discharge. Peripherally Inserted Central Catheter:     Central Venous Catheter:  Triple Lumen UDALL CATHETERR 07/17/19 Anterior;Proximal;Right Femoral (Active)   Central Line Being Utilized No 7/20/2019  8:00 AM   Criteria for Appropriate Use Dialysis/apheresis 7/20/2019  8:00 AM   Site Assessment Clean, dry, & intact 7/20/2019  8:00 AM   Infiltration Assessment 0 7/20/2019  8:00 AM   Affected Extremity/Extremities Color distal to insertion site pink (or appropriate for race); Pulses palpable 7/20/2019  8:00 AM   Date of Last Dressing Change 07/18/19 7/20/2019  8:00 AM   Dressing Status Clean, dry, & intact 7/20/2019  8:00 AM   Dressing Type Tape;Transparent 7/20/2019  8:00 AM   Action Taken Open ports on tubing capped 7/20/2019  8:00 AM   Alcohol Cap Used Yes 7/20/2019  8:00 AM     Peripheral Intravenous Line:  Peripheral IV 07/17/19 Left Forearm (Active)   Site Assessment Clean, dry, & intact 7/20/2019 10:00 AM   Phlebitis Assessment 0 7/20/2019 10:00 AM   Infiltration Assessment 0 7/20/2019 10:00 AM   Dressing Status Clean, dry, & intact 7/20/2019 10:00 AM   Dressing Type Tape;Transparent 7/20/2019  8:00 AM   Hub Color/Line Status Green;Flushed;Patent; Infusing 7/20/2019  8:00 AM   Action Taken Open ports on tubing capped 7/20/2019  8:00 AM   Alcohol Cap Used Yes 7/20/2019  8:00 AM       Peripheral IV 07/18/19 Left;Upper Arm (Active)   Site Assessment Clean;Clean, dry, & intact 7/20/2019 10:00 AM   Phlebitis Assessment 0 7/20/2019 10:00 AM Infiltration Assessment 0 7/20/2019 10:00 AM   Dressing Status Clean, dry, & intact 7/20/2019 10:00 AM   Dressing Type Tape;Transparent 7/20/2019  8:00 AM   Hub Color/Line Status Green;Flushed;Patent;Capped 7/20/2019  8:00 AM   Action Taken Open ports on tubing capped 7/20/2019  8:00 AM   Alcohol Cap Used Yes 7/20/2019  8:00 AM       Peripheral IV 07/18/19 Left Forearm (Active)   Site Assessment Clean, dry, & intact 7/20/2019 10:00 AM   Phlebitis Assessment 0 7/20/2019 10:00 AM   Infiltration Assessment 0 7/20/2019 10:00 AM   Dressing Status Clean, dry, & intact 7/20/2019 10:00 AM   Dressing Type Tape;Transparent 7/20/2019  8:00 AM   Hub Color/Line Status Pink; Infusing 7/20/2019  8:00 AM   Action Taken Open ports on tubing capped 7/20/2019  8:00 AM   Alcohol Cap Used No 7/20/2019  8:00 AM      Drain(s):  Orogastric Tube 07/17/19 (Active)   Site Assessment Clean, dry, & intact 7/20/2019  8:00 AM   Securement Device Tape 7/20/2019  8:00 AM   G Port Status Intermittent Suction 7/20/2019  8:00 AM   External Insertion Wade (cms) 80 cms 7/20/2019  8:00 AM   Action Taken Placement verified (comment) 7/20/2019  8:00 AM   Drainage Description Tan 7/20/2019  8:00 AM   Gastric Residual (mL) 10 ml 7/20/2019  8:00 AM   Tube Feeding/Formula Options Renal (Nepro) 7/20/2019  8:00 AM   Tube Feeding/Verify Rate (mL/hr) 40 7/20/2019  8:00 AM   Water Flush Volume (mL) 30 mL 7/20/2019  8:00 AM   Intake (ml) 40 ml 7/20/2019  8:00 AM   Medication Volume 30 ml 7/20/2019  8:00 AM   Output (ml) 0 ml 7/18/2019  5:00 PM     Airway:  Airway - Endotracheal Tube 07/17/19 Oral (Active)   Insertion Depth (cm) 24 cm 7/20/2019  8:00 AM   Line Wade Lips 7/20/2019  8:00 AM   Side Secured Left 7/20/2019  8:00 AM   Cuff Pressure 30 cmH20 7/20/2019  7:31 AM   Site Assessment Clean, dry, & intact 7/20/2019  8:00 AM       Objective:  Vital Signs:    Visit Vitals  /71   Pulse 94   Temp 98.6 °F (37 °C)   Resp 22   Ht 6' (1.829 m)   Wt 62.7 kg (138 lb 3.7 oz)   SpO2 100%   BMI 18.75 kg/m²      O2 Device: Endotracheal tube  O2 Flow Rate (L/min): 6 l/min  Temp (24hrs), Av.3 °F (36.8 °C), Min:97.4 °F (36.3 °C), Max:98.9 °F (37.2 °C)      Intake/Output:   Last shift:      701 - 1900  In: 452.2 [I.V.:352.2]  Out: -     Last 3 shifts: 1901 -  0700  In: 3771.8 [I.V.:2734.2]  Out: 700 [Urine:700]      Intake/Output Summary (Last 24 hours) at 2019 1117  Last data filed at 2019 1000  Gross per 24 hour   Intake 1873.16 ml   Output 485 ml   Net 1388.16 ml       Last 3 Recorded Weights in this Encounter    07/15/19 0613 19 0000 19 0645   Weight: 58.8 kg (129 lb 10.1 oz) 53.6 kg (118 lb 2.7 oz) 62.7 kg (138 lb 3.7 oz)       Ventilator Settings:  Mode Rate Tidal Volume Pressure FiO2 PEEP  Assist control, VC+   450 ml    30 % 5 cm H20    Peak airway pressure: 13 cm H2O   Plateau pressure:    Tidal volume:   Minute ventilation: 10 l/min  SPO2     ARDS network Guidelines: Lung protective strategy and Plateau pressure goals less than or equal to 30    Physical Exam:     General/Neurology: Sedated on vent   Head:   Normocephalic, without obvious abnormality  Eye:   no scleral icterus, no pallor  Oral:   Mucus membranes moist  Neck:   Supple  Lung:   B/l basal rales present  Heart:   S1 S2 present.    Abdomen/: Soft, non tender, BS +nt  Extremities:  Rt foot is cold, left AKA stump dressing is clean    Data:      Recent Results (from the past 24 hour(s))   GLUCOSE, POC    Collection Time: 19 12:00 PM   Result Value Ref Range    Glucose (POC) 77 70 - 110 mg/dL   GLUCOSE, POC    Collection Time: 19  1:56 PM   Result Value Ref Range    Glucose (POC) 128 (H) 70 - 110 mg/dL   SODIUM    Collection Time: 19  4:00 PM   Result Value Ref Range    Sodium 134 (L) 136 - 145 mmol/L   HGB & HCT    Collection Time: 19  4:00 PM   Result Value Ref Range    HGB 8.1 (L) 13.0 - 16.0 g/dL    HCT 24.5 (L) 36.0 - 48.0 %   CALCIUM, IONIZED    Collection Time: 07/19/19  4:00 PM   Result Value Ref Range    Ionized Calcium 1.07 (L) 1.12 - 1.32 MMOL/L   POTASSIUM    Collection Time: 07/19/19  4:00 PM   Result Value Ref Range    Potassium 3.9 3.5 - 5.5 mmol/L   GLUCOSE, POC    Collection Time: 07/19/19  6:00 PM   Result Value Ref Range    Glucose (POC) 97 70 - 110 mg/dL   GLUCOSE, POC    Collection Time: 07/19/19 10:59 PM   Result Value Ref Range    Glucose (POC) 102 70 - 110 mg/dL   CALCIUM, IONIZED    Collection Time: 07/19/19 11:05 PM   Result Value Ref Range    Ionized Calcium 1.10 (L) 1.12 - 1.32 MMOL/L   CBC W/O DIFF    Collection Time: 07/20/19  3:25 AM   Result Value Ref Range    WBC 11.6 4.6 - 13.2 K/uL    RBC 2.64 (L) 4.70 - 5.50 M/uL    HGB 7.5 (L) 13.0 - 16.0 g/dL    HCT 23.3 (L) 36.0 - 48.0 %    MCV 88.3 74.0 - 97.0 FL    MCH 28.4 24.0 - 34.0 PG    MCHC 32.2 31.0 - 37.0 g/dL    RDW 16.1 (H) 11.6 - 14.5 %    PLATELET 347 432 - 244 K/uL    MPV 7.7 (L) 9.2 - 11.8 FL   MAGNESIUM    Collection Time: 07/20/19  3:25 AM   Result Value Ref Range    Magnesium 1.9 1.6 - 2.6 mg/dL   CALCIUM, IONIZED    Collection Time: 07/20/19  3:25 AM   Result Value Ref Range    Ionized Calcium 1.09 (L) 1.12 - 4.15 MMOL/L   METABOLIC PANEL, COMPREHENSIVE    Collection Time: 07/20/19  3:25 AM   Result Value Ref Range    Sodium 132 (L) 136 - 145 mmol/L    Potassium 4.0 3.5 - 5.5 mmol/L    Chloride 99 (L) 100 - 111 mmol/L    CO2 26 21 - 32 mmol/L    Anion gap 7 3.0 - 18 mmol/L    Glucose 110 (H) 74 - 99 mg/dL    BUN 27 (H) 7.0 - 18 MG/DL    Creatinine 4.17 (H) 0.6 - 1.3 MG/DL    BUN/Creatinine ratio 6 (L) 12 - 20      GFR est AA 18 (L) >60 ml/min/1.73m2    GFR est non-AA 15 (L) >60 ml/min/1.73m2    Calcium 7.3 (L) 8.5 - 10.1 MG/DL    Bilirubin, total 0.5 0.2 - 1.0 MG/DL    ALT (SGPT) 83 (H) 16 - 61 U/L    AST (SGOT) 412 (H) 10 - 38 U/L    Alk.  phosphatase 100 45 - 117 U/L    Protein, total 4.8 (L) 6.4 - 8.2 g/dL    Albumin 1.4 (L) 3.4 - 5.0 g/dL    Globulin 3.4 2.0 - 4.0 g/dL    A-G Ratio 0.4 (L) 0.8 - 1.7     PROTHROMBIN TIME + INR    Collection Time: 07/20/19  3:25 AM   Result Value Ref Range    Prothrombin time 14.2 11.5 - 15.2 sec    INR 1.1 0.8 - 1.2     VANCOMYCIN, RANDOM    Collection Time: 07/20/19  3:25 AM   Result Value Ref Range    Vancomycin, random 18.0 5.0 - 40.0 UG/ML   POC G3    Collection Time: 07/20/19  4:38 AM   Result Value Ref Range    Device: VENT      FIO2 (POC) 30 %    pH (POC) 7.422 7.35 - 7.45      pCO2 (POC) 34.3 (L) 35.0 - 45.0 MMHG    pO2 (POC) 66 (L) 80 - 100 MMHG    HCO3 (POC) 22.4 22 - 26 MMOL/L    sO2 (POC) 93 92 - 97 %    Base deficit (POC) 2 mmol/L    Mode ASSIST CONTROL      Tidal volume 450 ml    Set Rate 12 bpm    PEEP/CPAP (POC) 5 cmH2O    PIP (POC) 12      Allens test (POC) N/A      Inspiratory Time 0.90 sec    Total resp. rate 16      Site RIGHT RADIAL      Patient temp. 98.6      Specimen type (POC) ARTERIAL      Performed by Tory George     Volume control plus YES     GLUCOSE, POC    Collection Time: 07/20/19  6:17 AM   Result Value Ref Range    Glucose (POC) 98 70 - 110 mg/dL         Chemistry   Recent Labs     07/20/19  0325 07/19/19  1600 07/19/19  0145  07/18/19  0130   *  --  117*  --  125*   * 134* 137   < > 136   K 4.0 3.9 3.8  --  4.4   CL 99*  --  102  --  100   CO2 26  --  27  --  24   BUN 27*  --  18  --  43*   CREA 4.17*  --  3.26*  --  5.49*   CA 7.3*  --  7.5*  --  7.1*   MG 1.9  --  1.9  --  2.4   PHOS  --   --  3.7  --  7.5*   AGAP 7  --  8  --  12   BUCR 6*  --  6*  --  8*     --  64  --  53   TP 4.8*  --  5.1*  --  5.3*   ALB 1.4*  --  1.5*  --  1.9*   GLOB 3.4  --  3.6  --  3.4   AGRAT 0.4*  --  0.4*  --  0.6*    < > = values in this interval not displayed.        CBC w/Diff   Recent Labs     07/20/19  0325 07/19/19  1600 07/19/19  0145 07/18/19  0130 07/17/19  1425   WBC 11.6  --  12.5 13.7* 19.8*   RBC 2.64*  --  2.27* 2.44* 2.30*   HGB 7.5* 8.1* 6.5* 7.0* 6.7*   HCT 23.3* 24.5* 19.4* 20.5* 19.4*     --  289 358 327   GRANS  --   --   --   --  87*   LYMPH  --   --   --   --  7*   EOS  --   --   --   --  0       ABG    Recent Labs     07/20/19  0438 07/19/19  0345 07/18/19  0433   PHI 7.422 7.439 7.452*   PCO2I 34.3* 35.2 33.9*   PO2I 66* 77* 65*   HCO3I 22.4 23.9 23.6   FIO2I 30 0.30 30       Micro   No results for input(s): SDES, CULT in the last 72 hours. No results for input(s): CULT in the last 72 hours. CT (Most Recent)    Results from Hospital Encounter encounter on 07/10/19   CT CHEST WO CONT    Narrative EXAM: CT chest     INDICATION: Right lung cancer. Follow-up examination. COMPARISON: None. TECHNIQUE: Axial CT imaging from the thoracic inlet through the diaphragm  without intravenous contrast. Multiplanar reformats were generated. One or more  dose reduction techniques were used on this CT: automated exposure control,  adjustment of the mAs and/or kVp according to patient size, and iterative  reconstruction techniques. The specific techniques used on this CT exam have  been documented in the patient's electronic medical record. Digital Imaging and  Communications in Medicine (DICOM) format image data are available to  nonaffiliated external healthcare facilities or entities on a secured, media  free, reciprocally searchable basis with patient authorization for at least a  12-month period after this study. _______________    FINDINGS:    BASE OF THE NECK: Normal.    LUNGS: Significant respiratory motion artifact is present. Fiducial markers are  present in the right upper lobe. Small area of scarring is present within the  right upper lobe which measures up to 1.7 cm (axial image 103). No focal nodular  area in this area of scarring to suggest recurrent disease, however prior  studies are not available for review. Interstitial edema is present in both  lower lungs.     AIRWAY: Secretions are present within the right main and segmental bronchi  causing narrowing of the airway within this region. PLEURA: Small to moderate bilateral pleural effusions with adjacent atelectasis. MEDIASTINUM: Coronary artery calcifications are present. Small pericardial  effusion. Vasculature is unremarkable. LYMPH NODES: No enlarged lymph nodes. UPPER ABDOMEN: Full thickening is present within the visualized portions of the  stomach, which could be suggestive of mild gastritis. BONES: No acute or aggressive osseous abnormalities identified. OTHER: None.    _______________      Impression IMPRESSION:    1. Mild interstitial edema is present in both lower lungs. Small to moderate  bilateral pleural effusions with adjacent atelectasis. Small pericardial  effusion is present. 2. Minimal soft tissue prominence is present in the right upper lobe at the site  of the prior malignancy which has the appearance of soft tissue scarring. No  focal nodular regions to suggest recurrent disease, however prior studies are  not available for review. 3. Doubtful thickening and surrounding edema within the visualized portions of  the proximal stomach, which could be suggestive of mild gastritis. 4. Layering secretions are present in the distal trachea in the right main  bronchus, concerning for small amount of aspiration. XR (Most Recent). CXR reviewed by me and compared with previous CXR   Results from Hospital Encounter encounter on 07/10/19   XR CHEST PORT    Narrative EXAM: Portable Chest    CLINICAL INDICATION: Respiratory distress  -Additional: Intubated    COMPARISON: 07/18/19    TECHNIQUE: AP portable view at 0506    ______________    FINDINGS:    HEART AND MEDIASTINUM: The heart size is stable    LUNGS AND AIRWAYS: Fiducial markers are again seen in the upper right chest.  Area of infiltrate appears present at both lung bases. There is increased at the  left lung base on the recent study    PLEURA: No pleural effusion or pneumothorax.     BONES: Chronic spondylosis is present    OTHER: The endotracheal catheter tip is about 7 cm above the aristeo. The NG tube  enters the stomach. Cardiac monitor leads overlie the chest    ______________      Impression IMPRESSION:    Infiltrative changes at the lung bases which have increased on the left. Tubes  as discussed         High complexity decision making was performed during the evaluation of this patient at high risk for decompensation with multiple organ involvement     Above mentioned total time spent on reviewing the case/medical record/data/notes/EMR/patient examination/documentation/coordinating care with nurse/consultants, exclusive of procedures with complex decision making performed and > 50% time spent in face to face evaluation.     Giancarlo Townsend MD  7/20/2019

## 2019-07-20 NOTE — PROGRESS NOTES
Pharmacy Dosing Services: Vancomycin    Indication: SSTI    Day of therapy: 10    Other Antimicrobials (Include dose, start day & day of therapy):  Ceftriaxone 1 gm IV every 24 hours    Loading dose (date given): 1500 mg (7/10 at 1500)  Current Maintenance dose: Dosing per level   750mg x 1 ([email or web address])  1000mg x 1 ([email or web address])    Goal Vancomycin Level: 15-20  (Trough 15-20 for most infections, 20 for meningitis/osteomyelitis, pre-HD level ~25)    Vancomycin Level (if drawn):   [email or web address] - 23.8 (Random - 23 hours post-dose)   Sylver@[employer] - 26 (Random - 20hr post dose)  [email or web address] - 26.2 (Random - 31 hours post-dose)  Jayson@[employer] - 24.7 (Random - 54 hrs post dose)  Minal@[employer] - 23.9 (Random - 79 hrs post dose)  Alair@[employer] - 18.0 (Random - 29.8 hrs post dose)    Significant Cultures:   Bcx (7/10) - NGTD x 4 days  Wound Cx (7/10) - candida parapsilosis, stenotrophomonas maltophila, E. Faecalis, Staph species. Renal function stable? (unstable defined as SCr increase of 0.5 mg/dL or > 50% increase from baseline, whichever is greater) (Y/N): N     CAPD, Hemodialysis or Renal Replacement Therapy (Y/N): N     Recent Labs     19  0325 19  0145 19  0130   CREA 4.17* 3.26* 5.49*   BUN 27* 18 43*   WBC 11.6 12.5 13.7*     Temp (24hrs), Av.3 °F (36.8 °C), Min:97.4 °F (36.3 °C), Max:99.1 °F (37.3 °C)    Creatinine Clearance (Creatinine Clearance (ml/min)): ~20 ml/min   - Possible HD - [place] placement pending    Regimen assessment:   - Vancomycin random level <20 mcg/mL  - Will give 750 mg today and continue to dose per level  - Had 2-hour HD on  and ; next HD planned for today    Maintenance dose: Dosing per level   Next scheduled level: Random on  at [facility] 2 will follow daily and adjust medications as appropriate for renal function and/or serum levels.     Thank you,  Emilia Donahue, PHARMD

## 2019-07-21 PROBLEM — E87.20 METABOLIC ACIDOSIS: Status: ACTIVE | Noted: 2019-01-01

## 2019-07-21 NOTE — PROGRESS NOTES
2030--Received patient sleeping quietly. Assessment completed. VSS. Cleaned and changed for fecal incontinence. Repositioned. Galvez care done. Mouth care done. No distress noted. 2200--No changes noted. Clean and dry. VSS. No distress noted. 0000--Assessment unchanged. VSS. CLean and dry. Galvez care done. Mouth care done. No distress noted. 0200--No changes noted. VSS. Clean and dry. 0400--Assessment unchanged. Cleaned and changed for large fecal incontinence. Repositioned. Galvez care done. Mouth care done. VSS. No distress noted. 0600--Continues to sleep/doze quietly. Clean and dry. VSS. No distress noted. 0715--Bedside shift change report given to El Bates RN (oncoming nurse) by Dex whitman RN (offgoing nurse). Report included the following information SBAR, OR Summary, Procedure Summary, Intake/Output, Recent Results and Cardiac Rhythm nsr, 1st avb.

## 2019-07-21 NOTE — PROGRESS NOTES
Stable labs post dialysis yesterday  Remains with marginal UO  Will see tomorrow  No need for dialysis today

## 2019-07-21 NOTE — PROGRESS NOTES
Pharmacy Dosing Services: Vancomycin    Indication: SSTI    Day of therapy: 11    Other Antimicrobials (Include dose, start day & day of therapy):  Ceftriaxone 1 gm IV every 24 hours    Loading dose (date given): 1500 mg (7/10 at 1500)  Current Maintenance dose: Dosing per level   750mg x 1 ([email or web address])  1000mg x 1 ([email or web address])    Goal Vancomycin Level: 15-20  (Trough 15-20 for most infections, 20 for meningitis/osteomyelitis, pre-HD level ~25)    Vancomycin Level (if drawn):   Lety@[employer] - 23.8 (Random - 23 hours post-dose)   Padget@[employer] - 26 (Random - 20hr post dose)  Germana@[employer] - 26.2 (Random - 31 hours post-dose)  Bryce@[employer] - 24.7 (Random - 54 hrs post dose)  Biswas@[employer] - 23.9 (Random - 79 hrs post dose)  [email or web address] - 18.0 (Random - 29.8 hrs post dose)  Amar@[employer] - 16.9 (Random - 16.8 hours post dose)    Significant Cultures:   Bcx (7/10) - NGTD x 4 days  Wound Cx (7/10) - candida parapsilosis, stenotrophomonas maltophila, E. Faecalis, Staph species. Renal function stable? (unstable defined as SCr increase of 0.5 mg/dL or > 50% increase from baseline, whichever is greater) (Y/N): N     CAPD, Hemodialysis or Renal Replacement Therapy (Y/N): Y    Recent Labs     19  0325 19  0145 19  0130   CREA 4.17* 3.26* 5.49*   BUN 27* 18 43*   WBC 11.6 12.5 13.7*     Temp (24hrs), Av.3 °F (36.8 °C), Min:97.4 °F (36.3 °C), Max:99.1 °F (37.3 °C)    Creatinine Clearance (Creatinine Clearance (ml/min)): ~20 ml/min   - Possible HD - [facility] placement pending    Regimen assessment:   - Vancomycin random level <20 mcg/mL  - Will give 1000 mg today and continue to dose per level  - Had 4-hour HD on ; next HD planned for today    Maintenance dose: Dosing per level   Next scheduled level: Random on  at [facility] 2 will follow daily and adjust medications as appropriate for renal function and/or serum levels.     Thank you,  Emilia Robles, PHARMD

## 2019-07-21 NOTE — PROGRESS NOTES
Problem: Pain  Goal: *Control of Pain  Outcome: Progressing Towards Goal     Problem: Patient Education: Go to Patient Education Activity  Goal: Patient/Family Education  Outcome: Progressing Towards Goal     Problem: Falls - Risk of  Goal: *Absence of Falls  Description  Document Radha Sheriff Fall Risk and appropriate interventions in the flowsheet. Outcome: Progressing Towards Goal  Note:   Fall Risk Interventions:  Mobility Interventions: Bed/chair exit alarm, Communicate number of staff needed for ambulation/transfer    Mentation Interventions: Adequate sleep, hydration, pain control, Bed/chair exit alarm, Door open when patient unattended, Evaluate medications/consider consulting pharmacy, More frequent rounding, Reorient patient, Room close to nurse's station, Toileting rounds, Update white board    Medication Interventions: Bed/chair exit alarm, Evaluate medications/consider consulting pharmacy    Elimination Interventions: Bed/chair exit alarm, Call light in reach, Elevated toilet seat, Toileting schedule/hourly rounds    History of Falls Interventions: Bed/chair exit alarm, Door open when patient unattended, Room close to nurse's station, Vital signs minimum Q4HRs X 24 hrs (comment for end date)         Problem: Patient Education: Go to Patient Education Activity  Goal: Patient/Family Education  Outcome: Progressing Towards Goal     Problem: Impaired Skin Integrity/Pressure Injury Treatment  Goal: *Improvement of Existing Pressure Injury  Outcome: Progressing Towards Goal  Goal: *Prevention of pressure injury  Description  Document Mikie Scale and appropriate interventions in the flowsheet.   Outcome: Progressing Towards Goal  Note:   Pressure Injury Interventions:  Sensory Interventions: Assess changes in LOC, Assess need for specialty bed, Avoid rigorous massage over bony prominences, Check visual cues for pain, Float heels, Keep linens dry and wrinkle-free, Minimize linen layers, Monitor skin under medical devices, Pad between skin to skin, Pressure redistribution bed/mattress (bed type), Turn and reposition approx. every two hours (pillows and wedges if needed)    Moisture Interventions: Absorbent underpads, Assess need for specialty bed, Check for incontinence Q2 hours and as needed, Internal/External urinary devices, Minimize layers    Activity Interventions: Assess need for specialty bed, Pressure redistribution bed/mattress(bed type)    Mobility Interventions: Assess need for specialty bed, Float heels, HOB 30 degrees or less, Pressure redistribution bed/mattress (bed type), Turn and reposition approx. every two hours(pillow and wedges)    Nutrition Interventions: Document food/fluid/supplement intake    Friction and Shear Interventions: Foam dressings/transparent film/skin sealants, HOB 30 degrees or less, Lift sheet                Problem: Patient Education: Go to Patient Education Activity  Goal: Patient/Family Education  Outcome: Progressing Towards Goal     Problem: Pressure Injury - Risk of  Goal: *Prevention of pressure injury  Description  Document Mikie Scale and appropriate interventions in the flowsheet. Outcome: Progressing Towards Goal  Note:   Pressure Injury Interventions:  Sensory Interventions: Assess changes in LOC, Assess need for specialty bed, Avoid rigorous massage over bony prominences, Check visual cues for pain, Float heels, Keep linens dry and wrinkle-free, Minimize linen layers, Monitor skin under medical devices, Pad between skin to skin, Pressure redistribution bed/mattress (bed type), Turn and reposition approx.  every two hours (pillows and wedges if needed)    Moisture Interventions: Absorbent underpads, Assess need for specialty bed, Check for incontinence Q2 hours and as needed, Internal/External urinary devices, Minimize layers    Activity Interventions: Assess need for specialty bed, Pressure redistribution bed/mattress(bed type)    Mobility Interventions: Assess need for specialty bed, Float heels, HOB 30 degrees or less, Pressure redistribution bed/mattress (bed type), Turn and reposition approx.  every two hours(pillow and wedges)    Nutrition Interventions: Document food/fluid/supplement intake    Friction and Shear Interventions: Foam dressings/transparent film/skin sealants, HOB 30 degrees or less, Lift sheet                Problem: Patient Education: Go to Patient Education Activity  Goal: Patient/Family Education  Outcome: Progressing Towards Goal     Problem: Discharge Planning  Goal: *Discharge to safe environment  Outcome: Progressing Towards Goal     Problem: Nutrition Deficit  Goal: *Optimize nutritional status  Outcome: Progressing Towards Goal  Goal: *Blood glucose 80 to 180 mg/dl  Outcome: Progressing Towards Goal  Goal: *Tolerates nutrition therapy  Outcome: Progressing Towards Goal     Problem: Patient Education: Go to Patient Education Activity  Goal: Patient/Family Education  Outcome: Progressing Towards Goal     Problem: Patient Education: Go to Patient Education Activity  Goal: Patient/Family Education  Outcome: Progressing Towards Goal     Problem: Ventilator Management  Goal: *Adequate oxygenation and ventilation  Outcome: Progressing Towards Goal  Goal: *Patient maintains clear airway/free of aspiration  Outcome: Progressing Towards Goal  Goal: *Absence of infection signs and symptoms  Outcome: Progressing Towards Goal  Goal: *Normal spontaneous ventilation  Outcome: Progressing Towards Goal     Problem: Patient Education: Go to Patient Education Activity  Goal: Patient/Family Education  Outcome: Progressing Towards Goal     Problem: Non-Violent Restraints  Goal: *Removal from restraints as soon as assessed to be safe  Outcome: Progressing Towards Goal  Goal: *No harm/injury to patient while restraints in use  Outcome: Progressing Towards Goal  Goal: *Patient's dignity will be maintained  Outcome: Progressing Towards Goal  Goal: *Patient Specific Goal (EDIT GOAL, INSERT TEXT)  Outcome: Progressing Towards Goal  Goal: Non-violent Restaints:Standard Interventions  Outcome: Progressing Towards Goal  Goal: Non-violent Restraints:Patient Interventions  Outcome: Progressing Towards Goal  Goal: Patient/Family Education  Outcome: Progressing Towards Goal     Problem: Patient Education: Go to Patient Education Activity  Goal: Patient/Family Education  Outcome: Progressing Towards Goal     Problem: Patient Education: Go to Patient Education Activity  Goal: Patient/Family Education  Outcome: Progressing Towards Goal     Problem: Surgical Pathway Day of Surgery  Goal: *Adequate urinary output (equal to or greater than 30 milliliters/hour)  Description  Ambulatory Surgery patients voiding without difficulty.   Outcome: Progressing Towards Goal     Problem: Surgical Pathway Post-Op Day 1  Goal: Off Pathway (Use only if patient is Off Pathway)  Outcome: Progressing Towards Goal  Goal: Activity/Safety  Outcome: Progressing Towards Goal  Goal: Diagnostic Test/Procedures  Outcome: Progressing Towards Goal  Goal: Nutrition/Diet  Outcome: Progressing Towards Goal  Goal: Discharge Planning  Outcome: Progressing Towards Goal  Goal: Medications  Outcome: Progressing Towards Goal  Goal: Respiratory  Outcome: Progressing Towards Goal  Goal: Treatments/Interventions/Procedures  Outcome: Progressing Towards Goal  Goal: Psychosocial  Outcome: Progressing Towards Goal  Goal: *No signs and symptoms of infection or wound complications  Outcome: Progressing Towards Goal  Goal: *Optimal pain control at patient's stated goal  Outcome: Progressing Towards Goal  Goal: *Adequate urinary output (equal to or greater than 30 milliliters/hour)  Outcome: Progressing Towards Goal  Goal: *Hemodynamically stable  Outcome: Progressing Towards Goal  Goal: *Tolerating diet  Outcome: Progressing Towards Goal  Goal: *Demonstrates progressive activity  Outcome: Progressing Towards Goal  Goal: *Lungs clear or at baseline  Outcome: Progressing Towards Goal     Problem: Nutrition Deficit  Goal: *Optimize nutritional status  Outcome: Progressing Towards Goal

## 2019-07-21 NOTE — PROGRESS NOTES
Physical Exam   Skin: Skin is warm and dry. Primary Nurse Dion Reyes RN and Neftali Tatum RN performed a dual skin assessment on this patient Impairment noted- see wound doc flow sheet.

## 2019-07-21 NOTE — PROGRESS NOTES
Problem: Ventilator Management  Goal: *Adequate oxygenation and ventilation  Outcome: Progressing Towards Goal  Goal: *Patient maintains clear airway/free of aspiration  Outcome: Progressing Towards Goal  Goal: *Normal spontaneous ventilation  Outcome: Progressing Towards Goal   VENTILATOR CARE PLAN    Problem: Ventilator Management  Goal: *Adequate oxygenation/ ventilation/ and extubation      Patient:        Warden Potts     62 y.o.   male     7/21/2019  10:49 AM  Patient Active Problem List   Diagnosis Code    Gangrene of foot ([address] I96    Cellulitis of left lower extremity L03. 80    Malnourished ([address] E46    Peripheral vascular disease ([address] I73.9    Hyponatremia E87.1    Acute kidney injury superimposed on chronic kidney disease ([address] N17.9, N18.9    Dehydration E86.0    Sepsis ([address] A41.9    Gangrene ([address] I96    Anemia D64.9    Acute respiratory failure (HCC) J96.00    Septic encephalopathy G93.41    Transaminitis I93.4    Metabolic acidosis C29.4       Sepsis ([address] [A41.9]  Hyponatremia [E87.1]  Gangrene ([address] Meribeth Rideau  Hyponatremia [E87.1]    Reason patient intubated:  confusion , not following commands  unable to get dialysis     Ventilator day:4    Ventilator settings: acvc+ 12,450,.9, 30% 5 peep    ETT Size/Placement: 7.5 24 @ lip       ABG:  Date:7/21/2019  Lab Results   Component Value Date/Time    PHI 7.458 (H) 07/21/2019 05:13 AM    PCO2I 34.6 (L) 07/21/2019 05:13 AM    PO2I 84 07/21/2019 05:13 AM    HCO3I 24.3 07/21/2019 05:13 AM    FIO2I 30 07/21/2019 05:13 AM       Chest X-ray:  Date:7/21/2019  Results from Hospital Encounter encounter on 07/10/19   XR CHEST PORT    Narrative EXAM: One view chest x-ray    CLINICAL INDICATION/HISTORY: Respiratory failure. COMPARISON: 07/19/2019. TECHNIQUE: Single AP view of the chest was obtained.    _______________    FINDINGS:    HEART, VESSELS, MEDIASTINUM: Heart size is normal. No vascular congestion.     LUNGS, PLEURAL SPACES: Upper right chest and a marker is again noted. There is  improved interstitial markings throughout the fluid with hazy opacities at  bilateral lung bases. There is no pneumothorax. BONY THORAX, SOFT TISSUES: Unremarkable. MEDICAL SUPPORT DEVICES: Endotracheal tube and nasogastric/orogastric tube in  stable position. _______________      Impression IMPRESSION:    1. Medical support devices in satisfactory position. 2. Persistent interstitial and alveolar opacities throughout both lungs of  nonspecific etiology either edema, atelectasis, or pneumonia.        Lab Test:  Date:7/21/2019  WBC:   Lab Results   Component Value Date/Time    WBC 10.4 07/21/2019 01:45 AM   HGB:   Lab Results   Component Value Date/Time    HGB 7.0 (L) 07/21/2019 01:45 AM    PLTS:   Lab Results   Component Value Date/Time    PLATELET 331 23/04/4556 01:45 AM       SaO2%/flow: @MUMBOTP9(4)@    Vital Signs:   Patient Vitals for the past 8 hrs:   Temp Pulse Resp BP SpO2   07/21/19 1043 -- (!) 106 22 -- 99 %   07/21/19 1000 -- (!) 107 24 133/68 99 %   07/21/19 0901 -- (!) 105 23 -- 100 %   07/21/19 0900 -- -- -- 131/73 --   07/21/19 0819 -- (!) 103 24 -- 100 %   07/21/19 0800 100.1 °F (37.8 °C) 90 19 107/70 100 %   07/21/19 0700 -- 91 17 117/73 100 %   07/21/19 0630 -- 89 18 114/70 100 %   07/21/19 0600 -- 92 17 130/76 100 %   07/21/19 0530 -- 90 14 121/71 100 %   07/21/19 0500 -- 92 16 132/80 100 %   07/21/19 0452 -- 89 20 -- 100 %   07/21/19 0430 -- 95 20 137/79 98 %   07/21/19 0400 99.8 °F (37.7 °C) 88 16 112/59 99 %   07/21/19 0330 -- 89 17 120/60 100 %   07/21/19 0300 -- 89 18 122/61 100 %       Wean Screen Pass (Yes or No): yes  Wean Screen Reason for Failure: patient not following command and very agitated   Duration of Weaning Trial:2 1/2 hours  Additional Comments: placed back on controlled ventilation , patient all over bed and trying to sit up         PLAN OF CARE: continue to try to wean tomorrow        GOAL: continue with wean trials daily      OUTCOME:  patient sedated and back on resting mode mode

## 2019-07-21 NOTE — PROGRESS NOTES
0700:  Report received from [facility]. Pt is intubated and sedated, VSS. Pt withdraws upper extremities, no response from lower. Positive cough/gag. 0745:  Sedation turned off (propofol and fentanyl). 0945: Sedation vacation fairly tolerated. Pt agitated and moving all extremities spontaneously, lifting his head and shoulders off of the bed. Pt opens eyes to voice, but does not follow command. Pupils are GLADYS. 1600: Pt had large loose bowel movement. Incontinence care performed. Held evening senna dose. 1800: Pt urine output for the shift was 75mL.

## 2019-07-21 NOTE — PROGRESS NOTES
PCCM Progress Note                                              I have reviewed the flowsheet and previous days notes. Events, vitals, medications and notes from last 24 hours reviewed. Care plan discussed with staff and on multidisciplinary rounds. Subjective:  7/21/2019     Pt's sedation was decreased today. Patient became agitated after that. Does not follow commands    Impression and Plan  Patient Active Problem List   Diagnosis Code    Gangrene of foot ([facility] [address] I96    Cellulitis of left lower extremity L03. 80    Malnourished ([address] E46    Peripheral vascular disease ([address] I73.9    Hyponatremia E87.1    Acute kidney injury superimposed on chronic kidney disease ([address] N17.9, N18.9    Dehydration E86.0    Sepsis (HCC) A41.9    Gangrene ([address] I96    Anemia D64.9    Acute respiratory failure (HCC) J96.00    Septic encephalopathy G93.41    Transaminitis I73.8    Metabolic acidosis X55.7     Acute resp failure with Hypoxia/VDRF - ABG looks ok, decrease TV to 430ml. Pt is not a candidate for extubation due to mental status issues. Cont with duonebs  Pneumonia - WBC count is normal today. Pt is on cefepime and Vanco. Cont with abx for total 7-8days  LLE Gangrene s/p Left AKA - POD #4. Pain control, follow up with vascular surgery  PVD - Pt's Rt foot is also cold. Vascular surgery is aware. Pt is on ASA and Plavix. Will defer mgt to Vascular surgery  [place]/ESRD -  HD per nephrology  Anemia - No overt bleeding. F/up CBC  COPD - Cont duonebs  DVT and GI proph - Pt is on heparin and protonix  Agitation/Delirium - Pt is sedated with propofol drip and Fentanyl drip. Also on Seroquel. Per pt's son, pt drinks all day long so has hx of alcohol abuse. Cont with current regimen  Stage III NSCLC, s/p chemoRx/XRT, completed in Dec 2018. His restaging bone scan (7/12/19) and CT of chest  (7/15/19) have no evidence of metastatic disease.  He appears to be in a clinical CR/VGPR wrt his NSCLC. OTHER:  Glycemic Control. Glucose stabilizer per ICU protocol when on insulin drip. Maintain blood glucose 140-180. Replace electrolytes per ICU electrolyte replacement protocol  Ventilator bundle & Sedation protocol followed. Daily morning sedation holiday, assessment for readiness for SBT & weaning from ventilator; and then re-titrate if required. Aim to keep peak plateau pressure 45-49QX H2O in ARDS patient. Sargeant tube to suction at 20-30 cm H2O, Maintain Mary Jane tube with 5-10ml air every 4 hours. Chlorhexidine mouth washes and routine oral care every 4 hours. Stress ulcer and DVT prophylaxis. HOB >=30 degree elevation all the time. HOB >=30 degree elevation all the time. Aggressive pulmonary toileting. Incentive spirometry when appropriate. Aspiration precautions. Sepsis bundle and protocol followed. Deescalate antibiotic when appropriate. Vasopressor when appropriate with MAP goal >65 mmHg. Central Line bundle followed, remove when not needed. Large bore IV line or CVP when appropriate. Mccarthy bundle followed, remove mccarthy catheter when not critically ill. PT/OT eval and treat. OOB/IS when appropriate. Quality Care: Stress ulcer prophylaxis, DVT prophylaxis, HOB elevated, Infection control all reviewed and addressed. Events and notes from last 24 hours reviewed. Care plan discussed with nursing. CC TIME: >35 min     Medication Reviewed:     Allergies   Allergen Reactions    Pcn [Penicillins] Unknown (comments)     Patients states that he was allergic to PCN as a child because he did not like receiving PCN injections, not because he developed a reaction to PCN administration       Past Medical History:   Diagnosis Date    Anemia     Chronic kidney disease     Depression     Emphysema (subcutaneous) (surgical) resulting from a procedure     Hypertension     Hyponatremia     Non-small cell lung cancer ([facility] [address] 2018    rt upper lung    Peripheral vascular disease ([facility] [address]     Seizure ([facility] [address]     Seizures ([facility] [address]     Sepsis [facility])       History reviewed. No pertinent surgical history. Social History     Tobacco Use    Smoking status: Current Every Day Smoker    Smokeless tobacco: Never Used   Substance Use Topics    Alcohol use: Yes      History reviewed. No pertinent family history. Prior to Admission medications    Medication Sig Start Date End Date Taking? Authorizing Provider   amoxicillin (AMOXIL) 500 mg capsule Take 1 Cap by mouth every eight (8) hours. 7/7/19   LoxJud garcia PA-C   ciprofloxacin HCl (CIPRO) 750 mg tablet Take 1 Tab by mouth every twelve (12) hours. 7/7/19   Carlton STEPHANIE Paredes   atorvastatin (LIPITOR) 40 mg tablet Take 1 Tab by mouth nightly. 7/6/19   Carlton QuietSTEPHANIE   carvedilol (COREG) 3.125 mg tablet Take 1 Tab by mouth two (2) times daily (with meals). 7/6/19   Carltonvasiliy Paredes PA-C   furosemide (LASIX) 20 mg tablet Take 1 Tab by mouth every Monday, Wednesday, Friday. 7/8/19   Kinsmanvasiliy Paredes PA-C   clopidogrel (PLAVIX) 75 mg tab Take 75 mg by mouth daily.     Provider, Historical     Current Facility-Administered Medications   Medication Dose Route Frequency    [START ON 7/22/2019] VANCOMYCIN INFORMATION NOTE   Other ONCE    QUEtiapine (SEROquel) tablet 50 mg  50 mg Oral BID    clopidogrel (PLAVIX) tablet 75 mg  75 mg Oral DAILY    aspirin tablet 325 mg  325 mg Oral DAILY    carvedilol (COREG) tablet 6.25 mg  6.25 mg Oral BID WITH MEALS    pantoprazole (PROTONIX) 40 mg in sodium chloride 0.9% 10 mL injection  40 mg IntraVENous DAILY    sodium chloride (NS) flush 5-40 mL  5-40 mL IntraVENous Q8H    sodium chloride (NS) flush 5-40 mL  5-40 mL IntraVENous Q8H    senna (SENOKOT) tablet 8.6 mg  1 Tab Oral BID    fentaNYL (PF) 900 mcg/30 ml infusion soln  0-200 mcg/hr IntraVENous TITRATE    propofol (DIPRIVAN) infusion  0-50 mcg/kg/min IntraVENous TITRATE    chlorhexidine (PERIDEX) 0.12 % mouthwash 10 mL  10 mL Oral Q12H    albuterol-ipratropium (DUO-NEB) 2.5 MG-0.5 MG/3 ML  3 mL Nebulization Q6H RT    cefepime (MAXIPIME) 0.5 g in 0.9% sodium chloride 100 mL IVPB  0.5 g IntraVENous Q24H    heparin (porcine) injection 5,000 Units  5,000 Units SubCUTAneous Q8H    VANCOMYCIN INFORMATION NOTE 1 Each  1 Each Other Rx Dosing/Monitoring    therapeutic multivitamin (THERAGRAN) tablet 1 Tab  1 Tab Oral DAILY       Lines: All central lines examined by me. No signs of erythema, induration, discharge. Central Venous Catheter:  Triple Lumen UDALL CATHETERR 07/17/19 Anterior;Proximal;Right Femoral (Active)   Central Line Being Utilized No 7/20/2019  8:00 AM   Criteria for Appropriate Use Dialysis/apheresis 7/20/2019  8:00 AM   Site Assessment Clean, dry, & intact 7/20/2019  8:00 AM   Infiltration Assessment 0 7/20/2019  8:00 AM   Affected Extremity/Extremities Color distal to insertion site pink (or appropriate for race); Pulses palpable 7/20/2019  8:00 AM   Date of Last Dressing Change 07/18/19 7/20/2019  8:00 AM   Dressing Status Clean, dry, & intact 7/20/2019  8:00 AM   Dressing Type Tape;Transparent 7/20/2019  8:00 AM   Action Taken Open ports on tubing capped 7/20/2019  8:00 AM   Alcohol Cap Used Yes 7/20/2019  8:00 AM     Peripheral Intravenous Line:  Peripheral IV 07/17/19 Left Forearm (Active)   Site Assessment Clean, dry, & intact 7/20/2019 10:00 AM   Phlebitis Assessment 0 7/20/2019 10:00 AM   Infiltration Assessment 0 7/20/2019 10:00 AM   Dressing Status Clean, dry, & intact 7/20/2019 10:00 AM   Dressing Type Tape;Transparent 7/20/2019  8:00 AM   Hub Color/Line Status Green;Flushed;Patent; Infusing 7/20/2019  8:00 AM   Action Taken Open ports on tubing capped 7/20/2019  8:00 AM   Alcohol Cap Used Yes 7/20/2019  8:00 AM       Peripheral IV 07/18/19 Left;Upper Arm (Active)   Site Assessment Clean;Clean, dry, & intact 7/20/2019 10:00 AM   Phlebitis Assessment 0 7/20/2019 10:00 AM   Infiltration Assessment 0 7/20/2019 10:00 AM   Dressing Status Clean, dry, & intact 7/20/2019 10:00 AM   Dressing Type Tape;Transparent 7/20/2019  8:00 AM   Hub Color/Line Status Green;Flushed;Patent;Capped 7/20/2019  8:00 AM   Action Taken Open ports on tubing capped 7/20/2019  8:00 AM   Alcohol Cap Used Yes 7/20/2019  8:00 AM       Peripheral IV 07/18/19 Left Forearm (Active)   Site Assessment Clean, dry, & intact 7/20/2019 10:00 AM   Phlebitis Assessment 0 7/20/2019 10:00 AM   Infiltration Assessment 0 7/20/2019 10:00 AM   Dressing Status Clean, dry, & intact 7/20/2019 10:00 AM   Dressing Type Tape;Transparent 7/20/2019  8:00 AM   Hub Color/Line Status Pink; Infusing 7/20/2019  8:00 AM   Action Taken Open ports on tubing capped 7/20/2019  8:00 AM   Alcohol Cap Used No 7/20/2019  8:00 AM      Drain(s):  Orogastric Tube 07/17/19 (Active)   Site Assessment Clean, dry, & intact 7/20/2019  8:00 AM   Securement Device Tape 7/20/2019  8:00 AM   G Port Status Intermittent Suction 7/20/2019  8:00 AM   External Insertion Wade (cms) 80 cms 7/20/2019  8:00 AM   Action Taken Placement verified (comment) 7/20/2019  8:00 AM   Drainage Description Tan 7/20/2019  8:00 AM   Gastric Residual (mL) 10 ml 7/20/2019  8:00 AM   Tube Feeding/Formula Options Renal (Nepro) 7/20/2019  8:00 AM   Tube Feeding/Verify Rate (mL/hr) 40 7/20/2019  8:00 AM   Water Flush Volume (mL) 30 mL 7/20/2019  8:00 AM   Intake (ml) 40 ml 7/20/2019  8:00 AM   Medication Volume 30 ml 7/20/2019  8:00 AM   Output (ml) 0 ml 7/18/2019  5:00 PM     Airway:  Airway - Endotracheal Tube 07/17/19 Oral (Active)   Insertion Depth (cm) 24 cm 7/20/2019  8:00 AM   Line Wade Lips 7/20/2019  8:00 AM   Side Secured Left 7/20/2019  8:00 AM   Cuff Pressure 30 cmH20 7/20/2019  7:31 AM   Site Assessment Clean, dry, & intact 7/20/2019  8:00 AM       Objective:  Vital Signs:    Visit Vitals  /68   Pulse (!) 107   Temp 100.1 °F (37.8 °C)   Resp 24   Ht 6' (1.829 m)   Wt 60.9 kg (134 lb 4.2 oz)   SpO2 99%   BMI 18.21 kg/m²      O2 Device: Endotracheal tube, Ventilator  O2 Flow Rate (L/min): 6 l/min  Temp (24hrs), Av.7 °F (37.6 °C), Min:98.7 °F (37.1 °C), Max:100.3 °F (37.9 °C)      Intake/Output:   Last shift:      701 - 1900  In: 55.2 [I.V.:15.2]  Out: 5 [Urine:5]    Last 3 shifts: 1901 -  07  In: 3192.7 [I.V.:1602.7]  Out: 5644 [Urine:777]      Intake/Output Summary (Last 24 hours) at 2019 1042  Last data filed at 2019 0800  Gross per 24 hour   Intake 1920.56 ml   Output 3457 ml   Net -1536.44 ml       Last 3 Recorded Weights in this Encounter    19 0000 19 0645 19 0600   Weight: 53.6 kg (118 lb 2.7 oz) 62.7 kg (138 lb 3.7 oz) 60.9 kg (134 lb 4.2 oz)       Ventilator Settings:  Mode Rate Tidal Volume Pressure FiO2 PEEP  Assist control, VC+   450 ml  7 cm H2O 30 % 5 cm H20    Peak airway pressure: 13 cm H2O   Plateau pressure:    Tidal volume:   Minute ventilation: 12 l/min  SPO2     ARDS network Guidelines: Lung protective strategy and Plateau pressure goals less than or equal to 30    Physical Exam:     General/Neurology: Sedated on vent   Head:   Normocephalic, without obvious abnormality  Eye:   no scleral icterus, no pallor  Oral:   Mucus membranes moist  Neck:   Supple  Lung:   B/l basal rales present  Heart:   S1 S2 present.    Abdomen/: Soft, non tender, BS +nt  Extremities:  Rt foot is cold, left AKA stump dressing is clean    Data:      Recent Results (from the past 24 hour(s))   GLUCOSE, POC    Collection Time: 19 11:47 AM   Result Value Ref Range    Glucose (POC) 111 (H) 70 - 110 mg/dL   SODIUM    Collection Time: 19  2:30 PM   Result Value Ref Range    Sodium 136 136 - 145 mmol/L   CALCIUM, IONIZED    Collection Time: 19  6:00 PM   Result Value Ref Range    Ionized Calcium 0.96 (L) 1.12 - 1.32 MMOL/L   CBC W/O DIFF    Collection Time: 19  1:45 AM   Result Value Ref Range    WBC 10.4 4.6 - 13.2 K/uL    RBC 2.47 (L) 4.70 - 5.50 M/uL    HGB 7.0 (L) 13.0 - 16.0 g/dL    HCT 21.6 (L) 36.0 - 48.0 %    MCV 87.4 74.0 - 97.0 FL    MCH 28.3 24.0 - 34.0 PG    MCHC 32.4 31.0 - 37.0 g/dL    RDW 16.1 (H) 11.6 - 14.5 %    PLATELET 557 563 - 398 K/uL    MPV 7.9 (L) 9.2 - 11.8 FL   MAGNESIUM    Collection Time: 07/21/19  1:45 AM   Result Value Ref Range    Magnesium 1.9 1.6 - 2.6 mg/dL   METABOLIC PANEL, COMPREHENSIVE    Collection Time: 07/21/19  1:45 AM   Result Value Ref Range    Sodium 138 136 - 145 mmol/L    Potassium 3.8 3.5 - 5.5 mmol/L    Chloride 103 100 - 111 mmol/L    CO2 27 21 - 32 mmol/L    Anion gap 8 3.0 - 18 mmol/L    Glucose 100 (H) 74 - 99 mg/dL    BUN 18 7.0 - 18 MG/DL    Creatinine 2.99 (H) 0.6 - 1.3 MG/DL    BUN/Creatinine ratio 6 (L) 12 - 20      GFR est AA 26 (L) >60 ml/min/1.73m2    GFR est non-AA 21 (L) >60 ml/min/1.73m2    Calcium 8.1 (L) 8.5 - 10.1 MG/DL    Bilirubin, total 0.5 0.2 - 1.0 MG/DL    ALT (SGPT) 96 (H) 16 - 61 U/L    AST (SGOT) 398 (H) 10 - 38 U/L    Alk.  phosphatase 131 (H) 45 - 117 U/L    Protein, total 5.1 (L) 6.4 - 8.2 g/dL    Albumin 1.2 (L) 3.4 - 5.0 g/dL    Globulin 3.9 2.0 - 4.0 g/dL    A-G Ratio 0.3 (L) 0.8 - 1.7     PROTHROMBIN TIME + INR    Collection Time: 07/21/19  1:45 AM   Result Value Ref Range    Prothrombin time 13.9 11.5 - 15.2 sec    INR 1.1 0.8 - 1.2     VANCOMYCIN, RANDOM    Collection Time: 07/21/19  1:45 AM   Result Value Ref Range    Vancomycin, random 16.9 5.0 - 40.0 UG/ML   CALCIUM, IONIZED    Collection Time: 07/21/19  1:45 AM   Result Value Ref Range    Ionized Calcium 1.25 1.12 - 1.32 MMOL/L   RENAL FUNCTION PANEL    Collection Time: 07/21/19  1:45 AM   Result Value Ref Range    Sodium 137 136 - 145 mmol/L    Potassium 3.8 3.5 - 5.5 mmol/L    Chloride 103 100 - 111 mmol/L    CO2 26 21 - 32 mmol/L    Anion gap 8 3.0 - 18 mmol/L    Glucose 101 (H) 74 - 99 mg/dL    BUN 18 7.0 - 18 MG/DL    Creatinine 3.04 (H) 0.6 - 1.3 MG/DL    BUN/Creatinine ratio 6 (L) 12 - 20      GFR est AA 25 (L) >60 ml/min/1.73m2    GFR est non-AA 21 (L) >60 ml/min/1.73m2 Calcium 8.0 (L) 8.5 - 10.1 MG/DL    Phosphorus 3.0 2.5 - 4.9 MG/DL    Albumin 1.2 (L) 3.4 - 5.0 g/dL   POC G3    Collection Time: 07/21/19  5:13 AM   Result Value Ref Range    Device: VENT      FIO2 (POC) 30 %    pH (POC) 7.458 (H) 7.35 - 7.45      pCO2 (POC) 34.6 (L) 35.0 - 45.0 MMHG    pO2 (POC) 84 80 - 100 MMHG    HCO3 (POC) 24.3 22 - 26 MMOL/L    sO2 (POC) 97 92 - 97 %    Base excess (POC) 1 mmol/L    Mode ASSIST CONTROL      Tidal volume 450 ml    Set Rate 12 bpm    PEEP/CPAP (POC) 5 cmH2O    Allens test (POC) N/A      Inspiratory Time 0.9 sec    Total resp. rate 15      Site RIGHT RADIAL      Patient temp. 99.8      Specimen type (POC) ARTERIAL      Performed by Simón Luis     Volume control plus YES     POTASSIUM    Collection Time: 07/21/19  8:30 AM   Result Value Ref Range    Potassium 4.0 3.5 - 5.5 mmol/L         Chemistry   Recent Labs     07/21/19  0830 07/21/19  0145 07/20/19  1430 07/20/19  0325  07/19/19  0145   GLU  --  101*  100*  --  110*  --  117*   NA  --  137  138 136 132*   < > 137   K 4.0 3.8  3.8  --  4.0   < > 3.8   CL  --  103  103  --  99*  --  102   CO2  --  26  27  --  26  --  27   BUN  --  18  18  --  27*  --  18   CREA  --  3.04*  2.99*  --  4.17*  --  3.26*   CA  --  8.0*  8.1*  --  7.3*  --  7.5*   MG  --  1.9  --  1.9  --  1.9   PHOS  --  3.0  --   --   --  3.7   AGAP  --  8  8  --  7  --  8   BUCR  --  6*  6*  --  6*  --  6*   AP  --  131*  --  100  --  64   TP  --  5.1*  --  4.8*  --  5.1*   ALB  --  1.2*  1.2*  --  1.4*  --  1.5*   GLOB  --  3.9  --  3.4  --  3.6   AGRAT  --  0.3*  --  0.4*  --  0.4*    < > = values in this interval not displayed.        CBC w/Diff   Recent Labs     07/21/19  0145 07/20/19  0325 07/19/19  1600 07/19/19  0145   WBC 10.4 11.6  --  12.5   RBC 2.47* 2.64*  --  2.27*   HGB 7.0* 7.5* 8.1* 6.5*   HCT 21.6* 23.3* 24.5* 19.4*    223  --  289       ABG    Recent Labs     07/21/19  0513 07/20/19  0438 07/19/19  0345   PHI 7.458* 7.422 7.439   PCO2I 34.6* 34.3* 35.2   PO2I 84 66* 77*   HCO3I 24.3 22.4 23.9   FIO2I 30 30 0.30   CT (Most Recent)    [facility]   CT CHEST WO CONT    Narrative EXAM: CT chest     INDICATION: Right lung cancer. Follow-up examination. COMPARISON: None. TECHNIQUE: Axial CT imaging from the thoracic inlet through the diaphragm  without intravenous contrast. Multiplanar reformats were generated. One or more  dose reduction techniques were used on this CT: automated exposure control,  adjustment of the mAs and/or kVp according to patient size, and iterative  reconstruction techniques. The specific techniques used on this CT exam have  been documented in the patient's electronic medical record. Digital Imaging and  Communications in Medicine (DICOM) format image data are available to  nonaffiliated external healthcare facilities or entities on a secured, media  free, reciprocally searchable basis with patient authorization for at least a  12-month period after this study. _______________    FINDINGS:    BASE OF THE NECK: Normal.    LUNGS: Significant respiratory motion artifact is present. Fiducial markers are  present in the right upper lobe. Small area of scarring is present within the  right upper lobe which measures up to 1.7 cm (axial image 103). No focal nodular  area in this area of scarring to suggest recurrent disease, however prior  studies are not available for review. Interstitial edema is present in both  lower lungs. AIRWAY: Secretions are present within the right main and segmental bronchi  causing narrowing of the airway within this region. PLEURA: Small to moderate bilateral pleural effusions with adjacent atelectasis. MEDIASTINUM: Coronary artery calcifications are present. Small pericardial  effusion. Vasculature is unremarkable. LYMPH NODES: No enlarged lymph nodes.     UPPER ABDOMEN: Full thickening is present within the visualized portions of the  stomach, which could be suggestive of mild gastritis. BONES: No acute or aggressive osseous abnormalities identified. OTHER: None.    _______________      Impression IMPRESSION:    1. Mild interstitial edema is present in both lower lungs. Small to moderate  bilateral pleural effusions with adjacent atelectasis. Small pericardial  effusion is present. 2. Minimal soft tissue prominence is present in the right upper lobe at the site  of the prior malignancy which has the appearance of soft tissue scarring. No  focal nodular regions to suggest recurrent disease, however prior studies are  not available for review. 3. Doubtful thickening and surrounding edema within the visualized portions of  the proximal stomach, which could be suggestive of mild gastritis. 4. Layering secretions are present in the distal trachea in the right main  bronchus, concerning for small amount of aspiration. XR (Most Recent). CXR reviewed by me and compared with previous CXR   Results from Hospital Encounter encounter on 07/10/19   XR CHEST PORT    Narrative EXAM: One view chest x-ray    CLINICAL INDICATION/HISTORY: Respiratory failure. COMPARISON: 07/19/2019. TECHNIQUE: Single AP view of the chest was obtained.    _______________    FINDINGS:    HEART, VESSELS, MEDIASTINUM: Heart size is normal. No vascular congestion. LUNGS, PLEURAL SPACES: Upper right chest and a marker is again noted. There is  improved interstitial markings throughout the fluid with hazy opacities at  bilateral lung bases. There is no pneumothorax. BONY THORAX, SOFT TISSUES: Unremarkable. MEDICAL SUPPORT DEVICES: Endotracheal tube and nasogastric/orogastric tube in  stable position. _______________      Impression IMPRESSION:    1. Medical support devices in satisfactory position. 2. Persistent interstitial and alveolar opacities throughout both lungs of  nonspecific etiology either edema, atelectasis, or pneumonia. High complexity decision making was performed during the evaluation of this patient at high risk for decompensation with multiple organ involvement     Above mentioned total time spent on reviewing the case/medical record/data/notes/EMR/patient examination/documentation/coordinating care with nurse/consultants, exclusive of procedures with complex decision making performed and > 50% time spent in face to face evaluation.     Declan Mckinney MD  7/21/2019

## 2019-07-22 PROBLEM — L98.429 SACRAL ULCER (HCC): Status: ACTIVE | Noted: 2019-01-01

## 2019-07-22 PROBLEM — R50.9 FEVER: Status: ACTIVE | Noted: 2019-01-01

## 2019-07-22 NOTE — PROGRESS NOTES
Problem: Pain  Goal: *Control of Pain  Outcome: Progressing Towards Goal     Problem: Patient Education: Go to Patient Education Activity  Goal: Patient/Family Education  Outcome: Progressing Towards Goal     Problem: Falls - Risk of  Goal: *Absence of Falls  Description  Document Tito Arzola Fall Risk and appropriate interventions in the flowsheet. Outcome: Progressing Towards Goal  Note:   Fall Risk Interventions:  Mobility Interventions: Bed/chair exit alarm, Communicate number of staff needed for ambulation/transfer    Mentation Interventions: Adequate sleep, hydration, pain control, Bed/chair exit alarm, Door open when patient unattended, More frequent rounding, Reorient patient, Room close to nurse's station    Medication Interventions: Bed/chair exit alarm, Evaluate medications/consider consulting pharmacy    Elimination Interventions: Bed/chair exit alarm, Call light in reach, Toileting schedule/hourly rounds    History of Falls Interventions: Bed/chair exit alarm, Door open when patient unattended, Room close to nurse's station, Vital signs minimum Q4HRs X 24 hrs (comment for end date)         Problem: Patient Education: Go to Patient Education Activity  Goal: Patient/Family Education  Outcome: Progressing Towards Goal     Problem: Impaired Skin Integrity/Pressure Injury Treatment  Goal: *Improvement of Existing Pressure Injury  Outcome: Progressing Towards Goal  Goal: *Prevention of pressure injury  Description  Document Mikie Scale and appropriate interventions in the flowsheet.   Outcome: Progressing Towards Goal  Variance Other (add note)  Note:   Pressure Injury Interventions:  Sensory Interventions: Assess changes in LOC, Assess need for specialty bed, Avoid rigorous massage over bony prominences, Check visual cues for pain, Float heels, Keep linens dry and wrinkle-free, Minimize linen layers, Monitor skin under medical devices, Pad between skin to skin, Pressure redistribution bed/mattress (bed type), Turn and reposition approx. every two hours (pillows and wedges if needed)    Moisture Interventions: Absorbent underpads, Apply protective barrier, creams and emollients, Assess need for specialty bed, Check for incontinence Q2 hours and as needed, Internal/External urinary devices, Minimize layers    Activity Interventions: Assess need for specialty bed, Pressure redistribution bed/mattress(bed type)    Mobility Interventions: Assess need for specialty bed, Float heels, HOB 30 degrees or less, Pressure redistribution bed/mattress (bed type), Turn and reposition approx. every two hours(pillow and wedges)    Nutrition Interventions: Document food/fluid/supplement intake    Friction and Shear Interventions: Foam dressings/transparent film/skin sealants, HOB 30 degrees or less, Lift sheet                Problem: Patient Education: Go to Patient Education Activity  Goal: Patient/Family Education  Outcome: Progressing Towards Goal     Problem: Pressure Injury - Risk of  Goal: *Prevention of pressure injury  Description  Document Mikie Scale and appropriate interventions in the flowsheet. Outcome: Progressing Towards Goal  Note:   Pressure Injury Interventions:  Sensory Interventions: Assess changes in LOC, Assess need for specialty bed, Avoid rigorous massage over bony prominences, Check visual cues for pain, Float heels, Keep linens dry and wrinkle-free, Minimize linen layers, Monitor skin under medical devices, Pad between skin to skin, Pressure redistribution bed/mattress (bed type), Turn and reposition approx.  every two hours (pillows and wedges if needed)    Moisture Interventions: Absorbent underpads, Apply protective barrier, creams and emollients, Assess need for specialty bed, Check for incontinence Q2 hours and as needed, Internal/External urinary devices, Minimize layers    Activity Interventions: Assess need for specialty bed, Pressure redistribution bed/mattress(bed type)    Mobility Interventions: Assess need for specialty bed, Float heels, HOB 30 degrees or less, Pressure redistribution bed/mattress (bed type), Turn and reposition approx.  every two hours(pillow and wedges)    Nutrition Interventions: Document food/fluid/supplement intake    Friction and Shear Interventions: Foam dressings/transparent film/skin sealants, HOB 30 degrees or less, Lift sheet                Problem: Patient Education: Go to Patient Education Activity  Goal: Patient/Family Education  Outcome: Progressing Towards Goal     Problem: Discharge Planning  Goal: *Discharge to safe environment  Outcome: Progressing Towards Goal     Problem: Nutrition Deficit  Goal: *Optimize nutritional status  Outcome: Progressing Towards Goal  Goal: *Blood glucose 80 to 180 mg/dl  Outcome: Progressing Towards Goal  Goal: *Tolerates nutrition therapy  Outcome: Progressing Towards Goal     Problem: Patient Education: Go to Patient Education Activity  Goal: Patient/Family Education  Outcome: Progressing Towards Goal     Problem: Patient Education: Go to Patient Education Activity  Goal: Patient/Family Education  Outcome: Progressing Towards Goal     Problem: Ventilator Management  Goal: *Adequate oxygenation and ventilation  Outcome: Progressing Towards Goal  Goal: *Patient maintains clear airway/free of aspiration  Outcome: Progressing Towards Goal  Goal: *Absence of infection signs and symptoms  Outcome: Progressing Towards Goal  Goal: *Normal spontaneous ventilation  Outcome: Progressing Towards Goal     Problem: Patient Education: Go to Patient Education Activity  Goal: Patient/Family Education  Outcome: Progressing Towards Goal     Problem: Non-Violent Restraints  Goal: *Removal from restraints as soon as assessed to be safe  Outcome: Progressing Towards Goal  Goal: *No harm/injury to patient while restraints in use  Outcome: Progressing Towards Goal  Goal: *Patient's dignity will be maintained  Outcome: Progressing Towards Goal  Goal: *Patient Specific Goal (EDIT GOAL, INSERT TEXT)  Outcome: Progressing Towards Goal  Goal: Non-violent Restaints:Standard Interventions  Outcome: Progressing Towards Goal  Goal: Non-violent Restraints:Patient Interventions  Outcome: Progressing Towards Goal  Goal: Patient/Family Education  Outcome: Progressing Towards Goal     Problem: Patient Education: Go to Patient Education Activity  Goal: Patient/Family Education  Outcome: Progressing Towards Goal     Problem: Patient Education: Go to Patient Education Activity  Goal: Patient/Family Education  Outcome: Progressing Towards Goal     Problem: Surgical Pathway Day of Surgery  Goal: *Adequate urinary output (equal to or greater than 30 milliliters/hour)  Description  Ambulatory Surgery patients voiding without difficulty.   Outcome: Progressing Towards Goal     Problem: Surgical Pathway Post-Op Day 1  Goal: Off Pathway (Use only if patient is Off Pathway)  Outcome: Progressing Towards Goal  Goal: Activity/Safety  Outcome: Progressing Towards Goal  Goal: Diagnostic Test/Procedures  Outcome: Progressing Towards Goal  Goal: Nutrition/Diet  Outcome: Progressing Towards Goal  Goal: Discharge Planning  Outcome: Progressing Towards Goal  Goal: Medications  Outcome: Progressing Towards Goal  Goal: Respiratory  Outcome: Progressing Towards Goal  Goal: Treatments/Interventions/Procedures  Outcome: Progressing Towards Goal  Goal: Psychosocial  Outcome: Progressing Towards Goal  Goal: *No signs and symptoms of infection or wound complications  Outcome: Progressing Towards Goal  Goal: *Optimal pain control at patient's stated goal  Outcome: Progressing Towards Goal  Goal: *Adequate urinary output (equal to or greater than 30 milliliters/hour)  Outcome: Progressing Towards Goal  Goal: *Hemodynamically stable  Outcome: Progressing Towards Goal  Goal: *Tolerating diet  Outcome: Progressing Towards Goal  Goal: *Demonstrates progressive activity  Outcome: Progressing Towards Goal  Goal: *Lungs clear or at baseline  Outcome: Progressing Towards Goal     Problem: Nutrition Deficit  Goal: *Optimize nutritional status  Outcome: Progressing Towards Goal

## 2019-07-22 NOTE — PROGRESS NOTES
1916--Patient restless, coughing, Flailing head back and forth,. Propafol increased to 50mcg/minute. Will monitor. /2000--Assessment completed. Galvez care done. Mouth care done. Clean and dry,. Temp 102. Repositioned. No distress noted. 2017--Dr. Ochoa notified of temp 102. Orders received. Tylenol 650mg given via ogt. Will monitor. 2200--No changes noted. Repositioned. Clean and dry. No distress noted. 0000--Assessment unchanged. Cleaned and changed for fecal incontinence. Repositioned. Galvez care done. Mouth care done. No distress noted. 0028--Tylenol 650mg given via ogt for temp 102.5. Dr. Flores Million aware. No new orders received. 0200--No changes noted. Clean and dry. No distress noted. 0400--Assessment unchanged. Clean and dry. Repositioned. Galvez care done. Mouth care done. No distress noted. 0423--Tylenol 650mg given via ogt for temp 101.9. 0600--Repositioned. Pericare done and pad under patient changed. Clean and dry. No distress noted. 7715--Bedside shift change report given to Donnell Barron RN (oncoming nurse) by Gato Lui RN (offgoing nurse). Report included the following information SBAR, OR Summary, Procedure Summary, Intake/Output, MAR, Recent Results and Cardiac Rhythm nsr, 1st degree avb.

## 2019-07-22 NOTE — PROGRESS NOTES
Hematology/Oncology: Meri Leon     Mr. Fam Peña was seen in follow up earlier this am, currently POD#5 from Left AKA. He remains very weak and much more debilitated vs prior evaluation, but no fevers. Sclera discolored but anicteric, OP w/o ulcers, NECK w/o masses or JVD, COR reg w/o S3, LUNGS w/ poor insp effort, no rales, ABD soft, quiet, no SMG, no ascites, EXT as above    IMPRESSION:  1.  Stage III NSCLC, s/p chemoRx/XRT, completed in Dec 2018 w/ prior restaging CTs in March 2019 w/ only expected/XRT related changes, now admitted w/ presumed sepsis, symptomatic LLE ischemia and hyponatremia. His restaging bone scan (7/12/19) and CT of chest  (7/15/19) have no evidence of metastatic disease. He appears to be in a clinical CR/VGPR wrt his NSCLC. S/p Left AKA 7/17/19, ?pneumonia post-op vs more likely volume issues. RECOMMENDATIONS:  1. ATBx per PCCM . 2. Would hold on any MRI imaging given current status and renal function. As he had no focal neurologic changes PTA or early last week, MRI can be deferred for now. 3.  No further chemoRx is planned at this time, but I will follow peripherally while admitted. Appreciate care of all involved. TT 25min evaluation and coordination of care, >90% at bedside.

## 2019-07-22 NOTE — PROGRESS NOTES
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 Dialyzed 7/17, 7/18, 7/20. Next dialysis tomorrow. Minimize intake, volume overloaded at this point. Overall prognosis appears poor, may have to readdress utility of dialysis with family in the near future. · Non small cell lung cancer. Restaging in progress. Oncology recommendations noted. 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 Consider substituting vanc with alternative abx. · Severe anemia. May need transfusions. No adan due to malignancy. · Resp failure. · Debilitation/malnutrition. Subjective: Intubated, sedated for intermittent agitation. Tolerate tf. Patient Active Problem List   Diagnosis Code    Gangrene of foot ([address] I96    Cellulitis of left lower extremity L03. 80    Malnourished ([address] E46    Peripheral vascular disease ([address] I73.9    Hyponatremia E87.1    Acute kidney injury superimposed on chronic kidney disease ([address] N17.9, N18.9    Dehydration E86.0    Sepsis (HCC) A41.9    Gangrene ([address] I96    Anemia D64.9    Acute respiratory failure (HCC) J96.00    Septic encephalopathy G93.41    Transaminitis J82.0    Metabolic acidosis P37.3       Current Facility-Administered Medications   Medication Dose Route Frequency Provider Last Rate Last Dose    [START ON 7/23/2019] VANCOMYCIN INFORMATION NOTE   Other ONCE Anson Ramos H, DO        acetaminophen (TYLENOL) tablet 650 mg  650 mg Oral Q4H PRN Maury Troy PA-C        fluconazole (DIFLUCAN) 200mg/100 mL IVPB (premix)  200 mg IntraVENous Q24H Fco Ochoa  mL/hr at 07/21/19 2044 200 mg at 07/21/19 2044    QUEtiapine (SEROquel) tablet 50 mg  50 mg Oral BID DIANDRA Moise   50 mg at 07/21/19 2044    ELECTROLYTE REPLACEMENT PROTOCOL - Potassium Renal Dosing  1 Each Other PRN La Baca MD        ELECTROLYTE REPLACEMENT PROTOCOL - Magnesium   1 Each Other PRN La Baca MD        ELECTROLYTE REPLACEMENT PROTOCOL  - Phosphorus Renal Dosing  1 Each Other PRN La Baca MD        ELECTROLYTE REPLACEMENT PROTOCOL - Calcium   1 Each Other PRN La Baca MD        0.9% sodium chloride infusion 250 mL  250 mL IntraVENous PRN La Baca MD        clopidogrel (PLAVIX) tablet 75 mg  75 mg Oral DAILY Ganga Richard PA-C   75 mg at 07/21/19 3719    aspirin tablet 325 mg  325 mg Oral DAILY OBINNA WatermanC   325 mg at 07/21/19 2380    carvedilol (COREG) tablet 6.25 mg  6.25 mg Oral BID WITH MEALS DIANDRA Myers   6.25 mg at 07/21/19 1715    pantoprazole (PROTONIX) 40 mg in sodium chloride 0.9% 10 mL injection  40 mg IntraVENous DAILY DIANDRA Myers   40 mg at 07/21/19 0813    sodium chloride (NS) flush 5-40 mL  5-40 mL IntraVENous Q8H Jim Berry MD   10 mL at 07/22/19 0604    sodium chloride (NS) flush 5-40 mL  5-40 mL IntraVENous PRN Jim Berry MD        0.9% sodium chloride infusion 250 mL  250 mL IntraVENous PRN Ivan Menendez MD        0.9% sodium chloride infusion 250 mL  250 mL IntraVENous PRN Ivan Menendez MD   Stopped at 07/17/19 1200    0.9% sodium chloride infusion  50 mL/hr IntraVENous DIALYSIS PRN Ivan Menendez MD        heparin (porcine) 1,000 unit/mL injection 1,000 Units  1,000 Units InterCATHeter DIALYSIS PRN Ivan Menendez MD        sodium chloride (NS) flush 5-40 mL  5-40 mL IntraVENous Q8H Rene Palmer MD   10 mL at 07/22/19 0604    sodium chloride (NS) flush 5-40 mL  5-40 mL IntraVENous PRN Rene Palmer MD        naloxone [facility]) injection 0.1 mg  0.1 mg IntraVENous PRN Rene Palmer MD        ondansetron [facility]) injection 4 mg  4 mg IntraVENous PRN Rene Palmer MD        senna (SENOKOT) tablet 8.6 mg  1 Tab Oral BID Rene Palmer MD   Stopped at 07/21/19 1800    fentaNYL (PF) 900 mcg/30 ml infusion soln  0-200 mcg/hr IntraVENous TITRATE Sherri Bergman MD 1.7 mL/hr at 07/22/19 0732 50 mcg/hr at 07/22/19 0732    propofol (DIPRIVAN) infusion  0-50 mcg/kg/min IntraVENous TITRATE Briana Molina, NP 17.6 mL/hr at 07/22/19 0733 50 mcg/kg/min at 07/22/19 0733    chlorhexidine (PERIDEX) 0.12 % mouthwash 10 mL  10 mL Oral Q12H Sherri Bergman MD   10 mL at 07/21/19 1927    albuterol-ipratropium (DUO-NEB) 2.5 MG-0.5 MG/3 ML  3 mL Nebulization Q6H RT Sherri Bergman MD   3 mL at 07/22/19 0736    fentaNYL citrate (PF) injection 25-50 mcg  25-50 mcg IntraVENous Q1H PRN Briana Molina I, NP   50 mcg at 07/18/19 1549    cefepime (MAXIPIME) 0.5 g in 0.9% sodium chloride 100 mL IVPB  0.5 g IntraVENous Q24H Briana Molina, NP   0.5 g at 07/21/19 2140    oxyCODONE-acetaminophen (PERCOCET) 5-325 mg per tablet 1-2 Tab  1-2 Tab Oral Q4H PRN Jhonny Prescott MD   2 Tab at 07/16/19 1215    heparin (porcine) injection 5,000 Units  5,000 Units SubCUTAneous Q8H Jhonny Prescott MD   5,000 Units at 07/22/19 0604    VANCOMYCIN INFORMATION NOTE 1 Each  1 Each Other Rx Dosing/Monitoring Jhonny Prescott MD        therapeutic multivitamin [facility]) tablet 1 Tab  1 Tab Oral DAILY Jhonny Prescott MD   1 Tab at 07/21/19 9713    sodium chloride (NS) flush 5-10 mL  5-10 mL IntraVENous PRN Jhonny Prescott MD   10 mL at 07/15/19 0503    ondansetron (ZOFRAN) injection 4 mg  4 mg IntraVENous Q4H PRN Jhonny Prescott MD   4 mg at 07/17/19 1113       Objective  Vitals:    07/22/19 0600 07/22/19 0630 07/22/19 0700 07/22/19 0736   BP: 101/61 102/63 94/57    Pulse: 92 94 93 96   Resp: 25 28 29 24   Temp: (!) 101.8 °F (38.8 °C)      TempSrc:       SpO2: 100% 100% 100% 100%   Weight: 64.5 kg (142 lb 3.2 oz)      Height:             Intake/Output Summary (Last 24 hours) at 7/22/2019 0755  Last data filed at 7/22/2019 0700  Gross per 24 hour   Intake 1534.26 ml   Output 220 ml   Net 1314.26 ml           Admission weight: Weight: 59 kg (130 lb) (07/10/19 1511)  Last Weight Metrics:  Weight Loss Metrics 7/22/2019 7/3/2019 4/29/2017 3/11/2017   Today's Wt 142 lb 3.2 oz 130 lb 150 lb 150 lb   BMI 19.29 kg/m2 17.63 kg/m2 20.34 kg/m2 20.34 kg/m2             Physical Assessment:     General: intubated, sedated. ET in place. Neck: No jvd. LUNGS: diffusely diminished air entry, bl exp rhonchi. No crackles. CVS EXM: S1, S2  RRR. Abdomen: soft, non tender. Lower Extremities:  1+ bl le  edema. Lt aka stump dressings intact. Rt femoral temporary dialysis catheter. Lab    CBC w/Diff Recent Labs     07/22/19 0220 07/21/19  0145 07/20/19  0325   WBC 12.6 10.4 11.6   RBC 2.34* 2.47* 2.64*   HGB 6.6* 7.0* 7.5*   HCT 20.6* 21.6* 23.3*    196 223        Chemistry Recent Labs     07/22/19 0220 07/21/19  1608 07/21/19  0830 07/21/19  0145  07/20/19  0325   GLU 97  --   --  101*  100*  --  110*   * 136  --  137  138   < > 132*   K 3.9  --  4.0 3.8  3.8  --  4.0     --   --  103  103  --  99*   CO2 26  --   --  26  27  --  26   BUN 32*  --   --  18  18  --  27*   CREA 4.46*  --   --  3.04*  2.99*  --  4.17*   CA 7.2*  --   --  8.0*  8.1*  --  7.3*   AGAP 9  --   --  8  8  --  7   BUCR 7*  --   --  6*  6*  --  6*   *  --   --  131*  --  100   TP 5.1*  --   --  5.1*  --  4.8*   ALB 1.2*  --   --  1.2*  1.2*  --  1.4*   GLOB 3.9  --   --  3.9  --  3.4   AGRAT 0.3*  --   --  0.3*  --  0.4*   PHOS 3.0  --   --  3.0   < >  --     < > = values in this interval not displayed. No results found for: IRON, FE, TIBC, IBCT, PSAT, FERR   Lab Results   Component Value Date/Time    Calcium 7.2 (L) 07/22/2019 02:20 AM    Phosphorus 3.0 07/22/2019 02:20 AM        Aislinn Bernstein M.D.   [facility]  Phone

## 2019-07-22 NOTE — PROGRESS NOTES
Problem: Pain  Goal: *Control of Pain  Outcome: Progressing Towards Goal     Problem: Patient Education: Go to Patient Education Activity  Goal: Patient/Family Education  Outcome: Progressing Towards Goal     Problem: Falls - Risk of  Goal: *Absence of Falls  Description  Document Cynthiacassidy Bonilla Fall Risk and appropriate interventions in the flowsheet. Outcome: Progressing Towards Goal  Note:   Fall Risk Interventions:  Mobility Interventions: Bed/chair exit alarm, Communicate number of staff needed for ambulation/transfer    Mentation Interventions: Adequate sleep, hydration, pain control, Bed/chair exit alarm    Medication Interventions: Bed/chair exit alarm, Evaluate medications/consider consulting pharmacy    Elimination Interventions: Bed/chair exit alarm, Call light in reach, Toilet paper/wipes in reach, Toileting schedule/hourly rounds    History of Falls Interventions: Bed/chair exit alarm, Evaluate medications/consider consulting pharmacy         Problem: Patient Education: Go to Patient Education Activity  Goal: Patient/Family Education  Outcome: Progressing Towards Goal     Problem: Impaired Skin Integrity/Pressure Injury Treatment  Goal: *Improvement of Existing Pressure Injury  Outcome: Progressing Towards Goal  Goal: *Prevention of pressure injury  Description  Document Mikie Scale and appropriate interventions in the flowsheet. Outcome: Progressing Towards Goal  Note:   Pressure Injury Interventions:  Sensory Interventions: Assess changes in LOC, Assess need for specialty bed, Avoid rigorous massage over bony prominences    Moisture Interventions: Absorbent underpads, Apply protective barrier, creams and emollients, Assess need for specialty bed    Activity Interventions: Assess need for specialty bed, Pressure redistribution bed/mattress(bed type)    Mobility Interventions: Assess need for specialty bed, HOB 30 degrees or less, Pressure redistribution bed/mattress (bed type), Turn and reposition approx. every two hours(pillow and wedges)    Nutrition Interventions: Document food/fluid/supplement intake    Friction and Shear Interventions: Foam dressings/transparent film/skin sealants, HOB 30 degrees or less, Lift sheet                Problem: Patient Education: Go to Patient Education Activity  Goal: Patient/Family Education  Outcome: Progressing Towards Goal     Problem: Pressure Injury - Risk of  Goal: *Prevention of pressure injury  Description  Document Mikie Scale and appropriate interventions in the flowsheet. Outcome: Progressing Towards Goal  Note:   Pressure Injury Interventions:  Sensory Interventions: Assess changes in LOC, Assess need for specialty bed, Avoid rigorous massage over bony prominences    Moisture Interventions: Absorbent underpads, Apply protective barrier, creams and emollients, Assess need for specialty bed    Activity Interventions: Assess need for specialty bed, Pressure redistribution bed/mattress(bed type)    Mobility Interventions: Assess need for specialty bed, HOB 30 degrees or less, Pressure redistribution bed/mattress (bed type), Turn and reposition approx.  every two hours(pillow and wedges)    Nutrition Interventions: Document food/fluid/supplement intake    Friction and Shear Interventions: Foam dressings/transparent film/skin sealants, HOB 30 degrees or less, Lift sheet                Problem: Patient Education: Go to Patient Education Activity  Goal: Patient/Family Education  Outcome: Progressing Towards Goal     Problem: Discharge Planning  Goal: *Discharge to safe environment  Outcome: Progressing Towards Goal     Problem: Nutrition Deficit  Goal: *Optimize nutritional status  Outcome: Progressing Towards Goal  Goal: *Blood glucose 80 to 180 mg/dl  Outcome: Progressing Towards Goal  Goal: *Tolerates nutrition therapy  Outcome: Progressing Towards Goal     Problem: Patient Education: Go to Patient Education Activity  Goal: Patient/Family Education  Outcome: Progressing Towards Goal     Problem: Patient Education: Go to Patient Education Activity  Goal: Patient/Family Education  Outcome: Progressing Towards Goal     Problem: Ventilator Management  Goal: *Adequate oxygenation and ventilation  Outcome: Progressing Towards Goal  Goal: *Patient maintains clear airway/free of aspiration  Outcome: Progressing Towards Goal  Goal: *Absence of infection signs and symptoms  Outcome: Progressing Towards Goal  Goal: *Normal spontaneous ventilation  Outcome: Progressing Towards Goal     Problem: Patient Education: Go to Patient Education Activity  Goal: Patient/Family Education  Outcome: Progressing Towards Goal     Problem: Non-Violent Restraints  Goal: *Removal from restraints as soon as assessed to be safe  Outcome: Progressing Towards Goal  Goal: *No harm/injury to patient while restraints in use  Outcome: Progressing Towards Goal  Goal: *Patient's dignity will be maintained  Outcome: Progressing Towards Goal  Goal: *Patient Specific Goal (EDIT GOAL, INSERT TEXT)  Outcome: Progressing Towards Goal  Goal: Non-violent Restaints:Standard Interventions  Outcome: Progressing Towards Goal  Goal: Non-violent Restraints:Patient Interventions  Outcome: Progressing Towards Goal  Goal: Patient/Family Education  Outcome: Progressing Towards Goal     Problem: Patient Education: Go to Patient Education Activity  Goal: Patient/Family Education  Outcome: Progressing Towards Goal     Problem: Patient Education: Go to Patient Education Activity  Goal: Patient/Family Education  Outcome: Progressing Towards Goal     Problem: Surgical Pathway Day of Surgery  Goal: *Adequate urinary output (equal to or greater than 30 milliliters/hour)  Description  Ambulatory Surgery patients voiding without difficulty.   Outcome: Progressing Towards Goal     Problem: Surgical Pathway Post-Op Day 1  Goal: Off Pathway (Use only if patient is Off Pathway)  Outcome: Progressing Towards Goal  Goal: Activity/Safety  Outcome: Progressing Towards Goal  Goal: Diagnostic Test/Procedures  Outcome: Progressing Towards Goal  Goal: Nutrition/Diet  Outcome: Progressing Towards Goal  Goal: Discharge Planning  Outcome: Progressing Towards Goal  Goal: Medications  Outcome: Progressing Towards Goal  Goal: Respiratory  Outcome: Progressing Towards Goal  Goal: Treatments/Interventions/Procedures  Outcome: Progressing Towards Goal  Goal: Psychosocial  Outcome: Progressing Towards Goal  Goal: *No signs and symptoms of infection or wound complications  Outcome: Progressing Towards Goal  Goal: *Optimal pain control at patient's stated goal  Outcome: Progressing Towards Goal  Goal: *Adequate urinary output (equal to or greater than 30 milliliters/hour)  Outcome: Progressing Towards Goal  Goal: *Hemodynamically stable  Outcome: Progressing Towards Goal  Goal: *Tolerating diet  Outcome: Progressing Towards Goal  Goal: *Demonstrates progressive activity  Outcome: Progressing Towards Goal  Goal: *Lungs clear or at baseline  Outcome: Progressing Towards Goal     Problem: Nutrition Deficit  Goal: *Optimize nutritional status  Outcome: Progressing Towards Goal

## 2019-07-22 NOTE — MANAGEMENT PLAN
Discharge/Transition Planning     Problem: Discharge Planning  Goal: *Discharge to safe environment  Outcome: Progressing Towards Goal   Plan : snf    Care Management following and chart reviewed. Pt remains intubated. Have sent secure email to [facility] with [employer] to see if Medicaid number is active. Plan is SNF vs SNF to LTC.  Contingent on plan of care and pt progression      Ora Kim RN BSN  Outcomes Manager  Pager # [phone]

## 2019-07-22 NOTE — PROGRESS NOTES
Problem: Ventilator Management  Goal: *Adequate oxygenation and ventilation  Outcome: Progressing Towards Goal   VENTILATOR CARE PLAN    Problem: Ventilator Management  Goal: *Adequate oxygenation/ ventilation/ and extubation      Patient:        Flor Gates     62 y.o.   male     7/22/2019  7:41 AM  Patient Active Problem List   Diagnosis Code    Gangrene of foot ([facility] [address] I96    Cellulitis of left lower extremity L03. 80    Malnourished ([facility] [address] E46    Peripheral vascular disease ([address] I73.9    Hyponatremia E87.1    Acute kidney injury superimposed on chronic kidney disease ([address] N17.9, N18.9    Dehydration E86.0    Sepsis ([address] A41.9    Gangrene ([address] I96    Anemia D64.9    Acute respiratory failure ([facility]) J96.00    Septic encephalopathy G93.41    Transaminitis D83.3    Metabolic acidosis N82.3       Sepsis ([address] [A41.9]  Hyponatremia [E87.1]  Gangrene ([address] [I96]  Hyponatremia [E87.1]    Reason patient intubated: Acute respiratory failure. Ventilator day: 6    Ventilator settings: ACVC+ 12 / 450 / +5 / 30%. ETT Size/Placement: 7.5-24 lip. ABG:  Date:7/22/2019  Lab Results   Component Value Date/Time    PHI 7.433 07/22/2019 05:10 AM    PCO2I 32.7 (L) 07/22/2019 05:10 AM    PO2I 80 07/22/2019 05:10 AM    HCO3I 21.5 (L) 07/22/2019 05:10 AM    FIO2I 30 07/22/2019 05:10 AM       Chest X-ray:  Date:7/22/2019  Results from Hospital Encounter encounter on 07/10/19   XR CHEST PORT    Narrative EXAM: One view chest x-ray    CLINICAL INDICATION/HISTORY: Respiratory distress. COMPARISON: 07/20/2019. TECHNIQUE: Single AP view of the chest was obtained.    _______________    FINDINGS:    HEART, VESSELS, MEDIASTINUM: Heart size is normal. No vascular congestion. LUNGS, PLEURAL SPACES: Upper right chest and a marker is again noted. There is  mild interval improvement in interstitial markings throughout with hazy  opacities at bilateral lung bases. There is no pneumothorax.     BONY THORAX, SOFT TISSUES: Unremarkable. MEDICAL SUPPORT DEVICES: Endotracheal tube and nasogastric/orogastric tube in  stable position. _______________      Impression IMPRESSION:    1. Medical support devices in satisfactory position. 2. Persistent, but improving interstitial and alveolar opacities throughout the  lungs with associated small effusions. Lab Test:  Date:7/22/2019  WBC:   Lab Results   Component Value Date/Time    WBC 12.6 07/22/2019 02:20 AM   HGB:   Lab Results   Component Value Date/Time    HGB 6.6 (L) 07/22/2019 02:20 AM    PLTS:   Lab Results   Component Value Date/Time    PLATELET 787 50/67/3969 02:20 AM       SaO2%/flow: @CPQJKCM0(9)@    Vital Signs:   Patient Vitals for the past 8 hrs:   Temp Pulse Resp BP SpO2   07/22/19 0736 -- 96 24 -- 100 %   07/22/19 0700 -- 93 29 94/57 100 %   07/22/19 0630 -- 94 28 102/63 100 %   07/22/19 0600 (!) 101.8 °F (38.8 °C) 92 25 101/61 100 %   07/22/19 0530 -- 93 23 101/61 100 %   07/22/19 0500 -- 92 23 99/60 100 %   07/22/19 0430 -- 95 27 121/70 100 %   07/22/19 0400 (!) 101.9 °F (38.8 °C) 93 27 131/67 100 %   07/22/19 0330 -- 92 26 124/68 100 %   07/22/19 0300 -- 91 24 114/59 100 %   07/22/19 0230 -- 94 23 123/61 100 %   07/22/19 0200 -- 93 22 114/58 100 %   07/22/19 0130 -- 91 23 119/62 100 %   07/22/19 0100 -- 91 20 111/62 100 %   07/22/19 0030 -- 98 18 132/70 100 %   07/22/19 0000 (!) 102.5 °F (39.2 °C) 87 21 124/63 100 %       Wean Screen Pass (Yes or No): Wean Screen Reason for Failure:  Duration of Weaning Trial:  Additional Comments:        PLAN OF CARE: SBT daily.        GOAL: extubation      OUTCOME:

## 2019-07-22 NOTE — PROGRESS NOTES
PCCM Progress Note                                              I have reviewed the flowsheet and previous days notes. Events, vitals, medications and notes from last 24 hours reviewed. Care plan discussed with staff and on multidisciplinary rounds. Subjective:  7/22/2019   Pt remains febrile to >101. BP and HR stable. Failed SBT this AM, remains very tachypneic even while on sedation. Patient Active Problem List   Diagnosis Code    Gangrene of foot ([facility] [address] I96    Cellulitis of left lower extremity L03. 80    Malnourished ([address] E46    Peripheral vascular disease ([address] I73.9    Hyponatremia E87.1    Acute kidney injury superimposed on chronic kidney disease ([address] N17.9, N18.9    Dehydration E86.0    Sepsis ([address] A41.9    Gangrene ([address] I96    Anemia D64.9    Acute respiratory failure (HCC) J96.00    Septic encephalopathy G93.41    Transaminitis V82.6    Metabolic acidosis F50.7       Assessment:  Acute Respiratory Failure  - s/p intubation for worsening mental status/hypoxia on 7/17  - ABG currently adequate, FIO2 30%, PEEP 5  Possible PNA  - No Cxs, infiltrate at R base  [place]  - on HD per nephro  LLE Gangrene s/p Left AKA  - POD #5. Pain control, follow up with vascular surgery  Agitation/Delirium  - concern for EtOH withdrawal  Anemia  - no signs of bleeding  Hx of COPD  Stage III NSCLC  -  s/p chemoRx/XRT, completed in Dec 2018    Impression/Plan:  - Failed SBT due to tachypnea, still not following commands  - Will attempt to transition to ativan gtt if agitation is due to [facility] withdrawal  - give 1 unit pRBC  - Will draw blood Cxs  - Continue ABX for now, unclear cause of continued fevers  - HD per nephro  - Plavix, ppx heparin  - TFs    Full Code    OTHER:  Glycemic Control- glucose stabilizer per ICU protocol when on insulin drip. Maintain blood glucose 140-180. Replace electrolytes per ICU electrolyte replacement protocol    Ventilator bundle & Sedation protocol followed.  Daily morning sedation holiday, assessment for readiness for SBT & weaning from ventilator; and then re-titrate if required. Aim to keep peak plateau pressure 76-14ZA H2O in ARDS patient. Mary Jane tube to suction at 20-30 cm H2O, Maintain [place] tube with 5-10ml air every 4 hours. Chlorhexidine mouth washes and routine oral care every 4 hours. Stress ulcer and DVT prophylaxis. HOB >=30 degree elevation all the time. HOB >=30 degree elevation all the time. Aggressive pulmonary toileting. Incentive spirometry when appropriate. Aspiration precautions. Sepsis bundle and protocol followed. Deescalate antibiotic when appropriate. Vasopressor when appropriate with MAP goal >65 mmHg. Central Line bundle followed, remove when not needed. Large bore IV line or CVP when appropriate. Mccarthy bundle followed, remove mccarthy catheter when not critically ill. PT/OT eval and treat. OOB/IS when appropriate. Quality Care: Stress ulcer prophylaxis, DVT prophylaxis, HOB elevated, Infection control all reviewed and addressed. Events and notes from last 24 hours reviewed. Care plan discussed with nursing. D/w patient and family above medical problems and answered all questions to their satisfaction. CC TIME: >45 min     Medication Reviewed: Allergies   Allergen Reactions    Pcn [Penicillins] Unknown (comments)     Patients states that he was allergic to PCN as a child because he did not like receiving PCN injections, not because he developed a reaction to PCN administration       Past Medical History:   Diagnosis Date    Anemia     Chronic kidney disease     Depression     Emphysema (subcutaneous) (surgical) resulting from a procedure     Hypertension     Hyponatremia     Non-small cell lung cancer ([address] 2018    rt upper lung    Peripheral vascular disease ([address]     Seizure ([address]     Seizures ([address]     Sepsis ([address]       History reviewed. No pertinent surgical history.    Social History     Tobacco Use    Smoking status: Current Every Day Smoker    Smokeless tobacco: Never Used   Substance Use Topics    Alcohol use: Yes      History reviewed. No pertinent family history. Prior to Admission medications    Medication Sig Start Date End Date Taking? Authorizing Provider   amoxicillin (AMOXIL) 500 mg capsule Take 1 Cap by mouth every eight (8) hours. 7/7/19   Ben Troy PA-C   ciprofloxacin HCl (CIPRO) 750 mg tablet Take 1 Tab by mouth every twelve (12) hours. 7/7/19   Spencer Corcoran PA-C   atorvastatin (LIPITOR) 40 mg tablet Take 1 Tab by mouth nightly. 7/6/19   Spencer Corcoran PA-C   carvedilol (COREG) 3.125 mg tablet Take 1 Tab by mouth two (2) times daily (with meals). 7/6/19   Spencer Corcoran PA-C   furosemide (LASIX) 20 mg tablet Take 1 Tab by mouth every Monday, Wednesday, Friday. 7/8/19   Spencer Corcoran PA-C   clopidogrel (PLAVIX) 75 mg tab Take 75 mg by mouth daily.     Provider, Historical     Current Facility-Administered Medications   Medication Dose Route Frequency    [START ON 7/23/2019] VANCOMYCIN INFORMATION NOTE   Other ONCE    LORazepam (ATIVAN) 1 mg/mL in D5W infusion  0-5 mg/hr IntraVENous TITRATE    NOREPINephrine (LEVOPHED) 8 mg in 0.9% NS 250ml infusion  2-16 mcg/min IntraVENous TITRATE    fluconazole (DIFLUCAN) 200mg/100 mL IVPB (premix)  200 mg IntraVENous Q24H    QUEtiapine (SEROquel) tablet 50 mg  50 mg Oral BID    clopidogrel (PLAVIX) tablet 75 mg  75 mg Oral DAILY    aspirin tablet 325 mg  325 mg Oral DAILY    carvedilol (COREG) tablet 6.25 mg  6.25 mg Oral BID WITH MEALS    pantoprazole (PROTONIX) 40 mg in sodium chloride 0.9% 10 mL injection  40 mg IntraVENous DAILY    sodium chloride (NS) flush 5-40 mL  5-40 mL IntraVENous Q8H    sodium chloride (NS) flush 5-40 mL  5-40 mL IntraVENous Q8H    senna (SENOKOT) tablet 8.6 mg  1 Tab Oral BID    fentaNYL (PF) 900 mcg/30 ml infusion soln  0-200 mcg/hr IntraVENous TITRATE    propofol (DIPRIVAN) infusion  0-50 mcg/kg/min IntraVENous TITRATE    chlorhexidine (PERIDEX) 0.12 % mouthwash 10 mL  10 mL Oral Q12H    albuterol-ipratropium (DUO-NEB) 2.5 MG-0.5 MG/3 ML  3 mL Nebulization Q6H RT    cefepime (MAXIPIME) 0.5 g in 0.9% sodium chloride 100 mL IVPB  0.5 g IntraVENous Q24H    heparin (porcine) injection 5,000 Units  5,000 Units SubCUTAneous Q8H    VANCOMYCIN INFORMATION NOTE 1 Each  1 Each Other Rx Dosing/Monitoring    therapeutic multivitamin (THERAGRAN) tablet 1 Tab  1 Tab Oral DAILY           Lines: All central lines examined by me. No signs of erythema, induration, discharge. Peripherally Inserted Central Catheter:     Central Venous Catheter:  Triple Lumen UDALL CATHETERR 07/17/19 Anterior;Proximal;Right Femoral (Active)   Central Line Being Utilized Yes 7/22/2019  4:00 AM   Criteria for Appropriate Use Dialysis/apheresis 7/22/2019  4:00 AM   Site Assessment Clean, dry, & intact 7/22/2019  4:00 AM   Infiltration Assessment 0 7/22/2019  4:00 AM   Affected Extremity/Extremities Color distal to insertion site pink (or appropriate for race); Pulses palpable;Range of motion performed 7/22/2019  4:00 AM   Date of Last Dressing Change 07/20/19 7/22/2019  4:00 AM   Dressing Status Clean, dry, & intact 7/22/2019  4:00 AM   Dressing Type Disk with Chlorhexadine gluconate (CHG); Transparent;Gauze 7/22/2019  4:00 AM   Action Taken Open ports on tubing capped 7/22/2019  4:00 AM   Proximal Hub Color/Line Status Capped 7/22/2019  4:00 AM   Medial Hub Color/Line Status Purple;Patent; Infusing 7/22/2019  4:00 AM   Positive Blood Return (Lateral Site) Yes 7/22/2019  4:00 AM   Distal Hub Color/Line Status Capped 7/22/2019  4:00 AM   External Catheter Length (cm) 0 centimeters 7/22/2019  4:00 AM   Alcohol Cap Used Yes 7/22/2019  4:00 AM     Peripheral Intravenous Line:  Peripheral IV 07/18/19 Left;Upper Arm (Active)   Site Assessment Clean, dry, & intact 7/22/2019  7:00 AM   Phlebitis Assessment 0 7/22/2019  7:00 AM   Infiltration Assessment 0 7/22/2019  7:00 AM   Dressing Status Clean, dry, & intact 7/22/2019  7:00 AM   Dressing Type Transparent 7/22/2019  7:00 AM   Hub Color/Line Status Green;Patent;Capped 7/22/2019  7:00 AM   Action Taken Open ports on tubing capped 7/22/2019  7:00 AM   Alcohol Cap Used Yes 7/22/2019  7:00 AM       Peripheral IV 07/18/19 Left Forearm (Active)   Site Assessment Clean, dry, & intact 7/22/2019  7:00 AM   Phlebitis Assessment 0 7/22/2019  7:00 AM   Infiltration Assessment 0 7/22/2019  7:00 AM   Dressing Status Clean, dry, & intact 7/22/2019  7:00 AM   Dressing Type Transparent 7/22/2019  7:00 AM   Hub Color/Line Status Pink;Patent;Capped 7/22/2019  7:00 AM   Action Taken Open ports on tubing capped 7/22/2019  7:00 AM   Alcohol Cap Used Yes 7/22/2019  7:00 AM     Arterial Line:     Hemodialysis Catheter:     Drain(s):  Orogastric Tube 07/17/19 (Active)   Site Assessment Clean, dry, & intact 7/22/2019  4:00 AM   Securement Device Tape 7/22/2019  4:00 AM   G Port Status Infusing 7/22/2019  4:00 AM   External Insertion Wade (cms) 79 cms 7/22/2019  4:00 AM   Action Taken Feed set changed; Tubing changed; Other (comment) 7/22/2019  5:30 AM   Drainage Description Tan 7/21/2019  4:00 PM   Gastric Residual (mL) 0 ml 7/22/2019  4:00 AM   Tube Feeding/Formula Options Renal (Nepro) 7/22/2019  4:00 AM   Tube Feeding/Verify Rate (mL/hr) 40 7/22/2019  4:00 AM   Water Flush Volume (mL) 60 mL 7/22/2019  4:23 AM   Intake (ml) 40 ml 7/22/2019  7:00 AM   Medication Volume 50 ml 7/20/2019  6:00 PM   Output (ml) 0 ml 7/18/2019  5:00 PM     Airway:  Airway - Endotracheal Tube 07/17/19 Oral (Active)   Insertion Depth (cm) 24 cm 7/22/2019 11:43 AM   Line Wade Lips 7/22/2019 11:43 AM   Side Secured Right 7/22/2019 11:43 AM   Cuff Pressure 30 cmH20 7/22/2019 11:43 AM   Site Assessment Clean, dry, & intact 7/22/2019 11:43 AM       Objective:  Vital Signs:    Visit Vitals  BP 93/58   Pulse 91   Temp (!) 101.8 °F (38.8 °C) Comment: RN notiify   Resp 27   Ht 6' (1.829 m)   Wt 64.5 kg (142 lb 3.2 oz)   SpO2 100%   BMI 19.29 kg/m²      O2 Device: Endotracheal tube, Ventilator  O2 Flow Rate (L/min): 6 l/min  Temp (24hrs), Av.6 °F (38.7 °C), Min:100.5 °F (38.1 °C), Max:102.5 °F (39.2 °C)      Intake/Output:   Last shift:      701 - 1900  In: -   Out: 15 [Urine:15]    Last 3 shifts: 1901 -  07  In: 2904.4 [I.V.:1324.4]  Out: 550 [Urine:372]      Intake/Output Summary (Last 24 hours) at 2019 1343  Last data filed at 2019 0800  Gross per 24 hour   Intake 1350.4 ml   Output 230 ml   Net 1120.4 ml       Last 3 Recorded Weights in this Encounter    19 0645 19 0600 19 0600   Weight: 62.7 kg (138 lb 3.7 oz) 60.9 kg (134 lb 4.2 oz) 64.5 kg (142 lb 3.2 oz)       Ventilator Settings:  Mode Rate Tidal Volume Pressure FiO2 PEEP  Assist control, VC+   450 ml  7 cm H2O 30 % 5 cm H20    Peak airway pressure: 21 cm H2O   Plateau pressure:    Tidal volume:   Minute ventilation: 14.3 l/min  SPO2     ARDS network Guidelines: Lung protective strategy and Plateau pressure goals less than or equal to 30    Physical Exam:     General/Neurology: Alert, Awake, Follows commands  Head:   Normocephalic, without obvious abnormality  Eye:   PERRL, EOM intact, no scleral icterus, no pallor  Oral:   Mucus membranes moist  Neck:   Supple  Lung:   B/l air movement. No wheezing or rales. Heart:   Regular rate & rhythm. S1 S2 present.    Abdomen/: Soft, non tender, BS +nt  Extremities:  No pedal edema  Skin:   Dry, intact    Data:      Recent Results (from the past 24 hour(s))   SODIUM    Collection Time: 19  4:08 PM   Result Value Ref Range    Sodium 136 136 - 145 mmol/L   GLUCOSE, POC    Collection Time: 19  6:36 PM   Result Value Ref Range    Glucose (POC) 111 (H) 70 - 110 mg/dL   CBC W/O DIFF    Collection Time: 19  2:20 AM   Result Value Ref Range    WBC 12.6 4.6 - 13.2 K/uL    RBC 2.34 (L) 4.70 - 5.50 M/uL    HGB 6.6 (L) 13.0 - 16.0 g/dL    HCT 20.6 (L) 36.0 - 48.0 %    MCV 88.0 74.0 - 97.0 FL    MCH 28.2 24.0 - 34.0 PG    MCHC 32.0 31.0 - 37.0 g/dL    RDW 16.6 (H) 11.6 - 14.5 %    PLATELET 348 406 - 598 K/uL    MPV 8.3 (L) 9.2 - 11.8 FL   MAGNESIUM    Collection Time: 07/22/19  2:20 AM   Result Value Ref Range    Magnesium 1.9 1.6 - 2.6 mg/dL   CALCIUM, IONIZED    Collection Time: 07/22/19  2:20 AM   Result Value Ref Range    Ionized Calcium 1.11 (L) 1.12 - 2.14 MMOL/L   METABOLIC PANEL, COMPREHENSIVE    Collection Time: 07/22/19  2:20 AM   Result Value Ref Range    Sodium 135 (L) 136 - 145 mmol/L    Potassium 3.9 3.5 - 5.5 mmol/L    Chloride 100 100 - 111 mmol/L    CO2 26 21 - 32 mmol/L    Anion gap 9 3.0 - 18 mmol/L    Glucose 97 74 - 99 mg/dL    BUN 32 (H) 7.0 - 18 MG/DL    Creatinine 4.46 (H) 0.6 - 1.3 MG/DL    BUN/Creatinine ratio 7 (L) 12 - 20      GFR est AA 16 (L) >60 ml/min/1.73m2    GFR est non-AA 13 (L) >60 ml/min/1.73m2    Calcium 7.2 (L) 8.5 - 10.1 MG/DL    Bilirubin, total 0.7 0.2 - 1.0 MG/DL    ALT (SGPT) 97 (H) 16 - 61 U/L    AST (SGOT) 439 (H) 10 - 38 U/L    Alk.  phosphatase 164 (H) 45 - 117 U/L    Protein, total 5.1 (L) 6.4 - 8.2 g/dL    Albumin 1.2 (L) 3.4 - 5.0 g/dL    Globulin 3.9 2.0 - 4.0 g/dL    A-G Ratio 0.3 (L) 0.8 - 1.7     PROTHROMBIN TIME + INR    Collection Time: 07/22/19  2:20 AM   Result Value Ref Range    Prothrombin time 15.1 11.5 - 15.2 sec    INR 1.2 0.8 - 1.2     VANCOMYCIN, RANDOM    Collection Time: 07/22/19  2:20 AM   Result Value Ref Range    Vancomycin, random 26.7 5.0 - 40.0 UG/ML   PHOSPHORUS    Collection Time: 07/22/19  2:20 AM   Result Value Ref Range    Phosphorus 3.0 2.5 - 4.9 MG/DL   POC G3    Collection Time: 07/22/19  5:10 AM   Result Value Ref Range    Device: VENT      FIO2 (POC) 30 %    pH (POC) 7.433 7.35 - 7.45      pCO2 (POC) 32.7 (L) 35.0 - 45.0 MMHG    pO2 (POC) 80 80 - 100 MMHG    HCO3 (POC) 21.5 (L) 22 - 26 MMOL/L    sO2 (POC) 95 92 - 97 %    Base deficit (POC) 2 mmol/L    Mode ASSIST CONTROL      Tidal volume 450 ml    Set Rate 12 bpm    PEEP/CPAP (POC) 5 cmH2O    Allens test (POC) N/A      Inspiratory Time 0.9 sec    Total resp. rate 24      Site RIGHT RADIAL      Patient temp. 101.9      Specimen type (POC) ARTERIAL      Performed by Alberto Gunderson     Volume control plus YES     TYPE + CROSSMATCH    Collection Time: 07/22/19 11:15 AM   Result Value Ref Range    Crossmatch Expiration 07/25/2019     ABO/Rh(D) O POSITIVE     Antibody screen NEG     CALLED TO: ELAINA LARSEN, 2700, ON 07/22/2019 AT 1325 TO PB    GLUCOSE, POC    Collection Time: 07/22/19 11:42 AM   Result Value Ref Range    Glucose (POC) 113 (H) 70 - 110 mg/dL         Chemistry   Recent Labs     07/22/19 0220 07/21/19  1608 07/21/19  0830 07/21/19  0145  07/20/19  0325   GLU 97  --   --  101*  100*  --  110*   * 136  --  137  138   < > 132*   K 3.9  --  4.0 3.8  3.8  --  4.0     --   --  103  103  --  99*   CO2 26  --   --  26  27  --  26   BUN 32*  --   --  18  18  --  27*   CREA 4.46*  --   --  3.04*  2.99*  --  4.17*   CA 7.2*  --   --  8.0*  8.1*  --  7.3*   MG 1.9  --   --  1.9  --  1.9   PHOS 3.0  --   --  3.0  --   --    AGAP 9  --   --  8  8  --  7   BUCR 7*  --   --  6*  6*  --  6*   *  --   --  131*  --  100   TP 5.1*  --   --  5.1*  --  4.8*   ALB 1.2*  --   --  1.2*  1.2*  --  1.4*   GLOB 3.9  --   --  3.9  --  3.4   AGRAT 0.3*  --   --  0.3*  --  0.4*    < > = values in this interval not displayed. CBC w/Diff   Recent Labs     07/22/19 0220 07/21/19  0145 07/20/19  0325   WBC 12.6 10.4 11.6   RBC 2.34* 2.47* 2.64*   HGB 6.6* 7.0* 7.5*   HCT 20.6* 21.6* 23.3*    196 223       ABG    Recent Labs     07/22/19  0510 07/21/19  0513 07/20/19  0438   PHI 7.433 7.458* 7.422   PCO2I 32.7* 34.6* 34.3*   PO2I 80 84 66*   HCO3I 21.5* 24.3 22.4   FIO2I 30 30 30       Micro   No results for input(s): SDES, CULT in the last 72 hours.   No results for input(s): CULT in the last 72 hours. CT (Most Recent)    Results from Hospital Encounter encounter on 07/10/19   CT CHEST WO CONT    Narrative EXAM: CT chest     INDICATION: Right lung cancer. Follow-up examination. COMPARISON: None. TECHNIQUE: Axial CT imaging from the thoracic inlet through the diaphragm  without intravenous contrast. Multiplanar reformats were generated. One or more  dose reduction techniques were used on this CT: automated exposure control,  adjustment of the mAs and/or kVp according to patient size, and iterative  reconstruction techniques. The specific techniques used on this CT exam have  been documented in the patient's electronic medical record. Digital Imaging and  Communications in Medicine (DICOM) format image data are available to  nonaffiliated external healthcare facilities or entities on a secured, media  free, reciprocally searchable basis with patient authorization for at least a  12-month period after this study. _______________    FINDINGS:    BASE OF THE NECK: Normal.    LUNGS: Significant respiratory motion artifact is present. Fiducial markers are  present in the right upper lobe. Small area of scarring is present within the  right upper lobe which measures up to 1.7 cm (axial image 103). No focal nodular  area in this area of scarring to suggest recurrent disease, however prior  studies are not available for review. Interstitial edema is present in both  lower lungs. AIRWAY: Secretions are present within the right main and segmental bronchi  causing narrowing of the airway within this region. PLEURA: Small to moderate bilateral pleural effusions with adjacent atelectasis. MEDIASTINUM: Coronary artery calcifications are present. Small pericardial  effusion. Vasculature is unremarkable. LYMPH NODES: No enlarged lymph nodes.     UPPER ABDOMEN: Full thickening is present within the visualized portions of the  stomach, which could be suggestive of mild gastritis. BONES: No acute or aggressive osseous abnormalities identified. OTHER: None.    _______________      Impression IMPRESSION:    1. Mild interstitial edema is present in both lower lungs. Small to moderate  bilateral pleural effusions with adjacent atelectasis. Small pericardial  effusion is present. 2. Minimal soft tissue prominence is present in the right upper lobe at the site  of the prior malignancy which has the appearance of soft tissue scarring. No  focal nodular regions to suggest recurrent disease, however prior studies are  not available for review. 3. Doubtful thickening and surrounding edema within the visualized portions of  the proximal stomach, which could be suggestive of mild gastritis. 4. Layering secretions are present in the distal trachea in the right main  bronchus, concerning for small amount of aspiration. XR (Most Recent). CXR reviewed by me and compared with previous CXR   Results from Hospital Encounter encounter on 07/10/19   XR CHEST PORT    Narrative AP portable chest, 7/22/2019 at 0313 hours:    INDICATION: Respiratory distress. Comparison 7/21/2019. Endotracheal tube good position just below the clavicles but well above the  aristeo. NG/OG tube off the film. Increased density left lung base consistent  with effusion and left lower lobe atelectasis/trace/focal edema is stable. Right  basilar edema or infiltrate with small right effusion also basically unchanged. The heart is stable. Impression IMPRESSION:  No definite interval change.          High complexity decision making was performed during the evaluation of this patient at high risk for decompensation with multiple organ involvement     Above mentioned total time spent on reviewing the case/medical record/data/notes/EMR/patient examination/documentation/coordinating care with nurse/consultants, exclusive of procedures with complex decision making performed and > 50% time spent in face to face evaluation.     Alexis Andrews MD  Critical Care Medicine

## 2019-07-22 NOTE — PROGRESS NOTES
Pharmacy Dosing Services: Vancomycin    Indication: SSTI    Day of therapy: 12    Other Antimicrobials (Include dose, start day & day of therapy):  Ceftriaxone 1 gm IV every 24 hours  Fluconazole 200 mg IV q24hr    Loading dose (date given): 1500 mg (7/10 at 1500)  Current Maintenance dose: Dosing per level     Goal Vancomycin Level: 15-20  (Trough 15-20 for most infections, 20 for meningitis/osteomyelitis, pre-HD level ~25)    Vancomycin Level (if drawn):   [email or web address] - 23.8 (Random - 23 hours post-dose)   [email or web address] - 26 (Random - 20hr post dose)  [email or web address] - 26.2 (Random - 31 hours post-dose)  [email or web address] - 24.7 (Random - 54 hrs post dose)  [email or web address] - 23.9 (Random - 79 hrs post dose)  [email or web address] - 18.0 (Random - 29.8 hrs post dose)  [email or web address]@[employer]yla - 16.9 (Random - 16.8 hours post dose)  [email or web address] - 26.7 (Random - 22.3 hours post dose)    Significant Cultures:   Bcx (7/10) - NGTD x 4 days  Wound Cx (7/10) - candida parapsilosis, stenotrophomonas maltophila, E. Faecalis, Staph species.       Renal function stable? (unstable defined as SCr increase of 0.5 mg/dL or > 50% increase from baseline, whichever is greater) (Y/N): N     CAPD, Hemodialysis or Renal Replacement Therapy (Y/N): Y    Recent Labs     19  0220 19  0145 19  0325   CREA 4.46* 3.04*  2.99* 4.17*   BUN 32* 18  18 27*   WBC 12.6 10.4 11.6     Temp (24hrs), Av.2 °F (38.4 °C), Min:100.1 °F (37.8 °C), Max:102.5 °F (39.2 °C)    Creatinine Clearance (Creatinine Clearance (ml/min)): ~20 ml/min   - Possible HD - Maurice placement pending    Regimen assessment:   - Last HD on  (4-hr); no HD needed on  per nephrololgy  - Patient received 1000 mg of vancomycin post last HD  - Random level of 26.7 mcg/mL 22.3 hrs post dose  - Will not need dose today (unless patient receives HD)   Maintenance dose: No doses needed for now   Next scheduled level: Random on  at [facility] 2 will follow daily and adjust medications as appropriate for renal function and/or serum levels.     Thank you,  Emilia Ricketts, PHARMD

## 2019-07-22 NOTE — PROGRESS NOTES
An EKG or ECG is recommended to be ordered because patient is on multiple medications that may cause QT prolongation. Fluconazole, Quetiapine and Clopidogrel. Last know ECG was 7/10/19 and result was 487.      [address]

## 2019-07-22 NOTE — WOUND CARE
Wound/Ostomy Nurse Progress Note          Patient: Gianni Pillai                                                                                      GOV:[id number]    MRN: [id number]              Situation: s/p left AKA with wound consult for sacral ulcer    Background: POD #5 s/p L AKA    Assessment: pt has weeping area of sanguinous drainage to lateral aspect of incision. Fluid filled nickel sized blister to medial end of incision. Stage 2 to sacral area measuring 1.7 x 1.7. With scant amount of serous drainage. Scrotum has large moisture associated skin breakdown breakdown. Scrotum has + 2 edema to penis and scrotum. Recommendation: pt requires [employer] mattress for ongoing pressure relief. Zinc paste applied to scrotum, pillow case made into sling for scrotum. Meplilex changed to sacral area. Pressure relief wedges in place. 07/22/19 1149   Wound Scrotum Posterior   Date First Assessed: 07/22/19   Primary Wound Type: Moisture Assoc. Skin Damage  Location: Scrotum  Wound Location Orientation: Posterior   Dressing Type Open to air  (zinc paste and scrotal sling applied)   Wound Length (cm) 4.5 cm   Wound Width (cm) 4.5 cm   Wound Depth (cm) 0.1 cm   Wound Volume (cm^3) 2.02 cm^3   Assessment Edema; Swelling   Tissue Type Percent Pink 100   Drainage Color Serous   Shanel-wound Assessment Edema;Fragile   Cleansing and Cleansing Agents  Normal saline   Dressing Type Applied Zinc based paste

## 2019-07-22 NOTE — DIABETES MGMT
NUTRITIONAL RE-ASSESSMENT AND GLYCEMIC CONTROL PLAN OF CARE     Paris Schneider           62 y.o.           7/10/2019                 1. 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 Cellulitis of left lower extremity    3. Elevated lactic acid level    4. Leukocytosis, unspecified type    5. Anemia, unspecified type    6. Hyponatremia      ASSESSMENT:   Pt is underweight related to inadequate caloric intake as evidenced by 73% ideal weight and BMI= 17.6kg/m2. Pt's record indicates he weighed 126 lbs 7 months ago, has gained 4 lbs. since 12/2018. However long term he has lost 20 lbs from his usual weight of 150 lbs. (3/2017). Nutrient deficit as evidenced by NPO status,  pt's report of  poor intake at  meals prior to admission and family report of drinking alcohol all day. Altered nutrition related lab values AEB Cr 4.46,  receiving dialysis. Altered nutrition related lab values AEB albumin 1.2. Inadequate oral food and beverage intake due to intubation as evidenced by NPO status and  TF. Pt appears to be tolerating tube feeding. Noted propofol rate had been decreased. Suggest small increase of TF rate to better meet estimated nutrient needs but yet continue to minimize fluid intake. INTERVENTIONS/PLAN:   1. Suggest increasing Nepro to 45 ml/hr (1944 calories, 86 grams protein, 765 ml free water/day). 2.  Monitor TF, labs, weights. SUBJECTIVE/OBJECTIVE:   Information obtained from:Pt is on a ventilator. Pt with stage 2 sacral pressure ulcer and wound on scrotum per nursing notes. Diet: npo with Nepro at 40 ml/hr providing  1728 calories,  76 g protein/d with 680 milliliters free water/d from TF. Last BM - 7/22 per I/O's    No data found.   Medications: [x]                Reviewed   Propofol at 3.5 ml/hr (92 kcals /24 hours)  Labs:   Lab Results   Component Value Date/Time    Hemoglobin A1c 5.2 04/18/2012 03:50 AM     Lab Results   Component Value Date/Time    Sodium 135 (L) 07/22/2019 02:20 AM Potassium 3.9 07/22/2019 02:20 AM    Chloride 100 07/22/2019 02:20 AM    CO2 26 07/22/2019 02:20 AM    Anion gap 9 07/22/2019 02:20 AM    Glucose 97 07/22/2019 02:20 AM    BUN 32 (H) 07/22/2019 02:20 AM    Creatinine 4.46 (H) 07/22/2019 02:20 AM    Calcium 7.2 (L) 07/22/2019 02:20 AM    Magnesium 1.9 07/22/2019 02:20 AM    Phosphorus 3.0 07/22/2019 02:20 AM    Albumin 1.2 (L) 07/22/2019 02:20 AM       Anthropometrics:       Ideal Wt 178 lbs BMI=17.6 kg/m2  Wt Readings from Last 1 Encounters:   07/22/19 64.5 kg (142 lb 3.2 oz)      Ht Readings from Last 1 Encounters:   07/18/19 6' (1.829 m)     Wt Readings from Last 3 Encounters:   07/22/19 64.5 kg (142 lb 3.2 oz)   07/12/19 59 kg (130 lb)   07/03/19 59 kg (130 lb)       Estimated Nutrition Needs: 2065 Kcals/day, Protein (g): 88 g Fluid (ml): 2000 ml  Based on:   [x]          Actual BW    []          IBW   []            Adjusted BW     Nutrition Diagnoses:       Meets Criteria for Chronic Malnutrition   [x]Moderate Malnutrition, as evidenced by:   [x] Mild muscle wasting, loss of subcutaneous fat   [x] Nutritional intake <75% of recommended intake for >1 month   [x] Weight loss of  5% in 1 month, 7.5% in 3 months, 10% in 6 months, or 20% in 1 year       Nutrition Interventions:     Weight gain 1 lb. per week by 7/29/19. Intake will meet >75% of energy and protein requirements by  7/24.    Glucose will be within target range of 70-180mg/dl by   7/22.  7/22:  Met and on-going    Nutrition Monitoring and Evaluation      []     Monitor po intake on meal rounds  [x]     Continue inpatient monitoring and intervention  []     Other:      Nutrition Risk:  [x]   High     []  Moderate    []  Minimal/Uncompromised    Lora Causey, 67 [address], [facility]   Office:  [address] [place] [address] Pager:  [phone]

## 2019-07-22 NOTE — PROGRESS NOTES
0700: Recived report from [facility]. Assumed care of patient in bed in low locked position with call bell in reach. 1000: Interdisciplinary rounds: Addressed H/H of 6.6, 20.6, 1 unit received, ativan drip added to treat possible DTs, blood culture x2 for fevers, propofol titrated lower for possible spontaneous breathing trial     1100: Call to Dr. Сергей Henao for patient maintaining MAP lower than 65,recieved order for  low dose levophed to use if patient maintained map lower than 65 sustained     1200: rounds with wound care     1415: I unit of PRBC started patient tolerated well    1600: Call to Dr. Mary Lou Strange for sodium level, order DCD and BMP placed for daily    1900: Bedside and Verbal shift change report given to [address] [place] [address]  (oncoming nurse) by Virgil Jennings RN  (offgoing nurse).  Report included the following information Procedure Summary, Intake/Output, Med Rec Status, Cardiac Rhythm NSR with 1st degree block  and Pre Procedure Checklist.

## 2019-07-22 NOTE — PROGRESS NOTES
Physical Exam   Skin: Skin is warm and dry. Primary Nurse Ethan Walker RN and Jenifer Parsons RN performed a dual skin assessment on this patient Impairment noted- see wound doc flow sheet.

## 2019-07-23 NOTE — PROGRESS NOTES
Physical Exam   Skin:        Drip Checks:   #Fentanyl gtt at 50 mcg/hr  # Propofol @ 10 mcg/kg/min  # Ativan @ 2 mg/hr. 0888 Dr. Julia Hinton at bedside and wants Lorazepam gtt to increased to 4 mg'/hr. 1143 Pt still tachypneic. Suctioned prn. Precedex gtt started. 1230 Dialysis ongoing at bedside. 1445 Dialysis is done. Dialysis nurse reported able to pulled out 2.5 liters of fluid. 1600 Pt is calmer. Reassessment done. Pt is tolerating tube feeding. 1715 Incontinent of large ; loose stool. Incontinent care done. Bed pad changed. 1920 Bedside and Verbal shift change report given to [facility] RN (oncoming nurse) by Millicent El RN   (offgoing nurse). Report included the following information SBAR, Kardex, Intake/Output, MAR and Cardiac Rhythm SR_ST.

## 2019-07-23 NOTE — PROGRESS NOTES
Pharmacy Dosing Services: Vancomycin    Indication: SSTI    Day of therapy: 12    Other Antimicrobials (Include dose, start day & day of therapy):  Cefepime 0.5 gm IV q24hr  Fluconazole 200 mg IV q24hr    Loading dose (date given): 1500 mg (7/10 at 1500)  Current Maintenance dose: HD dosing    Goal Vancomycin Level: 15-20  (Trough 15-20 for most infections, 20 for meningitis/osteomyelitis, pre-HD level ~25)    Vancomycin Level (if drawn):   [email or web address] - 18.0 (Random - 29.8 hrs post dose)  [email or web address] - 16.9 (Random - 16.8 hours post dose)  [email or web address] - 26.7 (Random - 22.3 hours post dose)  [email or web address] - 22.0 (Random - 47.3 hours post dose)    Significant Cultures:   Bcx (7/10) - NGTD x 4 days  Wound Cx (7/10) - candida parapsilosis, stenotrophomonas maltophila, E. Faecalis, Staph species. Renal function stable? (unstable defined as SCr increase of 0.5 mg/dL or > 50% increase from baseline, whichever is greater) (Y/N): N     CAPD, Hemodialysis or Renal Replacement Therapy (Y/N): Y    Recent Labs     19  0315 19  0220 19  0145   CREA 5.77* 4.46* 3.04*  2.99*   BUN 45* 32* 18  18   WBC 12.7 12.6 10.4     Temp (24hrs), Av.4 °F (38 °C), Min:99.8 °F (37.7 °C), Max:101.8 °F (38.8 °C)    Creatinine Clearance (Creatinine Clearance (ml/min)): ~12 ml/min (currently HD)    Regimen assessment:   - Post L-AKA  - HD ordered for Tue/Thur/Sat   Maintenance dose: 1000 mg IV post HD (Tue/Thur/Sat)  Next scheduled level: Random on  at 0400     Pharmacy will follow daily and adjust medications as appropriate for renal function and/or serum levels.     Thank you,  Emilia Sears, PHARMD

## 2019-07-23 NOTE — PROGRESS NOTES
Problem: Ventilator Management  Goal: *Adequate oxygenation and ventilation  Outcome: Progressing Towards Goal   VENTILATOR CARE PLAN    Problem: Ventilator Management  Goal: *Adequate oxygenation/ ventilation/ and extubation      Patient:        [facility]     62 y.o.   male     7/23/2019  8:31 AM  Patient Active Problem List   Diagnosis Code    Gangrene of foot ([address] I96    Cellulitis of left lower extremity L03. 80    Malnourished ([address] E46    Peripheral vascular disease ([address] I73.9    Hyponatremia E87.1    Acute kidney injury superimposed on chronic kidney disease ([address] N17.9, N18.9    Dehydration E86.0    Sepsis ([address] A41.9    Gangrene ([address] I96    Anemia D64.9    Acute respiratory failure (HCC) J96.00    Septic encephalopathy G93.41    Transaminitis C06.6    Metabolic acidosis Y24.0    Fever R50.9    Sacral ulcer ([address] L98.429       Sepsis ([address] [A41.9]  Hyponatremia [E87.1]  Gangrene ([address] [I96]  Hyponatremia [E87.1]    Reason patient intubated: Acute respiratory failure. Ventilator day: 7     Ventilator settings: ACVC+ 12 / 450 / +5 / 30%. ETT Size/Placement: 7.5-24 lip. ABG:  Date:7/23/2019  Lab Results   Component Value Date/Time    PHI 7.409 07/23/2019 04:17 AM    PCO2I 31.1 (L) 07/23/2019 04:17 AM    PO2I 59 (L) 07/23/2019 04:17 AM    HCO3I 19.7 (L) 07/23/2019 04:17 AM    FIO2I 30 07/23/2019 04:17 AM       Chest X-ray:  Date:7/23/2019  Results from Hospital Encounter encounter on 07/10/19   XR CHEST PORT    Narrative AP portable chest, 7/22/2019 at 0313 hours:    INDICATION: Respiratory distress. Comparison 7/21/2019. Endotracheal tube good position just below the clavicles but well above the  aristeo. NG/OG tube off the film. Increased density left lung base consistent  with effusion and left lower lobe atelectasis/trace/focal edema is stable. Right  basilar edema or infiltrate with small right effusion also basically unchanged. The heart is stable.       Impression IMPRESSION:  No definite interval change. Lab Test:  Date:7/23/2019  WBC:   Lab Results   Component Value Date/Time    WBC 12.7 07/23/2019 03:15 AM   HGB:   Lab Results   Component Value Date/Time    HGB 7.2 (L) 07/23/2019 03:15 AM    PLTS:   Lab Results   Component Value Date/Time    PLATELET 965 91/05/0190 03:15 AM       SaO2%/flow: @KFSaint Joseph EastWM5(9)@    Vital Signs:   Patient Vitals for the past 8 hrs:   Temp Pulse Resp BP SpO2   07/23/19 0825 -- 91 (!) 31 -- 99 %   07/23/19 0800 99.3 °F (37.4 °C) 92 (!) 31 115/74 98 %   07/23/19 0730 -- 89 28 127/78 100 %   07/23/19 0700 -- 88 24 134/76 98 %   07/23/19 0630 -- 88 (!) 31 139/82 99 %   07/23/19 0600 -- 86 25 137/72 100 %   07/23/19 0530 -- 83 26 106/66 99 %   07/23/19 0500 -- 84 28 108/65 100 %   07/23/19 0430 -- 83 23 116/70 100 %   07/23/19 0409 -- 85 27 -- 100 %   07/23/19 0400 99.8 °F (37.7 °C) 86 24 104/66 100 %   07/23/19 0330 -- 86 27 128/72 100 %   07/23/19 0300 -- 86 21 134/73 100 %   07/23/19 0230 -- 88 27 123/79 100 %   07/23/19 0200 -- 85 24 97/57 100 %   07/23/19 0130 -- 85 25 97/56 100 %   07/23/19 0100 -- 87 26 100/57 100 %       Wean Screen Pass (Yes or No): No  Wean Screen Reason for Failure: Agitation.   Duration of Weaning Trial:  Additional Comments:        PLAN OF CARE: SBT daily, Duonebs Q6H       GOAL: Extubation

## 2019-07-23 NOTE — PROGRESS NOTES
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 Dialyzed 7/17, 7/18, 7/20. Next dialysis today. Minimize intake, volume overloaded at this point. Will pull 3 liters with dialysis if bp tolerates. Overall prognosis appears poor, may have to readdress utility of dialysis with family in the near future. · Non small cell lung cancer. In remission per oncology. 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 Consider substituting vanc with alternative abx. · Severe anemia. Transfused. · Resp failure. · Debilitation/malnutrition. Subjective: Intubated, sedated. Tolerate tf. Patient Active Problem List   Diagnosis Code    Gangrene of foot ([address] I96    Cellulitis of left lower extremity L03. 80    Malnourished ([address] E46    Peripheral vascular disease ([address] I73.9    Hyponatremia E87.1    Acute kidney injury superimposed on chronic kidney disease ([address] N17.9, N18.9    Dehydration E86.0    Sepsis (HCC) A41.9    Gangrene ([address] I96    Anemia D64.9    Acute respiratory failure (HCC) J96.00    Septic encephalopathy G93.41    Transaminitis S57.3    Metabolic acidosis V20.9    Fever R50.9    Sacral ulcer (HCC) L98.429       Current Facility-Administered Medications   Medication Dose Route Frequency Provider Last Rate Last Dose    vancomycin (VANCOCIN) 1,000 mg in 0.9% sodium chloride (MBP/ADV) 250 mL adv  1,000 mg IntraVENous Q TU, TH & SAT Prachi Issa DO        [START ON 7/25/2019] VANCOMYCIN INFORMATION NOTE   Other ONCE Fernando Paredes, DO        calcium gluconate 2 g in 0.9% sodium chloride 50 mL IVPB  2 g IntraVENous ONCE Katy Ye MD        acetaminophen (TYLENOL) tablet 650 mg  650 mg Oral Q4H PRN Mya Valdovinos PA-C   650 mg at 07/22/19 2761    LORazepam (ATIVAN) 1 mg/mL in D5W infusion  0-5 mg/hr IntraVENous TITRATE Maggy Bhatia MD 4 mL/hr at 07/23/19 0820 4 mg/hr at 07/23/19 0820    0.9% sodium chloride infusion 250 mL  250 mL IntraVENous PRN Maggy Bhatia MD        NOREPINephrine (LEVOPHED) 8 mg in 0.9% NS 250ml infusion  2-16 mcg/min IntraVENous TITRATE Maggy Bhatia MD   Stopped at 07/22/19 1300    fluconazole (DIFLUCAN) 200mg/100 mL IVPB (premix)  200 mg IntraVENous Q24H Fco Ochoa  mL/hr at 07/22/19 2130 200 mg at 07/22/19 2130    QUEtiapine (SEROquel) tablet 50 mg  50 mg Oral BID DIANDRA Wayne   50 mg at 07/23/19 0827    ELECTROLYTE REPLACEMENT PROTOCOL - Potassium Renal Dosing  1 Each Other PRN Jimenez Nixon MD        ELECTROLYTE REPLACEMENT PROTOCOL - Magnesium   1 Each Other PRN Jimenez Nixon MD        ELECTROLYTE REPLACEMENT PROTOCOL  - Phosphorus Renal Dosing  1 Each Other PRN Jimenez Nixon MD        ELECTROLYTE REPLACEMENT PROTOCOL - Calcium   1 Each Other PRN Jimenez Nixon MD        0.9% sodium chloride infusion 250 mL  250 mL IntraVENous PRN Jimenez Nixon MD        clopidogrel (PLAVIX) tablet 75 mg  75 mg Oral DAILY Ag Jordan PA-C   75 mg at 07/23/19 0827    aspirin tablet 325 mg  325 mg Oral DAILY Ag Jordan PA-C   325 mg at 07/23/19 0827    carvedilol (COREG) tablet 6.25 mg  6.25 mg Oral BID WITH MEALS Dean Bailey PA   6.25 mg at 07/23/19 0827    pantoprazole (PROTONIX) 40 mg in sodium chloride 0.9% 10 mL injection  40 mg IntraVENous DAILY DIANDRA Wayne   40 mg at 07/23/19 0826    sodium chloride (NS) flush 5-40 mL  5-40 mL IntraVENous Q8H Guy Gu MD   10 mL at 07/23/19 0610    sodium chloride (NS) flush 5-40 mL  5-40 mL IntraVENous PRN Guy Gu MD        0.9% sodium chloride infusion 250 mL  250 mL IntraVENous PRN Jessika Bennett MD        0.9% sodium chloride infusion 250 mL  250 mL IntraVENous PRN Jessika Bennett MD   Stopped at 07/17/19 1200    0.9% sodium chloride infusion  50 mL/hr IntraVENous DIALYSIS PRN Lenny Levine MD        heparin (porcine) 1,000 unit/mL injection 1,000 Units  1,000 Units InterCATHeter DIALYSIS PRN Lenny Levine MD        sodium chloride (NS) flush 5-40 mL  5-40 mL IntraVENous Q8H Lester Chester MD   10 mL at 07/23/19 0600    sodium chloride (NS) flush 5-40 mL  5-40 mL IntraVENous PRN Lester Chester MD        naloxone [facility]) injection 0.1 mg  0.1 mg IntraVENous PRN Lester Chester MD        ondansetron [facility]) injection 4 mg  4 mg IntraVENous PRN Lester Chester MD        senna (SENOKOT) tablet 8.6 mg  1 Tab Oral BID Lester Chester MD   8.6 mg at 07/23/19 0839    fentaNYL (PF) 900 mcg/30 ml infusion soln  0-200 mcg/hr IntraVENous TITRATE Lester Chester MD 1.7 mL/hr at 07/23/19 0725 50 mcg/hr at 07/23/19 0725    propofol (DIPRIVAN) infusion  0-50 mcg/kg/min IntraVENous TITRATE Briana Molina NP 3.5 mL/hr at 07/23/19 0725 10 mcg/kg/min at 07/23/19 0725    chlorhexidine (PERIDEX) 0.12 % mouthwash 10 mL  10 mL Oral Q12H Lester Chester MD   10 mL at 07/23/19 0826    albuterol-ipratropium (DUO-NEB) 2.5 MG-0.5 MG/3 ML  3 mL Nebulization Q6H RT Lester Chester MD   3 mL at 07/23/19 0823    fentaNYL citrate (PF) injection 25-50 mcg  25-50 mcg IntraVENous Q1H PRN OgboluZachka I, NP   50 mcg at 07/18/19 1549    cefepime (MAXIPIME) 0.5 g in 0.9% sodium chloride 100 mL IVPB  0.5 g IntraVENous Q24H OgboluZachka I, NP   0.5 g at 07/22/19 2115    oxyCODONE-acetaminophen (PERCOCET) 5-325 mg per tablet 1-2 Tab  1-2 Tab Oral Q4H PRN Lenny Levine MD   2 Tab at 07/16/19 1215    heparin (porcine) injection 5,000 Units  5,000 Units SubCUTAneous Q8H Lenny Levine MD   5,000 Units at 07/23/19 0600    VANCOMYCIN INFORMATION NOTE 1 Each  1 Each Other Rx Dosing/Monitoring Lenny Levine MD        therapeutic multivitamin [facility]) tablet 1 Tab  1 Tab Oral DAILY Lenny Levine MD   1 Tab at 07/23/19 0827    sodium chloride (NS) flush 5-10 mL  5-10 mL IntraVENous PRN Jef Wiggins MD   10 mL at 07/15/19 0503    ondansetron (ZOFRAN) injection 4 mg  4 mg IntraVENous Q4H PRN Jef Wiggins MD   4 mg at 07/17/19 1113       Objective  Vitals:    07/23/19 0700 07/23/19 0730 07/23/19 0800 07/23/19 0825   BP: 134/76 127/78 115/74    Pulse: 88 89 92 91   Resp: 24 28 (!) 31 (!) 31   Temp:   99.3 °F (37.4 °C)    TempSrc:       SpO2: 98% 100% 98% 99%   Weight:       Height:             Intake/Output Summary (Last 24 hours) at 7/23/2019 0910  Last data filed at 7/23/2019 0700  Gross per 24 hour   Intake 1518.03 ml   Output 310 ml   Net 1208.03 ml           Admission weight: Weight: 59 kg (130 lb) (07/10/19 1511)  Last Weight Metrics:  Weight Loss Metrics 7/22/2019 7/3/2019 4/29/2017 3/11/2017   Today's Wt 142 lb 3.2 oz 130 lb 150 lb 150 lb   BMI 19.29 kg/m2 17.63 kg/m2 20.34 kg/m2 20.34 kg/m2             Physical Assessment:     General: intubated, sedated. ET in place. Neck: No jvd. LUNGS: diffusely diminished air entry, bl exp rhonchi. No crackles. CVS EXM: S1, S2  RRR. Abdomen: soft, non tender. Lower Extremities:  1+ bl le  edema. Lt aka stump dressings intact. Rt femoral temporary dialysis catheter.         Lab    CBC w/Diff Recent Labs     07/23/19  0315 07/22/19  2300 07/22/19  0220 07/21/19  0145   WBC 12.7  --  12.6 10.4   RBC 2.54*  --  2.34* 2.47*   HGB 7.2* 7.1* 6.6* 7.0*   HCT 22.3* 21.9* 20.6* 21.6*     --  191 196        Chemistry Recent Labs     07/23/19  0315 07/22/19  0220 07/21/19  1608 07/21/19  0830 07/21/19  0145   GLU 88 97  --   --  101*  100*   * 135* 136  --  137  138   K 4.3 3.9  --  4.0 3.8  3.8    100  --   --  103  103   CO2 23 26  --   --  26  27   BUN 45* 32*  --   --  18  18   CREA 5.77* 4.46*  --   --  3.04*  2.99*   CA 7.0* 7.2*  --   --  8.0*  8.1*   AGAP 12 9  --   --  8  8   BUCR 8* 7*  --   --  6*  6*   * 164*  --   --  131*   TP 5.3* 5.1*  --   --  5.1*   ALB 1. 1* 1.2*  --   --  1.2*  1.2*   GLOB 4.2* 3.9  --   --  3.9   AGRAT 0.3* 0.3*  --   --  0.3*   PHOS 3.6 3.0  --   --  3.0         No results found for: IRON, FE, TIBC, IBCT, PSAT, FERR   Lab Results   Component Value Date/Time    Calcium 7.0 (L) 07/23/2019 03:15 AM    Phosphorus 3.6 07/23/2019 03:15 AM        Pierre Barron M.D.   [facility]  Phone

## 2019-07-23 NOTE — DIALYSIS
PETER        ACUTE HEMODIALYSIS FLOW SHEET      HEMODIALYSIS ORDERS: Physician:  Cain Vitale       Dialyzer: revaclear   Duration: 4    hr  BFR: 300      DFR:   600    Dialysate:  Temp 36-37*C  K+  3        Ca+ 3    Na 138   Bicarb 35    Weight:      kg    Patient Chart []     Unable to Obtain [x]   Dry weight/UF Goal: 3000   Access TDC  Needle Gauge     Heparin []  Bolus  Units    [] Hourly    Units    [x]  None      Catheter locking solution       Pre BP:  110/65       Pulse:  86             Respirations: 24    Temperature: 99.3     Labs: Pre     Post:      [x] N/A   Additional Orders(medications, blood products, hypotension management):  [x] N/A     [x]    Peter Consent Verified     CATHETER ACCESS: []N/A   [x]Right   []Left   []IJ     [x]Fem   []chest wall   [] First use X-ray verified     []Tunnel                [x] Non Tunneled   [x]No S/S infection  []Redness  []Drainage []Cultured []Swelling []Pain   [x]Medical Aseptic Prep Utilized   []Dressing Changed  [] Biopatch  Date:    []Clotted   [x]Patent   Flows: [x]Good  []Poor  []Reversed   If access problem,  notified: []Yes    [x]N/A  Date:     GRAFT/FISTULA ACCESS:  [x]N/A     []Right     []Left     []UE     []LE   []AVG   []AVF        []Buttonhole    []Medical Aseptic Prep Utilized   []No S/S infection  []Redness  []Drainage []Cultured []Swelling []Pain    Bruit:   [] Strong    [] Weak       Thrill :   [] Strong    [] Weak       Needle Gauge:    g    Length:    inch   If access problem,  notified: []Yes     [x]N/A  Date:   Please describe access if present and not used:                            GENERAL ASSESSMENT:      LUNGS:  Rate  SaO2% 100    [] N/A    [] Clear  [x] Coarse  [] Crackles  [] Wheezing        [] Diminished     Location : []RLL   []LLL    []RUL  []HAVEN     Cough: []Productive  []Dry  []N/A   Respirations:  []Easy  []Labored     Therapy:   []RA  []NC  l/min    Mask: []NRB []Venti       O2%                  [x]Ventilator  [x]Intubated  [] Trach  [] BiPaP     CARDIAC: [x]Regular      [] Irregular   [] Pericardial Rub  [] JVD        []  Monitored  [] Bedside  [] Remotely monitored [] N/A  Rhythm:      EDEMA: [] None  [x]Generalized  [] Pitting [] 1    [] 2    [] 3    [] 4                 [] Facial  [] Pedal  []  UE  [] LE     SKIN:   [x] Warm  [] Hot     [] Cold   [x] Dry     [] Pale   [] Diaphoretic                  [] Flushed  [] Jaundiced  [] Cyanotic  [] Rash  [] Weeping     LOC:    [] Alert      []Oriented:    [] Person     [] Place  []Time               [] Confused  [] Lethargic  [x] Medicated  [x] Non-responsive     GI / ABDOMEN:     [] Flat    [] Distended    [x] Soft    [] Firm   []  Obese                             [] Diarrhea  [x] Bowel Sounds  [] Nausea  [] Vomiting       / URINE ASSESSMENT:   [] Voiding   [x] Oliguria  [] Anuria   [x]  Galvez     [] Incontinent    []  Incontinent Brief      []  Bathroom Privileges       PAIN:   [x] 0 []1  []2   []3   []4   []5   []6   []7   []8   []9   []10              Scale 0-10  Action/Follow Up:      MOBILITY:     [] Amb    [] Amb/Assist    [x] Bed    [] Wheelchair  [] Stretcher      All Vitals and Treatment Details on Attached [facility] [place]: Curtis Cowart [address]        Room # 0214/35      [] 1st Time Acute  [] Stat  [x] Routine  [] Urgent     [] Acute Room  []  Bedside  [x] ICU/CCU  [] ER   Isolation Precautions:   There are currently no Active Isolations      Special Considerations:         [x] Blood Consent Verified []N/A     ALLERGIES:   Allergies   Allergen Reactions    Pcn [Penicillins] Unknown (comments)     Patients states that he was allergic to PCN as a child because he did not like receiving PCN injections, not because he developed a reaction to PCN administration                Code Status:Full Code        Hepatitis Status:    2nd RN check:                    Lab Results   Component Value Date/Time    Hepatitis B surface Ag <0.10 07/11/2019 12:40 PM    Hepatitis C virus Ab 0.12 07/11/2019 12:40 PM                     Current Labs:   Lab Results   Component Value Date/Time    Sodium 135 (L) 07/23/2019 03:15 AM    Potassium 4.3 07/23/2019 03:15 AM    Chloride 100 07/23/2019 03:15 AM    CO2 23 07/23/2019 03:15 AM    Anion gap 12 07/23/2019 03:15 AM    Glucose 88 07/23/2019 03:15 AM    BUN 45 (H) 07/23/2019 03:15 AM    Creatinine 5.77 (H) 07/23/2019 03:15 AM    BUN/Creatinine ratio 8 (L) 07/23/2019 03:15 AM    GFR est AA 12 (L) 07/23/2019 03:15 AM    GFR est non-AA 10 (L) 07/23/2019 03:15 AM    Calcium 7.0 (L) 07/23/2019 03:15 AM      Lab Results   Component Value Date/Time    WBC 12.7 07/23/2019 03:15 AM    HGB 7.2 (L) 07/23/2019 03:15 AM    HCT 22.3 (L) 07/23/2019 03:15 AM    PLATELET 219 61/83/1259 03:15 AM    MCV 87.8 07/23/2019 03:15 AM                                                                                     DIET: DIET NPO  DIET TUBE FEEDING       PRIMARY NURSE REPORT: First initial/Last name/Title      Pre Dialysis: Belinda Vaughn RN       Time:  1015        EDUCATION:       [x] Patient [] Other         Knowledge Basis: [x]None []Minimal [] Substantial   Barriers to learning     []N/A   [] Access Care     [] S&S of infection     [] Fluid Management     []K+     [x]Procedural    []Albumin     [] Medications     [] Tx Options     [] Transplant     [] Diet     [] Other   Teaching Tools:  [x] Explain  [] Demo  [] Handouts [] Video  Patient response:      [] Verbalized understanding  [] Teach back  [] Return demonstration [x] Requires follow up  Pt.  Unresponsive on vent sedated   Inappropriate due to            [x]    Time Out/Safety Check  [x]   Extracorporeal Circuit Tested for integrity       RO/HEMODIALYSIS MACHINE SAFETY CHECKS  Before each treatment:     Machine Number:                   [facility]                                   [] Unit Machine #  with centralized RO                                  [] Portable Machine #1/RO serial # [id number] [] Portable Machine #2/RO serial # [id number]                                  [] Portable Machine #4/RO serial # [id number]                                                     [facility]                                  [] Portable Machine #1/RO serial # [id number]                                   [x] Portable Machine #2/RO serial # [id number]                                  [] Portable Machine #3/RO serial #  [id number]      Alarm Test:  Pass time    1020            [x]RO/Machine Log Complete      Temp   36             Dialysate: pH 7.2 Conductivity: Meter 14      HD Machine  14             TCD: 13.9  Dialyzer Lot # [id number]             Blood Tubing Lot # [id number]        Saline Lot #  [id number]        CHLORINE TESTING-Before each treatment and every 4 hours    Total Chlorine:    [x] less than 0.1 ppm  Time: 1030     4 Hr/2nd Check Time:       (if greater than 0.1 ppm from Primary then every 30 minutes from Secondary)     TREATMENT INITIATION  with Dialysis Precautions:   [x] All Connections Secured                 [x] Saline Line Double Clamped   [x] Venous Parameters Set                  [x] Arterial Parameters Set    [x] Prime Given 250ml                          [x]Air Foam Detector Engaged      Treatment Initiation Note:  Started treatment using rt fen HD cath. Pt. Sedated and on vent. During Treatment Notes:1130 Pt. Gaging on ett tube and resp up in 35s. Notified primary RN and she increased sedation. Medication Dose Volume Route Time [facility] name Title         RN         RN         RN                     RN                   Post Assessment:   Dialyzer Cleared:    [] Good [x] Fair  [] Poor  Blood processed:      L  UF Removed  2500    Ml  POst BP: 125/69            Pulse:   90         Respirations:  25    Temperature:   99.2  Lungs:     [] Clear      [x] Course         [] Crackles    [] Wheezing         [] Diminished   Post Tx Vascular Access:   AVF/AVG: Bleeding stopped   Art     min. Odessia Maple   + N/A +   Cardiac:   [x] Regular   [] Irregular   [] Monitor  [] N/A      Rhythm:       Catheter:   Locking solution: Heparin 1:1000   Art. 1.4     Josh. 1.4    N/A    Skin:  Pain:    [x] Warm  [x] Dry [] Diaphoretic    [] Flushed    [] Pale [] Cyanotic [x]0  []1  []2   []3  []4   []5   []6   []7   []8   []9   []10     Post Treatment Note: Pt.  Tolerated treatment         POST TREATMENT PRIMARY NURSE HANDOFF REPORT:     First initial/Last name/Title         Post Dialysis:Mercedes Khan RN    Time: 3105      Abbreviations: AVG-arterial venous graft, AVF-arterial venous fistula, IJ-Internal Jugular, Subcl-Subclavian, Fem-Femoral, Tx-treatment, AP/HR-apical heart rate, DFR-dialysate flow rate, BFR-blood flow rate, AP-arterial pressure, -venous pressure, UF-ultrafiltrate, TMP-transmembrane pressure, Josh-Venous, Art-Arterial, RO-Reverse Osmosis

## 2019-07-23 NOTE — PROGRESS NOTES
Physical Exam   Constitutional: He appears cachectic. He is sedated, intubated and restrained. Pulmonary/Chest: He is intubated. Skin:        Bedside shift change report was given to me, Ginny Cordova RN ( ICU night shift nurse), by Otoniel Eid RN   ( ICU day shift nurse). Report included the following information:  SBAR, kardex, consultations, hemodynamic monitoring, intake/output, MAR, lab results, VS trends, cardiac rhythm noted NSR. Skin, neuro, respiratory status, order verification, and critical IV gtt rate verification has been dually noted and verified at the bedside with:    Otoniel Eid RN                                       2700 ICU Nurse's Note:    2000: Pt is drowsy, RASS score -1,eyes closed at this moment but pt opens them spontaneously and to verbal command. He is not interactive in plan of care and is unable to follow directions. Pt continues to respond to painful stimulus by withdrawing his extremities. Pt independently moves BUE, requires the use of restraints to protect airway and essential lines. Pt requires max assist with all ADLs and repositioning. Bed is locked and in lowest position, side rails up x 3. Interventions are ongoing, will continue to monitor. Neurological: as noted in assessment   Cardiovascular: NSR on the monitor. Pulmonary: breath sounds coarse, diminished, saturations maintained at 97% via vent at 30% FiO2, moderate to copious amounts of thick yellow to white sputum suctioned via yaunker and inline suction catheter. GI/: Abdomen is soft, flat, non-distended with hypoactive bowel sounds. Pt is NPO. OG tube is in place @ 79 cm, infusing Nepro tube feedings at goal rate of 45 ml/hr. Placement verified and < 10 ml of tan gastric residual was noted. Last BM noted 07/23/2019. Galvez catheter remains intact with oliguric, max colored urine output.   IVF/Critical gtts: Fentanyl continuous PCA, Propofol gtt, Ativan gtt, IV abx  Skin: as noted above    2200: Pt tolerated scheduled HS meds, mccarthy care, mouth care, ET tube care and repositioning. Postop LLE remains unchanged, elevated on pillow. Pt continues to elicit responsiveness and withdraws upon application of painful stimulus. 0000: POC Accucheck noted 107. Current temp 99.9 axillary. VSS, he is in no acute distress at this time. 0200: Scheduled AM labs were drawn at the bedside. Pt tolerated without incident. 0530: Portable CXR completed at the bedside. Pt remains much more relaxed and appears to have decreased sensation of pain and discomfort. 0600: AM accucheck noted 98, no Humulog SSI indicated. Pt remains NPO. No noted changes to postop left AKA. Postop stump remains elevated on pillow, mepilex drsg remains intact with scant breakthrough drainage noted. Interventions are ongoing. Will continue to monitor.    0710: Bedside change of shift report given to: Kesha Anderson RN

## 2019-07-23 NOTE — PROGRESS NOTES
attempted to completed a follow up visit with patient in room 2704 this morning but found patient still on life support and unable to communicate. . No family seen at this time. . Chaplains will continue to follow and will provide pastoral care on an as needed/requested basis    Chaplain Forrest Nicholson   Board Certified [address] [place] [address]   [phone]

## 2019-07-23 NOTE — PROGRESS NOTES
Internal Medicine Progress Note    Patient's Name: Magali Barraza  Admit Date: 7/10/2019  Length of Stay: 13      Assessment/Plan     Principal Problem:    Sepsis ([address] (7/10/2019)    Active Problems:    Gangrene ([address] (7/10/2019)      Acute kidney injury superimposed on chronic kidney disease ([address] (7/10/2019)      Malnourished ([address] (6/29/2019)      Peripheral vascular disease ([address] (6/29/2019)      Hyponatremia (6/29/2019)      Dehydration (7/10/2019)      Anemia (7/16/2019)      Acute respiratory failure ([address] (7/17/2019)      Septic encephalopathy (7/20/2019)      Transaminitis (5/68/3971)      Metabolic acidosis (2/77/9263)      Fever (7/22/2019)      Sacral ulcer ([address] (7/22/2019)        Pt/OT rec SNF    Sepsis/septic encephalopathy  - gangrenous first and third toe, s/p L AKA  - Recent wound culture from previous admission susceptible to rocephin and vanc, continue abx, renally dose  - repeat wound culture - Stenotrophomonas maltophilia, enterococcus faecalis, coag neg staph  Fevers improved    Gangrene/PVD  - Vasc consulted - appreciate  - podiatry consulted - appreciate   - L AKA done 7/17  - IV abx   -continue ASA and plavix      [place]  - strict I&Os, mccarthy  - HD catheter placed  - nephrology following - appreciate the services  - refused renal US  - dialysis started 7/17    Acute resp failure  - intubated 7/17  - PCCM consulted - vent mgmt per them    Malnourished  - Nutritional supplements with all meals    Hyponatremia  - acute on chronic, stable  - Nephrology consult - appreciate  - improving    Anemia  - PRBCs transfused as needed for hgb <7  - monitor H&H  -transfusion yesterday, h/h stable    -Wound care consult for stage 2 sacral ulcer- recs per them appreciate services     -Febrile despite abx, treated with tylenol, unknown cause.   Blood cultures redrawn    - Cont acceptable home medications for chronic conditions   - DVT protocol    I have personally reviewed all pertinent labs and films that have officially resulted over the last 24 hours. I have personally checked for all pending labs that are awaiting final results. Interval History   \"Vini Tesfaye is a 62 y.o. male with a PMHx listed below of who presented to the ED with complaints of worsening LLE pain. Patient was admitted on 6/30/19 and discharged three days ago for gangrenous toes. Vascular surgery and podiatry were consulted then and he was recommended for multilevel revascularization, he was to follow up as out patient and said that his son could transport him. Per patient now, son was unable to transport patient to f/u appointment, so he returned due to persistent symptoms.       In the ED vascular surgery consulted, podiatry consulted. He presents with worsening creatinine and sodium. I spoke with Nephrology and they recommend fluids. His only complaint is pain, no fevers, chills, weakness, AMS. Sodium 121, will be admitted for further eval. He doesn't know if he has been taking his lasix, but says hes been taking his abx\"    Per podiatry - no need for urgent amputation, wait until revascularization. Nephrology consulted - Acute on chronic hyponatremia improved with gentle IVF. Oncology consulted - bone scan ordered to complete restaging - No definite sonographic evidence of osseous metastatic disease. Repeat wound culture 7/11 - Stenotrophomonas maltophilia, enterococcus faecalis, coag neg staph. Patient continually refused to complete MRI. Patient's renal function declined rapidly, patient started dialysis 7/17. PRBCs transfused as needed for hgb <7. L AKA 7/17. Patient developed acute resp failure postoperatively and was intubated.  Wound care consulted: stage 2 sacral ulcer    Subjective     Pt s/e @ bedside. Remains intubated, tachypnea. High grade Fevers resolved.   Does not follow commands     Objective     Visit Vitals  /64   Pulse 86   Temp 99.3 °F (37.4 °C) (Oral)   Resp 25   Ht 6' (1.829 m)   Wt 64.5 kg (142 lb 3.2 oz)   SpO2 100%   BMI 19.29 kg/m²       Physical Exam:  General Appearance: NAD, intubated, sedated, low grade fever  HENT: normocephalic/atraumatic, moist mucus membranes  Neck: No JVD, supple  Lungs: course breath sounds bilat, tachypnea  CV: RRR, no m/r/g  Abdomen: soft, non-tender, normal bowel sounds  Extremities: no cyanosis, no peripheral edema, L AKA  Neuro: intubated, does not follow commands    Intake and Output:  Current Shift:  07/23 0701 - 07/23 1900  In: 449.9 [I.V.:89.9]  Out: 30 [Urine:30]  Last three shifts:  07/21 1901 - 07/23 0700  In: 2746.3 [I.V.:754.3]  Out: 465 [Urine:465]    Lab/Data Reviewed:  BMP:   Lab Results   Component Value Date/Time     (L) 07/23/2019 03:15 AM    K 4.3 07/23/2019 03:15 AM     07/23/2019 03:15 AM    CO2 23 07/23/2019 03:15 AM    AGAP 12 07/23/2019 03:15 AM    GLU 88 07/23/2019 03:15 AM    BUN 45 (H) 07/23/2019 03:15 AM    CREA 5.77 (H) 07/23/2019 03:15 AM    GFRAA 12 (L) 07/23/2019 03:15 AM    GFRNA 10 (L) 07/23/2019 03:15 AM     CBC:   Lab Results   Component Value Date/Time    WBC 12.7 07/23/2019 03:15 AM    HGB 7.2 (L) 07/23/2019 03:15 AM    HCT 22.3 (L) 07/23/2019 03:15 AM     07/23/2019 03:15 AM         Imaging Reviewed:  Jose Mccormack Chest Port    Result Date: 7/23/2019  EXAM:  PORTABLE CHEST INDICATION:  Respiratory distress. TECHNIQUE:  Portable, semierect AP view. COMPARISON:  07/22/2019 ____________________ FINDINGS:  SUPPORT DEVICES: Endotracheal tube is approximately 5 cm above the aristeo. Sump tube is traversing below the left hemidiaphragm, tip not imaged. HEART AND MEDIASTINUM: Stable. LUNGS AND PLEURAL SPACES: Fiducial markers are noted within the right upper lobe. Hazy opacities within both mid to lower lung zones, similar to prior study. Suspect trace bilateral pleural effusions. No discernible pneumothorax. BONY THORAX AND SOFT TISSUES: No acute osseous abnormality. Thoracic spondylosis.  ____________________     IMPRESSION:  No significant interval change. Ill-defined opacities within both mid to lower lung zones, may represent atelectasis, pneumonia or focal edema. Trace bilateral pleural effusions. Endotracheal tube approximately 5 cm above the aristeo.        Medications Reviewed:  Current Facility-Administered Medications   Medication Dose Route Frequency    vancomycin (VANCOCIN) 1,000 mg in 0.9% sodium chloride (MBP/ADV) 250 mL adv  1,000 mg IntraVENous Q TU, TH & SAT    [START ON 7/25/2019] VANCOMYCIN INFORMATION NOTE   Other ONCE    dexmedeTOMidine (PRECEDEX) 400 mcg in 0.9% sodium chloride 100 mL infusion  0.2-0.7 mcg/kg/hr IntraVENous TITRATE    acetaminophen (TYLENOL) tablet 650 mg  650 mg Oral Q4H PRN    LORazepam (ATIVAN) 1 mg/mL in D5W infusion  0-5 mg/hr IntraVENous TITRATE    0.9% sodium chloride infusion 250 mL  250 mL IntraVENous PRN    NOREPINephrine (LEVOPHED) 8 mg in 0.9% NS 250ml infusion  2-16 mcg/min IntraVENous TITRATE    fluconazole (DIFLUCAN) 200mg/100 mL IVPB (premix)  200 mg IntraVENous Q24H    QUEtiapine (SEROquel) tablet 50 mg  50 mg Oral BID    ELECTROLYTE REPLACEMENT PROTOCOL - Potassium Renal Dosing  1 Each Other PRN    ELECTROLYTE REPLACEMENT PROTOCOL - Magnesium   1 Each Other PRN    ELECTROLYTE REPLACEMENT PROTOCOL  - Phosphorus Renal Dosing  1 Each Other PRN    ELECTROLYTE REPLACEMENT PROTOCOL - Calcium   1 Each Other PRN    0.9% sodium chloride infusion 250 mL  250 mL IntraVENous PRN    clopidogrel (PLAVIX) tablet 75 mg  75 mg Oral DAILY    aspirin tablet 325 mg  325 mg Oral DAILY    carvedilol (COREG) tablet 6.25 mg  6.25 mg Oral BID WITH MEALS    pantoprazole (PROTONIX) 40 mg in sodium chloride 0.9% 10 mL injection  40 mg IntraVENous DAILY    sodium chloride (NS) flush 5-40 mL  5-40 mL IntraVENous Q8H    sodium chloride (NS) flush 5-40 mL  5-40 mL IntraVENous PRN    0.9% sodium chloride infusion 250 mL  250 mL IntraVENous PRN    0.9% sodium chloride infusion 250 mL  250 mL IntraVENous PRN    0.9% sodium chloride infusion  50 mL/hr IntraVENous DIALYSIS PRN    heparin (porcine) 1,000 unit/mL injection 1,000 Units  1,000 Units InterCATHeter DIALYSIS PRN    sodium chloride (NS) flush 5-40 mL  5-40 mL IntraVENous Q8H    sodium chloride (NS) flush 5-40 mL  5-40 mL IntraVENous PRN    naloxone (NARCAN) injection 0.1 mg  0.1 mg IntraVENous PRN    ondansetron (ZOFRAN) injection 4 mg  4 mg IntraVENous PRN    senna (SENOKOT) tablet 8.6 mg  1 Tab Oral BID    fentaNYL (PF) 900 mcg/30 ml infusion soln  0-200 mcg/hr IntraVENous TITRATE    propofol (DIPRIVAN) infusion  0-50 mcg/kg/min IntraVENous TITRATE    chlorhexidine (PERIDEX) 0.12 % mouthwash 10 mL  10 mL Oral Q12H    albuterol-ipratropium (DUO-NEB) 2.5 MG-0.5 MG/3 ML  3 mL Nebulization Q6H RT    fentaNYL citrate (PF) injection 25-50 mcg  25-50 mcg IntraVENous Q1H PRN    cefepime (MAXIPIME) 0.5 g in 0.9% sodium chloride 100 mL IVPB  0.5 g IntraVENous Q24H    oxyCODONE-acetaminophen (PERCOCET) 5-325 mg per tablet 1-2 Tab  1-2 Tab Oral Q4H PRN    heparin (porcine) injection 5,000 Units  5,000 Units SubCUTAneous Q8H    VANCOMYCIN INFORMATION NOTE 1 Each  1 Each Other Rx Dosing/Monitoring    therapeutic multivitamin (THERAGRAN) tablet 1 Tab  1 Tab Oral DAILY    sodium chloride (NS) flush 5-10 mL  5-10 mL IntraVENous PRN    ondansetron (ZOFRAN) injection 4 mg  4 mg IntraVENous Q4H PRN     STEPHANIE TruongUniversity [facility]  Hospitalist Division  Pager: [phone]  Office: [phone]

## 2019-07-23 NOTE — PROGRESS NOTES
[address] [place] [address] [facility]  ICU Progress Note      Name: Marco Antonio Carlisle   : 1955   MRN: [id number]   Date: 2019 3:53 PM     [x]I have reviewed the flowsheet and previous days notes. Events overnight reviewed and discussed with nursing staff. Vital signs and records reviewed. Subjective:  19:    -Pt remains on vent  -Still very tachypneic breathing over vent in mid 30's  - Plan to d/c Propofol and initiate Precedex gtt while on Ativan and Fentanyl gtt for RR  -Plan for HD today  -S/p 1 unit PRBC   -Monitor fevers closely, continue abx      [x]The patient is unable to give any meaningful history or review of systems because the patient is:  [x]Intubated [x]Sedated   []Unresponsive      [x]The patient is critically ill on      [x]Mechanical ventilation []Pressors   []BiPAP []                 ROS:Pertinent items are noted in HPI.     Medication Review:  · Pressors - None  · Sedation- Propofol  · Antibiotics - Cefepime/Vanc  · Pain - Fentanyl  · GI/ DVT -Protonix/Heparin SC  · Others (other gtts)      Vital Signs:    Visit Vitals  /77   Pulse 89   Temp 98.4 °F (36.9 °C)   Resp 26   Ht 6' (1.829 m)   Wt 64.5 kg (142 lb 3.2 oz)   SpO2 98%   BMI 19.29 kg/m²       O2 Device: Endotracheal tube   O2 Flow Rate (L/min): 6 l/min   Temp (24hrs), Av.4 °F (37.4 °C), Min:98.4 °F (36.9 °C), Max:99.9 °F (37.7 °C)       Intake/Output:   Last shift:      701 - 1900  In: 449.9 [I.V.:89.9]  Out: 30 [Urine:30]  Last 3 shifts: 1901 -  0700  In: 2746.3 [I.V.:754.3]  Out: 465 [Urine:465]    Intake/Output Summary (Last 24 hours) at 2019 1553  Last data filed at 2019 1200  Gross per 24 hour   Intake 1657.14 ml   Output 315 ml   Net 1342.14 ml       Ventilator Settings:  Ventilator Mode: Assist control, VC+  Respiratory Rate  Back-Up Rate: 12  Insp Time (sec): 0.9 sec  I:E Ratio: 1:4.56  Ventilator Volumes  Vt Set (ml): 450 ml  Vt Exhaled (Machine Breath) (ml): 496 ml  Vt Spont (ml): 449 ml  Ve Observed (l/min): 12.7 l/min  Ventilator Pressures  Pressure Support (cm H2O): 7 cm H2O  PIP Observed (cm H2O): 22 cm H2O  Plateau Pressure (cm H2O): 17 cm H2O  MAP (cm H2O): 11  PEEP/VENT (cm H2O): 5 cm H20  Auto PEEP Observed (cm H2O): (unable to obtain)    Physical Exam:       General:  Intubated/Sedated   Head:  Normocephalic, without obvious abnormality, atraumatic. Eyes:  Pinpoint pupils, sluggish reaction. Nose: Nares normal. Septum midline. Mucosa normal. No drainage or sinus tenderness. Throat: Lips, mucosa, and tongue normal. Teeth and gums normal.   Neck: Supple, symmetrical, trachea midline, no adenopathy, no carotid bruit and no JVD. Lungs:   Coarse, crackles, and rhonchi bilateral lungs and lobes. Heart:  RRR, S1, S2 normal, no m/r/g   Abdomen:   Soft, non-tender. Bowel sounds normal. No masses,  No organomegaly. Extremities: Extremities normal, atraumatic, no cyanosis or edema. Pulses: 2+ and symmetric all extremities.    Skin: Skin color, texture, turgor normal. No rashes or lesions   Neurologic: Withdraws from stimulation           DATA:     Current Facility-Administered Medications   Medication Dose Route Frequency    vancomycin (VANCOCIN) 1,000 mg in 0.9% sodium chloride (MBP/ADV) 250 mL adv  1,000 mg IntraVENous Q TU, TH & SAT    [START ON 7/25/2019] VANCOMYCIN INFORMATION NOTE   Other ONCE    dexmedeTOMidine (PRECEDEX) 400 mcg in 0.9% sodium chloride 100 mL infusion  0.2-0.7 mcg/kg/hr IntraVENous TITRATE    acetaminophen (TYLENOL) tablet 650 mg  650 mg Oral Q4H PRN    LORazepam (ATIVAN) 1 mg/mL in D5W infusion  0-5 mg/hr IntraVENous TITRATE    0.9% sodium chloride infusion 250 mL  250 mL IntraVENous PRN    NOREPINephrine (LEVOPHED) 8 mg in 0.9% NS 250ml infusion  2-16 mcg/min IntraVENous TITRATE    fluconazole (DIFLUCAN) 200mg/100 mL IVPB (premix)  200 mg IntraVENous Q24H    QUEtiapine (SEROquel) tablet 50 mg  50 mg Oral BID    ELECTROLYTE REPLACEMENT PROTOCOL - Potassium Renal Dosing  1 Each Other PRN    ELECTROLYTE REPLACEMENT PROTOCOL - Magnesium   1 Each Other PRN    ELECTROLYTE REPLACEMENT PROTOCOL  - Phosphorus Renal Dosing  1 Each Other PRN    ELECTROLYTE REPLACEMENT PROTOCOL - Calcium   1 Each Other PRN    0.9% sodium chloride infusion 250 mL  250 mL IntraVENous PRN    clopidogrel (PLAVIX) tablet 75 mg  75 mg Oral DAILY    aspirin tablet 325 mg  325 mg Oral DAILY    carvedilol (COREG) tablet 6.25 mg  6.25 mg Oral BID WITH MEALS    pantoprazole (PROTONIX) 40 mg in sodium chloride 0.9% 10 mL injection  40 mg IntraVENous DAILY    sodium chloride (NS) flush 5-40 mL  5-40 mL IntraVENous Q8H    sodium chloride (NS) flush 5-40 mL  5-40 mL IntraVENous PRN    0.9% sodium chloride infusion 250 mL  250 mL IntraVENous PRN    0.9% sodium chloride infusion 250 mL  250 mL IntraVENous PRN    0.9% sodium chloride infusion  50 mL/hr IntraVENous DIALYSIS PRN    heparin (porcine) 1,000 unit/mL injection 1,000 Units  1,000 Units InterCATHeter DIALYSIS PRN    sodium chloride (NS) flush 5-40 mL  5-40 mL IntraVENous Q8H    sodium chloride (NS) flush 5-40 mL  5-40 mL IntraVENous PRN    naloxone (NARCAN) injection 0.1 mg  0.1 mg IntraVENous PRN    ondansetron (ZOFRAN) injection 4 mg  4 mg IntraVENous PRN    senna (SENOKOT) tablet 8.6 mg  1 Tab Oral BID    fentaNYL (PF) 900 mcg/30 ml infusion soln  0-200 mcg/hr IntraVENous TITRATE    propofol (DIPRIVAN) infusion  0-50 mcg/kg/min IntraVENous TITRATE    chlorhexidine (PERIDEX) 0.12 % mouthwash 10 mL  10 mL Oral Q12H    albuterol-ipratropium (DUO-NEB) 2.5 MG-0.5 MG/3 ML  3 mL Nebulization Q6H RT    fentaNYL citrate (PF) injection 25-50 mcg  25-50 mcg IntraVENous Q1H PRN    cefepime (MAXIPIME) 0.5 g in 0.9% sodium chloride 100 mL IVPB  0.5 g IntraVENous Q24H    oxyCODONE-acetaminophen (PERCOCET) 5-325 mg per tablet 1-2 Tab  1-2 Tab Oral Q4H PRN    heparin (porcine) injection 5,000 Units  5,000 Units SubCUTAneous Q8H    VANCOMYCIN INFORMATION NOTE 1 Each  1 Each Other Rx Dosing/Monitoring    therapeutic multivitamin (THERAGRAN) tablet 1 Tab  1 Tab Oral DAILY    sodium chloride (NS) flush 5-10 mL  5-10 mL IntraVENous PRN    ondansetron (ZOFRAN) injection 4 mg  4 mg IntraVENous Q4H PRN         Labs: Results:       Chemistry Recent Labs     07/23/19 0315 07/22/19 0220 07/21/19  1608 07/21/19  0830 07/21/19  0145   GLU 88 97  --   --  101*  100*   * 135* 136  --  137  138   K 4.3 3.9  --  4.0 3.8  3.8    100  --   --  103  103   CO2 23 26  --   --  26  27   BUN 45* 32*  --   --  18  18   CREA 5.77* 4.46*  --   --  3.04*  2.99*   CA 7.0* 7.2*  --   --  8.0*  8.1*   AGAP 12 9  --   --  8  8   BUCR 8* 7*  --   --  6*  6*   * 164*  --   --  131*   TP 5.3* 5.1*  --   --  5.1*   ALB 1.1* 1.2*  --   --  1.2*  1.2*   GLOB 4.2* 3.9  --   --  3.9   AGRAT 0.3* 0.3*  --   --  0.3*      CBC w/Diff Recent Labs     07/23/19 0315 07/22/19  2300 07/22/19 0220 07/21/19 0145   WBC 12.7  --  12.6 10.4   RBC 2.54*  --  2.34* 2.47*   HGB 7.2* 7.1* 6.6* 7.0*   HCT 22.3* 21.9* 20.6* 21.6*     --  191 196      Coagulation Recent Labs     07/23/19 0315 07/22/19 0220   PTP 14.8 15.1   INR 1.2 1.2       Liver Enzymes Recent Labs     07/23/19 0315   TP 5.3*   ALB 1.1*   *   SGOT 332*      ABG Lab Results   Component Value Date/Time    PHI 7.409 07/23/2019 04:17 AM    PCO2I 31.1 (L) 07/23/2019 04:17 AM    PO2I 59 (L) 07/23/2019 04:17 AM    HCO3I 19.7 (L) 07/23/2019 04:17 AM    FIO2I 30 07/23/2019 04:17 AM      Microbiology Recent Labs     07/22/19  1045 07/22/19  1040   CULT NO GROWTH AFTER 21 HOURS NO GROWTH AFTER 21 HOURS          Telemetry: [x]Sinus []A-flutter []Paced    []A-fib []Multiple PVCs                  Imaging:    CXR [date]:    CXR Results  (Last 48 hours)               07/23/19 0511  XR CHEST PORT Final result    Impression:  IMPRESSION:         No significant interval change. Ill-defined opacities within both mid to lower   lung zones, may represent atelectasis, pneumonia or focal edema. Trace bilateral   pleural effusions. Endotracheal tube approximately 5 cm above the aristeo. Narrative:  EXAM:  PORTABLE CHEST       INDICATION:  Respiratory distress. TECHNIQUE:  Portable, semierect AP view. COMPARISON:  07/22/2019       ____________________       FINDINGS:         SUPPORT DEVICES: Endotracheal tube is approximately 5 cm above the aristeo. Sump   tube is traversing below the left hemidiaphragm, tip not imaged. HEART AND MEDIASTINUM: Stable. LUNGS AND PLEURAL SPACES: Fiducial markers are noted within the right upper   lobe. Hazy opacities within both mid to lower lung zones, similar to prior   study. Suspect trace bilateral pleural effusions. No discernible pneumothorax. BONY THORAX AND SOFT TISSUES: No acute osseous abnormality. Thoracic   spondylosis. ____________________           07/22/19 0330  XR CHEST PORT Final result    Impression:  IMPRESSION:   No definite interval change. Narrative:  AP portable chest, 7/22/2019 at 0313 hours:       INDICATION: Respiratory distress. Comparison 7/21/2019. Endotracheal tube good position just below the clavicles but well above the   aristeo. NG/OG tube off the film. Increased density left lung base consistent   with effusion and left lower lobe atelectasis/trace/focal edema is stable. Right   basilar edema or infiltrate with small right effusion also basically unchanged. The heart is stable.                    CT HEAD/CHEST/ABD/PELVIS [date]:  [x]I have personally reviewed the patients radiographs  [x]Radiographs reviewed with radiologist   []No change from prior, tubes and lines in adequate position  []Improved   []Worsening          IMPRESSION:   Acute Respiratory Failure  - s/p intubation for worsening mental status/hypoxia on 7/17  - ABG currently adequate, FIO2 30%, PEEP 5  Possible PNA  - No Cxs, infiltrate at R base  [place]  - on HD per nephro  LLE Gangrene s/p Left AKA  - POD #6. Pain control, follow up with vascular surgery  Agitation/Delirium  - concern for EtOH withdrawal  Anemia  - no signs of bleeding  Hx of COPD  Stage III NSCLC  -  s/p chemoRx/XRT, completed in Dec 2018     Patient Active Problem List   Diagnosis Code    Gangrene of foot ([facility] [address] [address] I96    Cellulitis of left lower extremity L03. 80    Malnourished ([address] E46    Peripheral vascular disease ([facility] [address] I73.9    Hyponatremia E87.1    Acute kidney injury superimposed on chronic kidney disease ([facility] [address] N17.9, N18.9    Dehydration E86.0    Sepsis ([facility]) A41.9    Gangrene ([address] I96    Anemia D64.9    Acute respiratory failure (HCC) J96.00    Septic encephalopathy G93.41    Transaminitis R95.6    Metabolic acidosis Y02.4    Fever R50.9    Sacral ulcer ([facility]) L98.429        RECOMMENDATIONS:   · Resp:   -Titrate FiO2/ supp O2 for SpO2 >90%  -Pulmonary Hygiene  -May benefit most from HD  -Daily CXR  -Daily ABG's  -Will re-attempt SBT tomorrow morning  · I/D:    -aleukocytosis WBC (12.7K), (+) febrile t max overnight 99.8   -Last lactic normalized. Will observe closely for any s/sx of emerging or new infections from L AKA   -Will consider consulting Infectious Disease if s/sx of infection persist   -ABX : Vancomycin/ Cefepime  · Hem/Onc:    -Daily CBC;   -Hgb/Hct (7.2/22. 3) and plts are stable-Monitor closely for blood loss  -Will repeat CBC post HD  · CVS:    -Monitor Hemodynamics   -Check cardiac panel,   -ECHO complete- EF 19-49%  · Metabolic:   -Daily BMP;   -monitor e-lytes; replace PRN  · Renal:   -Palmer placed in OR  -Plan for HD today   -Consider additional HD tomorrow  -Nephrology following  -Galvez catheter / Diuresis  · Endocrine:   -POC Glucose q6  · GI:   -SUP,   -Trend LFTs,   -Tube Feeds running  -Zofran PRN for N/V   · Musc/Skin:   -No acute issues, wound care  · Neuro:   -Ativan gtt  -Fentanyl gtt   -Precedex gtt  -Goal for RASS 0 to -1 without breathing over the vent  · Fluids: None  · Code Status: Full Code     Best Practices/ Safety Bundles:  · Sepsis Bundle per Hospital Protocol  · CAUTI Bundle  · Electrolyte Replacement Bundle  · Glycemic control goal <180; avoid Hypoglycemia  · IHI ICU Bundles:  ·  Central Line Bundle Followed , Mccarthy Bundle Followed and Vent Bundle Followed, Vent Day 6    · Mech Vent patients:   · VAP bundle, Aim to keep peak plateau pressure 56-57WU H2O  · Aspiration Precautions - HOB >30'  · Daily sedation holiday as indicated  · SBT as tolerated/appropriate  · Titrate FiO2 for SpO2 >94%  · Aggressive Pulmonary Hygeiene  · Stress ulcer prophylaxis. Protonix   · DVT prophylaxis. Heparin SC  · Need for Lines, mccarthy assessed. · Restraints need. · Palliative care evaluation.       The patient is: [] acutely ill Risk of deterioration: [] moderate    [x] critically ill  [x] high     [x]See my orders for details    My assessment/plan was discussed with:  [x]Nursing []PT/OT    [x]Respiratory therapy [x]Dr. Mary Anne Oliver   [x]Family []       Critical Care Time: 44 mins      Briana Molina NP-BC     Pulmonary, Critical Care Medicine  [facility]

## 2019-07-24 NOTE — PROGRESS NOTES
0700 Received report from off going RN at the bedside inclusive of SBAR, lines drains drips and plan. Patient continues to pull away from pain, especially when teeth or mouth care given. Moving arms and continues intermittently to pull on restraints. Continues to be intubated, on sedation and pain medications. MRI screening form filled out, MRI staff notified completed, awaiting time for scan. Vasular surgery consulted about surgical staples, these are MRI friendly per STEPHANIE Vasquez. 1800 Meeting with Renee Michelle NP and family regarding patient disposition, family have decided to make patient comfort care, dc further treatment with patient receiving pain and comfort medications. Plan to extubate after medications are started on patient as appropriate. 2000MD Glen has signed DNR order as well as patient's son, and Anabella Bueno NP. Report given to oncoming RN Yosef Sabillon at the bedside regarding changes in care to comfort care, paperwork in chart (DNR) and inclusive of SBAR, lines drains drips and plan. Nurse aware to call patient's son if it apears death will be within the hour.

## 2019-07-24 NOTE — PROGRESS NOTES
PCCM Update:        Family (Son Jr Maldonado, brother, and mother of son) arrived for meeting held in conference room with NP [id number]. NP and RN re-explained patient's medical status to family with content described in previous progress note. Family expressed understanding and concerns of further risks associated with continuing medical care for patient at this time. Family decided to make patient Comfort Care Status. Plan:    He will be started on Morphine gtt followed by Comfort Extubation.       Critical Care time: 33 mins      Briana Molina NP-BC     Pulmonary, Critical Care Medicine  [facility]

## 2019-07-24 NOTE — PROGRESS NOTES
Problem: Pain  Goal: *Control of Pain  Outcome: Progressing Towards Goal     Problem: Patient Education: Go to Patient Education Activity  Goal: Patient/Family Education  Outcome: Progressing Towards Goal     Problem: Falls - Risk of  Goal: *Absence of Falls  Description  Document Vidhi Mancera Fall Risk and appropriate interventions in the flowsheet. Outcome: Progressing Towards Goal  Note:   Fall Risk Interventions:  Mobility Interventions: Bed/chair exit alarm    Mentation Interventions: Bed/chair exit alarm    Medication Interventions: Bed/chair exit alarm    Elimination Interventions: Bed/chair exit alarm    History of Falls Interventions: Bed/chair exit alarm         Problem: Patient Education: Go to Patient Education Activity  Goal: Patient/Family Education  Outcome: Progressing Towards Goal     Problem: Impaired Skin Integrity/Pressure Injury Treatment  Goal: *Improvement of Existing Pressure Injury  Outcome: Progressing Towards Goal  Goal: *Prevention of pressure injury  Description  Document Mikie Scale and appropriate interventions in the flowsheet. Outcome: Progressing Towards Goal  Note:   Pressure Injury Interventions:  Sensory Interventions: Assess changes in LOC    Moisture Interventions: Absorbent underpads    Activity Interventions: Pressure redistribution bed/mattress(bed type)    Mobility Interventions: HOB 30 degrees or less    Nutrition Interventions: Document food/fluid/supplement intake    Friction and Shear Interventions: Lift sheet                Problem: Patient Education: Go to Patient Education Activity  Goal: Patient/Family Education  Outcome: Progressing Towards Goal     Problem: Pressure Injury - Risk of  Goal: *Prevention of pressure injury  Description  Document Mikie Scale and appropriate interventions in the flowsheet.   Outcome: Progressing Towards Goal  Note:   Pressure Injury Interventions:  Sensory Interventions: Assess changes in LOC    Moisture Interventions: Absorbent underpads    Activity Interventions: Pressure redistribution bed/mattress(bed type)    Mobility Interventions: HOB 30 degrees or less    Nutrition Interventions: Document food/fluid/supplement intake    Friction and Shear Interventions: Lift sheet                Problem: Patient Education: Go to Patient Education Activity  Goal: Patient/Family Education  Outcome: Progressing Towards Goal     Problem: Discharge Planning  Goal: *Discharge to safe environment  Outcome: Progressing Towards Goal     Problem: Nutrition Deficit  Goal: *Optimize nutritional status  Outcome: Progressing Towards Goal  Goal: *Blood glucose 80 to 180 mg/dl  Outcome: Progressing Towards Goal  Goal: *Tolerates nutrition therapy  Outcome: Progressing Towards Goal     Problem: Patient Education: Go to Patient Education Activity  Goal: Patient/Family Education  Outcome: Progressing Towards Goal     Problem: Patient Education: Go to Patient Education Activity  Goal: Patient/Family Education  Outcome: Progressing Towards Goal     Problem: Ventilator Management  Goal: *Adequate oxygenation and ventilation  Outcome: Progressing Towards Goal  Goal: *Patient maintains clear airway/free of aspiration  Outcome: Progressing Towards Goal  Goal: *Absence of infection signs and symptoms  Outcome: Progressing Towards Goal  Goal: *Normal spontaneous ventilation  Outcome: Progressing Towards Goal     Problem: Patient Education: Go to Patient Education Activity  Goal: Patient/Family Education  Outcome: Progressing Towards Goal     Problem: Non-Violent Restraints  Goal: *Removal from restraints as soon as assessed to be safe  Outcome: Progressing Towards Goal  Goal: *No harm/injury to patient while restraints in use  Outcome: Progressing Towards Goal  Goal: *Patient's dignity will be maintained  Outcome: Progressing Towards Goal  Goal: *Patient Specific Goal (EDIT GOAL, INSERT TEXT)  Outcome: Progressing Towards Goal  Goal: Non-violent Restaints:Standard Interventions  Outcome: Progressing Towards Goal  Goal: Non-violent Restraints:Patient Interventions  Outcome: Progressing Towards Goal  Goal: Patient/Family Education  Outcome: Progressing Towards Goal     Problem: Patient Education: Go to Patient Education Activity  Goal: Patient/Family Education  Outcome: Progressing Towards Goal     Problem: Patient Education: Go to Patient Education Activity  Goal: Patient/Family Education  Outcome: Progressing Towards Goal     Problem: Surgical Pathway Day of Surgery  Goal: *Adequate urinary output (equal to or greater than 30 milliliters/hour)  Description  Ambulatory Surgery patients voiding without difficulty.   Outcome: Progressing Towards Goal     Problem: Surgical Pathway Post-Op Day 1  Goal: Off Pathway (Use only if patient is Off Pathway)  Outcome: Progressing Towards Goal  Goal: Activity/Safety  Outcome: Progressing Towards Goal  Goal: Diagnostic Test/Procedures  Outcome: Progressing Towards Goal  Goal: Nutrition/Diet  Outcome: Progressing Towards Goal  Goal: Discharge Planning  Outcome: Progressing Towards Goal  Goal: Medications  Outcome: Progressing Towards Goal  Goal: Respiratory  Outcome: Progressing Towards Goal  Goal: Treatments/Interventions/Procedures  Outcome: Progressing Towards Goal  Goal: Psychosocial  Outcome: Progressing Towards Goal  Goal: *No signs and symptoms of infection or wound complications  Outcome: Progressing Towards Goal  Goal: *Optimal pain control at patient's stated goal  Outcome: Progressing Towards Goal  Goal: *Adequate urinary output (equal to or greater than 30 milliliters/hour)  Outcome: Progressing Towards Goal  Goal: *Hemodynamically stable  Outcome: Progressing Towards Goal  Goal: *Tolerating diet  Outcome: Progressing Towards Goal  Goal: *Demonstrates progressive activity  Outcome: Progressing Towards Goal  Goal: *Lungs clear or at baseline  Outcome: Progressing Towards Goal     Problem: Nutrition Deficit  Goal: *Optimize nutritional status  Outcome: Progressing Towards Goal

## 2019-07-24 NOTE — PROGRESS NOTES
Problem: Pain  Goal: *Control of Pain  Outcome: Progressing Towards Goal     Problem: Patient Education: Go to Patient Education Activity  Goal: Patient/Family Education  Outcome: Progressing Towards Goal     Problem: Falls - Risk of  Goal: *Absence of Falls  Description  Document Diane Payment Fall Risk and appropriate interventions in the flowsheet. Outcome: Progressing Towards Goal  Note:   Fall Risk Interventions:  Mobility Interventions: Bed/chair exit alarm    Mentation Interventions: Bed/chair exit alarm    Medication Interventions: Bed/chair exit alarm    Elimination Interventions: Bed/chair exit alarm    History of Falls Interventions: Bed/chair exit alarm         Problem: Patient Education: Go to Patient Education Activity  Goal: Patient/Family Education  Outcome: Progressing Towards Goal     Problem: Impaired Skin Integrity/Pressure Injury Treatment  Goal: *Improvement of Existing Pressure Injury  Outcome: Progressing Towards Goal  Goal: *Prevention of pressure injury  Description  Document Mikie Scale and appropriate interventions in the flowsheet. Outcome: Progressing Towards Goal  Note:   Pressure Injury Interventions:  Sensory Interventions: Assess changes in LOC    Moisture Interventions: Absorbent underpads    Activity Interventions: Pressure redistribution bed/mattress(bed type)    Mobility Interventions: HOB 30 degrees or less    Nutrition Interventions: Document food/fluid/supplement intake    Friction and Shear Interventions: Lift sheet                Problem: Patient Education: Go to Patient Education Activity  Goal: Patient/Family Education  Outcome: Progressing Towards Goal     Problem: Pressure Injury - Risk of  Goal: *Prevention of pressure injury  Description  Document Mikie Scale and appropriate interventions in the flowsheet.   Outcome: Progressing Towards Goal  Note:   Pressure Injury Interventions:  Sensory Interventions: Assess changes in LOC    Moisture Interventions: Absorbent underpads    Activity Interventions: Pressure redistribution bed/mattress(bed type)    Mobility Interventions: HOB 30 degrees or less    Nutrition Interventions: Document food/fluid/supplement intake    Friction and Shear Interventions: Lift sheet                Problem: Patient Education: Go to Patient Education Activity  Goal: Patient/Family Education  Outcome: Progressing Towards Goal     Problem: Discharge Planning  Goal: *Discharge to safe environment  Outcome: Progressing Towards Goal     Problem: Nutrition Deficit  Goal: *Optimize nutritional status  Outcome: Progressing Towards Goal  Goal: *Blood glucose 80 to 180 mg/dl  Outcome: Progressing Towards Goal  Goal: *Tolerates nutrition therapy  Outcome: Progressing Towards Goal     Problem: Patient Education: Go to Patient Education Activity  Goal: Patient/Family Education  Outcome: Progressing Towards Goal     Problem: Patient Education: Go to Patient Education Activity  Goal: Patient/Family Education  Outcome: Progressing Towards Goal     Problem: Ventilator Management  Goal: *Adequate oxygenation and ventilation  Outcome: Progressing Towards Goal  Goal: *Patient maintains clear airway/free of aspiration  Outcome: Progressing Towards Goal  Goal: *Absence of infection signs and symptoms  Outcome: Progressing Towards Goal  Goal: *Normal spontaneous ventilation  Outcome: Progressing Towards Goal     Problem: Patient Education: Go to Patient Education Activity  Goal: Patient/Family Education  Outcome: Progressing Towards Goal     Problem: Non-Violent Restraints  Goal: *Removal from restraints as soon as assessed to be safe  Outcome: Progressing Towards Goal  Goal: *No harm/injury to patient while restraints in use  Outcome: Progressing Towards Goal  Goal: *Patient's dignity will be maintained  Outcome: Progressing Towards Goal  Goal: *Patient Specific Goal (EDIT GOAL, INSERT TEXT)  Outcome: Progressing Towards Goal  Goal: Non-violent Restaints:Standard Interventions  Outcome: Progressing Towards Goal  Goal: Non-violent Restraints:Patient Interventions  Outcome: Progressing Towards Goal  Goal: Patient/Family Education  Outcome: Progressing Towards Goal     Problem: Patient Education: Go to Patient Education Activity  Goal: Patient/Family Education  Outcome: Progressing Towards Goal     Problem: Patient Education: Go to Patient Education Activity  Goal: Patient/Family Education  Outcome: Progressing Towards Goal     Problem: Surgical Pathway Day of Surgery  Goal: *Adequate urinary output (equal to or greater than 30 milliliters/hour)  Description  Ambulatory Surgery patients voiding without difficulty.   Outcome: Progressing Towards Goal     Problem: Surgical Pathway Post-Op Day 1  Goal: Off Pathway (Use only if patient is Off Pathway)  Outcome: Progressing Towards Goal  Goal: Activity/Safety  Outcome: Progressing Towards Goal  Goal: Diagnostic Test/Procedures  Outcome: Progressing Towards Goal  Goal: Nutrition/Diet  Outcome: Progressing Towards Goal  Goal: Discharge Planning  Outcome: Progressing Towards Goal  Goal: Medications  Outcome: Progressing Towards Goal  Goal: Respiratory  Outcome: Progressing Towards Goal  Goal: Treatments/Interventions/Procedures  Outcome: Progressing Towards Goal  Goal: Psychosocial  Outcome: Progressing Towards Goal  Goal: *No signs and symptoms of infection or wound complications  Outcome: Progressing Towards Goal  Goal: *Optimal pain control at patient's stated goal  Outcome: Progressing Towards Goal  Goal: *Adequate urinary output (equal to or greater than 30 milliliters/hour)  Outcome: Progressing Towards Goal  Goal: *Hemodynamically stable  Outcome: Progressing Towards Goal  Goal: *Tolerating diet  Outcome: Progressing Towards Goal  Goal: *Demonstrates progressive activity  Outcome: Progressing Towards Goal  Goal: *Lungs clear or at baseline  Outcome: Progressing Towards Goal     Problem: Nutrition Deficit  Goal: *Optimize nutritional status  Outcome: Progressing Towards Goal

## 2019-07-24 NOTE — PROGRESS NOTES
[facility] Update:      NP (Rodolfo Barbosa.) spoke with son Kit Golden.) over the phone regarding patient's current medical status. NP informed son regarding the concern for pt's ability to be extubated with his consistent tachypnea despite anxiolytics, analgesics, and sedation. As a result, patient may have to consider receiving a tracheostomy in order to be able to get off of the mechanical ventilator. Family was also informed about the plan for brain MRI in order to see if there were any intracranial abnormalities possibly driving his tachypnea and aggravated mentation. In addition, NP explained that patient's R leg has no doppler pulses indicating severely poor circulation and it is very likely that he may require having to undergo another leg amputation. Jerome Barney. Expressed understanding of his Dad's current medical status. He also expressed that he would not want his Dad to keep suffering as he has been in the last 2 weeks. Son stated that he is interested in Hospice or \"taking his Dads suffering away\". Comfort care was explained to him. Son said that he would come to [facility] along with his Mom and brother for a final meeting to come up with a decision of care for his Dad this evening on 7/24/19.       Plan:    -[address] family meeting in the evening  -Proceed with MRI if family agrees          Critical Care Time: 63 mins

## 2019-07-24 NOTE — PROGRESS NOTES
Internal Medicine Progress Note    [facility]  Admit Date: 7/10/[facility]  Length of Stay: 14      Assessment/Plan     Principal Problem:    Sepsis ([address] (7/10/2019)    Active Problems:    Gangrene ([address] (7/10/2019)      Acute kidney injury superimposed on chronic kidney disease ([address] (7/10/2019)      Malnourished ([address] (6/29/2019)      Peripheral vascular disease ([address] (6/29/2019)      Hyponatremia (6/29/2019)      Dehydration (7/10/2019)      Anemia (7/16/2019)      Acute respiratory failure ([address] (7/17/2019)      Septic encephalopathy (7/20/2019)      Transaminitis (2/46/8311)      Metabolic acidosis (3/14/5955)      Fever (7/22/2019)      Sacral ulcer ([address] (7/22/2019)        Pt/OT rec SNF    Sepsis/septic encephalopathy  - gangrenous first and third toe, s/p L AKA  - Recent wound culture from previous admission susceptible to rocephin and vanc, continue abx, renally dose  - repeat wound culture - Stenotrophomonas maltophilia, enterococcus faecalis, coag neg staph  Fevers improved  -abx discontinued 7/24/19  -NGTD on reblood cultures that were drawn    Gangrene/PVD  - Vasc consulted - appreciate  - podiatry consulted - appreciate   - L AKA done 7/17  - IV abx   -continue ASA and plavix      [place]  - strict I&Os, mccarthy  - HD catheter placed  - nephrology following - appreciate the services  - refused renal US  - dialysis started 7/17  -abx discontinued     Acute resp failure  - intubated 7/17  - PCCM consulted - vent mgmt per them  -MRI today.   Unclear why difficult to wean off vent, neurological, psychological?   -ICU plans to contact family about possible tracheostomy, discussion about goals of care    Malnourished  - Nutritional supplements with all meals    Hyponatremia  - acute on chronic, stable  - Nephrology consult - appreciate  - improving    Anemia  - PRBCs transfused as needed for hgb <7  - monitor H&H  -transfusion yesterday, h/h stable    -Wound care consult for stage 2 sacral ulcer- recs per them appreciate services       - Cont acceptable home medications for chronic conditions   - DVT protocol    I have personally reviewed all pertinent labs and films that have officially resulted over the last 24 hours. I have personally checked for all pending labs that are awaiting final results. Interval History   \"Vini Reaves is a 62 y.o. male with a PMHx listed below of who presented to the ED with complaints of worsening LLE pain. Patient was admitted on 6/30/19 and discharged three days ago for gangrenous toes. Vascular surgery and podiatry were consulted then and he was recommended for multilevel revascularization, he was to follow up as out patient and said that his son could transport him. Per patient now, son was unable to transport patient to f/u appointment, so he returned due to persistent symptoms.       In the ED vascular surgery consulted, podiatry consulted. He presents with worsening creatinine and sodium. I spoke with Nephrology and they recommend fluids. His only complaint is pain, no fevers, chills, weakness, AMS. Sodium 121, will be admitted for further eval. He doesn't know if he has been taking his lasix, but says hes been taking his abx\"    Per podiatry - no need for urgent amputation, wait until revascularization. Nephrology consulted - Acute on chronic hyponatremia improved with gentle IVF. Oncology consulted - bone scan ordered to complete restaging - No definite sonographic evidence of osseous metastatic disease. Repeat wound culture 7/11 - Stenotrophomonas maltophilia, enterococcus faecalis, coag neg staph. Patient continually refused to complete MRI. Patient's renal function declined rapidly, patient started dialysis 7/17. PRBCs transfused as needed for hgb <7. L AKA 7/17. Patient developed acute resp failure postoperatively and was intubated.  Wound care consulted: stage 2 sacral ulcer    Subjective     Pt s/e @ bedside. Remains intubated, tachypnea.   High grade Fevers resolved. Does not follow commands     Objective     Visit Vitals  /73   Pulse 81   Temp 98 °F (36.7 °C)   Resp 18   Ht 6' (1.829 m)   Wt 64 kg (141 lb 1.5 oz)   SpO2 100%   BMI 19.14 kg/m²       Physical Exam:  General Appearance: NAD, intubated, sedated  HENT: normocephalic/atraumatic, moist mucus membranes  Neck: No JVD, supple  Lungs: cTAB, no wheeze, crackles, or ronchi,  CV: RRR, no m/r/g  Abdomen: soft, non-tender, normal bowel sounds  Extremities: no cyanosis, no peripheral edema, L AKA  Neuro: intubated, does not follow commands    Intake and Output:  Current Shift:  No intake/output data recorded. Last three shifts:  07/22 1901 - 07/24 0700  In: 3072.5 [I.V.:1137.5]  Out: 2980 [Urine:480]    Lab/Data Reviewed:  BMP:   Lab Results   Component Value Date/Time     07/24/2019 02:10 AM    K 4.3 07/24/2019 02:10 AM     07/24/2019 02:10 AM    CO2 25 07/24/2019 02:10 AM    AGAP 9 07/24/2019 02:10 AM     (H) 07/24/2019 02:10 AM    BUN 29 (H) 07/24/2019 02:10 AM    CREA 3.85 (H) 07/24/2019 02:10 AM    GFRAA 19 (L) 07/24/2019 02:10 AM    GFRNA 16 (L) 07/24/2019 02:10 AM     CBC:   Lab Results   Component Value Date/Time    WBC 14.2 (H) 07/24/2019 02:10 AM    HGB 7.7 (L) 07/24/2019 02:10 AM    HCT 23.9 (L) 07/24/2019 02:10 AM     07/24/2019 02:10 AM         Imaging Reviewed:  Lina Herrmann Chest Port    Result Date: 7/24/2019  EXAM: Portable upright chest radiograph. INDICATION: \"Respiratory distress. \" COMPARISON: 7/23/2019 _______________ FINDINGS:     DEVICES:  Endotracheal tube in standard position. Esophagogastric tube courses inferiorly off the field. LUNGS:           --Expansion:  Adequate. --Consolidation:  None detected. --Pulmonary edema:  Low-grade, lower lung predominant. PLEURAL SPACE:          --Pleural effusion:  None detected.           --Pneumothorax:  None detected. _______________     IMPRESSION: Unchanged since yesterday. _______________ Medications Reviewed:  Current Facility-Administered Medications   Medication Dose Route Frequency    QUEtiapine (SEROquel) tablet 100 mg  100 mg Oral BID    dexmedeTOMidine (PRECEDEX) 400 mcg in 0.9% sodium chloride 100 mL infusion  0.2-1 mcg/kg/hr IntraVENous TITRATE    acetaminophen (TYLENOL) tablet 650 mg  650 mg Oral Q4H PRN    LORazepam (ATIVAN) 1 mg/mL in D5W infusion  0-5 mg/hr IntraVENous TITRATE    0.9% sodium chloride infusion 250 mL  250 mL IntraVENous PRN    fluconazole (DIFLUCAN) 200mg/100 mL IVPB (premix)  200 mg IntraVENous Q24H    ELECTROLYTE REPLACEMENT PROTOCOL - Potassium Renal Dosing  1 Each Other PRN    ELECTROLYTE REPLACEMENT PROTOCOL - Magnesium   1 Each Other PRN    ELECTROLYTE REPLACEMENT PROTOCOL  - Phosphorus Renal Dosing  1 Each Other PRN    ELECTROLYTE REPLACEMENT PROTOCOL - Calcium   1 Each Other PRN    0.9% sodium chloride infusion 250 mL  250 mL IntraVENous PRN    clopidogrel (PLAVIX) tablet 75 mg  75 mg Oral DAILY    aspirin tablet 325 mg  325 mg Oral DAILY    carvedilol (COREG) tablet 6.25 mg  6.25 mg Oral BID WITH MEALS    pantoprazole (PROTONIX) 40 mg in sodium chloride 0.9% 10 mL injection  40 mg IntraVENous DAILY    sodium chloride (NS) flush 5-40 mL  5-40 mL IntraVENous Q8H    sodium chloride (NS) flush 5-40 mL  5-40 mL IntraVENous PRN    0.9% sodium chloride infusion 250 mL  250 mL IntraVENous PRN    0.9% sodium chloride infusion 250 mL  250 mL IntraVENous PRN    0.9% sodium chloride infusion  50 mL/hr IntraVENous DIALYSIS PRN    heparin (porcine) 1,000 unit/mL injection 1,000 Units  1,000 Units InterCATHeter DIALYSIS PRN    sodium chloride (NS) flush 5-40 mL  5-40 mL IntraVENous Q8H    sodium chloride (NS) flush 5-40 mL  5-40 mL IntraVENous PRN    naloxone (NARCAN) injection 0.1 mg  0.1 mg IntraVENous PRN    ondansetron (ZOFRAN) injection 4 mg  4 mg IntraVENous PRN    senna (SENOKOT) tablet 8.6 mg  1 Tab Oral BID    fentaNYL (PF) 900 mcg/30 ml infusion soln  0-200 mcg/hr IntraVENous TITRATE    propofol (DIPRIVAN) infusion  0-50 mcg/kg/min IntraVENous TITRATE    chlorhexidine (PERIDEX) 0.12 % mouthwash 10 mL  10 mL Oral Q12H    albuterol-ipratropium (DUO-NEB) 2.5 MG-0.5 MG/3 ML  3 mL Nebulization Q6H RT    fentaNYL citrate (PF) injection 25-50 mcg  25-50 mcg IntraVENous Q1H PRN    oxyCODONE-acetaminophen (PERCOCET) 5-325 mg per tablet 1-2 Tab  1-2 Tab Oral Q4H PRN    heparin (porcine) injection 5,000 Units  5,000 Units SubCUTAneous Q8H    therapeutic multivitamin (THERAGRAN) tablet 1 Tab  1 Tab Oral DAILY    sodium chloride (NS) flush 5-10 mL  5-10 mL IntraVENous PRN    ondansetron (ZOFRAN) injection 4 mg  4 mg IntraVENous Q4H PRN     [facility] Fang[facility]  Pager: [phone]  Office: [phone]

## 2019-07-24 NOTE — PROGRESS NOTES
Problem: Discharge Planning  Goal: *Discharge to safe environment  Outcome: Progressing Towards Goal   Plan : snf    Chart reviewed. Pt remains intubated on vent. For brain MRI today. Plan is for snf/snf to ltc based on progress. Dialysis likely tomorrow.

## 2019-07-24 NOTE — PROGRESS NOTES
Problem: Ventilator Management  Goal: *Adequate oxygenation and ventilation  Outcome: Progressing Towards Goal   VENTILATOR CARE PLAN    Problem: Ventilator Management  Goal: *Adequate oxygenation/ ventilation/ and extubation      Patient:        Lynette Vázquez     62 y.o.   male     7/24/2019  7:19 AM  Patient Active Problem List   Diagnosis Code    Gangrene of foot ([address] I96    Cellulitis of left lower extremity L03. 80    Malnourished ([address] E46    Peripheral vascular disease ([address] I73.9    Hyponatremia E87.1    Acute kidney injury superimposed on chronic kidney disease ([address] N17.9, N18.9    Dehydration E86.0    Sepsis ([address] A41.9    Gangrene ([address] I96    Anemia D64.9    Acute respiratory failure (HCC) J96.00    Septic encephalopathy G93.41    Transaminitis L68.5    Metabolic acidosis F27.1    Fever R50.9    Sacral ulcer ([address] L98.429       Sepsis ([address] [A41.9]  Hyponatremia [E87.1]  Gangrene ([address] [I96]  Hyponatremia [E87.1]    Reason patient intubated: Acute respiratory failure. Ventilator day: 8     Ventilator settings: ACVC+ 12 / 450 / +5 / 30%. ETT Size/Placement: 7.5-24 lip. ABG:  Date:7/24/2019  Lab Results   Component Value Date/Time    PHI 7.438 07/24/2019 05:22 AM    PCO2I 34.3 (L) 07/24/2019 05:22 AM    PO2I 79 (L) 07/24/2019 05:22 AM    HCO3I 23.2 07/24/2019 05:22 AM    FIO2I 30 07/24/2019 05:22 AM       Chest X-ray:  Date:7/24/2019  Results from Hospital Encounter encounter on 07/10/19   XR CHEST PORT    Narrative EXAM:  PORTABLE CHEST    INDICATION:  Respiratory distress. TECHNIQUE:  Portable, semierect AP view. COMPARISON:  07/22/2019    ____________________    FINDINGS:      SUPPORT DEVICES: Endotracheal tube is approximately 5 cm above the aristeo. Sump  tube is traversing below the left hemidiaphragm, tip not imaged. HEART AND MEDIASTINUM: Stable. LUNGS AND PLEURAL SPACES: Fiducial markers are noted within the right upper  lobe.  Hazy opacities within both mid to lower lung zones, similar to prior  study. Suspect trace bilateral pleural effusions. No discernible pneumothorax. BONY THORAX AND SOFT TISSUES: No acute osseous abnormality. Thoracic  spondylosis. ____________________      Impression IMPRESSION:      No significant interval change. Ill-defined opacities within both mid to lower  lung zones, may represent atelectasis, pneumonia or focal edema. Trace bilateral  pleural effusions. Endotracheal tube approximately 5 cm above the aristeo. Lab Test:  Date:7/24/2019  WBC:   Lab Results   Component Value Date/Time    WBC 14.2 (H) 07/24/2019 02:10 AM   HGB:   Lab Results   Component Value Date/Time    HGB 7.7 (L) 07/24/2019 02:10 AM    PLTS:   Lab Results   Component Value Date/Time    PLATELET 515 93/78/7110 02:10 AM       SaO2%/flow: @OCICKAV5(1)@    Vital Signs:   Patient Vitals for the past 8 hrs:   Temp Pulse Resp BP SpO2   07/24/19 0713 -- 78 23 -- 100 %   07/24/19 0700 -- 80 25 123/74 100 %   07/24/19 0600 -- 82 24 122/76 98 %   07/24/19 0515 98 °F (36.7 °C) -- -- -- --   07/24/19 0500 -- 91 27 128/88 97 %   07/24/19 0440 -- 82 30 -- 100 %   07/24/19 0400 98 °F (36.7 °C) 80 28 127/77 97 %   07/24/19 0300 -- 83 29 116/73 96 %   07/24/19 0200 -- 80 26 124/73 96 %   07/24/19 0100 -- 82 23 135/73 96 %   07/24/19 0010 98.8 °F (37.1 °C) -- -- -- --   07/24/19 0006 -- -- -- -- 94 %   07/24/19 0005 -- -- -- -- 90 %   07/24/19 0000 98.8 °F (37.1 °C) 79 27 135/73 92 %   07/23/19 2331 -- 75 26 -- 93 %       Wean Screen Pass (Yes or No): No  Wean Screen Reason for Failure: Agitation  Duration of Weaning Trial:  Additional Comments:        PLAN OF CARE: SBT daily       GOAL: Wean - extubate.       OUTCOME:

## 2019-07-24 NOTE — PROGRESS NOTES
1900: received pt from off going nurse Mercedes RN, pt resting in bed, side rails raised x3, bed in lowest position, VSS. 2000: Pt withdraws upper extremities slightly to pain, grimaces face, no sensation to LRE. *With doppler, No right pedal pulse and no right posterior tibial pulse. Right popliteal pulse heard with doppler. 0000: [id number].    0530: [id number].    0700: Bedside and Verbal shift change report given to Guillermo HORNE (oncoming nurse) by Jame Orozco   (offgoing nurse). Report included the following information SBAR, Kardex, ED Summary, Intake/Output, MAR, Accordion, Recent Results, Med Rec Status, Cardiac Rhythm nsr and Alarm Parameters .

## 2019-07-24 NOTE — CDMP QUERY
In order to reflect severity of illness please clarify if you concur with skin care nurse that this patient is being treated/managed for:    => stage 2 pressure ulcer To sacral area not POA   =>Other wound (please specify)  =>Unable to determine    Risks: 63 yo male with gangrene left leg, sepsis,  S/p Left AKA, respiratory failure on ventilator    Indicator: 7/22 Per Wound care note Stage 2 to sacral area measuring 1.7 x 1.7  7/17  Sacral Wound noted Flowsheet (admission date 7/10)    Treatment: Wound care consult, Envision Specialty mattress, Zinc paste applied to scrotum, pillow case made into sling for scrotum, Meplilex, Pressure relief wedges           Thank you,  Camila Dobson RN [id number] [place]

## 2019-07-24 NOTE — PROGRESS NOTES
[address] [place] [address] [facility]  ICU Progress Note      Name: Aravind Pereyra   : 1955   MRN: [id number]   Date: 2019 3:53 PM     [x]I have reviewed the flowsheet and previous days notes. Events overnight reviewed and discussed with nursing staff. Vital signs and records reviewed. Subjective:  19:    -Pt remains on vent  -Still slightly tachypneic breathing over vent in 20's today, 30's yesterday  - Plan for MRI today  -Will run Propofol gtt with Fentanyl gtt during MRI  -Increase Seroquel dose to 100mg BID  -HD yesterday  -S/p 1 unit PRBC   -Monitor fevers closely  -d/c abx      [x]The patient is unable to give any meaningful history or review of systems because the patient is:  [x]Intubated [x]Sedated   []Unresponsive      [x]The patient is critically ill on      [x]Mechanical ventilation []Pressors   []BiPAP []                 ROS:Pertinent items are noted in HPI. Medication Review:  · Pressors - None  · Sedation- Propofol  · Antibiotics - Cefepime/Vanc  · Pain - Fentanyl  · GI/ DVT -Protonix/Heparin SC  · Others (other gtts)      Vital Signs:    Visit Vitals  /73   Pulse 81   Temp 98 °F (36.7 °C)   Resp 18   Ht 6' (1.829 m)   Wt 64 kg (141 lb 1.5 oz)   SpO2 100%   BMI 19.14 kg/m²       O2 Device: Endotracheal tube, Ventilator   O2 Flow Rate (L/min): 6 l/min   Temp (24hrs), Av.7 °F (37.1 °C), Min:98 °F (36.7 °C), Max:99.2 °F (37.3 °C)       Intake/Output:   Last shift:      No intake/output data recorded.   Last 3 shifts:  1901 -  0700  In: 3072.5 [I.V.:1137.5]  Out: 2980 [Urine:480]    Intake/Output Summary (Last 24 hours) at 2019 1225  Last data filed at 2019 0700  Gross per 24 hour   Intake 1837.8 ml   Output 2620 ml   Net -782.2 ml       Ventilator Settings:  Ventilator Mode: Assist control, VC+  Respiratory Rate  Back-Up Rate: 12  Insp Time (sec): 0.9 sec  I:E Ratio: 1:4.56  Ventilator Volumes  Vt Set (ml): 450 ml  Vt Exhaled (Machine Breath) (ml): 481 ml  Vt Spont (ml): 449 ml  Ve Observed (l/min): 8.11 l/min  Ventilator Pressures  Pressure Support (cm H2O): 7 cm H2O  PIP Observed (cm H2O): 18 cm H2O  Plateau Pressure (cm H2O): 14 cm H2O  MAP (cm H2O): 9  PEEP/VENT (cm H2O): 5 cm H20  Auto PEEP Observed (cm H2O): (unable to obtain)    Physical Exam:       General:  Intubated/Sedated   Head:  Normocephalic, without obvious abnormality, atraumatic. Eyes:  Pinpoint pupils, sluggish reaction. Nose: Nares normal. Septum midline. Mucosa normal. No drainage or sinus tenderness. Throat: Lips, mucosa, and tongue normal. Teeth and gums normal.   Neck: Supple, symmetrical, trachea midline, no adenopathy, no carotid bruit and no JVD. Lungs:   Coarse, crackles, and rhonchi bilateral lungs and lobes. Heart:  RRR, S1, S2 normal, no m/r/g   Abdomen:   Soft, non-tender. Bowel sounds normal. No masses,  No organomegaly. Extremities: Extremities normal, atraumatic, no cyanosis or edema. Pulses: 2+ and symmetric all extremities.    Skin: Skin color, texture, turgor normal. No rashes or lesions   Neurologic: Withdraws from stimulation, grimaces           DATA:     Current Facility-Administered Medications   Medication Dose Route Frequency    QUEtiapine (SEROquel) tablet 100 mg  100 mg Oral BID    dexmedeTOMidine (PRECEDEX) 400 mcg in 0.9% sodium chloride 100 mL infusion  0.2-1 mcg/kg/hr IntraVENous TITRATE    acetaminophen (TYLENOL) tablet 650 mg  650 mg Oral Q4H PRN    LORazepam (ATIVAN) 1 mg/mL in D5W infusion  0-5 mg/hr IntraVENous TITRATE    0.9% sodium chloride infusion 250 mL  250 mL IntraVENous PRN    fluconazole (DIFLUCAN) 200mg/100 mL IVPB (premix)  200 mg IntraVENous Q24H    ELECTROLYTE REPLACEMENT PROTOCOL - Potassium Renal Dosing  1 Each Other PRN    ELECTROLYTE REPLACEMENT PROTOCOL - Magnesium   1 Each Other PRN    ELECTROLYTE REPLACEMENT PROTOCOL  - Phosphorus Renal Dosing  1 Each Other PRN    ELECTROLYTE REPLACEMENT PROTOCOL - Calcium   1 Each Other PRN    0.9% sodium chloride infusion 250 mL  250 mL IntraVENous PRN    clopidogrel (PLAVIX) tablet 75 mg  75 mg Oral DAILY    aspirin tablet 325 mg  325 mg Oral DAILY    carvedilol (COREG) tablet 6.25 mg  6.25 mg Oral BID WITH MEALS    pantoprazole (PROTONIX) 40 mg in sodium chloride 0.9% 10 mL injection  40 mg IntraVENous DAILY    sodium chloride (NS) flush 5-40 mL  5-40 mL IntraVENous Q8H    sodium chloride (NS) flush 5-40 mL  5-40 mL IntraVENous PRN    0.9% sodium chloride infusion 250 mL  250 mL IntraVENous PRN    0.9% sodium chloride infusion 250 mL  250 mL IntraVENous PRN    0.9% sodium chloride infusion  50 mL/hr IntraVENous DIALYSIS PRN    heparin (porcine) 1,000 unit/mL injection 1,000 Units  1,000 Units InterCATHeter DIALYSIS PRN    sodium chloride (NS) flush 5-40 mL  5-40 mL IntraVENous Q8H    sodium chloride (NS) flush 5-40 mL  5-40 mL IntraVENous PRN    naloxone (NARCAN) injection 0.1 mg  0.1 mg IntraVENous PRN    ondansetron (ZOFRAN) injection 4 mg  4 mg IntraVENous PRN    senna (SENOKOT) tablet 8.6 mg  1 Tab Oral BID    fentaNYL (PF) 900 mcg/30 ml infusion soln  0-200 mcg/hr IntraVENous TITRATE    propofol (DIPRIVAN) infusion  0-50 mcg/kg/min IntraVENous TITRATE    chlorhexidine (PERIDEX) 0.12 % mouthwash 10 mL  10 mL Oral Q12H    albuterol-ipratropium (DUO-NEB) 2.5 MG-0.5 MG/3 ML  3 mL Nebulization Q6H RT    fentaNYL citrate (PF) injection 25-50 mcg  25-50 mcg IntraVENous Q1H PRN    oxyCODONE-acetaminophen (PERCOCET) 5-325 mg per tablet 1-2 Tab  1-2 Tab Oral Q4H PRN    heparin (porcine) injection 5,000 Units  5,000 Units SubCUTAneous Q8H    therapeutic multivitamin (THERAGRAN) tablet 1 Tab  1 Tab Oral DAILY    sodium chloride (NS) flush 5-10 mL  5-10 mL IntraVENous PRN    ondansetron (ZOFRAN) injection 4 mg  4 mg IntraVENous Q4H PRN         Labs: Results:       Chemistry Recent Labs     07/24/19  0210 07/23/19  0315 07/22/19  0220   * 88 97    135* 135*   K 4.3 4.3 3.9    100 100   CO2 25 23 26   BUN 29* 45* 32*   CREA 3.85* 5.77* 4.46*   CA 7.6* 7.0* 7.2*   AGAP 9 12 9   BUCR 8* 8* 7*   * 166* 164*   TP 5.8* 5.3* 5.1*   ALB 1.1* 1.1* 1.2*   GLOB 4.7* 4.2* 3.9   AGRAT 0.2* 0.3* 0.3*      CBC w/Diff Recent Labs     07/24/19  1140 07/24/19  0210 07/23/19  0315   WBC 13.3* 14.2* 12.7   RBC 2.59* 2.72* 2.54*   HGB 7.4* 7.7* 7.2*   HCT 22.9* 23.9* 22.3*    241 208   GRANS PENDING  --   --    LYMPH PENDING  --   --    EOS PENDING  --   --       Coagulation Recent Labs     07/24/19  0210 07/23/19  0315   PTP 14.9 14.8   INR 1.2 1.2       Liver Enzymes Recent Labs     07/24/19  0210   TP 5.8*   ALB 1.1*   *   SGOT 292*      ABG Lab Results   Component Value Date/Time    PHI 7.438 07/24/2019 05:22 AM    PCO2I 34.3 (L) 07/24/2019 05:22 AM    PO2I 79 (L) 07/24/2019 05:22 AM    HCO3I 23.2 07/24/2019 05:22 AM    FIO2I 30 07/24/2019 05:22 AM      Microbiology Recent Labs     07/22/19  1045 07/22/19  1040   CULT NO GROWTH 2 DAYS NO GROWTH 2 DAYS          Telemetry: [x]Sinus []A-flutter []Paced    []A-fib []Multiple PVCs                  Imaging:    CXR [date]:    CXR Results  (Last 48 hours)               07/24/19 0515  XR CHEST PORT Final result    Impression:  IMPRESSION:       Unchanged since yesterday.        _______________                               Narrative:  EXAM: Portable upright chest radiograph. INDICATION: \"Respiratory distress. \"       COMPARISON: 7/23/2019       _______________       FINDINGS:            DEVICES:  Endotracheal tube in standard position. Esophagogastric tube   courses inferiorly off the field. LUNGS:              --Expansion:  Adequate. --Consolidation:  None detected. --Pulmonary edema:  Low-grade, lower lung predominant. PLEURAL SPACE:            --Pleural effusion:  None detected.             --Pneumothorax:  None detected.       _______________           07/23/19 1128 XR CHEST PORT Final result    Impression:  IMPRESSION:         No significant interval change. Ill-defined opacities within both mid to lower   lung zones, may represent atelectasis, pneumonia or focal edema. Trace bilateral   pleural effusions. Endotracheal tube approximately 5 cm above the aristeo. Narrative:  EXAM:  PORTABLE CHEST       INDICATION:  Respiratory distress. TECHNIQUE:  Portable, semierect AP view. COMPARISON:  07/22/2019       ____________________       FINDINGS:         SUPPORT DEVICES: Endotracheal tube is approximately 5 cm above the aristeo. Sump   tube is traversing below the left hemidiaphragm, tip not imaged. HEART AND MEDIASTINUM: Stable. LUNGS AND PLEURAL SPACES: Fiducial markers are noted within the right upper   lobe. Hazy opacities within both mid to lower lung zones, similar to prior   study. Suspect trace bilateral pleural effusions. No discernible pneumothorax. BONY THORAX AND SOFT TISSUES: No acute osseous abnormality. Thoracic   spondylosis. ____________________                   CT HEAD/CHEST/ABD/PELVIS [date]:  [x]I have personally reviewed the patients radiographs  [x]Radiographs reviewed with radiologist   []No change from prior, tubes and lines in adequate position  []Improved   []Worsening          IMPRESSION:   Acute Respiratory Failure  -s/p intubation for worsening mental status/hypoxia on 7/17  - ABG currently adequate, FIO2 30%, PEEP 5  Possible PNA  - No Cxs, infiltrate at R base  [place]  - on HD per nephro  LLE Gangrene s/p Left AKA  - POD #7. Pain control, follow up with vascular surgery  Agitation/Delirium  - concern for EtOH withdrawal  Anemia  - no signs of bleeding  Hx of COPD  Stage III NSCLC  -  s/p chemoRx/XRT, completed in Dec 2018     Patient Active Problem List   Diagnosis Code    Gangrene of foot ([address] I96    Cellulitis of left lower extremity L03. 80    Malnourished ([address] E46    Peripheral vascular disease ([facility] I73.9    Hyponatremia E87.1    Acute kidney injury superimposed on chronic kidney disease ([facility] N17.9, N18.9    Dehydration E86.0    Sepsis ([facility]) A41.9    Gangrene ([facility] I96    Anemia D64.9    Acute respiratory failure ([facility]) J96.00    Septic encephalopathy G93.41    Transaminitis K94.2    Metabolic acidosis W13.3    Fever R50.9    Sacral ulcer ([facility]) L98.429        RECOMMENDATIONS:   · Resp:   -Titrate FiO2/ supp O2 for SpO2 >90%  -Pulmonary Hygiene  -May benefit most from HD  -Daily CXR  -Daily ABG's  -Will re-attempt SBT tomorrow morning  · I/D:    -(+) leukocytosis WBC (14.2K), afebrile overnight   -Last lactic normalized. Will observe closely for any s/sx of emerging or new infections from L AKA   -Will consider consulting Infectious Disease if s/sx of infection persist   -ABX : d/c all abx  · Hem/Onc:    -Daily CBC;   -Hgb/Hct (7.4/22. 9) and plts are stable-Monitor closely for blood loss  -Will repeat CBC post HD  · CVS:    -Monitor Hemodynamics   -Check cardiac panel,   -ECHO complete- EF 20-38%  · Metabolic:   -Daily BMP;   -monitor e-lytes; replace PRN  · Renal:   -Rand placed in OR  -HD yesterday 7/23; 2.5L removed  -Consider additional HD tomorrow  -Nephrology following  -Galvez catheter / Diuresis  · Endocrine:   -POC Glucose q6  · GI:   -SUP,   -Trend LFTs,   -Tube Feeds   -Zofran PRN for N/V   · Musc/Skin:   -No acute issues, wound care  · Neuro:   -Ativan gtt  -Fentanyl gtt   -Precedex gtt  -Goal for RASS 0 to -1 without breathing over the vent  -Will initiate Propofol gtt for MRI only.   · Fluids: None  · Code Status: Full Code     Best Practices/ Safety Bundles:  · Sepsis Bundle per Hospital Protocol  · CAUTI Bundle  · Electrolyte Replacement Bundle  · Glycemic control goal <180; avoid Hypoglycemia  · IHI ICU Bundles:  ·  Central Line Bundle Followed , Galvez Bundle Followed and Vent Bundle Followed, Vent Day 6    · [facility] Vent patients:   · VAP bundle, Aim to keep peak plateau pressure 25-30cm H2O  · Aspiration Precautions - HOB >30'  · Daily sedation holiday as indicated  · SBT as tolerated/appropriate  · Titrate FiO2 for SpO2 >94%  · Aggressive Pulmonary Hygeiene  · Stress ulcer prophylaxis. Protonix   · DVT prophylaxis. Heparin SC  · Need for Lines, mccarthy assessed. · Restraints need. · Palliative care evaluation.       The patient is: [] acutely ill Risk of deterioration: [] moderate    [x] critically ill  [x] high     [x]See my orders for details    My assessment/plan was discussed with:  [x]Nursing []PT/OT    [x]Respiratory therapy [x]Dr. Jones Pascual   [x]Family []       Critical Care Time: 44 mins      Briana Molina NP-BC     Pulmonary, Critical Care Medicine  [facility]

## 2019-07-24 NOTE — PROGRESS NOTES
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 Started on dialysis 7/17. Last dialysis was yesterday, tolerated well, 2.5 liters pulled. Next dialysis tomorrow. Minimize intake, volume overloaded at this point. Overall prognosis appears poor, may have to readdress utility of dialysis with family in the near future. · Non small cell lung cancer. In remission per oncology. 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 · Severe anemia. H/H is stable after transfusion. · Resp failure. · Debilitation/malnutrition. Subjective: Intubated, sedated. Tolerate tf. Patient Active Problem List   Diagnosis Code    Gangrene of foot ([address] I96    Cellulitis of left lower extremity L03. 80    Malnourished ([address] E46    Peripheral vascular disease ([address] I73.9    Hyponatremia E87.1    Acute kidney injury superimposed on chronic kidney disease ([address] N17.9, N18.9    Dehydration E86.0    Sepsis (HCC) A41.9    Gangrene ([address] I96    Anemia D64.9    Acute respiratory failure (HCC) J96.00    Septic encephalopathy G93.41    Transaminitis D00.4    Metabolic acidosis W27.4    Fever R50.9    Sacral ulcer (HCC) L98.429       Current Facility-Administered Medications   Medication Dose Route Frequency Provider Last Rate Last Dose    vancomycin (VANCOCIN) 1,000 mg in 0.9% sodium chloride (MBP/ADV) 250 mL adv  1,000 mg IntraVENous Q TU, TH & SAT Al[place]illo [employer] H, .7 mL/hr at 07/23/19 2208 1,000 mg at 07/23/19 2208    [START ON 7/25/2019] VANCOMYCIN INFORMATION NOTE   Other ONCE Creed Fernando Heller H, DO        dexmedeTOMidine (PRECEDEX) 400 mcg in 0.9% sodium chloride 100 mL infusion  0.2-1 mcg/kg/hr IntraVENous TITRATE Briana Molina I NP 8.1 mL/hr at 07/24/19 0755 0.5 mcg/kg/hr at 07/24/19 1966    acetaminophen (TYLENOL) tablet 650 mg  650 mg Oral Q4H PRN Demetra Saxena PA-C   650 mg at 07/22/19 0955    LORazepam (ATIVAN) 1 mg/mL in D5W infusion  0-5 mg/hr IntraVENous TITRATE Jeral Lennox, MD 5 mL/hr at 07/24/19 0750 5 mg/hr at 07/24/19 0750    0.9% sodium chloride infusion 250 mL  250 mL IntraVENous PRN Jeral Lennox, MD        fluconazole (DIFLUCAN) 200mg/100 mL IVPB (premix)  200 mg IntraVENous Q24H Fco Ochoa  mL/hr at 07/23/19 2146 200 mg at 07/23/19 2146    QUEtiapine (SEROquel) tablet 50 mg  50 mg Oral BID DIANDRA Lopez   50 mg at 07/24/19 2677    ELECTROLYTE REPLACEMENT PROTOCOL - Potassium Renal Dosing  1 Each Other PRN Stew Mendoza MD        ELECTROLYTE REPLACEMENT PROTOCOL - Magnesium   1 Each Other PRN Stew Mendoza MD        ELECTROLYTE REPLACEMENT PROTOCOL  - Phosphorus Renal Dosing  1 Each Other PRN Stew Mendoza MD        ELECTROLYTE REPLACEMENT PROTOCOL - Calcium   1 Each Other PRN Stew Mendoza MD        0.9% sodium chloride infusion 250 mL  250 mL IntraVENous PRN Stew Mendoza MD        clopidogrel (PLAVIX) tablet 75 mg  75 mg Oral DAILY Demetra Saxena PA-C   75 mg at 07/24/19 2607    aspirin tablet 325 mg  325 mg Oral DAILY Demetra Saxena PA-C   325 mg at 07/24/19 6482    carvedilol (COREG) tablet 6.25 mg  6.25 mg Oral BID WITH MEALS Mckenzie Morris Anderson, PA   6.25 mg at 07/24/19 0945    pantoprazole (PROTONIX) 40 mg in sodium chloride 0.9% 10 mL injection  40 mg IntraVENous DAILY DIANDRA Lopez   40 mg at 07/24/19 5545    sodium chloride (NS) flush 5-40 mL  5-40 mL IntraVENous Q8H Maribel Villatoro MD   10 mL at 07/24/19 0540    sodium chloride (NS) flush 5-40 mL  5-40 mL IntraVENous PRN Maribel Villatoro MD        0.9% sodium chloride infusion 250 mL  250 mL IntraVENous PRN Kesha Delgado MD        0.9% sodium chloride infusion 250 mL  250 mL IntraVENous PRN Kesha Delgado MD   Stopped at 07/17/19 1200    0.9% sodium chloride infusion  50 mL/hr IntraVENous DIALYSIS PRN Zackery Jeter MD        heparin (porcine) 1,000 unit/mL injection 1,000 Units  1,000 Units InterCATHeter DIALYSIS PRN Zackery Jeter MD        sodium chloride (NS) flush 5-40 mL  5-40 mL IntraVENous Q8H Roman Kearney MD   10 mL at 07/24/19 0540    sodium chloride (NS) flush 5-40 mL  5-40 mL IntraVENous PRN Roman Kearney MD        [facility]) injection 0.1 mg  0.1 mg IntraVENous PRN Roman Kearney MD        ondansetron [facility]) injection 4 mg  4 mg IntraVENous PRN Roman Kearney MD        [facility]) tablet 8.6 mg  1 Tab Oral BID Roman Kearney MD   Stopped at 07/23/19 1800    fentaNYL (PF) 900 mcg/30 ml infusion soln  0-200 mcg/hr IntraVENous TITRATE Roman Kearney MD 2.5 mL/hr at 07/24/19 0752 75 mcg/hr at 07/24/19 0752    propofol (DIPRIVAN) infusion  0-50 mcg/kg/min IntraVENous TITRATE Briana Molina NP   Stopped at 07/23/19 1500    chlorhexidine (PERIDEX) 0.12 % mouthwash 10 mL  10 mL Oral Q12H Roman Kearney MD   10 mL at 07/24/19 0922    albuterol-ipratropium (DUO-NEB) 2.5 MG-0.5 MG/3 ML  3 mL Nebulization Q6H RT Roman Kearney MD   3 mL at 07/24/19 0713    fentaNYL citrate (PF) injection 25-50 mcg  25-50 mcg IntraVENous Q1H PRN Briana Molina NP   50 mcg at 07/18/19 1549    oxyCODONE-acetaminophen (PERCOCET) 5-325 mg per tablet 1-2 Tab  1-2 Tab Oral Q4H PRN Zackery Jeter MD   2 Tab at 07/16/19 1215    heparin (porcine) injection 5,000 Units  5,000 Units SubCUTAneous Amrit Garcia MD   5,000 Units at 07/24/19 0539    VANCOMYCIN INFORMATION NOTE 1 Each  1 Each Other Rx Dosing/Monitoring Zackery Jeter MD        therapeutic multivitamin [facility]) tablet 1 Tab  1 Tab Oral DAILY Zackery MD Steffany   1 Tab at 07/24/19 5003    sodium chloride (NS) flush 5-10 mL  5-10 mL IntraVENous PRN Zackery MD Steffany   10 mL at 07/23/19 1411    ondansetron (ZOFRAN) injection 4 mg  4 mg IntraVENous Q4H PRN Zackery Jeter MD   4 mg at 07/17/19 1113 Objective  Vitals:    07/24/19 0600 07/24/19 0700 07/24/19 0713 07/24/19 1104   BP: 122/76 123/74     Pulse: 82 80 78 81   Resp: 24 25 23 18   Temp:       TempSrc:       SpO2: 98% 100% 100% 100%   Weight:       Height:             Intake/Output Summary (Last 24 hours) at 7/24/2019 1109  Last data filed at 7/24/2019 0700  Gross per 24 hour   Intake 1897.94 ml   Output 2680 ml   Net -782.06 ml           Admission weight: Weight: 59 kg (130 lb) (07/10/19 1511)  Last Weight Metrics:  Weight Loss Metrics 7/23/2019 7/3/2019 4/29/2017 3/11/2017   Today's Wt 141 lb 1.5 oz 130 lb 150 lb 150 lb   BMI 19.14 kg/m2 17.63 kg/m2 20.34 kg/m2 20.34 kg/m2             Physical Assessment:     General: intubated, sedated. ET in place. Neck: No jvd. LUNGS: diffusely diminished air entry, bl exp rhonchi. No crackles. CVS EXM: S1, S2  RRR. Abdomen: soft, non tender. Lower Extremities:  1+ bl le  edema. Lt aka stump dressings intact. Rt femoral temporary dialysis catheter. Lab    CBC w/Diff Recent Labs     07/24/19  0210 07/23/19 0315 07/22/19  2300 07/22/19  0220   WBC 14.2* 12.7  --  12.6   RBC 2.72* 2.54*  --  2.34*   HGB 7.7* 7.2* 7.1* 6.6*   HCT 23.9* 22.3* 21.9* 20.6*    208  --  191        Chemistry Recent Labs     07/24/19  0210 07/23/19  0315 07/22/19  0220   * 88 97    135* 135*   K 4.3 4.3 3.9    100 100   CO2 25 23 26   BUN 29* 45* 32*   CREA 3.85* 5.77* 4.46*   CA 7.6* 7.0* 7.2*   AGAP 9 12 9   BUCR 8* 8* 7*   * 166* 164*   TP 5.8* 5.3* 5.1*   ALB 1.1* 1.1* 1.2*   GLOB 4.7* 4.2* 3.9   AGRAT 0.2* 0.3* 0.3*   PHOS 3.1 3.6 3.0         No results found for: IRON, FE, TIBC, IBCT, PSAT, FERR   Lab Results   Component Value Date/Time    Calcium 7.6 (L) 07/24/2019 02:10 AM    Phosphorus 3.1 07/24/2019 02:10 AM        Shavon Orozco M.D.   [facility]  Phone

## 2019-07-25 NOTE — HOSPICE
Chart review in process. Thank you for the referral to [facility]. Any questions or concerns please contact [phone].

## 2019-07-25 NOTE — ROUTINE PROCESS
Bedside shift change report given to Mj Hanson RN (oncoming nurse) by Petra Elias RN (offgoing nurse). Report included the following information SBAR, Kardex, OR Summary, Procedure Summary, Intake/Output, MAR, Recent Results, Med Rec Status, Cardiac Rhythm NSR- 1st degree Heart block and Alarm Parameters .

## 2019-07-25 NOTE — HOSPICE
Called placed to son Baron Mark. Discussed [facility] philosophy, services, criteria, and IDT. Answered all questions. Baron Mark explained he cannot bring his dad home. He works and there is no one to help care for him. Baron Mark said he was suppose to meet a person at the hospital Friday to apply for Medicaid. Writer instructed Baron Mark to contact  today to move forward with Medicaid assistance. Thank you for the [facility]. If we can be of further assistance please contact [phone].         EMMETT MedranoN, RN  Nurse Liaison, [place] [address]., [address] [facility], [facility] [address]  [phone]  [email or web address]

## 2019-07-25 NOTE — PROGRESS NOTES
Chart reviewed. Pt now extubated and on comfort care per family's wishes. Hospice reviewing pt currently. Pt to be transferred to floor.

## 2019-07-25 NOTE — ROUTINE PROCESS
Bedside shift change report given to Tram Suarez RN (oncoming nurse) by Cheyanne Guzman RN (offgoing nurse). Report included the following information SBAR, OR Summary, Procedure Summary, Intake/Output, MAR, Recent Results, Med Rec Status, Cardiac Rhythm NSR with  1st degree AVB and Alarm Parameters .

## 2019-07-25 NOTE — PROGRESS NOTES
Problem: Pain  Goal: *Control of Pain  Outcome: Progressing Towards Goal     Problem: Patient Education: Go to Patient Education Activity  Goal: Patient/Family Education  Outcome: Progressing Towards Goal     Problem: Falls - Risk of  Goal: *Absence of Falls  Description  Document Radha Cuevas Fall Risk and appropriate interventions in the flowsheet. Outcome: Progressing Towards Goal  Note:   Fall Risk Interventions:  Mobility Interventions: Bed/chair exit alarm    Mentation Interventions: Bed/chair exit alarm, Room close to nurse's station    Medication Interventions: Bed/chair exit alarm    Elimination Interventions: Bed/chair exit alarm    History of Falls Interventions: Bed/chair exit alarm, Door open when patient unattended, Room close to nurse's station         Problem: Patient Education: Go to Patient Education Activity  Goal: Patient/Family Education  Outcome: Progressing Towards Goal     Problem: Impaired Skin Integrity/Pressure Injury Treatment  Goal: *Improvement of Existing Pressure Injury  Outcome: Progressing Towards Goal  Goal: *Prevention of pressure injury  Description  Document Mikie Scale and appropriate interventions in the flowsheet. Outcome: Progressing Towards Goal  Note:   Pressure Injury Interventions:  Sensory Interventions: Assess changes in LOC, Minimize linen layers, Check visual cues for pain, Float heels, Turn and reposition approx. every two hours (pillows and wedges if needed)    Moisture Interventions: Absorbent underpads, Check for incontinence Q2 hours and as needed, Maintain skin hydration (lotion/cream)    Activity Interventions: Pressure redistribution bed/mattress(bed type)    Mobility Interventions: HOB 30 degrees or less, Turn and reposition approx.  every two hours(pillow and wedges)    Nutrition Interventions: Document food/fluid/supplement intake    Friction and Shear Interventions: Foam dressings/transparent film/skin sealants, HOB 30 degrees or less, Minimize layers Problem: Patient Education: Go to Patient Education Activity  Goal: Patient/Family Education  Outcome: Progressing Towards Goal     Problem: Pressure Injury - Risk of  Goal: *Prevention of pressure injury  Description  Document Mikie Scale and appropriate interventions in the flowsheet. Outcome: Progressing Towards Goal  Note:   Pressure Injury Interventions:  Sensory Interventions: Assess changes in LOC, Minimize linen layers, Check visual cues for pain, Float heels, Turn and reposition approx. every two hours (pillows and wedges if needed)    Moisture Interventions: Absorbent underpads, Check for incontinence Q2 hours and as needed, Maintain skin hydration (lotion/cream)    Activity Interventions: Pressure redistribution bed/mattress(bed type)    Mobility Interventions: HOB 30 degrees or less, Turn and reposition approx.  every two hours(pillow and wedges)    Nutrition Interventions: Document food/fluid/supplement intake    Friction and Shear Interventions: Foam dressings/transparent film/skin sealants, HOB 30 degrees or less, Minimize layers                Problem: Patient Education: Go to Patient Education Activity  Goal: Patient/Family Education  Outcome: Progressing Towards Goal     Problem: Discharge Planning  Goal: *Discharge to safe environment  Outcome: Progressing Towards Goal     Problem: Nutrition Deficit  Goal: *Optimize nutritional status  Outcome: Progressing Towards Goal  Goal: *Blood glucose 80 to 180 mg/dl  Outcome: Progressing Towards Goal  Goal: *Tolerates nutrition therapy  Outcome: Progressing Towards Goal     Problem: Patient Education: Go to Patient Education Activity  Goal: Patient/Family Education  Outcome: Progressing Towards Goal     Problem: Patient Education: Go to Patient Education Activity  Goal: Patient/Family Education  Outcome: Progressing Towards Goal     Problem: Ventilator Management  Goal: *Adequate oxygenation and ventilation  Outcome: Progressing Towards Goal  Goal: *Patient maintains clear airway/free of aspiration  Outcome: Progressing Towards Goal  Goal: *Absence of infection signs and symptoms  Outcome: Progressing Towards Goal  Goal: *Normal spontaneous ventilation  Outcome: Progressing Towards Goal     Problem: Patient Education: Go to Patient Education Activity  Goal: Patient/Family Education  Outcome: Progressing Towards Goal     Problem: Non-Violent Restraints  Goal: *Removal from restraints as soon as assessed to be safe  Outcome: Progressing Towards Goal  Goal: *No harm/injury to patient while restraints in use  Outcome: Progressing Towards Goal  Goal: *Patient's dignity will be maintained  Outcome: Progressing Towards Goal  Goal: *Patient Specific Goal (EDIT GOAL, INSERT TEXT)  Outcome: Progressing Towards Goal  Goal: Non-violent Restaints:Standard Interventions  Outcome: Progressing Towards Goal  Goal: Non-violent Restraints:Patient Interventions  Outcome: Progressing Towards Goal  Goal: Patient/Family Education  Outcome: Progressing Towards Goal     Problem: Patient Education: Go to Patient Education Activity  Goal: Patient/Family Education  Outcome: Progressing Towards Goal     Problem: Patient Education: Go to Patient Education Activity  Goal: Patient/Family Education  Outcome: Progressing Towards Goal     Problem: Surgical Pathway Day of Surgery  Goal: *Adequate urinary output (equal to or greater than 30 milliliters/hour)  Description  Ambulatory Surgery patients voiding without difficulty.   Outcome: Progressing Towards Goal     Problem: Surgical Pathway Post-Op Day 1  Goal: Off Pathway (Use only if patient is Off Pathway)  Outcome: Progressing Towards Goal  Goal: Activity/Safety  Outcome: Progressing Towards Goal  Goal: Diagnostic Test/Procedures  Outcome: Progressing Towards Goal  Goal: Nutrition/Diet  Outcome: Progressing Towards Goal  Goal: Discharge Planning  Outcome: Progressing Towards Goal  Goal: Medications  Outcome: Progressing Towards Goal  Goal: Respiratory  Outcome: Progressing Towards Goal  Goal: Treatments/Interventions/Procedures  Outcome: Progressing Towards Goal  Goal: Psychosocial  Outcome: Progressing Towards Goal  Goal: *No signs and symptoms of infection or wound complications  Outcome: Progressing Towards Goal  Goal: *Optimal pain control at patient's stated goal  Outcome: Progressing Towards Goal  Goal: *Adequate urinary output (equal to or greater than 30 milliliters/hour)  Outcome: Progressing Towards Goal  Goal: *Hemodynamically stable  Outcome: Progressing Towards Goal  Goal: *Tolerating diet  Outcome: Progressing Towards Goal  Goal: *Demonstrates progressive activity  Outcome: Progressing Towards Goal  Goal: *Lungs clear or at baseline  Outcome: Progressing Towards Goal     Problem: Nutrition Deficit  Goal: *Optimize nutritional status  Outcome: Progressing Towards Goal

## 2019-07-25 NOTE — PROGRESS NOTES
Internal Medicine Progress Note    Patient's Name: Adina Joshi  Admit Date: 7/10/2019  Length of Stay: 15      Assessment/Plan     Principal Problem:    Sepsis ([address] (7/10/2019)    Active Problems:    Gangrene ([address] (7/10/2019)      Acute kidney injury superimposed on chronic kidney disease ([address] (7/10/2019)      Malnourished ([address] (6/29/2019)      Peripheral vascular disease ([address] (6/29/2019)      Hyponatremia (6/29/2019)      Dehydration (7/10/2019)      Anemia (7/16/2019)      Acute respiratory failure ([address] (7/17/2019)      Septic encephalopathy (7/20/2019)      Transaminitis (6/89/9552)      Metabolic acidosis (0/21/7417)      Fever (7/22/2019)      Sacral ulcer ([address] (7/22/2019)        Hospice consulted, Comfort measures, Extubated, transferred           Sepsis/septic encephalopathy  - gangrenous first and third toe, s/p L AKA  - Recent wound culture from previous admission susceptible to rocephin and vanc, continue abx, renally dose  - repeat wound culture - Stenotrophomonas maltophilia, enterococcus faecalis, coag neg staph  Fevers improved  -abx discontinued 7/24/19  -NGTD on reblood cultures that were drawn    Gangrene/PVD  - Vasc consulted - appreciate  - podiatry consulted - appreciate   - L AKA done 7/17     [place]  - strict I&Os, mccarthy  - HD catheter placed  - nephrology following - appreciate the services  - refused renal US  - dialysis started 7/17  -abx discontinued     Acute resp failure  - intubated 7/17  - PCCM consulted - vent mgmt per them  -ICU plans to contact family about possible tracheostomy, discussion about goals of care    Malnourished  - Nutritional supplements with all meals    Hyponatremia  - acute on chronic, stable  - Nephrology consult - appreciate  - improving    Anemia  - PRBCs transfused as needed for hgb <7  - monitor H&H    -Wound care consult for stage 2 sacral ulcer- recs per them appreciate services       - Cont acceptable home medications for chronic conditions   - DVT protocol    I have personally reviewed all pertinent labs and films that have officially resulted over the last 24 hours. I have personally checked for all pending labs that are awaiting final results. Interval History   \"Vini Obregon is a 62 y.o. male with a PMHx listed below of who presented to the ED with complaints of worsening LLE pain. Patient was admitted on 6/30/19 and discharged three days ago for gangrenous toes. Vascular surgery and podiatry were consulted then and he was recommended for multilevel revascularization, he was to follow up as out patient and said that his son could transport him. Per patient now, son was unable to transport patient to f/u appointment, so he returned due to persistent symptoms.       In the ED vascular surgery consulted, podiatry consulted. He presents with worsening creatinine and sodium. I spoke with Nephrology and they recommend fluids. His only complaint is pain, no fevers, chills, weakness, AMS. Sodium 121, will be admitted for further eval. He doesn't know if he has been taking his lasix, but says hes been taking his abx\"    Per podiatry - no need for urgent amputation, wait until revascularization. Nephrology consulted - Acute on chronic hyponatremia improved with gentle IVF. Oncology consulted - bone scan ordered to complete restaging - No definite sonographic evidence of osseous metastatic disease. Repeat wound culture 7/11 - Stenotrophomonas maltophilia, enterococcus faecalis, coag neg staph. Patient continually refused to complete MRI. Patient's renal function declined rapidly, patient started dialysis 7/17. PRBCs transfused as needed for hgb <7. L AKA 7/17. Patient developed acute resp failure postoperatively and was intubated.  Wound care consulted: stage 2 sacral ulcer. Discussion with family took place between ICU team about poor prognosis, Comfort care measures, patient extubated, transferred out of ICU, Hospice consulted.     Subjective     Pt s/e @ bedside. Extubated, NAD, asleep, moans and groans, does not awake    Objective     Visit Vitals  /70   Pulse 91   Temp 99.6 °F (37.6 °C)   Resp 21   Ht 6' (1.829 m)   Wt 64 kg (141 lb 1.5 oz)   SpO2 91%   BMI 19.14 kg/m²       Physical Exam:  General Appearance: asleep, lethargic, moaning and groaning  HENT: normocephalic/atraumatic, moist mucus membranes  Neck: No JVD, supple  Lungs: cTAB, no wheeze, crackles, or ronchi,  CV: RRR, no m/r/g  Abdomen: soft, non-tender, normal bowel sounds  Extremities: no cyanosis, no peripheral edema, L AKA      Intake and Output:  Current Shift:  07/25 0701 - 07/25 1900  In: 16.6 [I.V.:16.6]  Out: -   Last three shifts:  07/23 1901 - 07/25 0700  In: 2153.6 [I.V.:968.6]  Out: 150 [Urine:150]    Lab/Data Reviewed:  BMP:   No results found for: NA, K, CL, CO2, AGAP, GLU, BUN, CREA, GFRAA, GFRNA  CBC:   No results found for: WBC, HGB, HGBEXT, HCT, HCTEXT, PLT, PLTEXT, HGBEXT, HCTEXT, PLTEXT      Imaging Reviewed:  No results found.     Medications Reviewed:  Current Facility-Administered Medications   Medication Dose Route Frequency    LORazepam (INTENSOL) 2 mg/mL oral concentrate 1 mg  1 mg Oral Q1H PRN    morphine (PF) 150 mg/30 mL infusion  0-10 mg/hr IntraVENous TITRATE    morphine injection 10 mg  10 mg Inhalation Q1H PRN    fentaNYL citrate (PF) injection 25-50 mcg  25-50 mcg IntraVENous Q1H PRN    ondansetron (ZOFRAN) injection 4 mg  4 mg IntraVENous Q4H PRN     Wesly Oconnell PA-C  [address] [facility]  Hospitalist Division  Pager: [phone]  Office: [phone]

## 2019-07-25 NOTE — PROGRESS NOTES
1915: Patient received resting on ventilator. Order received to extubate patient after Morphine drip is started and patient is adequately sedated. RT available if needed. RN will advise when patient is ready. 2130: RN not available at this time to allow for comfort extubation. 2300: Waiting to extubate patient as RN is not available at this time. RN will notify RT when she is ready to extubate patient to comfort care. 2355: Patient suctioned via ET, TIMMY and orally for large amounts of pale yellow secretions. Patient extubated to 2LPM NC.

## 2019-07-25 NOTE — PROGRESS NOTES
0800 Assumed care - pt is comfort care now - Ativan drip discontinued & will use PRN - Appears to be resting comfortably on Morphine drip 4mg/hr & can be increased to 10mg/hr as needed   1000- Preparing for transfer  Son aware pt is transfering to 3S - pt cleaned of loose stool & linen changed  1130 pt has some labored breathing while moving to new bed & Fentanyl 50 mcg given on arrival to 3037 & morphine remains at 4mg/hr  Pt also orally suctioned for moderate clearish secretions repot given to [address] - O2 at 2Lnc for comfort

## 2019-07-25 NOTE — PROGRESS NOTES
Patent extubated by  [facility] RT . Patient tolerated procedure- Patient placed on 2 LPM via nc- respirations 28-30 at this time- no distress noted- breath sounds coarse throughout. Patient suctioned orally  For moderate amount of thick secretions. Oral care given. Patient will be continued to be monitored for decline. Patient on comfort care only at this time.

## 2019-07-25 NOTE — PROGRESS NOTES
Spoke with pt son Lonny Puentes (Bharathi) regarding plan for Hospice/comfort care. He states that he was informed by hospice nurse that his father wouldn't be able to stay in hospital for comfort care, which he states he didn't understand initially. States that family is unable to provide 24/7 caregiver to be with his father, which is required for hospice care. He reported that he has appt with Bert Landaverde to begin Medicaid process tomorrow at 10am.  This CM spoke with Ms Vel Mendez, who stated that she can see Lonny Puentes today at 1pm instead. Informed him of this, and he states he will see her then. Placed call to [facility], and [facility]. Discussed pt and family situation. Both facilities report that they are a short term facility and require that pt family have a facility selected for care beyond a few days, or 24/7 caregiver for pt to continue hospice at home. Hospice care at a snf would require payment of room and board. Placed call to Petty Youssef (son). He stated that he did complete Medicaid isela today and also reiterated that he does not have the financial resources to pay out of pocket up front for room and board in a facility, and is not able to provide 24/7 caregiver for pt at home.

## 2019-07-25 NOTE — PROGRESS NOTES
Discussed with Critical Care and ICU Nurse. Patient is Comfort Care, DNR  The plan is for extubation soon in accordance with the family's wishes  Continuing with comfort care, it is the expectation that the patient will not survive for a prolonged time after extubation.

## 2019-07-26 NOTE — PROGRESS NOTES
responded to the death of  Paris Schneider, who was a 62 y.o.,male,     The  provided the following Interventions:  Provided crisis spiritual support and grief interventions. Offered prayers on behalf of the patient. Chart reviewed. Plan:  Chaplains will continue to follow and will provide spiritual care and grief support for the family.     [facility]   Staff [address] [facility]   [phone]

## 2019-07-26 NOTE — PROGRESS NOTES
Assume care of patient lying in bed unresponsive with mouth open. Chenyne storks respirations. No verbal or tactile stimuli response. Morphine IV via PCA pump infusing for comfort. Bed locked in lowest position. Monitoring for assistance and needs. No family member in room at present. Comfort measures in place. Reposition for comfort. Temp 102.7.    2135 Dr. Isacc Howell notified of elevated MEWS score 7 and order for tylenol suppository. 2144 Tylenol suppository administered rectally for patient warm to touch. Patient suction at intervals with thick whitish sputum. Turned and reposition for comfort. 2340 Patient perspiring with visible perspiration noted with bed bath provided. 07/26/2019    0100 Patient remains warm to touch with ice pack applied. 0330 Monitor patient vital signs. 8241 Patient found without BP, heart rate and respiration. Call placed to Dr. Isacc Howell. 6678 Call place to Dr. Carolee Varela to update on status. [address] [facility] notified by Charge nurse, Hardik Leahy , [facility].     8962 Call placed to son, Garry Kim. Stated Hayley Estrada will be up after he call his mom. \"    [phone] Family arrived to floor with  in room. 0700 Family remains in room.      0945 Report given to Rajat Perez

## 2019-07-26 NOTE — DISCHARGE SUMMARY
2 [facility]  Hospitalist Division    Discharge Summary    Patient: Michael Boswell MRN: [id number]  CSN: [id number]    YOB: 1955  Age: 62 y.o.   Sex: male    DOA: 7/10/2019 LOS:  LOS: 16 days   Discharge Date: 7/26/2019     Admission Diagnoses: Sepsis ([facility] [A41.9]  Hyponatremia [E87.1]  Gangrene ([facility] Matheus Pack  Hyponatremia [E87.1]    Discharge Diagnoses:    Problem List as of 7/26/2019 Date Reviewed: 7/16/2019          Codes Class Noted - Resolved    Gangrene ([facility] ICD-10-CM: V35  ICD-9-CM: 785.4  7/10/2019 - Present        Peripheral vascular disease ([facility] ICD-10-CM: I73.9  ICD-9-CM: 443.9  6/29/2019 - Present        Acute respiratory failure ([facility] ICD-10-CM: J96.00  ICD-9-CM: 518.81  7/17/2019 - Present        Acute kidney injury superimposed on chronic kidney disease ([facility] ICD-10-CM: N17.9, N18.9  ICD-9-CM: 866.00, 585.9  7/10/2019 - Present        Fever ICD-10-CM: R50.9  ICD-9-CM: 780.60  7/22/2019 - Present        Sacral ulcer ([facility] ICD-10-CM: T80.190  ICD-9-CM: 707.8  7/22/2019 - Present        Metabolic acidosis [facility]-QO: E87.2  ICD-9-CM: 276.2  7/21/2019 - Present        Septic encephalopathy ICD-10-CM: G93.41  ICD-9-CM: 348.31  7/20/2019 - Present        Transaminitis ICD-10-CM: R74.0  ICD-9-CM: 790.4  7/20/2019 - Present        Anemia ICD-10-CM: D64.9  ICD-9-CM: 285.9  7/16/2019 - Present        Dehydration ICD-10-CM: E86.0  ICD-9-CM: 276.51  7/10/2019 - Present        * (Principal) Sepsis ([place] [address] ICD-10-CM: A41.9  ICD-9-CM: 038.9, 995.91  7/10/2019 - Present        Gangrene of foot ([place] [address] ICD-10-CM: K00  ICD-9-CM: 785.4  6/29/2019 - Present        Cellulitis of left lower extremity ICD-10-CM: L03.116  ICD-9-CM: 682.6  6/29/2019 - Present        Malnourished ([place] [address] ICD-10-CM: E46  ICD-9-CM: 263.9  6/29/2019 - Present        Hyponatremia ICD-10-CM: E87.1  ICD-9-CM: 276.1  6/29/2019 - Present        RESOLVED: Lactic acidemia ICD-10-CM: E87.2  ICD-9-CM: 276.2 7/10/2019 - 2019              Discharge Condition:     Consults: Nephrology, Pulmonary/Critical Care, Vascular Surgery and Podiatry    HPI: Disha Dobson a 62 y. o. male with a PMHx listed below of who presented to the ED with complaints of worsening LLE pain.  Patient was admitted on 19 and discharged three days ago for gangrenous toes.  Vascular surgery and podiatry were consulted then and he was recommended for multilevel revascularization, he was to follow up as out patient and said that his son could transport him.  Per patient now, son was unable to transport patient to f/u appointment, so he returned due to persistent symptoms.       In the ED vascular surgery consulted, podiatry consulted. Semaj Hope presents with worsening creatinine and sodium.  I spoke with Nephrology and they recommend fluids.  His only complaint is pain, no fevers, chills, weakness, AMS.  Sodium 121, will be admitted for further eval. He doesn't know if he has been taking his lasix, but says hes been taking his abx    Hospital Course: Per podiatry - no need for urgent amputation, wait until revascularization. Nephrology consulted - Acute on chronic hyponatremia improved with gentle IVF. Oncology consulted - bone scan ordered to complete restaging - No definite sonographic evidence of osseous metastatic disease. Repeat wound culture  - Stenotrophomonas maltophilia, enterococcus faecalis, coag neg staph. Patient continually refused to complete MRI. Patient's renal function declined rapidly, patient started dialysis . PRBCs transfused as needed for hgb <7. L AKA . Patient developed acute resp failure postoperatively and was intubated by [facility]. Aztreonam started d/t thick yellow secretions seen during intubation. Patient repeatedly failed SBT/weaning off vent on d/t tachypnea/resp distress. [facility] held family meeting on  to discuss goals of care. Family decided to proceed with comfort care measures.  Patient passed away on 7/26 at 0511. Significant Diagnostic Studies: All lab results for the last 24 hours reviewed. Nm Bone Scan Wh Body    Result Date: 7/12/2019  WHOLE BODY BONE SCAN INDICATION: Lung cancer, non-small cell, staging exam COMPARISON: Prior CT angiogram with runoff of the lower extremities June 30, 2019 TECHNIQUE: Following intravenous administration of 42.0 mCi 99mTc-MDP, approximately three hour delayed whole body images were obtained in anterior and posterior projections. FINDINGS: Biodistribution of injected radiopharmaceutical appears as expected. Soft tissue infiltration adjacent to the injection site is noted. Likely degenerative uptake adjacent to the posterior left hand. Degenerative uptake noted involving the sacroiliac joints, hips, and knees. IMPRESSION: 1. No definite sonographic evidence of osseous metastatic disease. Xr Chest Sngl V    Result Date: 7/17/2019  EXAM: CHEST RADIOGRAPH, SINGLE VIEW CLINICAL INDICATION/HISTORY: Short of breath. Suspect fluid overload COMPARISON: Single view chest 7/10/2019 TECHNIQUE: Portable frontal view of the chest was obtained. _______________ FINDINGS: SUPPORT DEVICES: None. HEART AND MEDIASTINUM: There is central vascular congestion compared to recent chest film although heart size remains normal. LUNGS AND PLEURAL SPACES: New opacification inferior left hemithorax with blunting left costophrenic angle with similar but less prominent changes on the right. Linear density peripheral right upper lobe with several metallic densities consistent with fiducial markers is stable. BONY THORAX AND SOFT TISSUES: No acute osseous abnormality. _______________     IMPRESSION: 1. New bilateral pleural effusions with probable lower lobe areas of atelectasis, left greater than right.  Mild central vascular congestion although heart size remains normal.    Xr Foot Lt Min 3 V    Result Date: 7/11/2019  EXAM: XR FOOT LT MIN 3 V CLINICAL INDICATION/HISTORY: pain, gangrene to 1-3 digits -Additional: None COMPARISON: June 29, 2018 TECHNIQUE: 3 views of the left foot _______________ FINDINGS: BONES: Alignment is anatomic. Bony mineralization is normal. No acute fractures. Chronic healed fracture deformity of the fourth and fifth metatarsal diaphysis is noted. There is a thin linear ossific fragment along the dorsal dorsal to the talus suggesting sequela of prior capsular avulsion injury, unchanged. Calcaneal discopathy. SOFT TISSUES: Mild dorsal soft tissue swelling about the forefoot. Mild soft tissue swelling about the first and second digits are noted. There is atrophy over the distal aspect of the third digit. No radiopaque foreign bodies. _______________     IMPRESSION: 1. Nonspecific soft tissue swelling about the dorsum of the foot as well as the first and second digits along with soft tissue atrophy overlying the distal third digit. 2.  No acute osseous abnormality. Specifically, no findings to suggest osteoarthritis. Xr Foot Lt Min 3 V    Result Date: 6/29/2019  EXAM: Foot Series Complete Indication: Left foot pain. Technique:  Frontal, oblique, and lateral views of the left foot. Comparison: None _______________ FINDINGS: Osteopenia with old healed fourth and fifth metatarsal fractures. No acute fracture or destructive osseous abnormality. Small degenerative plantar cannula spur. The soft tissues appear within normal limits. No radiopaque foreign body is seen. _______________    IMPRESSION: 1. No acute fracture, destructive osseous changes or plain film findings to suggest osteomyelitis. Ct Chest Wo Cont    Result Date: 7/15/2019  EXAM: CT chest INDICATION: Right lung cancer. Follow-up examination. COMPARISON: None. TECHNIQUE: Axial CT imaging from the thoracic inlet through the diaphragm without intravenous contrast. Multiplanar reformats were generated.  One or more dose reduction techniques were used on this CT: automated exposure control, adjustment of the mAs and/or kVp according to patient size, and iterative reconstruction techniques. The specific techniques used on this CT exam have been documented in the patient's electronic medical record. Digital Imaging and Communications in Medicine (DICOM) format image data are available to nonaffiliated external healthcare facilities or entities on a secured, media free, reciprocally searchable basis with patient authorization for at least a 12-month period after this study. _______________ FINDINGS: BASE OF THE NECK: Normal. LUNGS: Significant respiratory motion artifact is present. Fiducial markers are present in the right upper lobe. Small area of scarring is present within the right upper lobe which measures up to 1.7 cm (axial image 103). No focal nodular area in this area of scarring to suggest recurrent disease, however prior studies are not available for review. Interstitial edema is present in both lower lungs. AIRWAY: Secretions are present within the right main and segmental bronchi causing narrowing of the airway within this region. PLEURA: Small to moderate bilateral pleural effusions with adjacent atelectasis. MEDIASTINUM: Coronary artery calcifications are present. Small pericardial effusion. Vasculature is unremarkable. LYMPH NODES: No enlarged lymph nodes. UPPER ABDOMEN: Full thickening is present within the visualized portions of the stomach, which could be suggestive of mild gastritis. BONES: No acute or aggressive osseous abnormalities identified. OTHER: None. _______________     IMPRESSION: 1. Mild interstitial edema is present in both lower lungs. Small to moderate bilateral pleural effusions with adjacent atelectasis. Small pericardial effusion is present. 2. Minimal soft tissue prominence is present in the right upper lobe at the site of the prior malignancy which has the appearance of soft tissue scarring.  No focal nodular regions to suggest recurrent disease, however prior studies are not available for review. 3. Doubtful thickening and surrounding edema within the visualized portions of the proximal stomach, which could be suggestive of mild gastritis. 4. Layering secretions are present in the distal trachea in the right main bronchus, concerning for small amount of aspiration. Cta Abd Art W Runoff W Wo Cont    Result Date: 7/1/2019  EXAM:  CT ANGIOGRAM OF THE ABDOMINAL AORTA AND BILATERAL LOWER EXTREMITIES WITH SAGITTAL AND CORONAL RECONSTRUCTIONS CLINICAL INDICATION/HISTORY: Atherosclerosis, unspecified; evaluate abdomen/pelvis/leg arteries. > Additional: Left foot pain and gangrene. COMPARISON: None. > Reference Exam: None. TECHNIQUE:  CT arteriogram of the abdominal aorta and both lower extremities was performed after intravenous contrast administration without complication. Extensive separate workstation post processing was performed by [employer] to include MIPs, color surface images, 3-D reconstructions, and curve planar reformat imaging. One or more dose reduction techniques were used on this CT: automated exposure control, adjustment of the mAs and/or kVp according to patient size, and iterative reconstruction techniques. The specific techniques used on this CT exam have been documented in the patient's electronic medical record. Digital Imaging and Communications in Medicine (DICOM) format image data are available to nonaffiliated external healthcare facilities or entities on a secure, media free, reciprocally searchable basis with patient authorization for at least a 12-month period after this study. ________________________________________________ FINDINGS: --- CT ARTERIOGRAM ---  ABDOMINAL AORTA:  Mild to moderate diffuse atherosclerosis moderate luminal irregularity. No significant infrarenal abdominal aortic stenosis. No aneurysm or dissection. - Celiac axis:  No significant proximal stenosis. - Superior mesenteric artery:  No significant proximal stenosis.  - Left renal:  Moderate to marked origin stenosis. - Right renal:  No significant stenosis. RIGHT LOWER EXTREMITY: - Iliac system: Moderate to marked atherosclerosis. Mild origin stenosis involving the common iliac artery. Multilevel stenosis involving the external iliac artery, most severe at the origin which is moderate. Tight stenosis involving the origin of the internal iliac artery. - Femoral/Popliteal system: Marked tight focal stenosis involving the mid common femoral artery. Additional moderate short segment stenosis involving the distal common femoral artery. Proximal profunda femoral artery is patent. Multilevel stenosis throughout the superficial femoral artery with multilevel tight stenosis or occlusion present within the proximal and mid aspects. Multilevel disease throughout the popliteal artery with long segment occlusion involving the mid to distal aspect. - Tibial/peroneal system:  Extensive atherosclerosis with suspected occlusions of the origins and proximal aspects of the trifurcation vessels. Some flow seen within the tibioperoneal vessels in the mid to distal calf. LEFT LOWER EXTREMITY: - Iliac system: Moderate to marked atherosclerosis. Suggestion of mild focal origin stenosis involving the common iliac artery secondary to calcified eccentric plaque. There is long segment occlusion of the external iliac artery along its entire length. Moderate to marked origin stenosis involving the internal iliac artery. - Femoral/popliteal system:  Reconstitution of the proximal common femoral artery which is diffusely diseased. No significant stenosis involving the mid to distal common femoral artery. Proximal profunda femoral artery is patent. Multilevel disease involving the superficial femoral artery throughout its entire length with multiple tandem stenosis. Short segment occlusion involving the distal aspect.  Reconstitution of the proximal popliteal artery with several short segment occlusions throughout its course. Short segment occlusion involving the proximal popliteal artery - Tibioperoneal system: Extensive atherosclerosis. Occlusion of the proximal trifurcation vessels with reconstitution with the mid to distal aspects being patent however with multilevel disease. --- CT OF ABDOMEN/PELVIS  --- Assessment is moderately limited secondary to respiratory motion. LOWER CHEST: Dependent bilateral lower lobe atelectasis. LIVER, BILIARY: Liver is unremarkable. No biliary dilation. Gallbladder is limited evaluation due to respiratory motion. Velora Sis PANCREAS: Unremarkable as visualized. SPLEEN: Unremarkable as visualized. ADRENALS: Unremarkable as visualized. KIDNEYS: No hydronephrosis. LYMPH NODES: No enlarged lymph nodes. GASTROINTESTINAL TRACT: No bowel dilation or wall thickening. PELVIC ORGANS: Moderately distended bladder. BONES: No acute or aggressive osseous abnormalities identified. Multilevel spondylosis. OTHER: No ascites. ___________________________________________________     IMPRESSION: 1. ABDOMINAL AORTA:  Mild to moderate atherosclerosis without significant stenosis. 2. RIGHT LOWER EXTREMITY: -HAYDEE:  Mild origin stenosis. -EIA:  Multilevel stenosis, most severe at the origin with moderate stenosis. -CFA:  Marked tight focal stenosis involving the mid aspect. Moderate short segment stenosis involving the distal aspect. -SFA:  Multilevel stenosis with multilevel tight stenosis or occlusions within the proximal and mid aspects. -Popliteal artery:  Multilevel stenosis with long segment occlusion involving the mid to distal aspect. -Tibioperoneal arteries:  Extensive atherosclerosis with suspected occlusions involving the origins and proximal aspects. 3. LEFT LOWER EXTREMITY: -HAYDEE:  Suggestion of mild focal origin stenosis. -EIA:  Occluded. -CFA:  Proximal aspect occluded. No significant stenosis involving the mid to distal aspect.  -SFA:  Multilevel stenosis throughout its entire length with short segment occlusion involving the distal aspect. -Popliteal artery:  Multiple short segment occlusions involving its entire length. -Tibioperoneal arteries:  Extensive atherosclerosis with suspected occlusion of the proximal trifurcation vessels. 4. Other vascular findings:  -Moderate to marked left renal artery origin stenosis. 5. Non-vascular findings: -Assessment limited secondary to respiratory motion. Moderately distended bladder. [place] [address]    Result Date: 7/12/2019  Retroperitoneal Ultrasound History: Acute kidney injury Comparison: No prior study available for comparison. Technique: Multiple gray scale sonographic images of the kidneys were obtained. Additional color Doppler and the Doppler waveform images were obtained . Findings: The right kidney measures 11.3 cm in length and is normal in echotexture. No focal lesions are seen. There is no evidence of hydronephrosis. The left kidney measures 11.2 cm in length and is normal in echotexture. No focal lesions are seen. There is no evidence of hydronephrosis. IMPRESSION: Normal appearance of bilateral kidneys without evidence of obstructive nephropathy. Xr Chest Port    Result Date: 7/24/2019  EXAM: Portable upright chest radiograph. INDICATION: \"Respiratory distress. \" COMPARISON: 7/23/2019 _______________ FINDINGS:     DEVICES:  Endotracheal tube in standard position. Esophagogastric tube courses inferiorly off the field. LUNGS:           --Expansion:  Adequate. --Consolidation:  None detected. --Pulmonary edema:  Low-grade, lower lung predominant. PLEURAL SPACE:          --Pleural effusion:  None detected. --Pneumothorax:  None detected. _______________     IMPRESSION: Unchanged since yesterday. _______________     Orvan Fail Chest Port    Result Date: 7/23/2019  EXAM:  PORTABLE CHEST INDICATION:  Respiratory distress. TECHNIQUE:  Portable, semierect AP view.  COMPARISON:  07/22/2019 ____________________ FINDINGS:  SUPPORT DEVICES: Endotracheal tube is approximately 5 cm above the aristeo. Sump tube is traversing below the left hemidiaphragm, tip not imaged. HEART AND MEDIASTINUM: Stable. LUNGS AND PLEURAL SPACES: Fiducial markers are noted within the right upper lobe. Hazy opacities within both mid to lower lung zones, similar to prior study. Suspect trace bilateral pleural effusions. No discernible pneumothorax. BONY THORAX AND SOFT TISSUES: No acute osseous abnormality. Thoracic spondylosis. ____________________     IMPRESSION:  No significant interval change. Ill-defined opacities within both mid to lower lung zones, may represent atelectasis, pneumonia or focal edema. Trace bilateral pleural effusions. Endotracheal tube approximately 5 cm above the aristeo. Xr Chest Port    Result Date: 7/22/2019  AP portable chest, 7/22/2019 at 0313 hours: INDICATION: Respiratory distress. Comparison 7/21/2019. Endotracheal tube good position just below the clavicles but well above the aristeo. NG/OG tube off the film. Increased density left lung base consistent with effusion and left lower lobe atelectasis/trace/focal edema is stable. Right basilar edema or infiltrate with small right effusion also basically unchanged. The heart is stable. IMPRESSION: No definite interval change. Xr Chest Port    Result Date: 7/21/2019  EXAM: One view chest x-ray CLINICAL INDICATION/HISTORY: Respiratory distress. COMPARISON: 07/20/2019. TECHNIQUE: Single AP view of the chest was obtained. _______________ FINDINGS: HEART, VESSELS, MEDIASTINUM: Heart size is normal. No vascular congestion. LUNGS, PLEURAL SPACES: Upper right chest and a marker is again noted. There is mild interval improvement in interstitial markings throughout with hazy opacities at bilateral lung bases. There is no pneumothorax. BONY THORAX, SOFT TISSUES: Unremarkable. MEDICAL SUPPORT DEVICES: Endotracheal tube and nasogastric/orogastric tube in stable position.  _______________ IMPRESSION: 1. Medical support devices in satisfactory position. 2. Persistent, but improving interstitial and alveolar opacities throughout the lungs with associated small effusions. Xr Chest Port    Result Date: 7/20/2019  EXAM: One view chest x-ray CLINICAL INDICATION/HISTORY: Respiratory failure. COMPARISON: 07/19/2019. TECHNIQUE: Single AP view of the chest was obtained. _______________ FINDINGS: HEART, VESSELS, MEDIASTINUM: Heart size is normal. No vascular congestion. LUNGS, PLEURAL SPACES: Upper right chest and a marker is again noted. There is improved interstitial markings throughout the fluid with hazy opacities at bilateral lung bases. There is no pneumothorax. BONY THORAX, SOFT TISSUES: Unremarkable. MEDICAL SUPPORT DEVICES: Endotracheal tube and nasogastric/orogastric tube in stable position. _______________     IMPRESSION: 1. Medical support devices in satisfactory position. 2. Persistent interstitial and alveolar opacities throughout both lungs of nonspecific etiology either edema, atelectasis, or pneumonia. Xr Chest Port    Result Date: 7/19/2019  EXAM: Portable Chest CLINICAL INDICATION: Respiratory distress -Additional: Intubated COMPARISON: 07/18/19 TECHNIQUE: AP portable view at 0506 ______________ FINDINGS: HEART AND MEDIASTINUM: The heart size is stable LUNGS AND AIRWAYS: Fiducial markers are again seen in the upper right chest. Area of infiltrate appears present at both lung bases. There is increased at the left lung base on the recent study PLEURA: No pleural effusion or pneumothorax. BONES: Chronic spondylosis is present OTHER: The endotracheal catheter tip is about 7 cm above the aristeo. The NG tube enters the stomach. Cardiac monitor leads overlie the chest ______________     IMPRESSION: Infiltrative changes at the lung bases which have increased on the left.  Tubes as discussed    Xr Chest Port    Result Date: 7/18/2019  EXAM: XR CHEST PORT CLINICAL INDICATION/HISTORY: Respiratory distress -Additional: Intubated COMPARISON: Prior chest x-rays, the most recent from 7/17/2019 TECHNIQUE: Frontal view of the chest _______________ FINDINGS: SUPPORT LINES AND TUBES:   > High position of the endotracheal tube tip, at the thoracic inlet, approximately 8.3 cm above the aristeo.   > Stable nasogastric tube. HEART AND MEDIASTINUM: Stable midline cardiac silhouette. Mild interval increased hilar prominence LUNGS AND PLEURAL SPACES: Right upper thoracic reduce the markers with increased adjacent patchy reticular and alveolar density to prior exam. Increasing right lower lung confluent reticular and alveolar opacity in the short interval. Unchanged left basilar opacity obscuring left diaphragmatic margin. Minimal blunted bilateral costophrenic angles, potentially scarring and/or trace effusions. No pneumothorax. BONY THORAX AND SOFT TISSUES: No acute or destructive osseous abnormality. _______________     IMPRESSION: 1. High position endotracheal tube tip at the thoracic inlet, 8.3 cm above the aristeo. Recommend advancing 3 cm. Stable nasogastric tube. 2. Interval increased right upper and lower lung confluent opacity, which may represent developing atelectasis and/or nonspecific edema given short interval progression. Xr Chest Port    Result Date: 7/17/2019  EXAM: CHEST RADIOGRAPH, SINGLE VIEW CLINICAL INDICATION/HISTORY: Respiratory failure. Intubated COMPARISON: AP portable chest film done earlier today and on 7/10/2019 TECHNIQUE: Portable frontal view of the chest was obtained. _______________ FINDINGS: SUPPORT DEVICES: Endotracheal tube is now present in good position well above the aristeo. NG tube is also present extending below the diaphragm although the tip is not included on this exam. HEART AND MEDIASTINUM: Heart size remains normal. Pulmonary vascularity appears less engorged possibly related to better inspiration on this exam.  Aorta is normal in caliber with mild calcification. LUNGS AND PLEURAL SPACES: The interstitial changes in the lung bases are less prominent. Blunting of the costophrenic angles also appears less prominent with minimal blunting remaining. No definite focal consolidation. No pneumothorax. BONY THORAX AND SOFT TISSUES: No acute osseous abnormality. _______________     IMPRESSION: 1. Endotracheal tube and NG tube good position. 2. Better inspiration with only possible tiny effusions suspected on this study and resolved basilar interstitial changes which were seen on recent exam done earlier this afternoon. Xr Chest Port    Result Date: 7/10/2019  EXAM: XR CHEST PORT CLINICAL INDICATION/HISTORY: Left lower extremity wound, sepsis -Additional: Non-small cell lung carcinoma of the right upper lobe COMPARISON: Several prior chest radiographs, most recently June 22, 2018. TECHNIQUE: Frontal view of the chest _______________ FINDINGS: HEART AND MEDIASTINUM: Normal cardiac size and mediastinal contours. LUNGS AND PLEURAL SPACES: Linear/nodular opacity projecting over the right upper lobe with adjacent surgical clips redemonstrated without change. No focal pneumonic consolidation, pneumothorax, or pleural effusion. BONY THORAX AND SOFT TISSUES: No acute osseous abnormality _______________     IMPRESSION: 1. No acute radiographic abnormality. 2. Linear and nodular area of opacity projecting over the right upper lobe in keeping with history of malignancy. Xr Abd Port  1 V    Result Date: 7/17/2019  EXAM: KUB INDICATION: NG tube placement COMPARISON: None. _______________ FINDINGS: Portable supine abdominal film was performed. NG tube is present with tip and sidehole extending well below the diaphragm. Mild blunting bilateral costophrenic angles consistent with small effusions. This appears smaller than on chest film done earlier this afternoon. Nonobstructive bowel gas pattern. _______________     IMPRESSION: 1. NG tube good position. Nonobstructive bowel gas pattern.  2. Findings suspicious for small bilateral effusions which appear smaller than on chest film done earlier this afternoon. Xr Angio Extremity Uni Si    Result Date: 7/17/2019  Fluoroscopy was used during surgery for this procedure under the supervision of the attending surgeon. Start Time:     0800 hours End Time:      0950 hours # of Images:  0       IMPRESSION:   Administrative report.   CB         Discharge time: >35 minutes    Rodriguez Angel PA-C  [facility] [id number]  Office:  [phone]  Pager: [phone]      7/26/2019, 5:57 PM

## 2019-07-26 NOTE — PROGRESS NOTES
Spoke with Amie Parsons from eye bank. Primary nurse unavailable to take call. The eye bank is releasing the patient from their list, he is not a donation candidate d/t sepsis and gangrene.

## 2019-07-26 NOTE — PROGRESS NOTES
DEATH PRONOUNCEMENT  Called to patient's room to evaluate non-responsive state. Patient does not respond to tactile or verbal stimulus. He has no respiratory effort or breath sounds. He has no heart tones or pulse. He is pronounced dead as of 05:11. Summary will be done by attending.

## 2019-07-27 PROCEDURE — 3331090001 HH PPS REVENUE CREDIT

## 2019-07-27 PROCEDURE — 3331090002 HH PPS REVENUE DEBIT

## 2019-07-28 LAB
BACTERIA SPEC CULT: NORMAL
BACTERIA SPEC CULT: NORMAL
SERVICE CMNT-IMP: NORMAL
SERVICE CMNT-IMP: NORMAL

## 2019-07-28 PROCEDURE — 3331090002 HH PPS REVENUE DEBIT

## 2019-07-28 PROCEDURE — 3331090001 HH PPS REVENUE CREDIT

## 2019-07-29 PROCEDURE — 3331090001 HH PPS REVENUE CREDIT

## 2019-07-29 PROCEDURE — 3331090002 HH PPS REVENUE DEBIT

## 2019-07-30 ENCOUNTER — HOME CARE VISIT (OUTPATIENT)
Dept: HOME HEALTH SERVICES | Facility: HOME HEALTH | Age: 64
End: 2019-07-30
Payer: MEDICARE

## 2019-07-30 PROCEDURE — 3331090001 HH PPS REVENUE CREDIT

## 2019-07-30 PROCEDURE — 3331090002 HH PPS REVENUE DEBIT

## 2019-07-31 PROCEDURE — 3331090002 HH PPS REVENUE DEBIT

## 2019-07-31 PROCEDURE — 3331090001 HH PPS REVENUE CREDIT

## 2019-08-01 PROCEDURE — 3331090002 HH PPS REVENUE DEBIT

## 2019-08-01 PROCEDURE — 3331090001 HH PPS REVENUE CREDIT

## 2019-08-02 PROCEDURE — 3331090002 HH PPS REVENUE DEBIT

## 2019-08-02 PROCEDURE — 3331090001 HH PPS REVENUE CREDIT

## 2019-08-03 PROCEDURE — 3331090002 HH PPS REVENUE DEBIT

## 2019-08-03 PROCEDURE — 3331090001 HH PPS REVENUE CREDIT

## 2019-08-04 PROCEDURE — 3331090002 HH PPS REVENUE DEBIT

## 2019-08-04 PROCEDURE — 3331090001 HH PPS REVENUE CREDIT

## 2019-08-05 ENCOUNTER — HOME CARE VISIT (OUTPATIENT)
Dept: HOME HEALTH SERVICES | Facility: HOME HEALTH | Age: 64
End: 2019-08-05
Payer: MEDICARE

## 2021-04-28 NOTE — PROGRESS NOTES
Physical Exam  
Skin:  
 
  
 
 Detail Level: Simple Instructions: This plan will send the code FBSD to the PM system.  DO NOT or CHANGE the price. Price (Do Not Change): 0.00

## 2023-02-27 NOTE — ED NOTES
TRANSFER - ED to INPATIENT REPORT: 
 
SBAR report made available to receiving floor on this patient being transferred to  [phone] /[phone])  for routine progression of care Admitting diagnosis Sepsis ([facility] [address] [A41.9] Hyponatremia [E87.1] Information from the following report(s) SBAR, ED Summary, [facility] and Recent Results was made available to receiving floor. Lines:  
Peripheral IV 07/10/19 Right Antecubital (Active) Site Assessment Clean, dry, & intact 7/10/2019  3:25 PM  
Phlebitis Assessment 0 7/10/2019  3:25 PM  
Infiltration Assessment 0 7/10/2019  3:25 PM  
Dressing Status Clean, dry, & intact 7/10/2019  3:25 PM  
Action Taken Blood drawn 7/10/2019  3:25 PM  
  
 
Medication list updated today Opportunity for questions and clarification was provided. Patient is oriented to time, place, person and situation Sepsis summary na* Patient is  continent and ambulatory with assist 
  
Valuables transported with patient Patient transported with: 
 Monitor Registered Nurse Tech 
 
MAP (Monitor): 100 =Monitored (most recent) Vitals w/ MEWS Score (last day) Date/Time MEWS Score Pulse Resp Temp BP Level of Consciousness SpO2  
 07/10/19 1930    92  23    150/89    93 % 07/10/19 1915    92  20    148/86    94 % 07/10/19 1900    95  26    136/95  (Abnormal)     97 % 07/10/19 1845    93  17    144/91  (Abnormal)     92 % 07/10/19 1830    91  16    148/82    93 % 07/10/19 1815    92  20    141/93  (Abnormal)     96 % 07/10/19 1800    92  22    161/90    92 % 07/10/19 1745    94  22    145/86    94 % 07/10/19 1730    95  24    158/93  (Abnormal)     93 % 07/10/19 1715    96  21        94 % 07/10/19 1700    96  23    161/90    98 % 07/10/19 1645    94  22    144/85      
 07/10/19 1615    96  24    186/92  (Abnormal)       
 07/10/19 1600    93  25    151/101  (Abnormal)      07/10/19 1510  1  91  18  97.6 °F (36.4 °C)  158/93  (Abnormal)   Alert  96 % Septic Patients:  
 
Lactic Acid Lab Results Component Value Date LACPOC 2.26 ([facility]) 07/10/2019 LACPOC 3.95 ([facility]) 07/10/2019  
 (Most recent on top) Repeat drawn: YES Time: 1941 ALL LACTIC ACIDS GREATER THAN 2 MUST BE REPEATED POC WITHIN 4 HOURS OR PRIOR TO ADMISSION Total Fluid Bolus initiated and documented on MAR: YES All ordered antibiotics initiated within first 3 hours of TIME ZERO?   YES 
 
 
 
 
 
 Yes

## 2025-06-04 NOTE — PROGRESS NOTES
Paged Dr. Carlene Lin regarding ECG and lab results. And updated her on pt's progress and recent vital signs.   Problem: Pain Goal: *Control of Pain Outcome: Progressing Towards Goal 
  
Problem: Falls - Risk of 
Goal: *Absence of Falls Description Document Tito Arzola Fall Risk and appropriate interventions in the flowsheet.  
Outcome: Progressing Towards Goal

## (undated) DEVICE — DRSG PATCH ANTIMIC 1INX7.0MM -- CONVERT TO ITEM 356054

## (undated) DEVICE — BANDAGE,GAUZE,BULKEE II,4.5"X4.1YD,STRL: Brand: MEDLINE

## (undated) DEVICE — MASTISOL ADHESIVE LIQ 2/3ML

## (undated) DEVICE — PAD,NON-ADHERENT,3X8,STERILE,LF,1/PK: Brand: MEDLINE

## (undated) DEVICE — NDL PRT INJ NSAF BLNT 18GX1.5 --

## (undated) DEVICE — GOWN,SIRUS,NONRNF,SETINSLV,2XL,18/CS: Brand: MEDLINE

## (undated) DEVICE — TOWEL: OR BLU 80/CS: Brand: MEDICAL ACTION INDUSTRIES

## (undated) DEVICE — 3M™ UNIVERSAL ELECTROSURGICAL PLATE, SPLIT, UNCORDED 9160: Brand: 3M™

## (undated) DEVICE — SUT SLK 2-0SH 30IN BLK --

## (undated) DEVICE — BANDAGE COMPR SGL LAYERED CLP CLSR ELAS 15FT LEN 6IN W

## (undated) DEVICE — SUTURE VCRL SZ 2-0 L18IN ABSRB UD CT-1 L36MM 1/2 CIR J839D

## (undated) DEVICE — (D)PREP SKN CHLRAPRP APPL 26ML -- CONVERT TO ITEM 371833

## (undated) DEVICE — DRSG PATCH ANTIMIC 1INX4.0MM -- CONVERT TO ITEM 356053

## (undated) DEVICE — TRAY PREP DRY W/ PREM GLV 2 APPL 6 SPNG 2 UNDPD 1 OVERWRAP

## (undated) DEVICE — AMD ANTIMICROBIAL SUPER SPONGES,MEDIUM: Brand: KERLIX

## (undated) DEVICE — SUTURE ETHLN SZ 2-0 L18IN NONABSORBABLE BLK L26MM PS 3/8 585H

## (undated) DEVICE — COVER US PRB W15XL120CM W/ GEL RUBBERBAND TAPE STRP FLD GEN

## (undated) DEVICE — PACK PROCEDURE SURG ORTH SHLDR CUST

## (undated) DEVICE — STERILE POLYISOPRENE POWDER-FREE SURGICAL GLOVES: Brand: PROTEXIS

## (undated) DEVICE — STAPLER SKIN H3.9MM WIRE DIA0.58MM CRWN 6.9MM 35 STPL FIX

## (undated) DEVICE — OCCLUSIVE GAUZE STRIP,3% BISMUTH TRIBROMOPHENATE IN PETROLATUM BLEND: Brand: XEROFORM

## (undated) DEVICE — MICROPUNCTURE INTRODUCER SET SILHOUETTE TRANSITIONLESS WITH STAINLESS STEEL WIRE GUIDE: Brand: MICROPUNCTURE

## (undated) DEVICE — STRYKER PERFORMANCE SERIES SAGITTAL BLADE: Brand: STRYKER PERFORMANCE SERIES

## (undated) DEVICE — DRAPE,THYROID,SOFT,STERILE: Brand: MEDLINE

## (undated) DEVICE — ELECTRODE BLDE L4IN NONINSULATED EDGE

## (undated) DEVICE — SUTURE PERMAHAND SZ 3-0 L18IN NONABSORBABLE BLK SILK BRAID A184H

## (undated) DEVICE — SYR 10ML LUER LOK 1/5ML GRAD --

## (undated) DEVICE — ROCKER SWITCH PENCIL HOLSTER: Brand: VALLEYLAB

## (undated) DEVICE — STAPLER SKIN H3.9MM WIRE DIA0.58MM CRWN 6.9MM 35 STPL ROT

## (undated) DEVICE — 3L THIN WALL CAN: Brand: CRD

## (undated) DEVICE — SUTURE PERMAHAND SZ 2-0 L30IN NONABSORBABLE BLK SH L26MM C016D

## (undated) DEVICE — TOWEL SURG W16XL26IN BLU NONFENESTRATED DLX ST 2 PER PK

## (undated) DEVICE — STOCKINETTE TUBE BLN 2PLY 6X72 -- MEDICHOICE CONVERT TO 363488

## (undated) DEVICE — DEVICE SECUREMENT AD W/ TRICOT ANCHR PD FOR F LTX SIL CATH

## (undated) DEVICE — FLUFF 6X6 1/2

## (undated) DEVICE — 13FR TRIALYSIS ST 20CM TRAY: Brand: POWER-TRIALYSIS CATHETER

## (undated) DEVICE — SPONGE LAP 18X18IN STRL -- 5/PK

## (undated) DEVICE — GOWN,AURORA,NONRNF,XL,30/CS: Brand: MEDLINE

## (undated) DEVICE — SUTURE PERMA-HAND SZ 0 L24IN NONABSORBABLE BLK W/O NDL SILK SA76G